# Patient Record
Sex: FEMALE | Race: WHITE | NOT HISPANIC OR LATINO | Employment: PART TIME | URBAN - METROPOLITAN AREA
[De-identification: names, ages, dates, MRNs, and addresses within clinical notes are randomized per-mention and may not be internally consistent; named-entity substitution may affect disease eponyms.]

---

## 2018-02-15 ENCOUNTER — OFFICE VISIT (OUTPATIENT)
Dept: URGENT CARE | Facility: CLINIC | Age: 43
End: 2018-02-15

## 2018-02-15 VITALS
HEART RATE: 72 BPM | BODY MASS INDEX: 30.86 KG/M2 | DIASTOLIC BLOOD PRESSURE: 85 MMHG | RESPIRATION RATE: 18 BRPM | SYSTOLIC BLOOD PRESSURE: 121 MMHG | HEIGHT: 66 IN | TEMPERATURE: 98 F | OXYGEN SATURATION: 97 % | WEIGHT: 192 LBS

## 2018-02-15 DIAGNOSIS — J01.00 ACUTE MAXILLARY SINUSITIS, RECURRENCE NOT SPECIFIED: Primary | ICD-10-CM

## 2018-02-15 LAB
CTP QC/QA: YES
FLUAV AG NPH QL: NEGATIVE
FLUBV AG NPH QL: NEGATIVE

## 2018-02-15 PROCEDURE — 3008F BODY MASS INDEX DOCD: CPT | Mod: S$GLB,,, | Performed by: FAMILY MEDICINE

## 2018-02-15 PROCEDURE — 96372 THER/PROPH/DIAG INJ SC/IM: CPT | Mod: S$GLB,,, | Performed by: FAMILY MEDICINE

## 2018-02-15 PROCEDURE — 87804 INFLUENZA ASSAY W/OPTIC: CPT | Mod: 59,QW,S$GLB, | Performed by: FAMILY MEDICINE

## 2018-02-15 PROCEDURE — 99202 OFFICE O/P NEW SF 15 MIN: CPT | Mod: 25,S$GLB,, | Performed by: FAMILY MEDICINE

## 2018-02-15 RX ORDER — AMOXICILLIN AND CLAVULANATE POTASSIUM 875; 125 MG/1; MG/1
1 TABLET, FILM COATED ORAL 2 TIMES DAILY
Qty: 20 TABLET | Refills: 0 | Status: SHIPPED | OUTPATIENT
Start: 2018-02-15 | End: 2018-02-25

## 2018-02-15 RX ORDER — BETAMETHASONE SODIUM PHOSPHATE AND BETAMETHASONE ACETATE 3; 3 MG/ML; MG/ML
6 INJECTION, SUSPENSION INTRA-ARTICULAR; INTRALESIONAL; INTRAMUSCULAR; SOFT TISSUE
Status: COMPLETED | OUTPATIENT
Start: 2018-02-15 | End: 2018-02-15

## 2018-02-15 RX ADMIN — BETAMETHASONE SODIUM PHOSPHATE AND BETAMETHASONE ACETATE 6 MG: 3; 3 INJECTION, SUSPENSION INTRA-ARTICULAR; INTRALESIONAL; INTRAMUSCULAR; SOFT TISSUE at 11:02

## 2018-02-15 NOTE — PROGRESS NOTES
"Subjective:       Patient ID: Chinyere Crockett is a 42 y.o. female.    Vitals:  height is 5' 6" (1.676 m) and weight is 87.1 kg (192 lb). Her oral temperature is 97.5 °F (36.4 °C). Her blood pressure is 121/85 and her pulse is 72. Her respiration is 18 and oxygen saturation is 97%.     Chief Complaint: Cough    Patient states she been having flu like symptoms for 1 week.      Cough   This is a new problem. The current episode started in the past 7 days. The problem has been gradually worsening. The problem occurs constantly. The cough is non-productive. Associated symptoms include chills, myalgias, nasal congestion and postnasal drip. Pertinent negatives include no chest pain, ear pain, eye redness, fever, headaches, sore throat, shortness of breath or wheezing. The symptoms are aggravated by lying down. Treatments tried: mucinex. The treatment provided mild relief. Her past medical history is significant for bronchitis and pneumonia. There is no history of asthma, COPD or emphysema.     Review of Systems   Constitution: Positive for chills, decreased appetite, weakness and malaise/fatigue. Negative for fever.   HENT: Positive for congestion and postnasal drip. Negative for ear pain, hoarse voice and sore throat.    Eyes: Negative for discharge and redness.   Cardiovascular: Negative for chest pain, dyspnea on exertion and leg swelling.   Respiratory: Positive for cough. Negative for shortness of breath, sputum production and wheezing.    Musculoskeletal: Positive for myalgias.   Gastrointestinal: Negative for abdominal pain and nausea.   Neurological: Negative for headaches.       Objective:      Physical Exam   Constitutional: She appears well-developed and well-nourished.   HENT:   Head: Normocephalic and atraumatic.   Right Ear: Tympanic membrane and ear canal normal.   Left Ear: Tympanic membrane and ear canal normal.   Nose: Mucosal edema and rhinorrhea present. Left sinus exhibits maxillary sinus tenderness and " frontal sinus tenderness.   Mouth/Throat: Posterior oropharyngeal erythema present.   Cardiovascular: Normal rate, regular rhythm and normal heart sounds.    Pulmonary/Chest: Effort normal and breath sounds normal.   Lymphadenopathy:     She has cervical adenopathy.   Nursing note and vitals reviewed.      Assessment:       1. Acute maxillary sinusitis, recurrence not specified        Plan:         Acute maxillary sinusitis, recurrence not specified  -     POCT Influenza A/B  -     amoxicillin-clavulanate 875-125mg (AUGMENTIN) 875-125 mg per tablet; Take 1 tablet by mouth 2 (two) times daily.  Dispense: 20 tablet; Refill: 0  -     betamethasone acetate-betamethasone sodium phosphate injection 6 mg; Inject 1 mL (6 mg total) into the muscle one time.      Recommend continue with muccinex OTC

## 2018-02-15 NOTE — PATIENT INSTRUCTIONS
Acute Sinusitis    Acute sinusitis is irritation and swelling of the sinuses. It is usually caused by a viral infection after a common cold. Your doctor can help you find relief.  What is acute sinusitis?  Sinuses are air-filled spaces in the skull behind the face. They are kept moist and clean by a lining of mucosa. Things such as pollen, smoke, and chemical fumes can irritate the mucosa. It can then swell up. As a response to irritation, the mucosa makes more mucus and other fluids. Tiny hairlike cilia cover the mucosa. Cilia help carry mucus toward the opening of the sinus. Too much mucus may cause the cilia to stop working. This blocks the sinus opening. A buildup of fluid in the sinuses then causes pain and pressure. It can also encourage bacteria to grow in the sinuses.  Common symptoms of acute sinusitis  You may have:  · Facial soreness pain  · Headache  · Fever  · Fluid draining in the back of the throat (postnasal drip)  · Congestion  · Drainage that is thick and colored, instead of clear  · Cough  Diagnosing acute sinusitis  Your doctor will ask about your symptoms and health history. He or she will look at your ear, nose, and throat. You usually won't need to have X-rays taken.    The doctor may take a sample of mucus to check for bacteria. If you have sinusitis that keeps coming back, you may need imaging tests such as X-rays or CAT scans. This will help your doctor check for a structural problem that may be causing the infection.  Treating acute sinusitis  Treatment is aimed at unblocking the sinus opening and helping the cilia work again. You may need to take antihistamine and decongestant medicine. These can reduce inflammation and decrease the amount of fluid your sinuses make. If you have a bacterial infection, you will need to take antibiotic medicine for 10 to 14 days. Take this medicine until it is gone, even if you feel better.  Date Last Reviewed: 10/1/2016  © 2527-4652 [employer] [address] [place] [id number]. All rights reserved. This information is not intended as a substitute for professional medical care. Always follow your healthcare professional's instructions.

## 2018-03-19 ENCOUNTER — HOSPITAL ENCOUNTER (EMERGENCY)
Facility: OTHER | Age: 43
Discharge: HOME | End: 2018-03-19
Attending: EMERGENCY MEDICINE
Payer: OTHER MISCELLANEOUS

## 2018-03-19 VITALS
RESPIRATION RATE: 16 BRPM | DIASTOLIC BLOOD PRESSURE: 63 MMHG | HEIGHT: 67 IN | HEART RATE: 77 BPM | OXYGEN SATURATION: 97 % | SYSTOLIC BLOOD PRESSURE: 128 MMHG | WEIGHT: 190.25 LBS | TEMPERATURE: 98 F | BODY MASS INDEX: 29.86 KG/M2

## 2018-03-19 DIAGNOSIS — T14.90XA INJURY: ICD-10-CM

## 2018-03-19 DIAGNOSIS — M54.6 ACUTE BILATERAL THORACIC BACK PAIN: ICD-10-CM
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
## 2018-03-19 DIAGNOSIS — M25.572 ACUTE LEFT ANKLE PAIN: Primary | ICD-10-CM

## 2018-03-19 LAB
B-HCG UR QL: NEGATIVE
CTP QC/QA: YES

## 2018-03-19 PROCEDURE — 81025 URINE PREGNANCY TEST: CPT | Performed by: EMERGENCY MEDICINE

## 2018-03-19 PROCEDURE — 63600175 PHARM REV CODE 636 W HCPCS: Performed by: PHYSICIAN ASSISTANT

## 2018-03-19 PROCEDURE — 96372 THER/PROPH/DIAG INJ SC/IM: CPT

## 2018-03-19 PROCEDURE — 99284 EMERGENCY DEPT VISIT MOD MDM: CPT | Mod: 25

## 2018-03-19 RX ORDER — KETOROLAC TROMETHAMINE 30 MG/ML
30 INJECTION, SOLUTION INTRAMUSCULAR; INTRAVENOUS
Status: COMPLETED | OUTPATIENT
Start: 2018-03-19 | End: 2018-03-19

## 2018-03-19 RX ORDER — METHOCARBAMOL 500 MG/1
1000 TABLET, FILM COATED ORAL 3 TIMES DAILY
Qty: 18 TABLET | Refills: 0 | Status: SHIPPED | OUTPATIENT
Start: 2018-03-19 | End: 2018-03-22

## 2018-03-19 RX ORDER — IBUPROFEN 600 MG/1
600 TABLET ORAL EVERY 6 HOURS PRN
Qty: 20 TABLET | Refills: 0 | Status: SHIPPED | OUTPATIENT
Start: 2018-03-19 | End: 2020-09-23

## 2018-03-19 RX ADMIN — KETOROLAC TROMETHAMINE 30 MG: 30 INJECTION, SOLUTION INTRAMUSCULAR at 06:03

## 2018-03-19 NOTE — ED NOTES
NEURO: Pt AAO x 4. Behavior and speech appropriate to situation.   CARDIAC: Regular rhythm auscultated  RESPIRATORY: Respirations even and unlabored. Breath sounds clear to all lung fields.   MUSCULOSKELETAL: Active ROM noted to extremities. Tenderness to lower thoracic back.

## 2018-03-19 NOTE — ED TRIAGE NOTES
"Pt presents with fall, onset 1150 today. Pt slipped on floor and fell backward and landed on her back. Pt hit left ankle on toilet. Pt also reporting pain to left hip. Pt denies any head trauma, denies LOC. Abrasion noted to left lateral ankle. Pain 7/10, described as "burning, dull ache". Pt denies any paresthesias. Pt took ibuprofen X 2 PTA. Pt ambulated to exam room with limp. Pt in NAD.   "

## 2018-03-19 NOTE — ED PROVIDER NOTES
Encounter Date: 3/19/2018       History     Chief Complaint   Patient presents with    Fall     + slip & fall at work, + bilateral lower back pains, left side ankle pains. Denies hitting head or LOC at time of fall. No neck pains reported     42-year-old female with no significant past medical history presents to the emergency department with complaints of mid to low back pain and left ankle pain status post slip and fall in the bathroom at work.  She states that she attempted to step back and fell hitting the concrete floor.  She states that she is unsure of what she her left ankle on.  She denies injury or LOC.  She denies numbness or weakness.  She denies loss of bowel bladder function.  She admits to taking 400 mg of ibuprofen without relief of her symptoms.  She denies confusion, lightheadedness, nausea, vomiting.  She complains of pain that is a 7 out of 10.      The history is provided by the patient and the spouse.     Review of patient's allergies indicates:   Allergen Reactions    Hydrocodone      Dizziness and nausea      History reviewed. No pertinent past medical history.  Past Surgical History:   Procedure Laterality Date    TONSILLECTOMY       History reviewed. No pertinent family history.  Social History   Substance Use Topics    Smoking status: Never Smoker    Smokeless tobacco: Never Used    Alcohol use Yes      Comment: occasionally     Review of Systems   Constitutional: Negative for chills and fever.   HENT: Negative for sore throat.    Respiratory: Negative for shortness of breath.    Cardiovascular: Negative for chest pain.   Gastrointestinal: Negative for nausea and vomiting.   Genitourinary: Negative for difficulty urinating and dysuria.   Musculoskeletal: Positive for arthralgias, back pain, gait problem, joint swelling and myalgias. Negative for neck pain and neck stiffness.   Skin: Negative for rash.   Neurological: Negative for syncope, weakness, light-headedness, numbness and  headaches.   Hematological: Does not bruise/bleed easily.   Psychiatric/Behavioral: Negative for confusion.       Physical Exam     Initial Vitals [03/19/18 1358]   BP Pulse Resp Temp SpO2   134/61 87 16 98.2 °F (36.8 °C) 98 %      MAP       85.33         Physical Exam    Nursing note and vitals reviewed.  Constitutional: Vital signs are normal. She appears well-developed and well-nourished. She is not diaphoretic.  Non-toxic appearance. No distress.   HENT:   Head: Normocephalic and atraumatic.   Right Ear: External ear normal.   Left Ear: External ear normal.   Nose: Nose normal.   Eyes: Conjunctivae, EOM and lids are normal. Pupils are equal, round, and reactive to light. No scleral icterus.   Neck: Normal range of motion and phonation normal. Neck supple. No spinous process tenderness and no muscular tenderness present. Normal range of motion present. No neck rigidity.   Cardiovascular: Normal rate, regular rhythm, normal heart sounds, intact distal pulses and normal pulses. Exam reveals no gallop, no friction rub and no decreased pulses.    No murmur heard.  Pulses:       Dorsalis pedis pulses are 2+ on the right side, and 2+ on the left side.   Pulmonary/Chest: Effort normal and breath sounds normal. No respiratory distress. She has no decreased breath sounds. She has no wheezes. She has no rhonchi. She has no rales.   Abdominal: Normal appearance.   Musculoskeletal: Normal range of motion.        Left ankle: She exhibits normal range of motion, no swelling, no ecchymosis, no deformity, no laceration and normal pulse. Tenderness. Lateral malleolus tenderness found. No medial malleolus, no AITFL, no head of 5th metatarsal and no proximal fibula tenderness found. Achilles tendon normal. Achilles tendon exhibits no pain and no defect.        Thoracic back: She exhibits tenderness, bony tenderness and pain. She exhibits no swelling, no edema, no deformity and no laceration.        Arms:  No obvious deformities,  moving all extremities, normal gait  No midline tenderness palpation to the cervical or lumbar spine.  Diffuse tenderness palpation to the thoracic spine.  No bruising or abrasions.  No paraspinal muscle tenderness palpation.  Left ankle with pain and tenderness to lateral malleolus with superficial overlying abrasion to the lateral malleolus.  Intact distal pulses and no sensory deficits.  Capillary refill less than 3 seconds.   Neurological: She is alert and oriented to person, place, and time. She has normal strength. She displays no atrophy. No sensory deficit. She exhibits normal muscle tone. Coordination and gait normal. GCS eye subscore is 4. GCS verbal subscore is 5. GCS motor subscore is 6.   Skin: Skin is warm and dry. Capillary refill takes less than 2 seconds. Abrasion noted. No bruising, no ecchymosis, no laceration, no lesion and no rash noted. No erythema.   Psychiatric: She has a normal mood and affect. Her speech is normal and behavior is normal. Judgment normal. Cognition and memory are normal.         ED Course   Procedures  Labs Reviewed   POCT URINE PREGNANCY         Imaging Results          X-Ray Thoracic Spine AP Lateral (Final result)  Result time 03/19/18 18:05:46    Final result by oB Brar MD (03/19/18 18:05:46)                 Impression:      No displaced fracture, dislocation or significant listhesis.      Electronically signed by: Bo Brar MD  Date:    03/19/2018  Time:    18:05             Narrative:    EXAMINATION:  XR THORACIC SPINE AP LATERAL    CLINICAL HISTORY:  Injury, unspecified, initial encounter    TECHNIQUE:  AP and lateral views of the thoracic spine    COMPARISON:  None    FINDINGS:  Bones are well mineralized.  There is S-type curvature of the thoracolumbar spine.  Normal thoracic kyphosis is observed.  Vertebral body and intervertebral disc space heights appear relatively maintained.  No acute displaced fracture, dislocation or significant listhesis.   Included surrounding soft tissues are within normal limits.                               X-Ray Ankle Complete Left (Final result)  Result time 03/19/18 18:04:56    Final result by Minerva Rucker MD (03/19/18 18:04:56)                 Impression:      There is soft tissue swelling about the ankle.      Electronically signed by: Minerva Rucker MD  Date:    03/19/2018  Time:    18:04             Narrative:    EXAMINATION:  XR ANKLE COMPLETE 3 VIEW LEFT    CLINICAL HISTORY:  Injury, unspecified, initial encounter    TECHNIQUE:  AP, lateral and oblique views of the left ankle were performed.    COMPARISON:  None    FINDINGS:  There is no evidence fracture or malalignment.  There is soft tissue swelling of the ankle.                                     Medical Decision Making:   History:   Old Medical Records: I decided to obtain old medical records.  Initial Assessment:   42-year-old female with complaints consistent with left ankle pain and swelling as well as thoracic back pain status post fall.  Vital signs stable, afebrile, neurovascular intact.  She is alert, healthy and nontoxic appearing.  She is in no apparent distress.  Exam documented above.  She is answering the emergency department.  Doubt fracture or dislocation. No clinical of evidence of spinal cord compression or cauda equina syndrome.  Independently Interpreted Test(s):   I have ordered and independently interpreted X-rays - see prior notes.  Clinical Tests:   Lab Tests: Ordered and Reviewed  Radiological Study: Ordered and Reviewed  ED Management:  X-rays obtained of the thoracic spine and left ankle and independently interpreted by myself with no evidence of fracture, dislocation, subluxation.  She was interested Toradol in the emergency department.  She is stable will be discharged home with prescriptions for ibuprofen and Robaxin as well as care instructions.  She is to follow-up with her doctor the next 48 hours or return for any worsening  signs or symptoms.  She states understanding.  This patient was discussed with the attending physician who agrees with treatment plan.  Other:   I have discussed this case with another health care provider.       <> Summary of the Discussion: Clyde  This note was created using Dragon Medical dictation.  There may be typographical errors secondary to dictation.                        Clinical Impression:     1. Acute left ankle pain    2. Injury    3. Acute bilateral thoracic back pain    4. Fall, initial encounter        Disposition:   Disposition: Discharged  Condition: Stable                        Catrina Bonilla PA-C  03/19/18 0880

## 2020-09-23 ENCOUNTER — OFFICE VISIT (OUTPATIENT)
Dept: INTERNAL MEDICINE | Facility: CLINIC | Age: 45
End: 2020-09-23
Payer: OTHER GOVERNMENT

## 2020-09-23 ENCOUNTER — IMMUNIZATION (OUTPATIENT)
Dept: INTERNAL MEDICINE | Facility: CLINIC | Age: 45
End: 2020-09-23
Payer: OTHER GOVERNMENT

## 2020-09-23 ENCOUNTER — HOSPITAL ENCOUNTER (OUTPATIENT)
Dept: RADIOLOGY | Facility: HOSPITAL | Age: 45
Discharge: HOME | End: 2020-09-23
Attending: FAMILY MEDICINE
Payer: OTHER GOVERNMENT

## 2020-09-23 VITALS
HEIGHT: 66 IN | SYSTOLIC BLOOD PRESSURE: 122 MMHG | TEMPERATURE: 98 F | DIASTOLIC BLOOD PRESSURE: 84 MMHG | BODY MASS INDEX: 40.57 KG/M2 | HEART RATE: 93 BPM | OXYGEN SATURATION: 98 % | WEIGHT: 252.44 LBS

## 2020-09-23 DIAGNOSIS — Z23 NEED FOR INFLUENZA VACCINATION: ICD-10-CM

## 2020-09-23 DIAGNOSIS — Z12.39 SCREENING FOR BREAST CANCER: ICD-10-CM

## 2020-09-23 DIAGNOSIS — M54.9 DORSALGIA, UNSPECIFIED: ICD-10-CM

## 2020-09-23 DIAGNOSIS — M25.551 HIP PAIN, BILATERAL: ICD-10-CM

## 2020-09-23 DIAGNOSIS — M25.552 HIP PAIN, BILATERAL: ICD-10-CM

## 2020-09-23 DIAGNOSIS — F41.9 ANXIETY: ICD-10-CM

## 2020-09-23 DIAGNOSIS — Z01.419 WELL WOMAN EXAM WITH ROUTINE GYNECOLOGICAL EXAM: Primary | ICD-10-CM

## 2020-09-23 DIAGNOSIS — R20.0 NUMBNESS OF LEFT FOOT: ICD-10-CM

## 2020-09-23 PROCEDURE — 72070 XR THORACIC SPINE AP LATERAL: ICD-10-PCS | Mod: 26,,, | Performed by: RADIOLOGY

## 2020-09-23 PROCEDURE — 72100 X-RAY EXAM L-S SPINE 2/3 VWS: CPT | Mod: 26,,, | Performed by: RADIOLOGY

## 2020-09-23 PROCEDURE — 72070 X-RAY EXAM THORAC SPINE 2VWS: CPT | Mod: 26,,, | Performed by: RADIOLOGY

## 2020-09-23 PROCEDURE — 90686 IIV4 VACC NO PRSV 0.5 ML IM: CPT | Mod: ,,, | Performed by: FAMILY MEDICINE

## 2020-09-23 PROCEDURE — 90471 FLU VACCINE (QUAD) GREATER THAN OR EQUAL TO 3YO PRESERVATIVE FREE IM: ICD-10-PCS | Mod: ,,, | Performed by: FAMILY MEDICINE

## 2020-09-23 PROCEDURE — 99215 OFFICE O/P EST HI 40 MIN: CPT | Mod: PBBFAC,25 | Performed by: FAMILY MEDICINE

## 2020-09-23 PROCEDURE — 99999 PR PBB SHADOW E&M-EST. PATIENT-LVL V: ICD-10-PCS | Mod: PBBFAC,,, | Performed by: FAMILY MEDICINE

## 2020-09-23 PROCEDURE — 72100 XR LUMBAR SPINE 2 OR 3 VIEWS: ICD-10-PCS | Mod: 26,,, | Performed by: RADIOLOGY

## 2020-09-23 PROCEDURE — 99999 PR PBB SHADOW E&M-EST. PATIENT-LVL V: CPT | Mod: PBBFAC,,, | Performed by: FAMILY MEDICINE

## 2020-09-23 PROCEDURE — 99203 OFFICE O/P NEW LOW 30 MIN: CPT | Mod: S$PBB,25,, | Performed by: FAMILY MEDICINE

## 2020-09-23 PROCEDURE — 72070 X-RAY EXAM THORAC SPINE 2VWS: CPT | Mod: TC

## 2020-09-23 PROCEDURE — 90686 IIV4 VACC NO PRSV 0.5 ML IM: CPT | Mod: PBBFAC

## 2020-09-23 PROCEDURE — 73521 X-RAY EXAM HIPS BI 2 VIEWS: CPT | Mod: TC

## 2020-09-23 PROCEDURE — 90471 IMMUNIZATION ADMIN: CPT | Mod: ,,, | Performed by: FAMILY MEDICINE

## 2020-09-23 PROCEDURE — 73521 X-RAY EXAM HIPS BI 2 VIEWS: CPT | Mod: 26,,, | Performed by: RADIOLOGY

## 2020-09-23 PROCEDURE — 72100 X-RAY EXAM L-S SPINE 2/3 VWS: CPT | Mod: TC

## 2020-09-23 PROCEDURE — 90686 FLU VACCINE (QUAD) GREATER THAN OR EQUAL TO 3YO PRESERVATIVE FREE IM: ICD-10-PCS | Mod: ,,, | Performed by: FAMILY MEDICINE

## 2020-09-23 PROCEDURE — 99203 PR OFFICE/OUTPT VISIT, NEW, LEVL III, 30-44 MIN: ICD-10-PCS | Mod: S$PBB,25,, | Performed by: FAMILY MEDICINE

## 2020-09-23 PROCEDURE — 73521 XR HIPS BILATERAL 2 VIEW INCL AP PELVIS: ICD-10-PCS | Mod: 26,,, | Performed by: RADIOLOGY

## 2020-09-23 PROCEDURE — G0008 ADMIN INFLUENZA VIRUS VAC: HCPCS | Mod: PBBFAC

## 2020-09-23 RX ORDER — MELOXICAM 15 MG/1
15 TABLET ORAL DAILY
Qty: 30 TABLET | Refills: 1 | Status: SHIPPED | OUTPATIENT
Start: 2020-09-23 | End: 2020-11-06

## 2020-09-23 RX ORDER — TIZANIDINE HYDROCHLORIDE 4 MG/1
1 CAPSULE, GELATIN COATED ORAL EVERY 6 HOURS PRN
Qty: 30 CAPSULE | Refills: 1 | Status: SHIPPED | OUTPATIENT
Start: 2020-09-23 | End: 2020-11-13 | Stop reason: SDUPTHER

## 2020-09-23 RX ORDER — DULOXETIN HYDROCHLORIDE 30 MG/1
30 CAPSULE, DELAYED RELEASE ORAL DAILY
Qty: 30 CAPSULE | Refills: 0 | Status: SHIPPED | OUTPATIENT
Start: 2020-09-23 | End: 2020-11-06 | Stop reason: SDUPTHER

## 2020-09-23 NOTE — PATIENT INSTRUCTIONS
"  Paraesthesias  Paraesthesia is a burning or prickling sensation that is sometimes felt in the hands, arms, legs or feet. It can also occur in other parts of the body. It can also feel like tingling or numbness, skin crawling, or itching. The feeling is not comfortable, but it is not painful. (The "pins and needles" feeling that happens when a foot or hand "falls asleep" is a temporary paraesthesia.)  Paraesthesias that last or come and go may be caused by medical issues that need to be treated. These include stroke, a bulging disk pressing on a nerve, a trapped nerve, vitamin deficiencies, or even certain medicines.  Tests are often done. These tests may include blood tests, X-ray, CT (computerized tomography) scan, or a muscle test (electromyography). Depending on the cause, treatment may include physical therapy.  Home care  · Tell the healthcare provider about all medicines you take. This includes prescription and over-the-counter medicines, vitamins, and herbs. Ask if any of the medicines may be causing your problems. Do not make any changes to prescription medicines without talking to your healthcare provider first.  · You may be prescribed medicines to help relieve the tingling feeling or for pain. Take all medicines as directed.  · A numb hand or foot may be more prone to injury. To help protect it:  ¨ Always use oven mitts.  ¨ Test water with an unaffected hand or foot.  ¨ Use caution when trimming nails. File sharp areas.  ¨ Wear shoes that fit well to avoid pressure points, blisters, and ulcers.  ¨ Inspect your hands and feet carefully (including the soles of your feet and between your toes) at least once a week. If you see red areas, sores, or other problems, tell your healthcare provider.  Follow-up care  Follow up with your doctor or as advised by our staff. You may need further testing or evaluation.  When to seek medical advice  Call your healthcare provider right away if any of the following " occur:  · Numbness or weakness of the face, one arm, or one leg  · Slurred speech, confusion, trouble speaking, walking, or seeing  · Severe headache, fainting spell, dizziness, or seizure  · Chest, arm, neck, or upper back pain  · Loss of bladder or bowel control  · Open wound with redness, swelling, or pus  Date Last Reviewed: 9/25/2015  © 9802-2953 [employer]. [address] [place] [address], [place] [address]. All rights reserved. This information is not intended as a substitute for professional medical care. Always follow your healthcare professional's instructions.

## 2020-09-23 NOTE — PROGRESS NOTES
Subjective:       Patient ID: Chinyere Crockett is a 45 y.o. female.    Chief Complaint:   Establish Care, Back Pain, Anxiety, Foot Pain (lt foot pain), and Hip Pain (soni hip pain)    Low-back Pain  This is a chronic problem. Episode onset: 2017. The problem occurs constantly. The problem has been gradually worsening. Pertinent negatives include no change in bowel habit, chest pain or chills. The symptoms are aggravated by stress and twisting. She has tried NSAIDs for the symptoms. The treatment provided mild relief. Started when she slipped on a wet floor and fell on her back and side.  Had PT.  Was better.  Starting to get worse again.  No new trauma.  Hip Pain   There was no injury mechanism. Pain location: both hips. The pain is moderate. The pain has been fluctuating since onset. She has tried NSAIDs for the symptoms. The treatment provided mild relief.   Foot Numbness  Entire sole of right foot for 2 years.      Anxiety  Presents for initial visit. Onset was more than 5 years ago. The problem has been gradually worsening (her  will be deployed in 3 weeks.  Pt doesn't drive much, especially in city traffic). Patient reports no chest pain or shortness of breath. Symptoms occur most days. The severity of symptoms is moderate.       Has a hard time sleeping.  Can't turn her brain off.  Wakes up tight and tense in her back.  She'd like to try medication.    Review of Systems   Constitutional: Negative for activity change, appetite change and fever.   Respiratory: Negative for cough and shortness of breath.    Cardiovascular: Negative for chest pain.   Genitourinary: Negative for difficulty urinating.     Current Outpatient Medications   Medication Sig    acetaminophen (TYLENOL ORAL) Take by mouth as needed.     No current facility-administered medications for this visit.      History reviewed. No pertinent past medical history.  History reviewed. No pertinent family history.  Social History     Tobacco Use     "Smoking status: Never Smoker    Smokeless tobacco: Never Used   Substance Use Topics    Alcohol use: Yes     Comment: occasionally    Drug use: No       Objective:      Vitals:    09/23/20 0816   BP: 122/84   BP Location: Right arm   Pulse: 93   Temp: 98.3 °F (36.8 °C)   SpO2: 98%   Weight: 114.5 kg (252 lb 6.8 oz)   Height: 5' 6" (1.676 m)     Physical Exam  Vitals signs and nursing note reviewed.   Constitutional:       General: She is not in acute distress.     Appearance: She is well-developed. She is not diaphoretic.   HENT:      Head: Normocephalic and atraumatic.   Eyes:      Conjunctiva/sclera: Conjunctivae normal.   Neck:      Musculoskeletal: Normal range of motion and neck supple.   Cardiovascular:      Rate and Rhythm: Normal rate and regular rhythm.      Heart sounds: Normal heart sounds. No murmur. No friction rub. No gallop.    Pulmonary:      Effort: Pulmonary effort is normal.      Breath sounds: Normal breath sounds. No wheezing or rales.   Musculoskeletal:         General: No deformity.      Comments: Back:  TTP L lower thoracic, upper lumbar area.  Spasm.  Neg SLR.  Patellar DTR's 2+,=.    Sole of L foot:  Decreased sensation to LT.  No redness, swelling, or warmth.   Skin:     General: Skin is warm and dry.   Neurological:      Mental Status: She is alert and oriented to person, place, and time.   Psychiatric:         Behavior: Behavior normal.              Assessment and Plan:     Well woman exam with routine gynecological exam  -     Ambulatory referral/consult to Obstetrics / Gynecology; Future; Expected date: 09/30/2020    Screening for breast cancer  -     Mammo Digital Screening Bilat; Future; Expected date: 09/23/2020    Need for influenza vaccination    Dorsalgia, unspecified  -     X-Ray Thoracic Spine AP Lateral; Future; Expected date: 09/23/2020  -     X-Ray Lumbar Spine 2 Or 3 Views; Future; Expected date: 09/23/2020  -     tiZANidine 4 mg Cap; Take 1 capsule by mouth every 6 " (six) hours as needed.  Dispense: 30 capsule; Refill: 1  -     meloxicam (MOBIC) 15 MG tablet; Take 1 tablet (15 mg total) by mouth once daily.  Dispense: 30 tablet; Refill: 1    Hip pain, bilateral  -     Cancel: X-Ray Hip 2 or 3 views Left; Future; Expected date: 09/23/2020  -     Cancel: X-Ray Hip 2 or 3 views Right; Future; Expected date: 09/23/2020  -     X-Ray Hips Bilateral 2 View Incl AP Pelvis; Future; Expected date: 09/23/2020    Numbness of left foot  -     EMG W/ ULTRASOUND AND NERVE CONDUCTION TEST 2 Extremities; Future    Anxiety  -     DULoxetine (CYMBALTA) 30 MG capsule; Take 1 capsule (30 mg total) by mouth once daily.  Dispense: 30 capsule; Refill: 0          Follow up in about 1 month (around 10/23/2020) for annual exam and follow up.      Heri Murphy MD

## 2020-09-25 ENCOUNTER — HOSPITAL ENCOUNTER (OUTPATIENT)
Dept: RADIOLOGY | Facility: OTHER | Age: 45
Discharge: HOME | End: 2020-09-25
Attending: FAMILY MEDICINE
Payer: OTHER GOVERNMENT

## 2020-09-25 ENCOUNTER — OFFICE VISIT (OUTPATIENT)
Dept: OBSTETRICS AND GYNECOLOGY | Facility: CLINIC | Age: 45
End: 2020-09-25
Payer: OTHER GOVERNMENT

## 2020-09-25 VITALS
DIASTOLIC BLOOD PRESSURE: 80 MMHG | WEIGHT: 252.19 LBS | SYSTOLIC BLOOD PRESSURE: 110 MMHG | HEIGHT: 66 IN | BODY MASS INDEX: 40.53 KG/M2

## 2020-09-25 DIAGNOSIS — Z01.419 WELL WOMAN EXAM WITH ROUTINE GYNECOLOGICAL EXAM: ICD-10-CM

## 2020-09-25 DIAGNOSIS — Z12.39 SCREENING FOR BREAST CANCER: ICD-10-CM

## 2020-09-25 PROCEDURE — 88175 CYTOPATH C/V AUTO FLUID REDO: CPT

## 2020-09-25 PROCEDURE — 99386 PR PREVENTIVE VISIT,NEW,40-64: ICD-10-PCS | Mod: S$PBB,,, | Performed by: STUDENT IN AN ORGANIZED HEALTH CARE EDUCATION/TRAINING PROGRAM

## 2020-09-25 PROCEDURE — 77063 BREAST TOMOSYNTHESIS BI: CPT | Mod: 26,,, | Performed by: RADIOLOGY

## 2020-09-25 PROCEDURE — 87624 HPV HI-RISK TYP POOLED RSLT: CPT

## 2020-09-25 PROCEDURE — 77067 SCR MAMMO BI INCL CAD: CPT | Mod: 26,,, | Performed by: RADIOLOGY

## 2020-09-25 PROCEDURE — 99213 OFFICE O/P EST LOW 20 MIN: CPT | Mod: PBBFAC | Performed by: STUDENT IN AN ORGANIZED HEALTH CARE EDUCATION/TRAINING PROGRAM

## 2020-09-25 PROCEDURE — 77067 MAMMO DIGITAL SCREENING BILAT WITH TOMO: ICD-10-PCS | Mod: 26,,, | Performed by: RADIOLOGY

## 2020-09-25 PROCEDURE — 77067 SCR MAMMO BI INCL CAD: CPT | Mod: TC

## 2020-09-25 PROCEDURE — 99999 PR PBB SHADOW E&M-EST. PATIENT-LVL III: ICD-10-PCS | Mod: PBBFAC,,, | Performed by: STUDENT IN AN ORGANIZED HEALTH CARE EDUCATION/TRAINING PROGRAM

## 2020-09-25 PROCEDURE — 99386 PREV VISIT NEW AGE 40-64: CPT | Mod: S$PBB,,, | Performed by: STUDENT IN AN ORGANIZED HEALTH CARE EDUCATION/TRAINING PROGRAM

## 2020-09-25 PROCEDURE — 77063 MAMMO DIGITAL SCREENING BILAT WITH TOMO: ICD-10-PCS | Mod: 26,,, | Performed by: RADIOLOGY

## 2020-09-25 PROCEDURE — 99999 PR PBB SHADOW E&M-EST. PATIENT-LVL III: CPT | Mod: PBBFAC,,, | Performed by: STUDENT IN AN ORGANIZED HEALTH CARE EDUCATION/TRAINING PROGRAM

## 2020-09-25 NOTE — PROGRESS NOTES
Subjective:       Patient ID: Chinyere Crockett is a 45 y.o. female.    Chief Complaint:  Well Woman      History of Present Illness  HPI  45 y.o. female  here for annual. Otherwise no complaints.  Menopause occurred 1-2 years ago (approximate). Denies postmenopausal bleeding. Hot flashes were bad initially but now they are rare.  She is sexually active,  denies issues.    Last Pap: unknown  History of abnormal pap: No  Last MMG: unknown  Colonoscopy: n/a    Denies FHx breast, ovarian, uterine, colon cancer      GYN & OB History  No LMP recorded. Patient is perimenopausal.   Date of Last Pap: No result found    OB History    Para Term  AB Living   0 0 0 0 0 0   SAB TAB Ectopic Multiple Live Births   0 0 0 0 0       Review of Systems  Review of Systems   Constitutional: Negative for chills and fever.   HENT: Negative for nasal congestion.    Respiratory: Negative for shortness of breath.    Cardiovascular: Negative for chest pain and leg swelling.   Gastrointestinal: Negative for abdominal pain, constipation, diarrhea, nausea and vomiting.   Endocrine: Negative for hot flashes.   Genitourinary: Negative for dysuria, menstrual problem, pelvic pain, vaginal discharge and vaginal dryness.   Musculoskeletal: Negative for myalgias.   Integumentary:  Negative for rash, breast mass, nipple discharge, breast skin changes and breast tenderness.   Neurological: Negative for headaches.   Psychiatric/Behavioral: Negative for depression. The patient is not nervous/anxious.    Breast: Negative for lump, mass, mastodynia, nipple discharge, skin changes and tenderness          Objective:    Physical Exam:   Constitutional: She is oriented to person, place, and time. She appears well-developed and well-nourished. No distress.    HENT:   Head: Normocephalic and atraumatic.    Eyes: Conjunctivae are normal.    Neck: Normal range of motion. No thyromegaly present.    Cardiovascular: Exam reveals no edema.      Pulmonary/Chest: Effort normal. No respiratory distress. Right breast exhibits no inverted nipple, no mass, no nipple discharge, no skin change, no tenderness and no swelling. Left breast exhibits no inverted nipple, no mass, no nipple discharge, no skin change, no tenderness and no swelling.        Abdominal: Soft. She exhibits no distension and no mass. There is no abdominal tenderness. There is no rebound and no guarding. No hernia.     Genitourinary:    Uterus, right adnexa and left adnexa normal.   There is no rash, tenderness, lesion or injury on the right labia. There is no rash, tenderness, lesion or injury on the left labia. Uterus is not enlarged, not fixed and not tender. Cervix is normal. There is a normal right adnexa and a normal left adnexa. Right adnexum displays no mass, no tenderness and no fullness. Left adnexum displays no mass, no tenderness and no fullness. No bleeding in the vagina. Cervix exhibits no motion tenderness and no friability.    Genitourinary Comments: Uterine/adnexal exam unremarkable but rather limited by habitus   negative for vaginal discharge          Musculoskeletal: Normal range of motion and moves all extremeties. No tenderness or edema.       Neurological: She is alert and oriented to person, place, and time.    Skin: Skin is warm and dry.    Psychiatric: She has a normal mood and affect.          Assessment:        1. Well woman exam with routine gynecological exam               Plan:       Chinyere was seen today for well woman.    Diagnoses and all orders for this visit:    Well woman exam with routine gynecological exam  -     Ambulatory referral/consult to Obstetrics / Gynecology  -     Liquid-Based Pap Smear, Screening  -     HPV High Risk Genotypes, PCR      CERVIX: cotesting today  BREAST: CBE wnl today. Breast self awareness discussed. MMG scheduled  COLON: age 48  BONE HEALTH: Recommend Calcium 1,200 mg and Vitamin D 600 IU daily   STI: declines screening     AHA  recommendation for 150 minutes of moderate-intensity aerobic exercise/week    Orders Placed This Encounter   Procedures    HPV High Risk Genotypes, PCR       Follow up in about 1 year (around 9/25/2021).

## 2020-09-25 NOTE — LETTER
September 25, 2020      Heri Murphy MD  [address]  [facility] [address]           [facility] [address]  [address] [facility] [address]  [facility] [address]  Phone: [phone]  Fax: [phone]          Patient: Chinyere Crockett   MR Number: [id number]   YOB: 1975   Date of Visit: 9/25/2020       Dear Dr. Heri Murphy:    Thank you for referring Chinyere Crockett to me for evaluation. Attached you will find relevant portions of my assessment and plan of care.    If you have questions, please do not hesitate to call me. I look forward to following Chinyere Crockett along with you.    Sincerely,    Pema Carlos MD    Enclosure  CC:  No Recipients    If you would like to receive this communication electronically, please contact [email or web address] or [phone] to request more information on [employer] Link access.    For providers and/or their staff who would like to refer a patient to [facility], please contact us through our one-stop-shop provider referral line, [facility], at [phone].    If you feel you have received this communication in error or would no longer like to receive these types of communications, please e-mail [email or web address]

## 2020-10-01 LAB
HPV HR 12 DNA SPEC QL NAA+PROBE: NEGATIVE
HPV16 AG SPEC QL: NEGATIVE
HPV18 DNA SPEC QL NAA+PROBE: NEGATIVE

## 2020-10-16 LAB
FINAL PATHOLOGIC DIAGNOSIS: NORMAL
Lab: NORMAL

## 2020-10-30 ENCOUNTER — NURSE TRIAGE (OUTPATIENT)
Dept: ADMINISTRATIVE | Facility: CLINIC | Age: 45
End: 2020-10-30

## 2020-10-30 NOTE — TELEPHONE ENCOUNTER
Calling regarding medication side effects. Has a follow up on Monday. Taking Meloxicam-rashes, tarry bloody stools. Stopped taking medication yesterday and has gotten better. Declined triage. Would like to send message to MD to see his advice. RN will send message to Dr. Murphy. Advised pt to call back with questions or concerns. Transferred pt to MD office. Advised pt to call back if unable to get in contact with Dr Murphy.    Reason for Disposition   Caller has NON-URGENT medication question about med that PCP or specialist prescribed and triager unable to answer question    Protocols used: MEDICATION QUESTION CALL-A-OH

## 2020-11-02 ENCOUNTER — TELEPHONE (OUTPATIENT)
Dept: INTERNAL MEDICINE | Facility: CLINIC | Age: 45
End: 2020-11-02

## 2020-11-02 NOTE — TELEPHONE ENCOUNTER
----- Message from KaiShaqabimael Krishnamurthy sent at 11/2/2020  8:14 AM CST -----  Regarding: Call  Contact: Patient  Type: Patient Call Back    Who called: Patient    What is the request in detail:Patient is requesting a call back. She states appt was r/s but she needs an injection. Please advise.    Can the clinic reply by [facility]? No    Would the patient rather a call back or a response via [facility]? Call    Best call back number: [phone] (home)     Additional Information:    Thanks

## 2020-11-02 NOTE — TELEPHONE ENCOUNTER
Spoke to the Pt's spouse he will be deployed in the next 2 days for an assignment  Rescheduled for this Friday at 11:30 am to f/u with Dr. Murphy    Full

## 2020-11-06 ENCOUNTER — OFFICE VISIT (OUTPATIENT)
Dept: INTERNAL MEDICINE | Facility: CLINIC | Age: 45
End: 2020-11-06
Payer: OTHER GOVERNMENT

## 2020-11-06 VITALS
OXYGEN SATURATION: 98 % | DIASTOLIC BLOOD PRESSURE: 74 MMHG | WEIGHT: 254.44 LBS | BODY MASS INDEX: 40.89 KG/M2 | HEIGHT: 66 IN | HEART RATE: 74 BPM | SYSTOLIC BLOOD PRESSURE: 98 MMHG

## 2020-11-06 DIAGNOSIS — M54.9 DORSALGIA, UNSPECIFIED: ICD-10-CM

## 2020-11-06 DIAGNOSIS — M25.551 HIP PAIN, BILATERAL: ICD-10-CM

## 2020-11-06 DIAGNOSIS — F41.9 ANXIETY: ICD-10-CM

## 2020-11-06 DIAGNOSIS — M25.552 HIP PAIN, BILATERAL: ICD-10-CM

## 2020-11-06 DIAGNOSIS — Z00.00 ANNUAL PHYSICAL EXAM: Primary | ICD-10-CM

## 2020-11-06 PROCEDURE — 99214 OFFICE O/P EST MOD 30 MIN: CPT | Mod: PBBFAC | Performed by: FAMILY MEDICINE

## 2020-11-06 PROCEDURE — 99396 PR PREVENTIVE VISIT,EST,40-64: ICD-10-PCS | Mod: S$PBB,,, | Performed by: FAMILY MEDICINE

## 2020-11-06 PROCEDURE — 99999 PR PBB SHADOW E&M-EST. PATIENT-LVL IV: ICD-10-PCS | Mod: PBBFAC,,, | Performed by: FAMILY MEDICINE

## 2020-11-06 PROCEDURE — 99999 PR PBB SHADOW E&M-EST. PATIENT-LVL IV: CPT | Mod: PBBFAC,,, | Performed by: FAMILY MEDICINE

## 2020-11-06 PROCEDURE — 99396 PREV VISIT EST AGE 40-64: CPT | Mod: S$PBB,,, | Performed by: FAMILY MEDICINE

## 2020-11-06 RX ORDER — DULOXETIN HYDROCHLORIDE 30 MG/1
30 CAPSULE, DELAYED RELEASE ORAL DAILY
Qty: 90 CAPSULE | Refills: 0 | Status: SHIPPED | OUTPATIENT
Start: 2020-11-06 | End: 2021-08-18

## 2020-11-06 NOTE — PROGRESS NOTES
Subjective:       Patient ID: Chinyere Crockett is a 45 y.o. female.    Chief Complaint:   Follow-up    Annual Exam  Last annual exam > 1 year ago.    Low-back Pain  This is a chronic problem. Episode onset: 2017. The problem occurs constantly. The problem has been gradually worsening. Pertinent negatives include no change in bowel habit, chest pain or chills. The symptoms are aggravated by stress and twisting. She has tried NSAIDs for the symptoms. The treatment provided mild relief. Started when she slipped on a wet floor and fell on her back and side.  Had PT.  Was better.  Starting to get worse again.  No new trauma.  Recent x-rays showed scoliosis and arthritis in her lumbar spine.  Thoracic spine normal.  She stopped taking meloxicam b/c she developed a rash.  Her back pain is tolerable.  Hip Pain   There was no injury mechanism. Pain location: both hips. The pain is moderate. The pain has been fluctuating since onset. She has tried NSAIDs for the symptoms. The treatment provided mild relief.  Recent hip x-rays were normal.  She'd like a referral to ortho.  Anxiety  Presents for follow up visit. Onset was more than 5 years ago. The problem has been gradually improving.  Her  was deployed recently.  Pt doesn't drive much, especially in city traffic. Patient reports no chest pain or shortness of breath. Symptoms occur most days. The severity of symptoms is moderate.   Better than last visit.  Was taking duloxetine.  Stopped taking it when she developed the rash, which she feels was from the meloxicam.      Review of Systems   Constitutional: Negative for fever.   Respiratory: Negative for cough and shortness of breath.    Cardiovascular: Negative for chest pain and palpitations.     Current Outpatient Medications   Medication Sig    acetaminophen (TYLENOL ORAL) Take by mouth as needed.    DULoxetine (CYMBALTA) 30 MG capsule Take 1 capsule (30 mg total) by mouth once daily.    tiZANidine 4 mg Cap Take 1  "capsule by mouth every 6 (six) hours as needed. (Patient not taking: Reported on 2020)     No current facility-administered medications for this visit.      No past medical history on file.  Family History   Problem Relation Age of Onset    Cancer Neg Hx     Colon cancer Neg Hx     Diabetes Neg Hx     Eclampsia Neg Hx     Hypertension Neg Hx     Miscarriages / Stillbirths Neg Hx     Ovarian cancer Neg Hx      labor Neg Hx     Stroke Neg Hx      Social History     Tobacco Use    Smoking status: Never Smoker    Smokeless tobacco: Never Used   Substance Use Topics    Alcohol use: Yes     Comment: occasionally    Drug use: No       Objective:      Vitals:    20 1131   BP: 98/74   BP Location: Left arm   Patient Position: Sitting   BP Method: X-Large (Manual)   Pulse: 74   SpO2: 98%   Weight: 115.4 kg (254 lb 6.6 oz)   Height: 5' 6" (1.676 m)     Physical Exam  Vitals signs and nursing note reviewed.   Constitutional:       General: She is not in acute distress.     Appearance: She is well-developed. She is not diaphoretic.   HENT:      Head: Normocephalic and atraumatic.   Eyes:      Conjunctiva/sclera: Conjunctivae normal.   Neck:      Musculoskeletal: Normal range of motion and neck supple.   Cardiovascular:      Rate and Rhythm: Normal rate and regular rhythm.      Heart sounds: Normal heart sounds. No murmur. No friction rub. No gallop.    Pulmonary:      Effort: Pulmonary effort is normal.      Breath sounds: Normal breath sounds. No wheezing or rales.   Musculoskeletal:         General: No deformity.   Skin:     General: Skin is warm and dry.   Neurological:      Mental Status: She is alert and oriented to person, place, and time.   Psychiatric:         Behavior: Behavior normal.              Assessment and Plan:     Annual physical exam  -     CBC Auto Differential; Future  -     Comprehensive Metabolic Panel; Future  -     Lipid Panel; Future  -     TSH; Future  -     Vitamin D; " Future  -     Urinalysis; Future  -     Hepatitis C Antibody; Future  -     HIV 1/2 Ag/Ab (4th Gen); Future; Expected date: 11/06/2020    Hip pain, bilateral  -     Ambulatory referral/consult to Orthopedics; Future; Expected date: 11/13/2020    Dorsalgia, unspecified  -     Ambulatory referral/consult to Orthopedics; Future; Expected date: 11/13/2020    Anxiety  -     DULoxetine (CYMBALTA) 30 MG capsule; Take 1 capsule (30 mg total) by mouth once daily.  Dispense: 90 capsule; Refill: 0          Follow up in about 3 months (around 2/6/2021).      Heri Murphy MD

## 2020-11-06 NOTE — PATIENT INSTRUCTIONS
Understanding Anxiety Disorders  Almost everyone gets nervous now and then. Its normal to have knots in your stomach before a test, or for your heart to race on a first date. But an anxiety disorder is much more than a case of nerves. In fact, its symptoms may be overwhelming. But treatment can relieve many of these symptoms. Talking to your healthcare provider is the first step.    What are anxiety disorders?  An anxiety disorder causes intense feelings of panic and fear. These feelings may arise for no apparent reason. And they tend to recur again and again. They may prevent you from coping with life and cause you great distress. As a result, you may avoid anything that triggers your fear. In extreme cases, you may never leave the house. Anxiety disorders may cause other symptoms, such as:  · Obsessive thoughts you cant control  · Constant nightmares or painful thoughts of the past  · Nausea, sweating, and muscle tension  · Trouble sleeping or concentrating  What causes anxiety disorders?  Anxiety disorders tend to run in families. For some people, childhood abuse or neglect may play a role. For others, stressful life events or trauma may trigger anxiety disorders. Anxiety can trigger low self-esteem and poor coping skills.  Common anxiety disorders  · Panic disorder. This causes an intense fear of being in danger.  · Phobias. These are extreme fears of certain objects, places, or events.  · Obsessive-compulsive disorder. This causes you to have unwanted thoughts and urges. You also may perform certain actions over and over.  · Posttraumatic stress disorder. This occurs in people who have survived a terrible ordeal. It can cause nightmares and flashbacks about the event.  · Generalized anxiety disorder. This causes constant worry that can greatly disrupt your life.   Getting better  You may believe that nothing can help you. Or, you might fear what others may think. But most anxiety symptoms can be eased.  Having an anxiety disorder is nothing to be ashamed of. Most people do best with treatment that combines medicine and therapy. These arent cures. But they can help you live a healthier life.  Date Last Reviewed: 2/1/2017  © 0597-5872 [employer]. [address] [place] [address], [place] [address]. All rights reserved. This information is not intended as a substitute for professional medical care. Always follow your healthcare professional's instructions.

## 2020-11-09 ENCOUNTER — LAB VISIT (OUTPATIENT)
Dept: LAB | Facility: OTHER | Age: 45
End: 2020-11-09
Attending: FAMILY MEDICINE
Payer: OTHER GOVERNMENT

## 2020-11-09 DIAGNOSIS — Z00.00 ANNUAL PHYSICAL EXAM: ICD-10-CM

## 2020-11-09 LAB
25(OH)D3+25(OH)D2 SERPL-MCNC: 14 NG/ML (ref 30–96)
ALBUMIN SERPL BCP-MCNC: 4.2 G/DL (ref 3.5–5.2)
ALP SERPL-CCNC: 141 U/L (ref 55–135)
ALT SERPL W/O P-5'-P-CCNC: 21 U/L (ref 10–44)
ANION GAP SERPL CALC-SCNC: 13 MMOL/L (ref 8–16)
AST SERPL-CCNC: 22 U/L (ref 10–40)
BASOPHILS # BLD AUTO: 0.04 K/UL (ref 0–0.2)
BASOPHILS NFR BLD: 0.5 % (ref 0–1.9)
BILIRUB SERPL-MCNC: 0.6 MG/DL (ref 0.1–1)
BUN SERPL-MCNC: 14 MG/DL (ref 6–20)
CALCIUM SERPL-MCNC: 9.1 MG/DL (ref 8.7–10.5)
CHLORIDE SERPL-SCNC: 104 MMOL/L (ref 95–110)
CHOLEST SERPL-MCNC: 185 MG/DL (ref 120–199)
CHOLEST/HDLC SERPL: 2.6 {RATIO} (ref 2–5)
CO2 SERPL-SCNC: 23 MMOL/L (ref 23–29)
CREAT SERPL-MCNC: 0.8 MG/DL (ref 0.5–1.4)
DIFFERENTIAL METHOD: ABNORMAL
EOSINOPHIL # BLD AUTO: 0.1 K/UL (ref 0–0.5)
EOSINOPHIL NFR BLD: 1 % (ref 0–8)
ERYTHROCYTE [DISTWIDTH] IN BLOOD BY AUTOMATED COUNT: 12.5 % (ref 11.5–14.5)
EST. GFR  (AFRICAN AMERICAN): >60 ML/MIN/1.73 M^2
EST. GFR  (NON AFRICAN AMERICAN): >60 ML/MIN/1.73 M^2
GLUCOSE SERPL-MCNC: 103 MG/DL (ref 70–110)
HCT VFR BLD AUTO: 43.2 % (ref 37–48.5)
HDLC SERPL-MCNC: 71 MG/DL (ref 40–75)
HDLC SERPL: 38.4 % (ref 20–50)
HGB BLD-MCNC: 13.9 G/DL (ref 12–16)
IMM GRANULOCYTES # BLD AUTO: 0.06 K/UL (ref 0–0.04)
IMM GRANULOCYTES NFR BLD AUTO: 0.7 % (ref 0–0.5)
LDLC SERPL CALC-MCNC: 95 MG/DL (ref 63–159)
LYMPHOCYTES # BLD AUTO: 2.6 K/UL (ref 1–4.8)
LYMPHOCYTES NFR BLD: 29.7 % (ref 18–48)
MCH RBC QN AUTO: 29.6 PG (ref 27–31)
MCHC RBC AUTO-ENTMCNC: 32.2 G/DL (ref 32–36)
MCV RBC AUTO: 92 FL (ref 82–98)
MONOCYTES # BLD AUTO: 0.8 K/UL (ref 0.3–1)
MONOCYTES NFR BLD: 8.8 % (ref 4–15)
NEUTROPHILS # BLD AUTO: 5.2 K/UL (ref 1.8–7.7)
NEUTROPHILS NFR BLD: 59.3 % (ref 38–73)
NONHDLC SERPL-MCNC: 114 MG/DL
NRBC BLD-RTO: 0 /100 WBC
PLATELET # BLD AUTO: 302 K/UL (ref 150–350)
PMV BLD AUTO: 9.6 FL (ref 9.2–12.9)
POTASSIUM SERPL-SCNC: 4.2 MMOL/L (ref 3.5–5.1)
PROT SERPL-MCNC: 7.9 G/DL (ref 6–8.4)
RBC # BLD AUTO: 4.69 M/UL (ref 4–5.4)
SODIUM SERPL-SCNC: 140 MMOL/L (ref 136–145)
TRIGL SERPL-MCNC: 95 MG/DL (ref 30–150)
TSH SERPL DL<=0.005 MIU/L-ACNC: 1 UIU/ML (ref 0.4–4)
WBC # BLD AUTO: 8.83 K/UL (ref 3.9–12.7)

## 2020-11-09 PROCEDURE — 80053 COMPREHEN METABOLIC PANEL: CPT

## 2020-11-09 PROCEDURE — 36415 COLL VENOUS BLD VENIPUNCTURE: CPT

## 2020-11-09 PROCEDURE — 86803 HEPATITIS C AB TEST: CPT

## 2020-11-09 PROCEDURE — 84443 ASSAY THYROID STIM HORMONE: CPT

## 2020-11-09 PROCEDURE — 86703 HIV-1/HIV-2 1 RESULT ANTBDY: CPT

## 2020-11-09 PROCEDURE — 82306 VITAMIN D 25 HYDROXY: CPT

## 2020-11-09 PROCEDURE — 80061 LIPID PANEL: CPT

## 2020-11-09 PROCEDURE — 85025 COMPLETE CBC W/AUTO DIFF WBC: CPT

## 2020-11-10 LAB
HCV AB SERPL QL IA: NEGATIVE
HIV 1+2 AB+HIV1 P24 AG SERPL QL IA: NEGATIVE

## 2020-11-12 ENCOUNTER — TELEPHONE (OUTPATIENT)
Dept: INTERNAL MEDICINE | Facility: CLINIC | Age: 45
End: 2020-11-12

## 2020-11-12 NOTE — TELEPHONE ENCOUNTER
----- Message from Heri Murphy MD sent at 11/11/2020  5:00 PM CST -----  Vitamin D level is low.  Needs to start taking OTC vitamin D3.  2,000 IU/day.  Will recheck her vitamin D level at her f/u in 3 months.  Other labs are normal.

## 2020-11-13 ENCOUNTER — TELEPHONE (OUTPATIENT)
Dept: INTERNAL MEDICINE | Facility: CLINIC | Age: 45
End: 2020-11-13

## 2020-11-13 DIAGNOSIS — M54.9 DORSALGIA, UNSPECIFIED: ICD-10-CM

## 2020-11-13 RX ORDER — TIZANIDINE HYDROCHLORIDE 4 MG/1
1 CAPSULE, GELATIN COATED ORAL EVERY 6 HOURS PRN
Qty: 30 CAPSULE | Refills: 1 | Status: SHIPPED | OUTPATIENT
Start: 2020-11-13 | End: 2021-08-18

## 2020-11-13 NOTE — TELEPHONE ENCOUNTER
----- Message from Sofía Cheek sent at 11/13/2020 10:55 AM CST -----   Name of Who is Calling:     What is the request in detail: patient request call back in reference to sleep medication  questions/concerns   Please contact to further discuss and advise      Can the clinic reply by [facility]: no     What Number to [facility] Back [facility]:  [phone]

## 2020-11-18 ENCOUNTER — TELEPHONE (OUTPATIENT)
Dept: INTERNAL MEDICINE | Facility: CLINIC | Age: 45
End: 2020-11-18

## 2020-11-19 ENCOUNTER — PATIENT OUTREACH (OUTPATIENT)
Dept: ADMINISTRATIVE | Facility: OTHER | Age: 45
End: 2020-11-19

## 2020-11-19 ENCOUNTER — OFFICE VISIT (OUTPATIENT)
Dept: ORTHOPEDICS | Facility: CLINIC | Age: 45
End: 2020-11-19
Payer: OTHER GOVERNMENT

## 2020-11-19 VITALS
WEIGHT: 254 LBS | DIASTOLIC BLOOD PRESSURE: 90 MMHG | BODY MASS INDEX: 40.82 KG/M2 | HEIGHT: 66 IN | SYSTOLIC BLOOD PRESSURE: 122 MMHG

## 2020-11-19 DIAGNOSIS — M25.551 HIP PAIN, BILATERAL: Primary | ICD-10-CM

## 2020-11-19 DIAGNOSIS — M25.552 HIP PAIN, BILATERAL: Primary | ICD-10-CM

## 2020-11-19 DIAGNOSIS — M70.62 GREATER TROCHANTERIC BURSITIS OF LEFT HIP: ICD-10-CM

## 2020-11-19 DIAGNOSIS — M70.61 TROCHANTERIC BURSITIS OF RIGHT HIP: ICD-10-CM

## 2020-11-19 PROCEDURE — 99204 PR OFFICE/OUTPT VISIT, NEW, LEVL IV, 45-59 MIN: ICD-10-PCS | Mod: 25,S$PBB,, | Performed by: ORTHOPAEDIC SURGERY

## 2020-11-19 PROCEDURE — 99204 OFFICE O/P NEW MOD 45 MIN: CPT | Mod: 25,S$PBB,, | Performed by: ORTHOPAEDIC SURGERY

## 2020-11-19 PROCEDURE — 99999 PR PBB SHADOW E&M-EST. PATIENT-LVL III: CPT | Mod: PBBFAC,,, | Performed by: ORTHOPAEDIC SURGERY

## 2020-11-19 PROCEDURE — 20611 PR DRAIN/ASP/INJECT MAJOR JOINT/BURSA W/US GUIDANCE: ICD-10-PCS | Mod: 50,S$PBB,, | Performed by: ORTHOPAEDIC SURGERY

## 2020-11-19 PROCEDURE — 20611 DRAIN/INJ JOINT/BURSA W/US: CPT | Mod: 50,PBBFAC,PN | Performed by: ORTHOPAEDIC SURGERY

## 2020-11-19 PROCEDURE — 99213 OFFICE O/P EST LOW 20 MIN: CPT | Mod: PBBFAC,PN,25 | Performed by: ORTHOPAEDIC SURGERY

## 2020-11-19 PROCEDURE — 99999 PR PBB SHADOW E&M-EST. PATIENT-LVL III: ICD-10-PCS | Mod: PBBFAC,,, | Performed by: ORTHOPAEDIC SURGERY

## 2020-11-19 PROCEDURE — 20611 DRAIN/INJ JOINT/BURSA W/US: CPT | Mod: 50,S$PBB,, | Performed by: ORTHOPAEDIC SURGERY

## 2020-11-19 RX ORDER — TRIAMCINOLONE ACETONIDE 40 MG/ML
40 INJECTION, SUSPENSION INTRA-ARTICULAR; INTRAMUSCULAR
Status: DISCONTINUED | OUTPATIENT
Start: 2020-11-19 | End: 2021-03-18

## 2020-11-19 NOTE — LETTER
November 20, 2020      Heri Murphy MD  [address]  [facility] [address]           [facility]  [address] [address]  Phone: [phone]  Fax: [phone]          Patient: Chinyere Crockett   MR Number: [id number]   YOB: 1975   Date of Visit: 11/19/2020       Dear Dr. Heri Murphy:    Thank you for referring Chinyere Crockett to me for evaluation. Attached you will find relevant portions of my assessment and plan of care.    If you have questions, please do not hesitate to call me. I look forward to following Chinyere Crockett along with you.    Sincerely,    Daryl Zazueta,     Enclosure  CC:  No Recipients    If you would like to receive this communication electronically, please contact [email or web address] or [phone] to request more information on [employer] Link access.    For providers and/or their staff who would like to refer a patient to [facility], please contact us through our one-stop-shop provider referral line, [facility], at [phone].    If you feel you have received this communication in error or would no longer like to receive these types of communications, please e-mail [email or web address]

## 2020-11-19 NOTE — PROGRESS NOTES
Health Maintenance Due   Topic Date Due    TETANUS VACCINE  08/23/1991     Updates were requested from care everywhere.  Chart was reviewed for overdue Proactive [facility] Encounters (PENNY) topics (CRS, Breast Cancer Screening, Eye exam)  Health Maintenance has been updated.  LINKS immunization registry triggered.  Immunizations were reconciled.

## 2020-11-19 NOTE — PROGRESS NOTES
CC: bilateral hip pain    45 y.o. Female presents today for evaluation of her bilateral hip pain. Patient admits to bilateral hip pain intermittently for the past ten years without known mechanism of injury. She feels as though her left hip pain is more severe than her right. When asked where she hurts she gestures with two fingers to the lateral aspect of both hips. She describes the quality of her pain as sharp and aching.  How long: Patient admits to bilateral hip pain for the past ten years.   What makes it better: Tylenol and ibuprofen help to somewhat improve her pain.   What makes it worse: Patient admits to increased pain with stairs, walking and sitting on the floor.   Does it radiate: Denies radiating pain.   Attempted treatments: Patient has been taking tylenol and ibuprofen as needed. She was prescribed meloxicam but experienced nausea and vomiting with this medication. She denies physical therapy for this problem. Denies history of hip injection or surgery.   Pain score: 2/10  Any mechanical symptoms: Denies mechanical symptoms.  Feelings of instability: Denies feelings of instability.   Affecting ADLs: Admits to this problem affecting her ability to perform ADLs.     REVIEW OF SYSTEMS:   Constitution: Patient denies fever, chills, night sweats, and weight changes.  Eyes: Patient denies eye pain or vision changes.  HENT: Patient denies headache, ear pain, sore throat, or nasal discharge.  CVS: Patient denies chest pain.  Lungs: Patient denies shortness of breath or cough.  Abd: Patient denies stomach pain, nausea, or vomiting.  Skin: Patient denies skin rash or itching.    Hematologic/Lymphatic: Patient denies easy bruising.   Musculoskeletal: Patient denies recent falls. See HPI.  Psych: Patient denies any current anxiety or nervousness.    PAST MEDICAL HISTORY:   History reviewed. No pertinent past medical history.    PAST SURGICAL HISTORY:   Past Surgical History:   Procedure Laterality Date     TONSILLECTOMY         FAMILY HISTORY:   Family History   Problem Relation Age of Onset    Cancer Neg Hx     Colon cancer Neg Hx     Diabetes Neg Hx     Eclampsia Neg Hx     Hypertension Neg Hx     Miscarriages / Stillbirths Neg Hx     Ovarian cancer Neg Hx      labor Neg Hx     Stroke Neg Hx        SOCIAL HISTORY:   Social History     Socioeconomic History    Marital status:      Spouse name: Not on file    Number of children: Not on file    Years of education: Not on file    Highest education level: Not on file   Occupational History    Not on file   Social Needs    Financial resource strain: Not on file    Food insecurity     Worry: Not on file     Inability: Not on file    Transportation needs     Medical: Not on file     Non-medical: Not on file   Tobacco Use    Smoking status: Never Smoker    Smokeless tobacco: Never Used   Substance and Sexual Activity    Alcohol use: Yes     Comment: occasionally    Drug use: No    Sexual activity: Yes     Partners: Male     Birth control/protection: None   Lifestyle    Physical activity     Days per week: Not on file     Minutes per session: Not on file    Stress: Not on file   Relationships    Social connections     Talks on phone: Not on file     Gets together: Not on file     Attends Baptism service: Not on file     Active member of club or organization: Not on file     Attends meetings of clubs or organizations: Not on file     Relationship status: Not on file   Other Topics Concern    Not on file   Social History Narrative    Not on file       MEDICATIONS:     Current Outpatient Medications:     acetaminophen (TYLENOL ORAL), Take by mouth as needed., Disp: , Rfl:     DULoxetine (CYMBALTA) 30 MG capsule, Take 1 capsule (30 mg total) by mouth once daily., Disp: 90 capsule, Rfl: 0    tiZANidine 4 mg Cap, Take 1 capsule by mouth every 6 (six) hours as needed., Disp: 30 capsule, Rfl: 1    Current Facility-Administered  "Medications:     triamcinolone acetonide injection 40 mg, 40 mg, INTRABURSAL, 1 time in Clinic/HOD, Daryl Zazueta DO    triamcinolone acetonide injection 40 mg, 40 mg, INTRABURSAL, 1 time in Clinic/HOD, Daryl Zazueta DO    ALLERGIES:   Review of patient's allergies indicates:   Allergen Reactions    Hydrocodone      Dizziness and nausea         PHYSICAL EXAMINATION:  BP (!) 122/90   Ht 5' 6" (1.676 m)   Wt 115.2 kg (254 lb)   BMI 41.00 kg/m²   Vitals signs and nursing note have been reviewed.  General: In no acute distress, well developed, well nourished, no diaphoresis  Eyes: EOM full and smooth, no eye redness or discharge  HENT: normocephalic and atraumatic, neck supple, trachea midline, no nasal discharge, no external ear redness or discharge  Cardiovascular: no LE edema  Lungs: respirations non-labored, no conversational dyspnea   Abd: non-distended, no rigidity  MSK: no amputation or deformity, no swelling of extremities  Neuro: AAOx3, CN2-12 grossly intact  Skin: No rashes, warm and dry  Psychiatric: cooperative, pleasant, mood and affect appropriate for age    HIP: BILATERAL  The affected hip is compared to the contralateral hip.    Observation:    There is no edema, erythema, or ecchymosis in the lumbosacral region.   There is no Trendelenburg sign on either side  No obvious pelvic obliquity while standing.    No thoracolumbar scoliosis observed.    No midline skin abnormalities.  No atrophy noted in the lower limbs.    ROM:   Passive hip flexion to 120° on left and 120° on right.    Passive hip internal rotation to 45° on left and 45° on right.    Passive hip external rotation to 45° on left and 45° on right.    Passive hip abduction to 45° on left and 45° on right.    All motions above with pain at lateral hips.    Tenderness To Palpation:  No tenderness at the ASIS, AIIS, PSIS, PIIS, iliac crest, pubic bones, ischial tuberosity.  No tenderness throughout the lumbar spine, iliolumbar region, " or posterior pelvis.  No tenderness throughout the sacrum or piriformis  + tenderness over the greater trochanteric bursa   + tenderness at the glut attachments on the greater trochanter  No tenderness over proximal IT band or hip flexor musculature.    Strength Testing:  Hip flexion - 5/5 on left and 5/5 on right  Hip extension - 5/5 on left and 5/5 on right  Hip adduction - 5/5 on left and 5/5 on right  Hip abduction - 5/5 on left and 5/5 on right  Knee flexion - 5/5 on left and 5/5 on right  Knee extension - 5/5 on left and 5/5 on right    Special Tests:  Standing Trendelenburg test - negative    Seated straight leg raise - negative  Supine straight leg raise - negative  Hamstring flexibility symmetric    Log roll - negative  BARRETT - negative  FADIR - negative  Scour test - negative  No pain with posterior hip capsule compression    ASIS compression test - negative  SI drawer test - negative   Thigh thrust test - negative     Neurovascular Exam:  Left antalgic gait  2+ femoral, DP, and PT pulses BL.  No skin changes, no abnormal hair distribution.  Sensation intact to light touch throughout the obturator and medial/lateral/posterior femoral cutaneous nerves.  Capillary refill intact to <2 seconds in all lower limb digits.      IMAGIN. X-ray obtained 2020 due to bilateral hip pain   2. X-ray images were reviewed personally by me and then directly with patient.  3. FINDINGS: X-ray images obtained demonstrate no acute fracture of the visualized lower lumbar spine, bony pelvis, or proximal femurs.  Preserved right and left femoral head contours.  Intact right and left SI joints and hip joint spaces.  Pelvic densities felt related to phleboliths unless concern for otherwise.  Some asymmetric radiodensity projects to the left side of the L4-L5 disc space that could be further evaluated with lumbar spine radiographs.  4. IMPRESSION: No acute fracture.      PROCEDURE:  Large Joint Aspiration/Injection  Greater  trochanteric bursa, bilateral  Performed by: ITZEL MISHRA  Authorized by: ITZEL MISHRA   Consent Done?: Yes (Verbal and written)  Indications: Pain  Site marked: The procedure site was marked   Timeout: Prior to procedure the correct patient, procedure, and site was verified     Location: Greater trochanteric bursa, bilateral  Prep: Patient was prepped and draped in usual sterile fashion   Ultrasonic Guidance for needle placement: yes  Procedure: After skin anesthetic was applied, the 22G, 2.5 needle was used to enter the greater trochanteric bursa under US guidance. A 3 cc mixture of 1 cc of 40 mg/ml triamcinolone acetonide and 2 cc of 0.2% ropivacaine was injected into the bursa. The procedure was performed on both lateral hips.  Needle size: 22G, 2.5  Approach: Lateral  Medications: 40 mg triamcinolone acetonide 40 mg/mL  Patient tolerance: Patient tolerated the procedure well with no immediate complications    Ultrasound guidance was used for needle localization with SonVeriShowte Edge 2, 9-L MHz linear probe(s). Images were saved and stored for documentation. The lateral hip structures were well visualized. Dynamic visualization of the 22G x 2.5 needles was continuous throughout the procedure and maintained good position and correct needle placement.      Triamcinolone (X2):  NDC: [address]-0  LOT: [id number]  EXP: 06/2022      ASSESSMENT:      ICD-10-CM ICD-9-CM   1. Hip pain, bilateral  M25.551 719.45    M25.552    2. Greater trochanteric bursitis of left hip  M70.62 726.5   3. Trochanteric bursitis of right hip  M70.61 726.5         PLAN:  1-3. Hip pain/GTB -     - Chiynere admits to bilateral lateral hip pain for the past 10 years without known mechanism of injury. She feels as though her left hip is more painful than her right and she is struggling with ambulation due to her pain at this time.     - XRs obtained 09/23/2020 in the office today and images were personally reviewed with the patient. See  above for further detail.    - After discussing options, she elects to proceed with bilateral ultrasound-guided injections. See above for procedure detail.     - Physical therapy referral has been placed.      Future planning includes - possibly OMT if indicated and if tolerable (was not today due to high level of pain with minimal palpation and motion).    All questions were answered to the best of my ability and all concerns were addressed at this time.    Follow up after PT.      This note is dictated using the M*Modal Fluency Direct word recognition program. There are word recognition mistakes that are occasionally missed on review.

## 2020-12-01 ENCOUNTER — CLINICAL SUPPORT (OUTPATIENT)
Dept: REHABILITATION | Facility: OTHER | Age: 45
End: 2020-12-01
Payer: OTHER GOVERNMENT

## 2020-12-01 DIAGNOSIS — M25.652 DECREASED RANGE OF MOTION OF BOTH HIPS: ICD-10-CM

## 2020-12-01 DIAGNOSIS — M25.552 HIP PAIN, BILATERAL: ICD-10-CM

## 2020-12-01 DIAGNOSIS — M25.651 DECREASED RANGE OF MOTION OF BOTH HIPS: ICD-10-CM

## 2020-12-01 DIAGNOSIS — M70.62 GREATER TROCHANTERIC BURSITIS OF LEFT HIP: ICD-10-CM

## 2020-12-01 DIAGNOSIS — M70.61 TROCHANTERIC BURSITIS OF RIGHT HIP: ICD-10-CM

## 2020-12-01 DIAGNOSIS — M25.551 HIP PAIN, BILATERAL: ICD-10-CM

## 2020-12-01 DIAGNOSIS — R29.898 DECREASED STRENGTH OF LOWER EXTREMITY: ICD-10-CM

## 2020-12-01 PROCEDURE — 97161 PT EVAL LOW COMPLEX 20 MIN: CPT | Mod: PN

## 2020-12-01 PROCEDURE — 97110 THERAPEUTIC EXERCISES: CPT | Mod: PN

## 2020-12-10 ENCOUNTER — DOCUMENTATION ONLY (OUTPATIENT)
Dept: REHABILITATION | Facility: OTHER | Age: 45
End: 2020-12-10

## 2020-12-14 ENCOUNTER — DOCUMENTATION ONLY (OUTPATIENT)
Dept: REHABILITATION | Facility: OTHER | Age: 45
End: 2020-12-14

## 2020-12-22 NOTE — PROGRESS NOTES
Physical Therapy Treatment Note     Name: Chinyere Bay  Clinic Number: [id number]    Therapy Diagnosis:   Encounter Diagnoses   Name Primary?    Decreased range of motion of both hips     Decreased strength of lower extremity      Physician: Daryl Zazueta DO    Visit Date: 12/23/2020    Physician Orders: PT Eval and Treat   Medical Diagnosis from Referral: M25.551,M25.552 (ICD-10-CM) - Hip pain, bilateral; M70.62 (ICD-10-CM) - Greater trochanteric bursitis of left hip; M70.61 (ICD-10-CM) - Trochanteric bursitis of right hip  Evaluation Date: 12/1/2020  Authorization Period Expiration: 3/18/2021  Plan of Care Certification Period: 03/01/2021  Visit #/Visits authorized: 2/ 16     Time In: 7:47 am  Time Out: 8:30 am  Total Billable Time: 43 minutes    Precautions: Standard    Subjective     Pt reports: she has not been able to keep up with everything due to personal reasons.  She was not compliant with home exercise program.  Response to previous treatment: some soreness but no adverse effects  Functional change: none    Pain: 1/10  Location: bilateral hips with the L worse than the R      Objective     Chinyere received therapeutic exercises to develop strength, endurance, ROM, flexibility and posture for 43 minutes including:  Piriformis stretch 3x 30 seconds each  HS stretch 3x 30 seconds each  ITB stretch 3x 30 seconds each  Hip flexor stretch 3x 30 seconds each  + Clamshells 10x 3 second hold  + PPT x10  + TrA activation in hooklying x10       Home Exercises Provided and Patient Education Provided     Education provided:   - new HEP; core engagement and/or opposite hip/knee flexion to decrease low back discomfort with HS stretch; exercise performance and purpose; performing HEP     Written Home Exercises Provided: yes.  Exercises were reviewed and Chinyere was able to demonstrate them prior to the end of the session.  Chinyere demonstrated good  understanding of the education provided.     See EMR under Patient  Instructions for exercises provided today and prior visit.    Assessment     Patient had good tolerance to therapy today.  Increased tightness noted with HS, Piriformis and ITB stretch with patient self reporting increased mobility in hip after exercises.  Added hip and abdominal strengthening exercises to address complaints of low back discomfort with HS stretch. Patient responded well with self tactile cues for PPT and TrA activation as well as PT provided manual cueing for hip positioning with clamshells. Continue with glute and hip strengthening and mobility exercises.     Chinyere is progressing well towards her goals.   Pt prognosis is Excellent.     Pt will continue to benefit from skilled outpatient physical therapy to address the deficits listed in the problem list box on initial evaluation, provide pt/family education and to maximize pt's level of independence in the home and community environment.     Pt's spiritual, cultural and educational needs considered and pt agreeable to plan of care and goals.     Anticipated barriers to physical therapy: none    Goals:   Short Term GOALS: 4 weeks. Pt agrees with goals set.  1. Patient demonstrates independence with HEP.   2. Patient demonstrates independence with Postural Awareness.   3. Patient demonstrates independence with body mechanics.   4. Patient will report pain of 3/10 at worst, on 0-10 pain scale, with all activity  5. Patient demonstrates increased hip range of motion to WFL to improve tolerance to functional activities  6. Patient demonstrates increased strength BLE's to 4/5 or greater to improve tolerance to functional activities pain free     Long Term GOALS: 8 weeks. Pt agrees with goals set.  1. Patient demonstrates increased SLS balance to improve tolerance to functional activities pain free.   2. Patient demonstrates increased strength BLE's to 5/5 or greater to improve tolerance to functional activities pain free.   3. Patient demonstrates  improved overall function per FOTO Hip Survey to 45% Limitation or less.   4. Patient will report pain of 2/10 at worst, on 0-10 pain scale, with all activity  5. Patient will ambulate >10 min pain free, proper gait pattern    Plan     Continue with established PT POC and progress per pt tolerance.  Trial ITB/glute tissue mobilization for relief and improved mobility as well.     Stephanie Hays, PT

## 2020-12-23 ENCOUNTER — CLINICAL SUPPORT (OUTPATIENT)
Dept: REHABILITATION | Facility: OTHER | Age: 45
End: 2020-12-23
Payer: OTHER GOVERNMENT

## 2020-12-23 DIAGNOSIS — R29.898 DECREASED STRENGTH OF LOWER EXTREMITY: ICD-10-CM

## 2020-12-23 DIAGNOSIS — M25.651 DECREASED RANGE OF MOTION OF BOTH HIPS: ICD-10-CM

## 2020-12-23 DIAGNOSIS — M25.652 DECREASED RANGE OF MOTION OF BOTH HIPS: ICD-10-CM

## 2020-12-23 PROCEDURE — 97110 THERAPEUTIC EXERCISES: CPT | Mod: PN

## 2020-12-23 NOTE — PATIENT INSTRUCTIONS
Access Code: [email or web address]   URL: [email or web address]   Date: 12/23/2020   Prepared by: Stephanie Hays     Exercises  Supine Posterior Pelvic Tilt - 20 reps - 5 hold - 1x daily  Supine Transversus Abdominis Bracing - Hands on Stomach - 1 sets - 20 reps - 5 second hold  Clamshell - 10 reps - 2 sets - 5 hold - 1x daily

## 2020-12-28 NOTE — PROGRESS NOTES
Physical Therapy Treatment Note     Name: Chinyere Crockett  [facility] Number: [id number]    Therapy Diagnosis:   Encounter Diagnoses   Name Primary?    Decreased range of motion of both hips     Decreased strength of lower extremity      Physician: Daryl Zazueta DO    Visit Date: 12/31/2020    Physician Orders: PT Eval and Treat   Medical Diagnosis from Referral: M25.551,M25.552 (ICD-10-CM) - Hip pain, bilateral; M70.62 (ICD-10-CM) - Greater trochanteric bursitis of left hip; M70.61 (ICD-10-CM) - Trochanteric bursitis of right hip  Evaluation Date: 12/1/2020  Authorization Period Expiration: 3/18/2021  Plan of Care Certification Period: 03/01/2021  Visit #/Visits authorized: 3/ 16     Time In: 7:49 am  Time Out: 8:34 am  Total Billable Time: 45 minutes    Precautions: Standard    Subjective     Pt reports: she didn't sleep well last night. She has been keeping up with her exercises since last visit. Patient reports performing Hamstring stretch and clamshells prior to therapy appointment today.     She was compliant with home exercise program.  Response to previous treatment: some soreness but no adverse effects  Functional change: none    Pain: 1/10  Location: bilateral hips with the L worse than the R      Objective     Chinyere received therapeutic exercises to develop strength, endurance, ROM, flexibility and posture for 45 minutes including:  PPT 20x 5 second hold  TrA activation in hooklying 20x 5 second hold  Piriformis stretch 3x 30 seconds each  + SL abduction 2x 10  + SL adduction 2x 10  ITB stretch 3x 30 seconds each  Hip flexor stretch 3x 30 seconds each  + self mobilization with lacrosse ball to lateral and posterior hip x2 minutes    Not performed today:  Clamshells 10x 3 second hold   HS stretch 3x 30 seconds each    Home Exercises Provided and Patient Education Provided     Education provided:   - exercise form and purpose    Written Home Exercises Provided: Patient instructed to cont prior  HEP.  Exercises were reviewed and Chinyere was able to demonstrate them prior to the end of the session.  Chinyere demonstrated good  understanding of the education provided.     See EMR under Patient Instructions for exercises provided prior visit.    Assessment     Patient was challenged with addition of SL abduction exercise today with moderate cues during initial exercise performance and intermittently throughout to decrease posterior rolling of hips and proper positioning of leg.  Pt had good relief of symptoms with self tissue mobilization using lacrosse ball.  Continue with hip strengthening as tolerated by patient.     Chinyere is progressing well towards her goals.   Pt prognosis is Excellent.     Pt will continue to benefit from skilled outpatient physical therapy to address the deficits listed in the problem list box on initial evaluation, provide pt/family education and to maximize pt's level of independence in the home and community environment.     Pt's spiritual, cultural and educational needs considered and pt agreeable to plan of care and goals.     Anticipated barriers to physical therapy: none    Goals:   Short Term GOALS: 4 weeks. Pt agrees with goals set.  1. Patient demonstrates independence with HEP.   2. Patient demonstrates independence with Postural Awareness.   3. Patient demonstrates independence with body mechanics.   4. Patient will report pain of 3/10 at worst, on 0-10 pain scale, with all activity  5. Patient demonstrates increased hip range of motion to WFL to improve tolerance to functional activities  6. Patient demonstrates increased strength BLE's to 4/5 or greater to improve tolerance to functional activities pain free     Long Term GOALS: 8 weeks. Pt agrees with goals set.  1. Patient demonstrates increased SLS balance to improve tolerance to functional activities pain free.   2. Patient demonstrates increased strength BLE's to 5/5 or greater to improve tolerance to functional  activities pain free.   3. Patient demonstrates improved overall function per FOTO Hip Survey to 45% Limitation or less.   4. Patient will report pain of 2/10 at worst, on 0-10 pain scale, with all activity  5. Patient will ambulate >10 min pain free, proper gait pattern    Plan     Continue with established PT POC and progress per pt tolerance.  Trial ITB/glute tissue mobilization for relief and improved mobility as well.     Stephanie Hays, PT

## 2020-12-31 ENCOUNTER — CLINICAL SUPPORT (OUTPATIENT)
Dept: REHABILITATION | Facility: OTHER | Age: 45
End: 2020-12-31
Payer: OTHER GOVERNMENT

## 2020-12-31 DIAGNOSIS — M25.651 DECREASED RANGE OF MOTION OF BOTH HIPS: ICD-10-CM

## 2020-12-31 DIAGNOSIS — M25.652 DECREASED RANGE OF MOTION OF BOTH HIPS: ICD-10-CM

## 2020-12-31 DIAGNOSIS — R29.898 DECREASED STRENGTH OF LOWER EXTREMITY: ICD-10-CM

## 2020-12-31 PROCEDURE — 97110 THERAPEUTIC EXERCISES: CPT | Mod: PN

## 2021-01-05 ENCOUNTER — DOCUMENTATION ONLY (OUTPATIENT)
Dept: REHABILITATION | Facility: OTHER | Age: 46
End: 2021-01-05

## 2021-01-07 ENCOUNTER — CLINICAL SUPPORT (OUTPATIENT)
Dept: REHABILITATION | Facility: OTHER | Age: 46
End: 2021-01-07
Payer: OTHER GOVERNMENT

## 2021-01-07 DIAGNOSIS — R29.898 DECREASED STRENGTH OF LOWER EXTREMITY: ICD-10-CM

## 2021-01-07 DIAGNOSIS — M25.652 DECREASED RANGE OF MOTION OF BOTH HIPS: ICD-10-CM

## 2021-01-07 DIAGNOSIS — M25.651 DECREASED RANGE OF MOTION OF BOTH HIPS: ICD-10-CM

## 2021-01-07 PROCEDURE — 97110 THERAPEUTIC EXERCISES: CPT | Mod: PN,CQ

## 2021-01-12 ENCOUNTER — CLINICAL SUPPORT (OUTPATIENT)
Dept: REHABILITATION | Facility: OTHER | Age: 46
End: 2021-01-12
Payer: OTHER GOVERNMENT

## 2021-01-12 DIAGNOSIS — M25.651 DECREASED RANGE OF MOTION OF BOTH HIPS: Primary | ICD-10-CM

## 2021-01-12 DIAGNOSIS — M25.652 DECREASED RANGE OF MOTION OF BOTH HIPS: Primary | ICD-10-CM

## 2021-01-12 DIAGNOSIS — R29.898 DECREASED STRENGTH OF LOWER EXTREMITY: ICD-10-CM

## 2021-01-12 PROCEDURE — 97140 MANUAL THERAPY 1/> REGIONS: CPT | Mod: PN

## 2021-01-14 ENCOUNTER — CLINICAL SUPPORT (OUTPATIENT)
Dept: REHABILITATION | Facility: OTHER | Age: 46
End: 2021-01-14
Payer: OTHER GOVERNMENT

## 2021-01-14 DIAGNOSIS — R29.898 DECREASED STRENGTH OF LOWER EXTREMITY: ICD-10-CM

## 2021-01-14 DIAGNOSIS — M25.652 DECREASED RANGE OF MOTION OF BOTH HIPS: Primary | ICD-10-CM

## 2021-01-14 DIAGNOSIS — M25.651 DECREASED RANGE OF MOTION OF BOTH HIPS: Primary | ICD-10-CM

## 2021-01-14 PROCEDURE — 97110 THERAPEUTIC EXERCISES: CPT | Mod: PN

## 2021-01-14 PROCEDURE — 97140 MANUAL THERAPY 1/> REGIONS: CPT | Mod: PN

## 2021-01-19 ENCOUNTER — CLINICAL SUPPORT (OUTPATIENT)
Dept: REHABILITATION | Facility: OTHER | Age: 46
End: 2021-01-19
Payer: OTHER GOVERNMENT

## 2021-01-19 DIAGNOSIS — M25.651 DECREASED RANGE OF MOTION OF BOTH HIPS: Primary | ICD-10-CM

## 2021-01-19 DIAGNOSIS — R29.898 DECREASED STRENGTH OF LOWER EXTREMITY: ICD-10-CM

## 2021-01-19 DIAGNOSIS — M25.652 DECREASED RANGE OF MOTION OF BOTH HIPS: Primary | ICD-10-CM

## 2021-01-19 PROCEDURE — 97110 THERAPEUTIC EXERCISES: CPT | Mod: PN

## 2021-01-21 ENCOUNTER — CLINICAL SUPPORT (OUTPATIENT)
Dept: REHABILITATION | Facility: OTHER | Age: 46
End: 2021-01-21
Payer: OTHER GOVERNMENT

## 2021-01-21 DIAGNOSIS — M25.651 DECREASED RANGE OF MOTION OF BOTH HIPS: Primary | ICD-10-CM

## 2021-01-21 DIAGNOSIS — R29.898 DECREASED STRENGTH OF LOWER EXTREMITY: ICD-10-CM

## 2021-01-21 DIAGNOSIS — M25.652 DECREASED RANGE OF MOTION OF BOTH HIPS: Primary | ICD-10-CM

## 2021-01-21 PROCEDURE — 97110 THERAPEUTIC EXERCISES: CPT | Mod: PN

## 2021-01-26 ENCOUNTER — CLINICAL SUPPORT (OUTPATIENT)
Dept: REHABILITATION | Facility: OTHER | Age: 46
End: 2021-01-26
Payer: OTHER GOVERNMENT

## 2021-01-26 DIAGNOSIS — R29.898 DECREASED STRENGTH OF LOWER EXTREMITY: ICD-10-CM

## 2021-01-26 DIAGNOSIS — M25.651 DECREASED RANGE OF MOTION OF BOTH HIPS: Primary | ICD-10-CM

## 2021-01-26 DIAGNOSIS — M25.652 DECREASED RANGE OF MOTION OF BOTH HIPS: Primary | ICD-10-CM

## 2021-01-26 PROCEDURE — 97110 THERAPEUTIC EXERCISES: CPT | Mod: PN

## 2021-01-28 ENCOUNTER — CLINICAL SUPPORT (OUTPATIENT)
Dept: REHABILITATION | Facility: OTHER | Age: 46
End: 2021-01-28
Payer: OTHER GOVERNMENT

## 2021-01-28 DIAGNOSIS — R29.898 DECREASED STRENGTH OF LOWER EXTREMITY: ICD-10-CM

## 2021-01-28 DIAGNOSIS — M25.651 DECREASED RANGE OF MOTION OF BOTH HIPS: Primary | ICD-10-CM

## 2021-01-28 DIAGNOSIS — M25.652 DECREASED RANGE OF MOTION OF BOTH HIPS: Primary | ICD-10-CM

## 2021-01-28 PROCEDURE — 97110 THERAPEUTIC EXERCISES: CPT | Mod: PN

## 2021-02-18 PROBLEM — F41.9 ANXIETY: Status: ACTIVE | Noted: 2021-02-18

## 2021-03-15 ENCOUNTER — TELEPHONE (OUTPATIENT)
Dept: SPORTS MEDICINE | Facility: CLINIC | Age: 46
End: 2021-03-15

## 2021-03-18 ENCOUNTER — OFFICE VISIT (OUTPATIENT)
Dept: SPORTS MEDICINE | Facility: CLINIC | Age: 46
End: 2021-03-18
Payer: OTHER GOVERNMENT

## 2021-03-18 VITALS
BODY MASS INDEX: 40.82 KG/M2 | DIASTOLIC BLOOD PRESSURE: 82 MMHG | SYSTOLIC BLOOD PRESSURE: 130 MMHG | WEIGHT: 254 LBS | HEIGHT: 66 IN

## 2021-03-18 DIAGNOSIS — M25.651 DECREASED RANGE OF MOTION OF BOTH HIPS: ICD-10-CM

## 2021-03-18 DIAGNOSIS — M25.551 BILATERAL HIP PAIN: Primary | ICD-10-CM

## 2021-03-18 DIAGNOSIS — M25.652 DECREASED RANGE OF MOTION OF BOTH HIPS: ICD-10-CM

## 2021-03-18 DIAGNOSIS — M70.62 GREATER TROCHANTERIC BURSITIS OF BOTH HIPS: ICD-10-CM

## 2021-03-18 DIAGNOSIS — M70.61 GREATER TROCHANTERIC BURSITIS OF BOTH HIPS: ICD-10-CM

## 2021-03-18 DIAGNOSIS — M25.552 BILATERAL HIP PAIN: Primary | ICD-10-CM

## 2021-03-18 PROCEDURE — 20611 DRAIN/INJ JOINT/BURSA W/US: CPT | Mod: 50,PBBFAC,PN | Performed by: ORTHOPAEDIC SURGERY

## 2021-03-18 PROCEDURE — 99999 PR PBB SHADOW E&M-EST. PATIENT-LVL II: ICD-10-PCS | Mod: PBBFAC,,, | Performed by: ORTHOPAEDIC SURGERY

## 2021-03-18 PROCEDURE — 99214 OFFICE O/P EST MOD 30 MIN: CPT | Mod: 25,S$PBB,, | Performed by: ORTHOPAEDIC SURGERY

## 2021-03-18 PROCEDURE — 99214 PR OFFICE/OUTPT VISIT, EST, LEVL IV, 30-39 MIN: ICD-10-PCS | Mod: 25,S$PBB,, | Performed by: ORTHOPAEDIC SURGERY

## 2021-03-18 PROCEDURE — 20611 PR DRAIN/ASP/INJECT MAJOR JOINT/BURSA W/US GUIDANCE: ICD-10-PCS | Mod: 50,S$PBB,, | Performed by: ORTHOPAEDIC SURGERY

## 2021-03-18 PROCEDURE — 20611 DRAIN/INJ JOINT/BURSA W/US: CPT | Mod: 50,S$PBB,, | Performed by: ORTHOPAEDIC SURGERY

## 2021-03-18 PROCEDURE — 99999 PR PBB SHADOW E&M-EST. PATIENT-LVL II: CPT | Mod: PBBFAC,,, | Performed by: ORTHOPAEDIC SURGERY

## 2021-03-18 PROCEDURE — 99212 OFFICE O/P EST SF 10 MIN: CPT | Mod: PBBFAC,PN | Performed by: ORTHOPAEDIC SURGERY

## 2021-03-18 RX ORDER — TRIAMCINOLONE ACETONIDE 40 MG/ML
40 INJECTION, SUSPENSION INTRA-ARTICULAR; INTRAMUSCULAR
Status: DISCONTINUED | OUTPATIENT
Start: 2021-03-18 | End: 2021-06-10

## 2021-04-13 ENCOUNTER — OFFICE VISIT (OUTPATIENT)
Dept: URGENT CARE | Facility: CLINIC | Age: 46
End: 2021-04-13
Payer: OTHER GOVERNMENT

## 2021-04-13 ENCOUNTER — TELEPHONE (OUTPATIENT)
Dept: URGENT CARE | Facility: CLINIC | Age: 46
End: 2021-04-13

## 2021-04-13 VITALS
OXYGEN SATURATION: 98 % | HEIGHT: 66 IN | TEMPERATURE: 98 F | BODY MASS INDEX: 41.46 KG/M2 | WEIGHT: 258 LBS | SYSTOLIC BLOOD PRESSURE: 146 MMHG | HEART RATE: 97 BPM | RESPIRATION RATE: 18 BRPM | DIASTOLIC BLOOD PRESSURE: 98 MMHG

## 2021-04-13 DIAGNOSIS — J30.2 SEASONAL ALLERGIES: ICD-10-CM

## 2021-04-13 DIAGNOSIS — H92.02 LEFT EAR PAIN: Primary | ICD-10-CM

## 2021-04-13 PROCEDURE — 99213 OFFICE O/P EST LOW 20 MIN: CPT | Mod: S$GLB,,, | Performed by: SURGERY

## 2021-04-13 PROCEDURE — 99213 PR OFFICE/OUTPT VISIT, EST, LEVL III, 20-29 MIN: ICD-10-PCS | Mod: S$GLB,,, | Performed by: SURGERY

## 2021-04-13 RX ORDER — PREDNISONE 20 MG/1
40 TABLET ORAL DAILY
Qty: 10 TABLET | Refills: 0 | Status: SHIPPED | OUTPATIENT
Start: 2021-04-14 | End: 2021-04-19

## 2021-04-13 RX ORDER — LORATADINE 10 MG/1
10 TABLET ORAL DAILY
Qty: 30 TABLET | Refills: 0 | Status: SHIPPED | OUTPATIENT
Start: 2021-04-13 | End: 2022-04-13

## 2021-04-13 RX ORDER — FLUTICASONE PROPIONATE 50 MCG
2 SPRAY, SUSPENSION (ML) NASAL DAILY
Qty: 15.8 ML | Refills: 1 | Status: SHIPPED | OUTPATIENT
Start: 2021-04-13 | End: 2021-05-13

## 2021-04-27 ENCOUNTER — PATIENT OUTREACH (OUTPATIENT)
Dept: ADMINISTRATIVE | Facility: OTHER | Age: 46
End: 2021-04-27

## 2021-04-29 ENCOUNTER — CLINICAL SUPPORT (OUTPATIENT)
Dept: OTOLARYNGOLOGY | Facility: CLINIC | Age: 46
End: 2021-04-29
Payer: OTHER GOVERNMENT

## 2021-04-29 ENCOUNTER — OFFICE VISIT (OUTPATIENT)
Dept: OTOLARYNGOLOGY | Facility: CLINIC | Age: 46
End: 2021-04-29
Payer: OTHER GOVERNMENT

## 2021-04-29 VITALS
HEIGHT: 66 IN | TEMPERATURE: 98 F | SYSTOLIC BLOOD PRESSURE: 133 MMHG | HEART RATE: 102 BPM | BODY MASS INDEX: 40.71 KG/M2 | WEIGHT: 253.31 LBS | DIASTOLIC BLOOD PRESSURE: 91 MMHG

## 2021-04-29 DIAGNOSIS — H93.13 BILATERAL TINNITUS: ICD-10-CM

## 2021-04-29 DIAGNOSIS — Z82.2 FAMILY HISTORY OF HEARING LOSS: ICD-10-CM

## 2021-04-29 DIAGNOSIS — H93.8X2 SENSATION OF FULLNESS IN LEFT EAR: ICD-10-CM

## 2021-04-29 DIAGNOSIS — Z86.69 HISTORY OF MIGRAINE: ICD-10-CM

## 2021-04-29 DIAGNOSIS — Z01.10 HEARING EXAM WITHOUT ABNORMAL FINDINGS: ICD-10-CM

## 2021-04-29 DIAGNOSIS — Z01.10 ENCOUNTER FOR HEARING EXAMINATION WITHOUT ABNORMAL FINDINGS: Primary | ICD-10-CM

## 2021-04-29 DIAGNOSIS — H93.12 TINNITUS, LEFT: ICD-10-CM

## 2021-04-29 DIAGNOSIS — R03.0 ELEVATED BLOOD PRESSURE READING: ICD-10-CM

## 2021-04-29 PROCEDURE — 92557 COMPREHENSIVE HEARING TEST: CPT | Mod: S$GLB,,, | Performed by: AUDIOLOGIST-HEARING AID FITTER

## 2021-04-29 PROCEDURE — 92550 TYMPANOMETRY & REFLEX THRESH: CPT | Mod: S$GLB,,, | Performed by: AUDIOLOGIST-HEARING AID FITTER

## 2021-04-29 PROCEDURE — 92550 PR TYMPANOMETRY AND REFLEX THRESHOLD MEASUREMENTS: ICD-10-PCS | Mod: S$GLB,,, | Performed by: AUDIOLOGIST-HEARING AID FITTER

## 2021-04-29 PROCEDURE — 99204 OFFICE O/P NEW MOD 45 MIN: CPT | Mod: S$GLB,,, | Performed by: OTOLARYNGOLOGY

## 2021-04-29 PROCEDURE — 92557 PR COMPREHENSIVE HEARING TEST: ICD-10-PCS | Mod: S$GLB,,, | Performed by: AUDIOLOGIST-HEARING AID FITTER

## 2021-04-29 PROCEDURE — 99204 PR OFFICE/OUTPT VISIT, NEW, LEVL IV, 45-59 MIN: ICD-10-PCS | Mod: S$GLB,,, | Performed by: OTOLARYNGOLOGY

## 2021-06-10 ENCOUNTER — OFFICE VISIT (OUTPATIENT)
Dept: SPORTS MEDICINE | Facility: CLINIC | Age: 46
End: 2021-06-10
Payer: OTHER GOVERNMENT

## 2021-06-10 VITALS
WEIGHT: 253 LBS | SYSTOLIC BLOOD PRESSURE: 130 MMHG | BODY MASS INDEX: 40.66 KG/M2 | DIASTOLIC BLOOD PRESSURE: 86 MMHG | HEIGHT: 66 IN

## 2021-06-10 DIAGNOSIS — M54.6 ACUTE RIGHT-SIDED THORACIC BACK PAIN: Primary | ICD-10-CM

## 2021-06-10 DIAGNOSIS — R20.8 HYPERALGESIA: ICD-10-CM

## 2021-06-10 PROCEDURE — 99214 OFFICE O/P EST MOD 30 MIN: CPT | Mod: S$PBB,,, | Performed by: ORTHOPAEDIC SURGERY

## 2021-06-10 PROCEDURE — 99212 OFFICE O/P EST SF 10 MIN: CPT | Mod: PBBFAC,PN | Performed by: ORTHOPAEDIC SURGERY

## 2021-06-10 PROCEDURE — 99999 PR PBB SHADOW E&M-EST. PATIENT-LVL II: CPT | Mod: PBBFAC,,, | Performed by: ORTHOPAEDIC SURGERY

## 2021-06-10 PROCEDURE — 99999 PR PBB SHADOW E&M-EST. PATIENT-LVL II: ICD-10-PCS | Mod: PBBFAC,,, | Performed by: ORTHOPAEDIC SURGERY

## 2021-06-10 PROCEDURE — 99214 PR OFFICE/OUTPT VISIT, EST, LEVL IV, 30-39 MIN: ICD-10-PCS | Mod: S$PBB,,, | Performed by: ORTHOPAEDIC SURGERY

## 2021-06-10 RX ORDER — PREDNISONE 10 MG/1
30 TABLET ORAL DAILY
Qty: 15 TABLET | Refills: 0 | Status: SHIPPED | OUTPATIENT
Start: 2021-06-10 | End: 2021-06-10

## 2021-06-10 RX ORDER — PREDNISONE 10 MG/1
30 TABLET ORAL DAILY
Qty: 15 TABLET | Refills: 0 | Status: SHIPPED | OUTPATIENT
Start: 2021-06-10 | End: 2021-07-14 | Stop reason: ALTCHOICE

## 2021-06-15 ENCOUNTER — HOSPITAL ENCOUNTER (OUTPATIENT)
Dept: RADIOLOGY | Facility: HOSPITAL | Age: 46
Discharge: HOME | End: 2021-06-15
Attending: ORTHOPAEDIC SURGERY
Payer: OTHER GOVERNMENT

## 2021-06-15 DIAGNOSIS — M54.6 ACUTE RIGHT-SIDED THORACIC BACK PAIN: ICD-10-CM

## 2021-06-15 PROCEDURE — 72146 MRI CHEST SPINE W/O DYE: CPT | Mod: 26,,, | Performed by: RADIOLOGY

## 2021-06-15 PROCEDURE — 72146 MRI CHEST SPINE W/O DYE: CPT | Mod: TC

## 2021-06-15 PROCEDURE — 72146 MRI THORACIC SPINE WITHOUT CONTRAST: ICD-10-PCS | Mod: 26,,, | Performed by: RADIOLOGY

## 2021-06-16 ENCOUNTER — TELEPHONE (OUTPATIENT)
Dept: SPORTS MEDICINE | Facility: CLINIC | Age: 46
End: 2021-06-16

## 2021-06-24 ENCOUNTER — OFFICE VISIT (OUTPATIENT)
Dept: SPORTS MEDICINE | Facility: CLINIC | Age: 46
End: 2021-06-24
Payer: OTHER GOVERNMENT

## 2021-06-24 VITALS
DIASTOLIC BLOOD PRESSURE: 92 MMHG | WEIGHT: 253 LBS | HEIGHT: 66 IN | BODY MASS INDEX: 40.66 KG/M2 | SYSTOLIC BLOOD PRESSURE: 118 MMHG

## 2021-06-24 DIAGNOSIS — M54.6 ACUTE RIGHT-SIDED THORACIC BACK PAIN: Primary | ICD-10-CM

## 2021-06-24 DIAGNOSIS — R20.8 HYPERALGESIA: ICD-10-CM

## 2021-06-24 PROCEDURE — 99999 PR PBB SHADOW E&M-EST. PATIENT-LVL III: CPT | Mod: PBBFAC,,, | Performed by: ORTHOPAEDIC SURGERY

## 2021-06-24 PROCEDURE — 99213 OFFICE O/P EST LOW 20 MIN: CPT | Mod: PBBFAC,PN | Performed by: ORTHOPAEDIC SURGERY

## 2021-06-24 PROCEDURE — 99213 PR OFFICE/OUTPT VISIT, EST, LEVL III, 20-29 MIN: ICD-10-PCS | Mod: S$PBB,,, | Performed by: ORTHOPAEDIC SURGERY

## 2021-06-24 PROCEDURE — 99213 OFFICE O/P EST LOW 20 MIN: CPT | Mod: S$PBB,,, | Performed by: ORTHOPAEDIC SURGERY

## 2021-06-24 PROCEDURE — 99999 PR PBB SHADOW E&M-EST. PATIENT-LVL III: ICD-10-PCS | Mod: PBBFAC,,, | Performed by: ORTHOPAEDIC SURGERY

## 2021-07-07 ENCOUNTER — TELEPHONE (OUTPATIENT)
Dept: SPORTS MEDICINE | Facility: CLINIC | Age: 46
End: 2021-07-07

## 2021-07-14 ENCOUNTER — OFFICE VISIT (OUTPATIENT)
Dept: SPORTS MEDICINE | Facility: CLINIC | Age: 46
End: 2021-07-14
Payer: OTHER GOVERNMENT

## 2021-07-14 VITALS
WEIGHT: 253 LBS | SYSTOLIC BLOOD PRESSURE: 140 MMHG | HEIGHT: 66 IN | DIASTOLIC BLOOD PRESSURE: 108 MMHG | BODY MASS INDEX: 40.66 KG/M2

## 2021-07-14 DIAGNOSIS — M25.551 BILATERAL HIP PAIN: Primary | ICD-10-CM

## 2021-07-14 DIAGNOSIS — M25.552 BILATERAL HIP PAIN: Primary | ICD-10-CM

## 2021-07-14 DIAGNOSIS — M70.62 GREATER TROCHANTERIC BURSITIS OF BOTH HIPS: ICD-10-CM

## 2021-07-14 DIAGNOSIS — M70.61 GREATER TROCHANTERIC BURSITIS OF BOTH HIPS: ICD-10-CM

## 2021-07-14 PROCEDURE — 99999 PR PBB SHADOW E&M-EST. PATIENT-LVL III: CPT | Mod: PBBFAC,,, | Performed by: ORTHOPAEDIC SURGERY

## 2021-07-14 PROCEDURE — 20611 DRAIN/INJ JOINT/BURSA W/US: CPT | Mod: 50,S$PBB,, | Performed by: ORTHOPAEDIC SURGERY

## 2021-07-14 PROCEDURE — 99999 PR PBB SHADOW E&M-EST. PATIENT-LVL III: ICD-10-PCS | Mod: PBBFAC,,, | Performed by: ORTHOPAEDIC SURGERY

## 2021-07-14 PROCEDURE — 99213 OFFICE O/P EST LOW 20 MIN: CPT | Mod: PBBFAC,PN,25 | Performed by: ORTHOPAEDIC SURGERY

## 2021-07-14 PROCEDURE — 99214 PR OFFICE/OUTPT VISIT, EST, LEVL IV, 30-39 MIN: ICD-10-PCS | Mod: 25,S$PBB,, | Performed by: ORTHOPAEDIC SURGERY

## 2021-07-14 PROCEDURE — 20611 DRAIN/INJ JOINT/BURSA W/US: CPT | Mod: 50,PBBFAC,PN | Performed by: ORTHOPAEDIC SURGERY

## 2021-07-14 PROCEDURE — 99214 OFFICE O/P EST MOD 30 MIN: CPT | Mod: 25,S$PBB,, | Performed by: ORTHOPAEDIC SURGERY

## 2021-07-14 PROCEDURE — 20611 PR DRAIN/ASP/INJECT MAJOR JOINT/BURSA W/US GUIDANCE: ICD-10-PCS | Mod: 50,S$PBB,, | Performed by: ORTHOPAEDIC SURGERY

## 2021-07-14 RX ORDER — TRIAMCINOLONE ACETONIDE 40 MG/ML
40 INJECTION, SUSPENSION INTRA-ARTICULAR; INTRAMUSCULAR
Status: SHIPPED | OUTPATIENT
Start: 2021-07-14

## 2021-08-18 ENCOUNTER — OFFICE VISIT (OUTPATIENT)
Dept: INTERNAL MEDICINE | Facility: CLINIC | Age: 46
End: 2021-08-18
Payer: OTHER GOVERNMENT

## 2021-08-18 VITALS
BODY MASS INDEX: 40.36 KG/M2 | HEART RATE: 100 BPM | DIASTOLIC BLOOD PRESSURE: 84 MMHG | SYSTOLIC BLOOD PRESSURE: 126 MMHG | WEIGHT: 251.13 LBS | HEIGHT: 66 IN | OXYGEN SATURATION: 98 %

## 2021-08-18 DIAGNOSIS — R73.01 ELEVATED FASTING GLUCOSE: ICD-10-CM

## 2021-08-18 DIAGNOSIS — Z13.220 ENCOUNTER FOR LIPID SCREENING FOR CARDIOVASCULAR DISEASE: ICD-10-CM

## 2021-08-18 DIAGNOSIS — Z00.00 ANNUAL PHYSICAL EXAM: Primary | ICD-10-CM

## 2021-08-18 DIAGNOSIS — M25.551 HIP PAIN, BILATERAL: ICD-10-CM

## 2021-08-18 DIAGNOSIS — Z13.6 ENCOUNTER FOR LIPID SCREENING FOR CARDIOVASCULAR DISEASE: ICD-10-CM

## 2021-08-18 DIAGNOSIS — M54.9 BACK PAIN, UNSPECIFIED BACK LOCATION, UNSPECIFIED BACK PAIN LATERALITY, UNSPECIFIED CHRONICITY: ICD-10-CM

## 2021-08-18 DIAGNOSIS — G47.9 SLEEP DISTURBANCE: ICD-10-CM

## 2021-08-18 DIAGNOSIS — E55.9 VITAMIN D DEFICIENCY: ICD-10-CM

## 2021-08-18 DIAGNOSIS — F41.9 ANXIETY: ICD-10-CM

## 2021-08-18 DIAGNOSIS — M25.552 HIP PAIN, BILATERAL: ICD-10-CM

## 2021-08-18 DIAGNOSIS — G43.009 MIGRAINE WITHOUT AURA AND WITHOUT STATUS MIGRAINOSUS, NOT INTRACTABLE: ICD-10-CM

## 2021-08-18 DIAGNOSIS — Z12.31 ENCOUNTER FOR SCREENING MAMMOGRAM FOR MALIGNANT NEOPLASM OF BREAST: ICD-10-CM

## 2021-08-18 PROCEDURE — 99396 PR PREVENTIVE VISIT,EST,40-64: ICD-10-PCS | Mod: S$PBB,,, | Performed by: STUDENT IN AN ORGANIZED HEALTH CARE EDUCATION/TRAINING PROGRAM

## 2021-08-18 PROCEDURE — 99214 OFFICE O/P EST MOD 30 MIN: CPT | Mod: PBBFAC | Performed by: STUDENT IN AN ORGANIZED HEALTH CARE EDUCATION/TRAINING PROGRAM

## 2021-08-18 PROCEDURE — 99999 PR PBB SHADOW E&M-EST. PATIENT-LVL IV: ICD-10-PCS | Mod: PBBFAC,,, | Performed by: STUDENT IN AN ORGANIZED HEALTH CARE EDUCATION/TRAINING PROGRAM

## 2021-08-18 PROCEDURE — 99999 PR PBB SHADOW E&M-EST. PATIENT-LVL IV: CPT | Mod: PBBFAC,,, | Performed by: STUDENT IN AN ORGANIZED HEALTH CARE EDUCATION/TRAINING PROGRAM

## 2021-08-18 PROCEDURE — 99396 PREV VISIT EST AGE 40-64: CPT | Mod: S$PBB,,, | Performed by: STUDENT IN AN ORGANIZED HEALTH CARE EDUCATION/TRAINING PROGRAM

## 2021-08-18 RX ORDER — CYCLOBENZAPRINE HCL 10 MG
10 TABLET ORAL 3 TIMES DAILY PRN
Qty: 20 TABLET | Refills: 1 | Status: SHIPPED | OUTPATIENT
Start: 2021-08-18 | End: 2021-09-07

## 2021-08-18 RX ORDER — ERGOCALCIFEROL 1.25 MG/1
50000 CAPSULE ORAL
COMMUNITY
End: 2022-04-13

## 2021-08-19 ENCOUNTER — LAB VISIT (OUTPATIENT)
Dept: LAB | Facility: OTHER | Age: 46
End: 2021-08-19
Attending: STUDENT IN AN ORGANIZED HEALTH CARE EDUCATION/TRAINING PROGRAM
Payer: OTHER GOVERNMENT

## 2021-08-19 DIAGNOSIS — Z00.00 ANNUAL PHYSICAL EXAM: ICD-10-CM

## 2021-08-19 DIAGNOSIS — Z13.220 ENCOUNTER FOR LIPID SCREENING FOR CARDIOVASCULAR DISEASE: ICD-10-CM

## 2021-08-19 DIAGNOSIS — E55.9 VITAMIN D DEFICIENCY: ICD-10-CM

## 2021-08-19 DIAGNOSIS — Z13.6 ENCOUNTER FOR LIPID SCREENING FOR CARDIOVASCULAR DISEASE: ICD-10-CM

## 2021-08-19 DIAGNOSIS — R73.01 ELEVATED FASTING GLUCOSE: ICD-10-CM

## 2021-08-19 LAB
25(OH)D3+25(OH)D2 SERPL-MCNC: 19 NG/ML (ref 30–96)
ALBUMIN SERPL BCP-MCNC: 3.6 G/DL (ref 3.5–5.2)
ALP SERPL-CCNC: 108 U/L (ref 55–135)
ALT SERPL W/O P-5'-P-CCNC: 30 U/L (ref 10–44)
ANION GAP SERPL CALC-SCNC: 8 MMOL/L (ref 8–16)
AST SERPL-CCNC: 25 U/L (ref 10–40)
BASOPHILS # BLD AUTO: 0.05 K/UL (ref 0–0.2)
BASOPHILS NFR BLD: 0.5 % (ref 0–1.9)
BILIRUB SERPL-MCNC: 0.4 MG/DL (ref 0.1–1)
BUN SERPL-MCNC: 11 MG/DL (ref 6–20)
CALCIUM SERPL-MCNC: 9.2 MG/DL (ref 8.7–10.5)
CHLORIDE SERPL-SCNC: 105 MMOL/L (ref 95–110)
CHOLEST SERPL-MCNC: 156 MG/DL (ref 120–199)
CHOLEST/HDLC SERPL: 2.6 {RATIO} (ref 2–5)
CO2 SERPL-SCNC: 28 MMOL/L (ref 23–29)
CREAT SERPL-MCNC: 0.7 MG/DL (ref 0.5–1.4)
DIFFERENTIAL METHOD: ABNORMAL
EOSINOPHIL # BLD AUTO: 0.1 K/UL (ref 0–0.5)
EOSINOPHIL NFR BLD: 0.6 % (ref 0–8)
ERYTHROCYTE [DISTWIDTH] IN BLOOD BY AUTOMATED COUNT: 12.4 % (ref 11.5–14.5)
EST. GFR  (AFRICAN AMERICAN): >60 ML/MIN/1.73 M^2
EST. GFR  (NON AFRICAN AMERICAN): >60 ML/MIN/1.73 M^2
ESTIMATED AVG GLUCOSE: 97 MG/DL (ref 68–131)
GLUCOSE SERPL-MCNC: 85 MG/DL (ref 70–110)
HBA1C MFR BLD: 5 % (ref 4–5.6)
HCT VFR BLD AUTO: 40.7 % (ref 37–48.5)
HDLC SERPL-MCNC: 61 MG/DL (ref 40–75)
HDLC SERPL: 39.1 % (ref 20–50)
HGB BLD-MCNC: 13.3 G/DL (ref 12–16)
IMM GRANULOCYTES # BLD AUTO: 0.08 K/UL (ref 0–0.04)
IMM GRANULOCYTES NFR BLD AUTO: 0.9 % (ref 0–0.5)
LDLC SERPL CALC-MCNC: 79.2 MG/DL (ref 63–159)
LYMPHOCYTES # BLD AUTO: 2.6 K/UL (ref 1–4.8)
LYMPHOCYTES NFR BLD: 27.7 % (ref 18–48)
MCH RBC QN AUTO: 30.4 PG (ref 27–31)
MCHC RBC AUTO-ENTMCNC: 32.7 G/DL (ref 32–36)
MCV RBC AUTO: 93 FL (ref 82–98)
MONOCYTES # BLD AUTO: 0.8 K/UL (ref 0.3–1)
MONOCYTES NFR BLD: 8.5 % (ref 4–15)
NEUTROPHILS # BLD AUTO: 5.8 K/UL (ref 1.8–7.7)
NEUTROPHILS NFR BLD: 61.8 % (ref 38–73)
NONHDLC SERPL-MCNC: 95 MG/DL
NRBC BLD-RTO: 0 /100 WBC
PLATELET # BLD AUTO: 313 K/UL (ref 150–450)
PMV BLD AUTO: 9.5 FL (ref 9.2–12.9)
POTASSIUM SERPL-SCNC: 3.6 MMOL/L (ref 3.5–5.1)
PROT SERPL-MCNC: 7.4 G/DL (ref 6–8.4)
RBC # BLD AUTO: 4.37 M/UL (ref 4–5.4)
SODIUM SERPL-SCNC: 141 MMOL/L (ref 136–145)
TRIGL SERPL-MCNC: 79 MG/DL (ref 30–150)
WBC # BLD AUTO: 9.31 K/UL (ref 3.9–12.7)

## 2021-08-19 PROCEDURE — 82306 VITAMIN D 25 HYDROXY: CPT | Performed by: STUDENT IN AN ORGANIZED HEALTH CARE EDUCATION/TRAINING PROGRAM

## 2021-08-19 PROCEDURE — 80053 COMPREHEN METABOLIC PANEL: CPT | Performed by: STUDENT IN AN ORGANIZED HEALTH CARE EDUCATION/TRAINING PROGRAM

## 2021-08-19 PROCEDURE — 36415 COLL VENOUS BLD VENIPUNCTURE: CPT | Performed by: STUDENT IN AN ORGANIZED HEALTH CARE EDUCATION/TRAINING PROGRAM

## 2021-08-19 PROCEDURE — 83036 HEMOGLOBIN GLYCOSYLATED A1C: CPT | Performed by: STUDENT IN AN ORGANIZED HEALTH CARE EDUCATION/TRAINING PROGRAM

## 2021-08-19 PROCEDURE — 85025 COMPLETE CBC W/AUTO DIFF WBC: CPT | Performed by: STUDENT IN AN ORGANIZED HEALTH CARE EDUCATION/TRAINING PROGRAM

## 2021-08-19 PROCEDURE — 80061 LIPID PANEL: CPT | Performed by: STUDENT IN AN ORGANIZED HEALTH CARE EDUCATION/TRAINING PROGRAM

## 2021-08-23 ENCOUNTER — TELEPHONE (OUTPATIENT)
Dept: INTERNAL MEDICINE | Facility: CLINIC | Age: 46
End: 2021-08-23

## 2021-08-23 PROBLEM — M25.651 DECREASED RANGE OF MOTION OF BOTH HIPS: Status: RESOLVED | Noted: 2020-12-01 | Resolved: 2021-01-28

## 2021-08-23 PROBLEM — M25.652 DECREASED RANGE OF MOTION OF BOTH HIPS: Status: RESOLVED | Noted: 2020-12-01 | Resolved: 2021-01-28

## 2021-08-23 PROBLEM — R29.898 DECREASED STRENGTH OF LOWER EXTREMITY: Status: RESOLVED | Noted: 2020-12-01 | Resolved: 2021-01-28

## 2021-08-24 ENCOUNTER — TELEPHONE (OUTPATIENT)
Dept: PAIN MEDICINE | Facility: CLINIC | Age: 46
End: 2021-08-24

## 2021-08-25 ENCOUNTER — OFFICE VISIT (OUTPATIENT)
Dept: PAIN MEDICINE | Facility: CLINIC | Age: 46
End: 2021-08-25
Payer: OTHER GOVERNMENT

## 2021-08-25 VITALS
TEMPERATURE: 97 F | DIASTOLIC BLOOD PRESSURE: 94 MMHG | RESPIRATION RATE: 18 BRPM | SYSTOLIC BLOOD PRESSURE: 132 MMHG | BODY MASS INDEX: 40.39 KG/M2 | WEIGHT: 251.31 LBS | HEART RATE: 104 BPM | HEIGHT: 66 IN

## 2021-08-25 DIAGNOSIS — M54.9 DORSALGIA, UNSPECIFIED: ICD-10-CM

## 2021-08-25 DIAGNOSIS — M25.552 HIP PAIN, BILATERAL: ICD-10-CM

## 2021-08-25 DIAGNOSIS — M25.551 HIP PAIN, BILATERAL: ICD-10-CM

## 2021-08-25 DIAGNOSIS — M54.9 BACK PAIN, UNSPECIFIED BACK LOCATION, UNSPECIFIED BACK PAIN LATERALITY, UNSPECIFIED CHRONICITY: ICD-10-CM

## 2021-08-25 PROCEDURE — 99999 PR PBB SHADOW E&M-EST. PATIENT-LVL IV: ICD-10-PCS | Mod: PBBFAC,,, | Performed by: ANESTHESIOLOGY

## 2021-08-25 PROCEDURE — 99244 OFF/OP CNSLTJ NEW/EST MOD 40: CPT | Mod: S$PBB,,, | Performed by: ANESTHESIOLOGY

## 2021-08-25 PROCEDURE — 99999 PR PBB SHADOW E&M-EST. PATIENT-LVL IV: CPT | Mod: PBBFAC,,, | Performed by: ANESTHESIOLOGY

## 2021-08-25 PROCEDURE — 99244 PR OFFICE CONSULTATION,LEVEL IV: ICD-10-PCS | Mod: S$PBB,,, | Performed by: ANESTHESIOLOGY

## 2021-08-25 PROCEDURE — 99214 OFFICE O/P EST MOD 30 MIN: CPT | Mod: PBBFAC | Performed by: ANESTHESIOLOGY

## 2021-08-25 RX ORDER — MELOXICAM 7.5 MG/1
7.5 TABLET ORAL DAILY
Qty: 60 TABLET | Refills: 2 | Status: SHIPPED | OUTPATIENT
Start: 2021-08-25 | End: 2021-12-21

## 2021-09-27 ENCOUNTER — HOSPITAL ENCOUNTER (OUTPATIENT)
Dept: RADIOLOGY | Facility: OTHER | Age: 46
Discharge: HOME | End: 2021-09-27
Attending: STUDENT IN AN ORGANIZED HEALTH CARE EDUCATION/TRAINING PROGRAM
Payer: OTHER GOVERNMENT

## 2021-09-27 DIAGNOSIS — M54.9 DORSALGIA, UNSPECIFIED: ICD-10-CM

## 2021-09-27 DIAGNOSIS — Z12.31 ENCOUNTER FOR SCREENING MAMMOGRAM FOR MALIGNANT NEOPLASM OF BREAST: ICD-10-CM

## 2021-09-27 PROCEDURE — 72110 XR LUMBAR SPINE 5 VIEW WITH FLEX AND EXT: ICD-10-PCS | Mod: 26,,, | Performed by: RADIOLOGY

## 2021-09-27 PROCEDURE — 72110 X-RAY EXAM L-2 SPINE 4/>VWS: CPT | Mod: TC,FY

## 2021-09-27 PROCEDURE — 77063 BREAST TOMOSYNTHESIS BI: CPT | Mod: 26,,, | Performed by: RADIOLOGY

## 2021-09-27 PROCEDURE — 77063 MAMMO DIGITAL SCREENING BILAT WITH TOMO: ICD-10-PCS | Mod: 26,,, | Performed by: RADIOLOGY

## 2021-09-27 PROCEDURE — 77067 MAMMO DIGITAL SCREENING BILAT WITH TOMO: ICD-10-PCS | Mod: 26,,, | Performed by: RADIOLOGY

## 2021-09-27 PROCEDURE — 77067 SCR MAMMO BI INCL CAD: CPT | Mod: 26,,, | Performed by: RADIOLOGY

## 2021-09-27 PROCEDURE — 77067 SCR MAMMO BI INCL CAD: CPT | Mod: TC

## 2021-09-27 PROCEDURE — 72110 X-RAY EXAM L-2 SPINE 4/>VWS: CPT | Mod: 26,,, | Performed by: RADIOLOGY

## 2021-10-05 ENCOUNTER — CLINICAL SUPPORT (OUTPATIENT)
Dept: REHABILITATION | Facility: OTHER | Age: 46
End: 2021-10-05
Attending: STUDENT IN AN ORGANIZED HEALTH CARE EDUCATION/TRAINING PROGRAM
Payer: OTHER GOVERNMENT

## 2021-10-05 DIAGNOSIS — M25.69 DECREASED RANGE OF MOTION OF TRUNK AND BACK: ICD-10-CM

## 2021-10-05 DIAGNOSIS — R29.898 WEAKNESS OF BOTH LOWER EXTREMITIES: ICD-10-CM

## 2021-10-05 DIAGNOSIS — M54.9 BACK PAIN, UNSPECIFIED BACK LOCATION, UNSPECIFIED BACK PAIN LATERALITY, UNSPECIFIED CHRONICITY: ICD-10-CM

## 2021-10-05 DIAGNOSIS — R29.898 DECREASED STRENGTH OF TRUNK AND BACK: ICD-10-CM

## 2021-10-05 PROCEDURE — 97161 PT EVAL LOW COMPLEX 20 MIN: CPT

## 2021-10-06 ENCOUNTER — PATIENT OUTREACH (OUTPATIENT)
Dept: ADMINISTRATIVE | Facility: OTHER | Age: 46
End: 2021-10-06

## 2021-10-07 ENCOUNTER — OFFICE VISIT (OUTPATIENT)
Dept: NEUROLOGY | Facility: CLINIC | Age: 46
End: 2021-10-07
Payer: OTHER GOVERNMENT

## 2021-10-07 ENCOUNTER — TELEPHONE (OUTPATIENT)
Dept: NEUROLOGY | Facility: CLINIC | Age: 46
End: 2021-10-07

## 2021-10-07 VITALS
WEIGHT: 254.19 LBS | HEART RATE: 93 BPM | BODY MASS INDEX: 41.03 KG/M2 | DIASTOLIC BLOOD PRESSURE: 87 MMHG | SYSTOLIC BLOOD PRESSURE: 111 MMHG

## 2021-10-07 DIAGNOSIS — G43.009 MIGRAINE WITHOUT AURA AND WITHOUT STATUS MIGRAINOSUS, NOT INTRACTABLE: Primary | ICD-10-CM

## 2021-10-07 DIAGNOSIS — Z86.69 HISTORY OF MIGRAINE: ICD-10-CM

## 2021-10-07 PROCEDURE — 99205 OFFICE O/P NEW HI 60 MIN: CPT | Mod: S$PBB,,, | Performed by: NEUROMUSCULOSKELETAL MEDICINE & OMM

## 2021-10-07 PROCEDURE — 99213 OFFICE O/P EST LOW 20 MIN: CPT | Mod: PBBFAC,PO | Performed by: NEUROMUSCULOSKELETAL MEDICINE & OMM

## 2021-10-07 PROCEDURE — 99999 PR PBB SHADOW E&M-EST. PATIENT-LVL III: ICD-10-PCS | Mod: PBBFAC,,, | Performed by: NEUROMUSCULOSKELETAL MEDICINE & OMM

## 2021-10-07 PROCEDURE — 99205 PR OFFICE/OUTPT VISIT, NEW, LEVL V, 60-74 MIN: ICD-10-PCS | Mod: S$PBB,,, | Performed by: NEUROMUSCULOSKELETAL MEDICINE & OMM

## 2021-10-07 PROCEDURE — 99999 PR PBB SHADOW E&M-EST. PATIENT-LVL III: CPT | Mod: PBBFAC,,, | Performed by: NEUROMUSCULOSKELETAL MEDICINE & OMM

## 2021-10-07 RX ORDER — SUMATRIPTAN SUCCINATE 100 MG/1
100 TABLET ORAL
Qty: 10 TABLET | Refills: 3 | Status: SHIPPED | OUTPATIENT
Start: 2021-10-07 | End: 2022-04-08 | Stop reason: SDUPTHER

## 2021-10-18 ENCOUNTER — CLINICAL SUPPORT (OUTPATIENT)
Dept: REHABILITATION | Facility: OTHER | Age: 46
End: 2021-10-18
Attending: STUDENT IN AN ORGANIZED HEALTH CARE EDUCATION/TRAINING PROGRAM
Payer: OTHER GOVERNMENT

## 2021-10-18 DIAGNOSIS — R29.898 DECREASED STRENGTH OF TRUNK AND BACK: ICD-10-CM

## 2021-10-18 DIAGNOSIS — R29.898 WEAKNESS OF BOTH LOWER EXTREMITIES: ICD-10-CM

## 2021-10-18 DIAGNOSIS — M25.69 DECREASED RANGE OF MOTION OF TRUNK AND BACK: Primary | ICD-10-CM

## 2021-10-18 PROCEDURE — 97110 THERAPEUTIC EXERCISES: CPT | Mod: CQ

## 2021-10-21 ENCOUNTER — CLINICAL SUPPORT (OUTPATIENT)
Dept: REHABILITATION | Facility: OTHER | Age: 46
End: 2021-10-21
Attending: STUDENT IN AN ORGANIZED HEALTH CARE EDUCATION/TRAINING PROGRAM
Payer: OTHER GOVERNMENT

## 2021-10-21 DIAGNOSIS — M25.69 DECREASED RANGE OF MOTION OF TRUNK AND BACK: Primary | ICD-10-CM

## 2021-10-21 DIAGNOSIS — R29.898 WEAKNESS OF BOTH LOWER EXTREMITIES: ICD-10-CM

## 2021-10-21 DIAGNOSIS — R29.898 DECREASED STRENGTH OF TRUNK AND BACK: ICD-10-CM

## 2021-10-21 PROCEDURE — 97110 THERAPEUTIC EXERCISES: CPT

## 2021-10-26 ENCOUNTER — CLINICAL SUPPORT (OUTPATIENT)
Dept: REHABILITATION | Facility: OTHER | Age: 46
End: 2021-10-26
Attending: STUDENT IN AN ORGANIZED HEALTH CARE EDUCATION/TRAINING PROGRAM
Payer: OTHER GOVERNMENT

## 2021-10-26 DIAGNOSIS — R29.898 WEAKNESS OF BOTH LOWER EXTREMITIES: ICD-10-CM

## 2021-10-26 DIAGNOSIS — R29.898 DECREASED STRENGTH OF TRUNK AND BACK: ICD-10-CM

## 2021-10-26 DIAGNOSIS — M25.69 DECREASED RANGE OF MOTION OF TRUNK AND BACK: Primary | ICD-10-CM

## 2021-10-26 PROCEDURE — 97110 THERAPEUTIC EXERCISES: CPT | Mod: CQ

## 2021-10-28 ENCOUNTER — PATIENT MESSAGE (OUTPATIENT)
Dept: REHABILITATION | Facility: OTHER | Age: 46
End: 2021-10-28
Payer: OTHER GOVERNMENT

## 2021-11-07 ENCOUNTER — PATIENT MESSAGE (OUTPATIENT)
Dept: REHABILITATION | Facility: OTHER | Age: 46
End: 2021-11-07
Payer: OTHER GOVERNMENT

## 2021-11-29 ENCOUNTER — OFFICE VISIT (OUTPATIENT)
Dept: PAIN MEDICINE | Facility: CLINIC | Age: 46
End: 2021-11-29
Payer: OTHER GOVERNMENT

## 2021-11-29 VITALS
HEART RATE: 96 BPM | WEIGHT: 253.5 LBS | OXYGEN SATURATION: 100 % | RESPIRATION RATE: 18 BRPM | BODY MASS INDEX: 40.74 KG/M2 | TEMPERATURE: 97 F | SYSTOLIC BLOOD PRESSURE: 156 MMHG | HEIGHT: 66 IN | DIASTOLIC BLOOD PRESSURE: 74 MMHG

## 2021-11-29 DIAGNOSIS — G89.29 CHRONIC RIGHT SHOULDER PAIN: ICD-10-CM

## 2021-11-29 DIAGNOSIS — M25.511 CHRONIC RIGHT SHOULDER PAIN: ICD-10-CM

## 2021-11-29 PROCEDURE — 99214 PR OFFICE/OUTPT VISIT, EST, LEVL IV, 30-39 MIN: ICD-10-PCS | Mod: S$PBB,,, | Performed by: ANESTHESIOLOGY

## 2021-11-29 PROCEDURE — 99214 OFFICE O/P EST MOD 30 MIN: CPT | Mod: S$PBB,,, | Performed by: ANESTHESIOLOGY

## 2021-11-29 PROCEDURE — 99999 PR PBB SHADOW E&M-EST. PATIENT-LVL IV: CPT | Mod: PBBFAC,,, | Performed by: ANESTHESIOLOGY

## 2021-11-29 PROCEDURE — 99214 OFFICE O/P EST MOD 30 MIN: CPT | Mod: PBBFAC | Performed by: ANESTHESIOLOGY

## 2021-11-29 PROCEDURE — 99999 PR PBB SHADOW E&M-EST. PATIENT-LVL IV: ICD-10-PCS | Mod: PBBFAC,,, | Performed by: ANESTHESIOLOGY

## 2021-11-29 RX ORDER — CYCLOBENZAPRINE HCL 10 MG
10 TABLET ORAL 3 TIMES DAILY PRN
Qty: 30 TABLET | Refills: 0 | Status: SHIPPED | OUTPATIENT
Start: 2021-11-29 | End: 2021-12-09

## 2021-11-29 RX ORDER — DICLOFENAC SODIUM 75 MG/1
75 TABLET, DELAYED RELEASE ORAL 2 TIMES DAILY
Qty: 60 TABLET | Refills: 2 | Status: SHIPPED | OUTPATIENT
Start: 2021-11-29 | End: 2022-04-11 | Stop reason: SDUPTHER

## 2021-11-29 RX ORDER — ERGOCALCIFEROL 1.25 MG/1
50000 CAPSULE ORAL
Qty: 8 CAPSULE | Refills: 0 | Status: SHIPPED | OUTPATIENT
Start: 2021-11-29 | End: 2023-04-13

## 2021-12-08 ENCOUNTER — HOSPITAL ENCOUNTER (OUTPATIENT)
Dept: RADIOLOGY | Facility: OTHER | Age: 46
Discharge: HOME | End: 2021-12-08
Attending: ANESTHESIOLOGY
Payer: OTHER GOVERNMENT

## 2021-12-08 DIAGNOSIS — G89.29 CHRONIC RIGHT SHOULDER PAIN: ICD-10-CM

## 2021-12-08 DIAGNOSIS — M25.511 CHRONIC RIGHT SHOULDER PAIN: ICD-10-CM

## 2021-12-08 PROCEDURE — 73221 MRI JOINT UPR EXTREM W/O DYE: CPT | Mod: TC,RT

## 2021-12-08 PROCEDURE — 73221 MRI JOINT UPR EXTREM W/O DYE: CPT | Mod: 26,RT,, | Performed by: RADIOLOGY

## 2021-12-08 PROCEDURE — 73221 MRI SHOULDER WITHOUT CONTRAST RIGHT: ICD-10-PCS | Mod: 26,RT,, | Performed by: RADIOLOGY

## 2021-12-20 ENCOUNTER — TELEPHONE (OUTPATIENT)
Dept: PAIN MEDICINE | Facility: CLINIC | Age: 46
End: 2021-12-20
Payer: OTHER GOVERNMENT

## 2021-12-20 ENCOUNTER — PATIENT OUTREACH (OUTPATIENT)
Dept: ADMINISTRATIVE | Facility: OTHER | Age: 46
End: 2021-12-20
Payer: OTHER GOVERNMENT

## 2021-12-21 ENCOUNTER — OFFICE VISIT (OUTPATIENT)
Dept: PAIN MEDICINE | Facility: CLINIC | Age: 46
End: 2021-12-21
Payer: OTHER GOVERNMENT

## 2021-12-21 VITALS — HEIGHT: 66 IN | BODY MASS INDEX: 40.04 KG/M2 | WEIGHT: 249.13 LBS | TEMPERATURE: 98 F

## 2021-12-21 DIAGNOSIS — M50.30 DDD (DEGENERATIVE DISC DISEASE), CERVICAL: Primary | ICD-10-CM

## 2021-12-21 DIAGNOSIS — M51.36 DDD (DEGENERATIVE DISC DISEASE), LUMBAR: ICD-10-CM

## 2021-12-21 DIAGNOSIS — M47.812 CERVICAL SPONDYLOSIS: ICD-10-CM

## 2021-12-21 DIAGNOSIS — M47.816 LUMBAR SPONDYLOSIS: ICD-10-CM

## 2021-12-21 PROCEDURE — 99213 OFFICE O/P EST LOW 20 MIN: CPT | Mod: PBBFAC | Performed by: NURSE PRACTITIONER

## 2021-12-21 PROCEDURE — 99213 PR OFFICE/OUTPT VISIT, EST, LEVL III, 20-29 MIN: ICD-10-PCS | Mod: S$PBB,,, | Performed by: NURSE PRACTITIONER

## 2021-12-21 PROCEDURE — 99999 PR PBB SHADOW E&M-EST. PATIENT-LVL III: CPT | Mod: PBBFAC,,, | Performed by: NURSE PRACTITIONER

## 2021-12-21 PROCEDURE — 99213 OFFICE O/P EST LOW 20 MIN: CPT | Mod: S$PBB,,, | Performed by: NURSE PRACTITIONER

## 2021-12-21 PROCEDURE — 99999 PR PBB SHADOW E&M-EST. PATIENT-LVL III: ICD-10-PCS | Mod: PBBFAC,,, | Performed by: NURSE PRACTITIONER

## 2021-12-21 RX ORDER — DICLOFENAC SODIUM 10 MG/G
4 GEL TOPICAL 3 TIMES DAILY
Qty: 2 EACH | Refills: 2 | Status: SHIPPED | OUTPATIENT
Start: 2021-12-21 | End: 2022-01-20

## 2022-01-04 ENCOUNTER — CLINICAL SUPPORT (OUTPATIENT)
Dept: REHABILITATION | Facility: OTHER | Age: 47
End: 2022-01-04
Attending: ANESTHESIOLOGY
Payer: OTHER GOVERNMENT

## 2022-01-04 DIAGNOSIS — M25.511 CHRONIC RIGHT SHOULDER PAIN: ICD-10-CM

## 2022-01-04 DIAGNOSIS — R68.89 DECREASED FUNCTIONAL ACTIVITY TOLERANCE: ICD-10-CM

## 2022-01-04 DIAGNOSIS — G89.29 CHRONIC RIGHT SHOULDER PAIN: ICD-10-CM

## 2022-01-04 DIAGNOSIS — R29.898 SHOULDER WEAKNESS: ICD-10-CM

## 2022-01-04 PROCEDURE — 97110 THERAPEUTIC EXERCISES: CPT

## 2022-01-04 PROCEDURE — 97161 PT EVAL LOW COMPLEX 20 MIN: CPT

## 2022-01-04 PROCEDURE — 97530 THERAPEUTIC ACTIVITIES: CPT

## 2022-01-04 NOTE — PLAN OF CARE
[facility]  Physical Therapy Initial Evaluation    Date: 1/4/2022   Name: Chinyere Crockett   Clinic Number: [id number]    Therapy Diagnosis:   Encounter Diagnoses   Name Primary?    Chronic right shoulder pain     Shoulder weakness     Decreased functional activity tolerance      Physician: Mariana De León MD     Physician Orders: PT Eval and Treat   Medical Diagnosis from Referral: Chronic right shoulder pain [M25.511, G89.29]  Evaluation Date: 1/4/2022  Authorization Period Expiration: 01/31/2022  Plan of Care Expiration: 03/01/2022  Visit # / Visits authorized: 1/ 1    Progress Note Due: 02/04/2022  FOTO: 1/1    Precautions: Standard, anxiety     Time In: 9:00 am  Time Out: 10:00 am  Total Appointment Time (timed & untimed codes): 60 minutes    Subjective   History of current condition - Chinyere reports: Last spring she was brushing her hair and heard a snap in her shoulder. She states that days prior she was doing a lot of heavy gardening. She states has been doing fair and but continued scapular pain and shoulder pain with cooking and every day activities. Does not wish to pursue CSI or surgical intervention at this time.     STEVE:  Any fall: no  Any popping or clicking or locking: no   Any difficult to elevate shoulder flexion, abd, ER, or IR: yes   Does pain radiates: yes; down into elbow   Pain constant or intermittent: constant   Has done any injections: no    Pain:  Current 2/10, worst 8/10, best 0/10   Location: right shoulder   Description: Dull and Throbbing  Aggravating Factors: elbow flexion,chopping veggies, housework,   Easing Factors: rest, activity modification     Prior Therapy: yes but none for current compliant   Social History: lives with their spouse  Occupation: Unemployed   Prior Level of Function: independent with gardening, house work, lifting   Current Level of Function: independent with ADL's w/ increased pain, unable to garden or sleep on side  "    Pts goals: "manage pain so I can garden and do what I need to do"     Imaging:  Narrative & Impression  EXAMINATION:  MRI SHOULDER WITHOUT CONTRAST RIGHT     CLINICAL HISTORY:  Pain in right shoulderShoulder pain, rotator cuff disorder suspected, xray done;     TECHNIQUE:  MRI of right shoulder was performed on a 1.5T magnet utilizing the following sequences: Localizer; axial T2 FS; coronal T2 FS and PD FS; sagittal T1, T2 FS and PD FS.     COMPARISON:  None     FINDINGS:  Rotator cuff: There is some attenuation of caliber of the insertional fibers of the supraspinatus and infraspinatus tendons and some irregularity and abnormal signal along the bursal surface suggesting partial-thickness bursal sided tear involving approximately 50% of the tendon thickness.  Tear measures 2 cm in AP diameter.  Subscapularis tendon intact.Muscle bulk preserved.     Biceps: Long head biceps tendon is intact.     Labrum: Glenoid labrum is intact.     Glenohumeral Joint: There is no fracture or significant articular cartilage loss.  Bone marrow signal is normal.     Acromioclavicular joint: The AC joint is unremarkable.     Misc: There is no evidence of bursitis.     Impression:     Partial-thickness bursal sided tear of the supraspinatus and infraspinatus tendons measuring 2 cm in size.     Medical History:   No past medical history on file.    Surgical History:   Chinyere Crockett  has a past surgical history that includes Tonsillectomy.    Medications:   Chinyere has a current medication list which includes the following prescription(s): acetaminophen, diclofenac, diclofenac sodium, ergocalciferol, ergocalciferol, loratadine, and sumatriptan, and the following Facility-Administered Medications: triamcinolone acetonide and triamcinolone acetonide.    Allergies:   Review of patient's allergies indicates:   Allergen Reactions    Hydrocodone      Dizziness and nausea       Objective   Observation: Pt ambulates into clinic with " independence and no AD. Guarding of right shoulder noted.      Posture Alignment: slouched posture;forward head   Shoulder rotation at rest: IR    Sensation: Light touch: intact to light touch    DTR: Intact     GAIT DEVIATIONS: Karma displays functional gait pattern with guarding of right shoulder during ambulation     Cervical Range of Motion:    Degrees Pain   Flexion WFL -     Extension WFL -     Right Rotation WFL -     Left Rotation WFL -     Right Side Bending WFL -   Left Side Bending WFL -     Cervical Special Tests:  Compression: negative  Spurling's:  negative  ULTT:  negative      Active Range of Motion in (degrees):   Shoulder Right Left   Flexion 155* WFL   Abduction 160* WFL   ER 90* WFL   IR  78* WFL     Upper Extremity Strength  (R) UE  (L) UE    Elbow flexion: 4/5 Elbow flexion: 4/5   Elbow extension: 4/5 Elbow extension: 4/5   Shoulder elevation: 3/5 Shoulder elevation: 4-/5   Shoulder flexion: 3/5 Shoulder flexion: 4-/5   Shoulder Abduction: 3/5 Shoulder abduction: 3+/5   Shoulder ER 3+/5 Shoulder ER 4/5   Shoulder IR 3+/5 Shoulder IR 4/5   Lower Trap 3/5 Lower Trap 3/5   Middle Trap 3/5 Middle Trap 3/5   Rhomboids 3-/5 Rhomboids 3-/5       Special Tests:    Impingement   Right Left   Neer's  positive  negative   Rahman-Bob  positive  negative     Rotator Cuff:     Right Left   Empty Can Test  positive negative   Full Can Test positive negative     Instability:     Right Left   Sulcus Sign negative negative   Anterior Apprehension Test negative negative   Anterior Drawer Test negative negative   Posterior Apprehension Test negative negative   Scapular Retraction Test negative negative     Labrum:     Right Left   University's Test negative negative   Clunk Test negative negative       AC Joint/Biceps:     Right Left   AC Joint Compression Test negative negative   Speed's test negative negative       Joint Mobility: hypomobile glenohumeral joint       Palpation: Supraspinatus Region, along long  head of biceps tendon       Flexibility: decreased soft tissue flexibility       CMS Impairment/Limitation/Restriction for FOTO Survey    Therapist reviewed FOTO scores for Chinyere Crockett on 1/4/2022.   FOTO documents entered into [employer] - see Media section.    Limitation Score: 48%           TREATMENT   Treatment Time In: 9:40 am  Treatment Time Out: 10:00 am  Total Treatment time separate from Evaluation: 20 minutes    Chinyere received therapeutic exercises to develop strength, endurance, ROM and posture for 15 minutes including:  +No money RTB 2x10   +shoulder isometrics x10       Home Exercises and Patient Education Provided    Education provided:   -HEP, POC  -Patient was educated on initial evaluation findings and expectations as well as future PT services, procedures, and expectations for optimal compliance with therapy    Written Home Exercises Provided: yes.  Exercises were reviewed and Chinyere was able to demonstrate them prior to the end of the session.  Chinyere demonstrated good  understanding of the education provided.     See EMR under Patient Instructions for exercises provided 1/4/2022.    Assessment   Chinyere is a 46 y.o. female referred to outpatient Physical Therapy with a medical diagnosis of Chronic right shoulder pain [M25.511, G89.29]. Pt presents with signs and symptoms consistent with diagnosis including weakness of RUE, decreased joint mobility in RUE, decreased soft tissue flexibility, TTP to shoulder complex, poor posture and decreased functional mobility tolerance. These deficits are limiting patient in full participation in ADL's and leisure activities such as lifting, grocery shopping, and repetitive task at work. Pt will benefit from skilled outpatient Physical Therapy to address the deficits stated above and in the chart below, provide pt/family education, and to maximize pt's level of independence.    Pt prognosis is Fair.   Pt will benefit from skilled outpatient Physical Therapy to  address the deficits stated above and in the chart below, provide pt/family education, and to maximize pt's level of independence.     Plan of care discussed with patient: Yes  Pt's spiritual, cultural and educational needs considered and patient is agreeable to the plan of care and goals as stated below:     Anticipated Barriers for therapy: None    Medical Necessity is demonstrated by the following  History  Co-morbidities and personal factors that may impact the plan of care Co-morbidities:   anxiety    Personal Factors:   no deficits     low   Examination  Body Structures and Functions, activity limitations and participation restrictions that may impact the plan of care Body Regions:   upper extremities    Body Systems:    gross symmetry  ROM  strength  gross coordinated movement  motor control  motor learning    Participation Restrictions:   None    Activity limitations:   Learning and applying knowledge  no deficits    General Tasks and Commands  no deficits    Communication  no deficits    Mobility  lifting and carrying objects    Self care  dressing    Domestic Life  shopping  cooking  doing house work (cleaning house, washing dishes, laundry)  assisting others    Interactions/Relationships  no deficits    Life Areas  no deficits    Community and Social Life  no deficits         Complexity: low  See FOTO outcome assessment    Clinical Presentation stable and uncomplicated low   Decision Making/ Complexity Score: low     GOALS: Short Term Goals: 4 weeks  1.Report decreased in pain at worse less than  <   / =  8  /10  to increase tolerance for functional mobility. On going  2. Pt to improve range of motion by 25% to allow for improved functional mobility to allow for improvement in IADLs. On going  3. Increased right shoulder MMT 1/2 grade to increase tolerance for ADL and work activities. On going  4. Pt to report be conscious of impaired sitting and standing posture daily to decrease pain. On going  5. Pt  "to tolerate HEP to improve ROM and independence with ADL's. On going    Long Term Goals: 8 weeks  1.Report decreased in pain at worse less than  <   / =  2 /10  to increase tolerance for functional mobility. On going  2.  Patient will demonstrate improved overall function per FOTO Survey to 35% score or less.On going  3.Increased right shoulder MMT 1 grade to increase tolerance for ADL and work activities.On going  4. Pt to report and demonstrate proper posture in standing and sitting to decrease pain. On going  5. Pt to be Independent with HEP to improve ROM and independence with ADL's.On going  6. Pt to improve range of motion by 75% to allow for improved functional mobility to allow for improvement in IADLs. On going  7. Pt will be able to complete making of "minis" while maintain good postural awareness and little to no (no more than 2/10 on PRS) pain. (progressing, not met)    Plan   Plan of care Certification: 1/4/2022 to 03/01/2022     Outpatient Physical Therapy 2 times weekly for 8 weeks to include the following interventions: Cervical/Lumbar Traction, Electrical Stimulation PRN, Gait Training, Manual Therapy, Moist Heat/ Ice, Neuromuscular Re-ed, Patient Education, Self Care, Therapeutic Activities and Therapeutic Exercise. Dry needling Progress HEP towards D/C. Recommend F/U with MD if symptoms worsen or do not resolve. Patient may be seen by a PTA for treatment to carry out their plan of care.  Face-to-face conferences will be held.    Debby Dasilva, PT      I CERTIFY THE NEED FOR THESE SERVICES FURNISHED UNDER THIS PLAN OF TREATMENT AND WHILE UNDER MY CARE   Physician's comments:     Physician's Signature: ___________________________________________________     "

## 2022-01-06 ENCOUNTER — OFFICE VISIT (OUTPATIENT)
Dept: NEUROLOGY | Facility: CLINIC | Age: 47
End: 2022-01-06
Payer: OTHER GOVERNMENT

## 2022-01-06 VITALS — WEIGHT: 253.06 LBS | BODY MASS INDEX: 40.85 KG/M2

## 2022-01-06 DIAGNOSIS — G43.109 MIGRAINE WITH AURA AND WITHOUT STATUS MIGRAINOSUS, NOT INTRACTABLE: Primary | ICD-10-CM

## 2022-01-06 PROCEDURE — 99215 OFFICE O/P EST HI 40 MIN: CPT | Mod: S$PBB,,, | Performed by: NEUROMUSCULOSKELETAL MEDICINE & OMM

## 2022-01-06 PROCEDURE — 99999 PR PBB SHADOW E&M-EST. PATIENT-LVL II: CPT | Mod: PBBFAC,,, | Performed by: NEUROMUSCULOSKELETAL MEDICINE & OMM

## 2022-01-06 PROCEDURE — 99999 PR PBB SHADOW E&M-EST. PATIENT-LVL II: ICD-10-PCS | Mod: PBBFAC,,, | Performed by: NEUROMUSCULOSKELETAL MEDICINE & OMM

## 2022-01-06 PROCEDURE — 99212 OFFICE O/P EST SF 10 MIN: CPT | Mod: PBBFAC,PO | Performed by: NEUROMUSCULOSKELETAL MEDICINE & OMM

## 2022-01-06 PROCEDURE — 99215 PR OFFICE/OUTPT VISIT, EST, LEVL V, 40-54 MIN: ICD-10-PCS | Mod: S$PBB,,, | Performed by: NEUROMUSCULOSKELETAL MEDICINE & OMM

## 2022-01-06 RX ORDER — RIMEGEPANT SULFATE 75 MG/75MG
75 TABLET, ORALLY DISINTEGRATING ORAL ONCE AS NEEDED
Qty: 8 TABLET | Refills: 1 | Status: SHIPPED | OUTPATIENT
Start: 2022-01-06 | End: 2022-01-06

## 2022-01-06 NOTE — PROGRESS NOTES
Social History :  Patient is presently not working  Present history:   This is a 46-year-old white female who presents with a long history of migraine headaches.  She is referred by ENT with history of ear problems and sinus problems.  Patient relates that her headaches are primarily seasonal and have occurred most of her life.  She is in menopause and has not had any menstrual cycles. Most of the headaches will last all day   She occasionally has hot flashes she.  Since September 29th she started with his severe headaches which the 1st 2 days are associated with nausea, photophobia, and sonophobia.  Headache is described on the top of the head that is an aching throbbing pain right greater than left typically 3-4/10 intensity however the 1st day of this headache was 8/10 radiating yesterday into the left arm.  Patient typically will take Tylenol or over-the-counter medications for the headaches.  She has not been on any triptans.  She has been taking Mobic 7.5 mg 2 pills in the morning for bursitis in the hips.     Neurological Exam:  Mental status-alert and oriented to person, place, and time; attention span and concentration is good. Fund of knowledge-patient is aware of current events and able to give detailed history of the current problem.recent and remote memory seems intact. Language function is normal with no evidence of aphasia  Cranial nerves:Visual acuity to hand chart -normal; visual fields to confrontation normal;pupils were equal and reactive to light ;no evidence of ptosis ;  funduscopic examination was normal with sharp disc margins. external ocular movements were full with no nystagmus. Facial sensation to pinprick : normal ; corneal reflexes intact; Facial muscles were symmetrical. Hearing is unimpaired symmetrical finger rub; Tongue movements - normal ; palate movements - normal ;Swallowing unimpaired. Shoulder shrug was intact with good strength Speech was normal  Motor examination: Upper :  normal                                      Lower extremities - Normal;muscle tone was normal ;                  Right-handed  Sensory examination:   Upper; normal pinprick and soft touch ;   Lower extremities -hip pain  Vibration sense: 15-20 seconds @ toes  Deep tendon reflexes: upper extremities :1-2+ symmetrical ;     lower extremities KJ- 1-2 +; AJ - 1-2+ Both plantar responses were flexor  Cerebellar examination upper: Normal finger to nose and rapid alternating movements  Gait: Steady with no ataxia;      heel and toe walk normal  Romberg test: negative       Tandem gait: Normal    Involuntary movements: Negative  TMJ - no tenderness  Cervical examination: Full range of motion with no pain Cervical tenderness :negative  Lumbar examination: Low back tenderness-negative                  Sciatic notchtenderness-negative            Straight leg raising : negative     Impression:  Chronic headaches; probable migraine related to hormones ie. postmenopausal ;Bursitis of the hips     Recommendations/Plan :  Will start patient on Imitrex 100 mg half tablet at onset of headache to repeat in 2 hours if needed patient will follow-up in 3 months at which time we will decide on frequency and pattern of the headaches-whether not she needs to be on preventive medication.

## 2022-01-10 ENCOUNTER — CLINICAL SUPPORT (OUTPATIENT)
Dept: REHABILITATION | Facility: OTHER | Age: 47
End: 2022-01-10
Attending: ANESTHESIOLOGY
Payer: OTHER GOVERNMENT

## 2022-01-10 DIAGNOSIS — R29.898 SHOULDER WEAKNESS: Primary | ICD-10-CM

## 2022-01-10 DIAGNOSIS — R68.89 DECREASED FUNCTIONAL ACTIVITY TOLERANCE: ICD-10-CM

## 2022-01-10 DIAGNOSIS — G89.29 CHRONIC RIGHT SHOULDER PAIN: ICD-10-CM

## 2022-01-10 DIAGNOSIS — M25.511 CHRONIC RIGHT SHOULDER PAIN: ICD-10-CM

## 2022-01-10 PROCEDURE — 97110 THERAPEUTIC EXERCISES: CPT | Mod: CQ

## 2022-01-10 PROCEDURE — 97140 MANUAL THERAPY 1/> REGIONS: CPT | Mod: CQ

## 2022-01-10 NOTE — PROGRESS NOTES
"Physical Therapy Daily Treatment Note     Name: Chinyere Crockett  Clinic Number: [id number]    Therapy Diagnosis:   Encounter Diagnoses   Name Primary?    Shoulder weakness Yes    Chronic right shoulder pain     Decreased functional activity tolerance      Physician: Mariana De León MD    Visit Date: 1/10/2022    Physician Orders: PT Eval and Treat   Medical Diagnosis from Referral: Chronic right shoulder pain [M25.511, G89.29]  Evaluation Date: 1/4/2022  Authorization Period Expiration: 01/31/2022  Plan of Care Expiration: 03/01/2022  Visit # / Visits authorized: 2/    Progress Note Due: 02/04/2022  FOTO: 1/1     Precautions: Standard, anxiety      Time In: 3:00 PM   Time Out: 4:00 PM  Total Appointment Time (timed & untimed codes):53 minutes    Subjective     Pt reports: aggravating her shoulder over the weekend after lifting a heavy bag from "[employer]" and also doing household chores.   She was compliant with home exercise program.   Response to previous treatment: Minimal soreness  Functional change: None yet    Pain: 3/10  Location: right face  and shoulder      Objective     Chinyere received therapeutic exercises to develop strength, endurance, ROM, flexibility, posture and core stabilization for 45 minutes including:    AAROM on pulleys for Shoulder flex/abd x 2 minutes each    Supine AAROM Shoulder flex with dowel x 10  Supine AAROM shoulder abd with dowel x 10  Supine Shoulder ER with dowel x 10  Supine Scap protract 2 x 10  Sidelying shoulder flex to 90 degrees x 10  Sidelying ER x 10  prone scaption (Y) x 10  prone row x 10  prone H-abd (T) x 10  prone ext (I) x 10  No money RTB 2x10   Scap retract/rows RTB 2 x 10  shoulder isometrics x10 --NP    Chinyere received the following manual therapy techniques: Joint mobilizations were applied to the: (R) shoulder for 8 minutes, including: inferior/posterior GH glides grade ll, GH oscillations    Chinyere received cold pack for 5 minutes to (R) shoulder for " pain control..    Home Exercises Provided and Patient Education Provided     Education provided:   - cues with ex's  - Cues for postural awareness      Written Home Exercises Provided: Patient instructed to cont prior HEP.  Exercises were reviewed and Chinyere was able to demonstrate them prior to the end of the session.  Chinyere demonstrated good  understanding of the education provided.     See EMR under Patient Instructions for exercises provided prior visit.    Assessment   Patient tolerated treatment fairly well.  She did appear to have guarded posturing and active shoulder movement initially which improved with cuing and as treatment progressed.  Patient able to perform the above ex's/activities within tolerable muscular fatigue and discomfort.  Patient particularly noting that supine scap protract felt good.  She does require cues for postural awareness, scap engagement and avoidance of UT activation with ex's.  HEP2go website was down therefore, new ex's not added to HEP today--will look to add next visit.     Chinyere Is progressing well towards her goals.   Pt prognosis is Good.     Pt will continue to benefit from skilled outpatient physical therapy to address the deficits listed in the problem list box on initial evaluation, provide pt/family education and to maximize pt's level of independence in the home and community environment.     Pt's spiritual, cultural and educational needs considered and pt agreeable to plan of care and goals.     Anticipated barriers to physical therapy: None    GOALS: Short Term Goals: 4 weeks  1.Report decreased in pain at worse less than  <   / =  8  /10  to increase tolerance for functional mobility. On going  2. Pt to improve range of motion by 25% to allow for improved functional mobility to allow for improvement in IADLs. On going  3. Increased right shoulder MMT 1/2 grade to increase tolerance for ADL and work activities. On going  4. Pt to report be conscious of impaired  "sitting and standing posture daily to decrease pain. On going  5. Pt to tolerate HEP to improve ROM and independence with ADL's. On going     Long Term Goals: 8 weeks  1.Report decreased in pain at worse less than  <   / =  2 /10  to increase tolerance for functional mobility. On going  2.  Patient will demonstrate improved overall function per FOTO Survey to 35% score or less.On going  3.Increased right shoulder MMT 1 grade to increase tolerance for ADL and work activities.On going  4. Pt to report and demonstrate proper posture in standing and sitting to decrease pain. On going  5. Pt to be Independent with HEP to improve ROM and independence with ADL's.On going  6. Pt to improve range of motion by 75% to allow for improved functional mobility to allow for improvement in IADLs. On going  7. Pt will be able to complete making of "minis" while maintain good postural awareness and little to no (no more than 2/10 on PRS) pain. (progressing, not met)    Plan     Plan of care Certification: 1/4/2022 to 03/01/2022     Outpatient Physical Therapy 2 times weekly for 8 weeks to include the following interventions: Cervical/Lumbar Traction, Electrical Stimulation PRN, Gait Training, Manual Therapy, Moist Heat/ Ice, Neuromuscular Re-ed, Patient Education, Self Care, Therapeutic Activities and Therapeutic Exercise. Dry needling Progress HEP towards D/C. Recommend F/U with MD if symptoms worsen or do not resolve. Patient may be seen by a PTA for treatment to carry out their plan of care.  Face-to-face conferences will be held.    Bladimir Mccullough PTA     "

## 2022-01-11 NOTE — PROGRESS NOTES
Physical Therapy Daily Treatment Note     Name: Chinyere Crockett  Clinic Number: [id number]    Therapy Diagnosis:   Encounter Diagnoses   Name Primary?    Shoulder weakness Yes    Chronic right shoulder pain     Decreased functional activity tolerance      Physician: Mariana De León MD    Visit Date: 1/13/2022    Physician Orders: PT Eval and Treat   Medical Diagnosis from Referral: Chronic right shoulder pain [M25.511, G89.29]  Evaluation Date: 1/4/2022  Authorization Period Expiration: 01/31/2022  Plan of Care Expiration: 03/01/2022  Visit # / Visits authorized: 3/20    Progress Note Due: 02/04/2022  FOTO: 1/1     Precautions: Standard, anxiety      Time In: 2:00 PM   Time Out: 3:00 PM  Total Appointment Time (timed & untimed codes):60 minutes    Subjective   Pt reports: she is still feeling a little sore from over doing it several days ago. 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   Response to previous treatment: Minimal soreness  Functional change: None yet    Pain:4/10  Location: right face  and shoulder      Objective     Chinyere received therapeutic exercises to develop strength, endurance, ROM, flexibility, posture and core stabilization for 45 minutes including:    AAROM on pulleys for Shoulder flex/scaption x 2 minutes each    Supine AAROM Shoulder flection with dowel x 10  Supine Shoulder ER with dowel x 10  Supine Scap protract 2 x 10  Sidelying shoulder flex to 90 degrees x 10  Sidelying ER x 10  +Supine serratus punches 1# dowel x10  prone row x 10  prone ext (I) x 10  prone H-abd (T) x 10  prone scaption (Y) x 10  SOC RTB 2x10   Scap retract/rows RTB 2 x 10  +Shoulder extension 2x10 RTB    Not performed:   shoulder isometrics x10    Chinyere received the following manual therapy techniques: Joint mobilizations were applied to the: (R) shoulder for 15 minutes, including: inferior/posterior GH  "glides grade ll, GH oscillations  +STM to right  Deltoid--->RTC      Chinyere received cold pack for 5 minutes to (R) shoulder for pain control..    Home Exercises Provided and Patient Education Provided     Education provided:   - cues with ex's  - Cues for postural awareness      Written Home Exercises Provided: Patient instructed to cont prior HEP.  Exercises were reviewed and Chinyere was able to demonstrate them prior to the end of the session.  Chinyere demonstrated good  understanding of the education provided.     See EMR under Patient Instructions for exercises provided prior visit.    Assessment   Chinyere completed today's therapy session well and with no adverse effects. Pt reports significant relief in "tightness and pain" post manual therapy. Continuation of shoulder and scapular stabilization exercises provided today. Pt required frequent verbal and tactile cues for proper muscle activation and avoidence of compensations. Will continue to progress exercises and initiate closed chain shoulder exercises next visit.     Chinyere Is progressing well towards her goals.   Pt prognosis is Good.     Pt will continue to benefit from skilled outpatient physical therapy to address the deficits listed in the problem list box on initial evaluation, provide pt/family education and to maximize pt's level of independence in the home and community environment.     Pt's spiritual, cultural and educational needs considered and pt agreeable to plan of care and goals.     Anticipated barriers to physical therapy: None    GOALS: Short Term Goals: 4 weeks  1.Report decreased in pain at worse less than  <   / =  8  /10  to increase tolerance for functional mobility. On going  2. Pt to improve range of motion by 25% to allow for improved functional mobility to allow for improvement in IADLs. On going  3. Increased right shoulder MMT 1/2 grade to increase tolerance for ADL and work activities. On going  4. Pt to report be conscious of " "impaired sitting and standing posture daily to decrease pain. On going  5. Pt to tolerate HEP to improve ROM and independence with ADL's. On going     Long Term Goals: 8 weeks  1.Report decreased in pain at worse less than  <   / =  2 /10  to increase tolerance for functional mobility. On going  2.  Patient will demonstrate improved overall function per FOTO Survey to 35% score or less.On going  3.Increased right shoulder MMT 1 grade to increase tolerance for ADL and work activities.On going  4. Pt to report and demonstrate proper posture in standing and sitting to decrease pain. On going  5. Pt to be Independent with HEP to improve ROM and independence with ADL's.On going  6. Pt to improve range of motion by 75% to allow for improved functional mobility to allow for improvement in IADLs. On going  7. Pt will be able to complete making of "minis" while maintain good postural awareness and little to no (no more than 2/10 on PRS) pain. (progressing, not met)    Plan     Plan of care Certification: 1/4/2022 to 03/01/2022     Outpatient Physical Therapy 2 times weekly for 8 weeks to include the following interventions: Cervical/Lumbar Traction, Electrical Stimulation PRN, Gait Training, Manual Therapy, Moist Heat/ Ice, Neuromuscular Re-ed, Patient Education, Self Care, Therapeutic Activities and Therapeutic Exercise. Dry needling Progress HEP towards D/C. Recommend F/U with MD if symptoms worsen or do not resolve. Patient may be seen by a PTA for treatment to carry out their plan of care.  Face-to-face conferences will be held.    Debby Dasilva, PT     "

## 2022-01-13 ENCOUNTER — CLINICAL SUPPORT (OUTPATIENT)
Dept: REHABILITATION | Facility: OTHER | Age: 47
End: 2022-01-13
Attending: ANESTHESIOLOGY
Payer: OTHER GOVERNMENT

## 2022-01-13 DIAGNOSIS — M25.511 CHRONIC RIGHT SHOULDER PAIN: ICD-10-CM

## 2022-01-13 DIAGNOSIS — R68.89 DECREASED FUNCTIONAL ACTIVITY TOLERANCE: ICD-10-CM

## 2022-01-13 DIAGNOSIS — R29.898 SHOULDER WEAKNESS: Primary | ICD-10-CM

## 2022-01-13 DIAGNOSIS — G89.29 CHRONIC RIGHT SHOULDER PAIN: ICD-10-CM

## 2022-01-13 PROCEDURE — 97110 THERAPEUTIC EXERCISES: CPT

## 2022-01-13 PROCEDURE — 97140 MANUAL THERAPY 1/> REGIONS: CPT

## 2022-01-19 ENCOUNTER — CLINICAL SUPPORT (OUTPATIENT)
Dept: REHABILITATION | Facility: OTHER | Age: 47
End: 2022-01-19
Attending: ANESTHESIOLOGY
Payer: OTHER GOVERNMENT

## 2022-01-19 DIAGNOSIS — R68.89 DECREASED FUNCTIONAL ACTIVITY TOLERANCE: ICD-10-CM

## 2022-01-19 DIAGNOSIS — G89.29 CHRONIC RIGHT SHOULDER PAIN: ICD-10-CM

## 2022-01-19 DIAGNOSIS — R29.898 SHOULDER WEAKNESS: Primary | ICD-10-CM

## 2022-01-19 DIAGNOSIS — M25.511 CHRONIC RIGHT SHOULDER PAIN: ICD-10-CM

## 2022-01-19 PROCEDURE — 97140 MANUAL THERAPY 1/> REGIONS: CPT | Mod: CQ

## 2022-01-19 PROCEDURE — 97110 THERAPEUTIC EXERCISES: CPT | Mod: CQ

## 2022-01-19 NOTE — PROGRESS NOTES
"Physical Therapy Daily Treatment Note     Name: Chinyere Crockett  Clinic Number: [id number]    Therapy Diagnosis:   Encounter Diagnoses   Name Primary?    Shoulder weakness Yes    Chronic right shoulder pain     Decreased functional activity tolerance      Physician: Mariana De León MD    Visit Date: 1/19/2022    Physician Orders: PT Eval and Treat   Medical Diagnosis from Referral: Chronic right shoulder pain [M25.511, G89.29]  Evaluation Date: 1/4/2022  Authorization Period Expiration: 01/31/2022  Plan of Care Expiration: 03/01/2022  Visit # / Visits authorized: 4/20    Progress Note Due: 02/04/2022  FOTO: 1/1     Precautions: Standard, anxiety      Time In: 2:05 PM   Time Out: 3:05 PM  Total Appointment Time (timed & untimed codes):55 minutes    Subjective   Pt reports: that her shoulder is feeling "grumpy".  She reports aggravation of symptoms while performing sewing activities and reorganizing her crafting supplies at home..    She was compliant with home exercise program.   Response to previous treatment: Minimal soreness  Functional change: None yet    Pain:4/10  Location: right shoulder/ (R) middle trap/rhomboid     Objective     Chinyere received therapeutic exercises to develop strength, endurance, ROM, flexibility, posture and core stabilization for 40 minutes including:    Ex's in bold performed today    AAROM on pulleys for Shoulder flex/scaption x 2 minutes each  Supine AAROM Shoulder flexion with dowel x 10  Sidelying ER with 1# 2 x 10  Supine serratus punches 1# dowel 1 x 10 and 1 x 5 ( stopped due to onset of (R) rhomboid/middle trap cramping)  +Posterior capsule stretch 3 x 20 sec  SOC RTB 2x10    Scap retract/rows RTB 2 x 10  Shoulder extension 2x10 RTB  +serratus wall slides (Pillow case) 2 x 10  +Shoulder ER/IR walkouts with YTB  +Myofascial release (Rhomboid/posterior capsule)with lacrosse ball on wall x 1 minute    Not performed:   shoulder isometrics x10  Supine Scap protract 2 x " 10  Supine Shoulder ER with dowel x 10  Sidelying shoulder flex to 90 degrees x 10  prone row x 10  prone ext (I) x 10  prone H-abd (T) x 10  prone scaption (Y) x 10    Karma received the following manual therapy techniques: Joint mobilizations were applied to the: (R) shoulder for 15 minutes, including: inferior/posterior GH glides grade ll, GH oscillations  +STM to right rhomboid/middle trap     Chinyere received cold pack for 5 minutes to (R) shoulder and Rhomboid/middle trap for pain control..    Home Exercises Provided and Patient Education Provided     Education provided:   - cues with ex's  - Cues for postural awareness    Written Home Exercises Provided: Patient instructed to cont prior HEP. Added serratus wall slides and posterior capsule stretch.  Exercises were reviewed and Chinyere was able to demonstrate them prior to the end of the session.  Chinyere demonstrated good  understanding of the education provided.     See EMR under Patient Instructions for exercises provided prior visit and today's visit.    Assessment   Patient presents with some continued posterior capsule discomfort to her (R) shoulder. Some aggravation of symptoms while performing craft work/sewing at home and she also experienced onset of Rhomboid/Middle trap discomfort with performance of supine scap protract with a dowel which was relieved with manual techniques, instruction in self Lacrosse ball myofascial release technique and posterior capsule stretch. She was able to tolerate progression to closed chain serratus wall slides without c/o and this was added to her HEP. Discomfort level = 3/10 post session. Will monitor and attempt to progress as tolerated.     Chinyere Is progressing well towards her goals.   Pt prognosis is Good.     Pt will continue to benefit from skilled outpatient physical therapy to address the deficits listed in the problem list box on initial evaluation, provide pt/family education and to maximize pt's level of  "independence in the home and community environment.     Pt's spiritual, cultural and educational needs considered and pt agreeable to plan of care and goals.     Anticipated barriers to physical therapy: None    GOALS: Short Term Goals: 4 weeks  1.Report decreased in pain at worse less than  <   / =  8  /10  to increase tolerance for functional mobility. On going  2. Pt to improve range of motion by 25% to allow for improved functional mobility to allow for improvement in IADLs. On going  3. Increased right shoulder MMT 1/2 grade to increase tolerance for ADL and work activities. On going  4. Pt to report be conscious of impaired sitting and standing posture daily to decrease pain. On going  5. Pt to tolerate HEP to improve ROM and independence with ADL's. On going     Long Term Goals: 8 weeks  1.Report decreased in pain at worse less than  <   / =  2 /10  to increase tolerance for functional mobility. On going  2.  Patient will demonstrate improved overall function per FOTO Survey to 35% score or less.On going  3.Increased right shoulder MMT 1 grade to increase tolerance for ADL and work activities.On going  4. Pt to report and demonstrate proper posture in standing and sitting to decrease pain. On going  5. Pt to be Independent with HEP to improve ROM and independence with ADL's.On going  6. Pt to improve range of motion by 75% to allow for improved functional mobility to allow for improvement in IADLs. On going  7. Pt will be able to complete making of "minis" while maintain good postural awareness and little to no (no more than 2/10 on PRS) pain. (progressing, not met)    Plan     Plan of care Certification: 1/4/2022 to 03/01/2022     Outpatient Physical Therapy 2 times weekly for 8 weeks to include the following interventions: Cervical/Lumbar Traction, Electrical Stimulation PRN, Gait Training, Manual Therapy, Moist Heat/ Ice, Neuromuscular Re-ed, Patient Education, Self Care, Therapeutic Activities and " Therapeutic Exercise. Dry needling Progress HEP towards D/C. Recommend F/U with MD if symptoms worsen or do not resolve. Patient may be seen by a PTA for treatment to carry out their plan of care.  Face-to-face conferences will be held.    Bladimir Mccullough PTA

## 2022-01-21 ENCOUNTER — CLINICAL SUPPORT (OUTPATIENT)
Dept: REHABILITATION | Facility: OTHER | Age: 47
End: 2022-01-21
Attending: ANESTHESIOLOGY
Payer: OTHER GOVERNMENT

## 2022-01-21 DIAGNOSIS — R29.898 SHOULDER WEAKNESS: Primary | ICD-10-CM

## 2022-01-21 DIAGNOSIS — M25.511 CHRONIC RIGHT SHOULDER PAIN: ICD-10-CM

## 2022-01-21 DIAGNOSIS — R68.89 DECREASED FUNCTIONAL ACTIVITY TOLERANCE: ICD-10-CM

## 2022-01-21 DIAGNOSIS — G89.29 CHRONIC RIGHT SHOULDER PAIN: ICD-10-CM

## 2022-01-21 PROCEDURE — 97110 THERAPEUTIC EXERCISES: CPT | Mod: CQ

## 2022-01-21 PROCEDURE — 97140 MANUAL THERAPY 1/> REGIONS: CPT | Mod: CQ

## 2022-01-21 NOTE — PROGRESS NOTES
"Physical Therapy Daily Treatment Note     Name: Chinyere Crockett  Clinic Number: [id number]    Therapy Diagnosis:   Encounter Diagnoses   Name Primary?    Shoulder weakness Yes    Chronic right shoulder pain     Decreased functional activity tolerance      Physician: Mariana De León MD    Visit Date: 1/21/2022    Physician Orders: PT Eval and Treat   Medical Diagnosis from Referral: Chronic right shoulder pain [M25.511, G89.29]  Evaluation Date: 1/4/2022  Authorization Period Expiration: 01/31/2022  Plan of Care Expiration: 03/01/2022  Visit # / Visits authorized: 5/20    Progress Note Due: 02/04/2022  FOTO: 1/1     Precautions: Standard, anxiety      Time In: 8:00 PM   Time Out: 9:00 PM  Total Appointment Time (timed & untimed codes):55 minutes    Subjective   Pt reports: some continued (R) shoulder/upper back discomfort.  She reports aggravation of symptoms with moving of plants inside in preparation of cold weather.  She also states increased discomfort when trying to open jars.     She was compliant with home exercise program.   Response to previous treatment: Minimal soreness  Functional change: None yet    Pain:3-4/10  Location: right posterolateral shoulder/ (R) middle trap/rhomboid     Objective     Chinyree received therapeutic exercises to develop strength, endurance, ROM, flexibility, posture and core stabilization for 40 minutes including:    Ex's in bold performed today    AAROM on pulleys for Shoulder flex/scaption and abduction to tolerance x 2 minutes each  Posterior capsule stretch 3 x 20 sec  Supine AAROM Shoulder flexion with dowel x 10    Scap retract/rows RTB 2 x 10  Shoulder extension 2x10 RTB  serratus wall slides (Pillow case) 2 x 10  Closed chain scap protract on wall 2 x 10  Shoulder ER/IR walkouts with YTB x 10   Prone over T-ball against plinth   Prone row x 10   scaption (Y") x 10   H-ABd ("T") x 10   Row x 10    Not performed:   shoulder isometrics x10  Supine Scap protract 2 x " 10  Supine Shoulder ER with dowel x 10  Sidelying shoulder flex to 90 degrees x 10  Supine serratus punches 1# dowel 1 x 10 and 1 x 5 ( stopped due to onset of (R) rhomboid/middle trap cramping)  Myofascial release (Rhomboid/posterior capsule)with lacrosse ball on wall x 1 minute  Sidelying ER with 1# 2 x 10  SOC RTB 2x10    Chinyere received the following manual therapy techniques: Joint mobilizations were applied to the: (R) shoulder for 20 minutes, including: inferior/posterior GH glides grade ll, GH oscillations, STM to right rhomboid/middle trap/posterolateral shoulder with and without roller massage ball.     Chinyere received cold pack for 5 minutes to (R) shoulder and Rhomboid/middle trap for pain control.. (Patient declined application of cryotherapy today due to time constraints but states she will apply at home.    Home Exercises Provided and Patient Education Provided     Education provided:   - cues with ex's  - Cues for postural awareness  - Patient educated to attempt to refrain from sleeping on her (R) side however, she indicates that she has tried other positions but cannot get comfortable enough to sleep.    Written Home Exercises Provided: Patient instructed to cont prior HEP. Added ER/IR walkouts 1/21/22  Exercises were reviewed and Chinyere was able to demonstrate them prior to the end of the session.  Chinyere demonstrated good  understanding of the education provided.     See EMR under Patient Instructions for exercises provided prior visit and today's visit.    Assessment   Patient presents with some continued (R) shoulder/posterior capsule discomfort. She reports aggravation of symptoms with household activities and crafting work. Patient appearing somewhat guarded with active (R) UE movement but did respond well to manual techniques with decreased guarded noted and was able to perform ex's without exacerbation of pain symptoms. She does require some cues for postural awareness and scap engagement with  "ex's. Will monitor and attempt to progress as tolerated.     Chinyere Is progressing well towards her goals.   Pt prognosis is Good.     Pt will continue to benefit from skilled outpatient physical therapy to address the deficits listed in the problem list box on initial evaluation, provide pt/family education and to maximize pt's level of independence in the home and community environment.     Pt's spiritual, cultural and educational needs considered and pt agreeable to plan of care and goals.     Anticipated barriers to physical therapy: None    GOALS: Short Term Goals: 4 weeks  1.Report decreased in pain at worse less than  <   / =  8  /10  to increase tolerance for functional mobility. On going  2. Pt to improve range of motion by 25% to allow for improved functional mobility to allow for improvement in IADLs. On going  3. Increased right shoulder MMT 1/2 grade to increase tolerance for ADL and work activities. On going  4. Pt to report be conscious of impaired sitting and standing posture daily to decrease pain. On going  5. Pt to tolerate HEP to improve ROM and independence with ADL's. On going     Long Term Goals: 8 weeks  1.Report decreased in pain at worse less than  <   / =  2 /10  to increase tolerance for functional mobility. On going  2.  Patient will demonstrate improved overall function per FOTO Survey to 35% score or less.On going  3.Increased right shoulder MMT 1 grade to increase tolerance for ADL and work activities.On going  4. Pt to report and demonstrate proper posture in standing and sitting to decrease pain. On going  5. Pt to be Independent with HEP to improve ROM and independence with ADL's.On going  6. Pt to improve range of motion by 75% to allow for improved functional mobility to allow for improvement in IADLs. On going  7. Pt will be able to complete making of "minis" while maintain good postural awareness and little to no (no more than 2/10 on PRS) pain. (progressing, not met)    Plan "     Plan of care Certification: 1/4/2022 to 03/01/2022     Outpatient Physical Therapy 2 times weekly for 8 weeks to include the following interventions: Cervical/Lumbar Traction, Electrical Stimulation PRN, Gait Training, Manual Therapy, Moist Heat/ Ice, Neuromuscular Re-ed, Patient Education, Self Care, Therapeutic Activities and Therapeutic Exercise. Dry needling Progress HEP towards D/C. Recommend F/U with MD if symptoms worsen or do not resolve. Patient may be seen by a PTA for treatment to carry out their plan of care.  Face-to-face conferences will be held.    Bladimir Mccullough PTA

## 2022-01-27 ENCOUNTER — CLINICAL SUPPORT (OUTPATIENT)
Dept: REHABILITATION | Facility: OTHER | Age: 47
End: 2022-01-27
Attending: ANESTHESIOLOGY
Payer: OTHER GOVERNMENT

## 2022-01-27 DIAGNOSIS — G89.29 CHRONIC RIGHT SHOULDER PAIN: ICD-10-CM

## 2022-01-27 DIAGNOSIS — M25.511 CHRONIC RIGHT SHOULDER PAIN: ICD-10-CM

## 2022-01-27 DIAGNOSIS — R29.898 SHOULDER WEAKNESS: Primary | ICD-10-CM

## 2022-01-27 DIAGNOSIS — R68.89 DECREASED FUNCTIONAL ACTIVITY TOLERANCE: ICD-10-CM

## 2022-01-27 PROCEDURE — 97110 THERAPEUTIC EXERCISES: CPT

## 2022-01-27 PROCEDURE — 97140 MANUAL THERAPY 1/> REGIONS: CPT

## 2022-01-31 ENCOUNTER — CLINICAL SUPPORT (OUTPATIENT)
Dept: REHABILITATION | Facility: OTHER | Age: 47
End: 2022-01-31
Attending: ANESTHESIOLOGY
Payer: OTHER GOVERNMENT

## 2022-01-31 ENCOUNTER — TELEPHONE (OUTPATIENT)
Dept: PAIN MEDICINE | Facility: CLINIC | Age: 47
End: 2022-01-31
Payer: OTHER GOVERNMENT

## 2022-01-31 DIAGNOSIS — R29.898 SHOULDER WEAKNESS: Primary | ICD-10-CM

## 2022-01-31 DIAGNOSIS — G89.29 CHRONIC RIGHT SHOULDER PAIN: ICD-10-CM

## 2022-01-31 DIAGNOSIS — R68.89 DECREASED FUNCTIONAL ACTIVITY TOLERANCE: ICD-10-CM

## 2022-01-31 DIAGNOSIS — M25.511 CHRONIC RIGHT SHOULDER PAIN: ICD-10-CM

## 2022-01-31 PROCEDURE — 97110 THERAPEUTIC EXERCISES: CPT | Mod: CQ

## 2022-01-31 PROCEDURE — 97140 MANUAL THERAPY 1/> REGIONS: CPT | Mod: CQ

## 2022-01-31 NOTE — TELEPHONE ENCOUNTER
"This message is for patient in regards to his/her appointment 02/01/21 at 8:00a       [facility] Policy: For the safety of all patients and staff members.     Patient Visitor policy: Due to social distancing and limited seating staff are requesting patient to arrive to their schedule appointments on time.     Upon arriving to facility; patients are required to wear a face mask, if patient do not have a face mask one will be provided. Upon arriving patient we ask patients to contact clinic at this number [phone] to notify staff that they have arrived or they may do so by utilizing the Mobile [employer] in [place](if patient have patient portal; clinical staff will send a message through there letting them know it's okay to proceed to their visit). Staff will give the patient the "okay" to come up or wait inside their vehicle until clinic is ready for patient to be seen by SHIRA Rubin. If you have any questions or concerns please contact [phone]      Staff left voicemail for patient   "

## 2022-01-31 NOTE — PROGRESS NOTES
Physical Therapy Daily Treatment Note     Name: Chinyere Crockett  Clinic Number: [id number]    Therapy Diagnosis:   No diagnosis found.  Physician: Mariana De León MD    Visit Date: 1/31/2022    Physician Orders: PT Eval and Treat   Medical Diagnosis from Referral: Chronic right shoulder pain [M25.511, G89.29]  Evaluation Date: 1/4/2022  Authorization Period Expiration: 12/31/2022  Plan of Care Expiration: 03/01/2022  Visit # / Visits authorized: 7/20    Progress Note Due: 02/27/2022  FOTO: 2/2     Precautions: Standard, anxiety      Time In: 8:00 am   Time Out: 9:10 am  Total Appointment Time (timed & untimed codes):60 minutes    Subjective   Pt reports: increased soreness after last session. States that she had to take some muscle relaxers and feels the progression to RTB last visit for some ex's was challenging. She reports pain is still located to (R) shoulder/(R) Mid thoracic area and also reports intermittent pain to (R) elbow.  Despite continued pain, patient does report slight improvement with functional use of her (R) arm at home. Patient reports that she has a visit with pain medicine MD tomorrow and will discuss her continued (R) shoulder pain along with her other issues related to chronic migraines, RA symptoms.   She was compliant with home exercise program.   Response to previous treatment: Increased soreness  Functional change: Slight improvement with ability to load/unload groceries at home on occasion    Pain:3-4/10  Location: right posterolateral shoulder/ (R) middle trap/rhomboid.      Objective          Active Range of Motion in (degrees): - reassessed 1/27/2022  Shoulder Right Left   Flexion 160 WFL   Abduction 165 WFL   ER 90* WFL   IR  80 WFL      Upper Extremity Strength - reassessed 1/27/2022  (R) UE   (L) UE     Elbow flexion: 4/5 Elbow flexion: 4/5   Elbow extension: 4/5 Elbow extension: 4/5   Shoulder elevation: 4/5 Shoulder elevation: 4/5   Shoulder flexion: 4-/5 Shoulder  "flexion: 4/5   Shoulder Abduction: 4-/5 Shoulder abduction: 4/5   Shoulder ER 4/5 Shoulder ER 4/5   Shoulder IR 4/5 Shoulder IR 4/5   Lower Trap 3/5 Lower Trap 3/5   Middle Trap 3/5 Middle Trap 3/5   Rhomboids 3-/5 Rhomboids 3-/5        Chinyere received therapeutic exercises to develop strength, endurance, ROM, flexibility, posture and core stabilization for 45 minutes including:    Ex's in bold performed today    AAROM on pulleys for Shoulder flex/scaption and abduction to tolerance x 2 minutes each  Levator scap stretch 3 x 20 sec (R).   Lat stretch holding onto chest press 5 x 10"  Posterior capsule stretch 3 x 20 sec  Open books x10  Supine AAROM Shoulder flexion with 1# dowel (decreased from 3# last visit) x 10    Scap protract in supine with 1# dowel x 10  Supine horizontal abduction YTB 10x3"  Sidelying ER with 1# 2 x 10  Prone over T-ball against plinth   Prone row x 10   scaption (Y") x 10   H-ABd ("T") x 10   Row x 10   Ext with ER x 10  Scap retract/rows 2 x 10 YTB  Shoulder extension 2x10 YTB  serratus wall slides (Pillow case) 2 x 10   Closed chain scap protract on wall 2 x 10  No money YTB 10x3"    Not performed:   Shoulder ER/IR walkouts with YTB x 10   shoulder isometrics x10  Sidelying shoulder flex to 90 degrees x 10  Myofascial release (Rhomboid/posterior capsule)with lacrosse ball on wall x 1 minute  SOC RTB 2x10    Chinyere received the following manual therapy techniques: Joint mobilizations were applied to the: (R) shoulder for 15 minutes, including: inferior/posterior GH glides grade ll, GH oscillations, scap mobs, STM to right rhomboid/middle trap/posterolateral shoulder with and without roller massage ball.     Chinyere received cold pack for 10 minutes to (R) shoulder and Rhomboid/middle trap for pain control..    Home Exercises Provided and Patient Education Provided     Education provided:   - cues with ex's  - Cues for postural awareness  - Patient educated to attempt to refrain from sleeping " on her (R) side however, she indicates that she has tried other positions but cannot get comfortable enough to sleep.    Written Home Exercises Provided: Patient instructed to cont prior HEP.   Exercises were reviewed and Chinyere was able to demonstrate them prior to the end of the session.  Chinyere demonstrated good  understanding of the education provided.     See EMR under Patient Instructions for exercises provided prior visit    Assessment   Patient returns reporting continued (R) shoulder/periscapular pain. She reports increased discomfort after last session therefore, decreased resistance was applied for some ex's today.  Patient noted to be moving a little more cautiously today. Increased tenderness reported to Mid trap area with scap mobs. Despite continued c/o pain, patient reports slight improvements in her functional use at home. Will monitor and attempt to progress as tolerated.    Chinyere Is progressing Fair/Good towards her goals.   Pt prognosis is Good.     Pt will continue to benefit from skilled outpatient physical therapy to address the deficits listed in the problem list box on initial evaluation, provide pt/family education and to maximize pt's level of independence in the home and community environment.     Pt's spiritual, cultural and educational needs considered and pt agreeable to plan of care and goals.     Anticipated barriers to physical therapy: None    GOALS: Short Term Goals: 4 weeks  1.Report decreased in pain at worse less than  <   / =  8  /10  to increase tolerance for functional mobility. MET 1/27/2022  2. Pt to improve range of motion by 25% to allow for improved functional mobility to allow for improvement in IADLs. MET 1/27/2022  3. Increased right shoulder MMT 1/2 grade to increase tolerance for ADL and work activities. MET 1/27/2022  4. Pt to report be conscious of impaired sitting and standing posture daily to decrease pain. MET 1/27/2022  5. Pt to tolerate HEP to improve ROM and  "independence with ADL's. MET 1/27/2022     Long Term Goals: 8 weeks  1.Report decreased in pain at worse less than  <   / =  2 /10  to increase tolerance for functional mobility. (not met, progressing)  2.  Patient will demonstrate improved overall function per FOTO Survey to 35% score or less. (not met, progressing)  3.Increased right shoulder MMT 1 grade to increase tolerance for ADL and work activities. (not met, progressing)  4. Pt to report and demonstrate proper posture in standing and sitting to decrease pain. (not met, progressing)  5. Pt to be Independent with HEP to improve ROM and independence with ADL's. (not met, progressing)  6. Pt to improve range of motion by 75% to allow for improved functional mobility to allow for improvement in IADLs. (not met, progressing)  7. Pt will be able to complete making of "minis" while maintain good postural awareness and little to no (no more than 2/10 on PRS) pain.(not met, progressing)    Plan     Plan of care Certification: 1/4/2022 to 03/01/2022     Outpatient Physical Therapy 2 times weekly for 8 weeks to include the following interventions: Cervical/Lumbar Traction, Electrical Stimulation PRN, Gait Training, Manual Therapy, Moist Heat/ Ice, Neuromuscular Re-ed, Patient Education, Self Care, Therapeutic Activities and Therapeutic Exercise. Dry needling Progress HEP towards D/C. Recommend F/U with MD if symptoms worsen or do not resolve. Patient may be seen by a PTA for treatment to carry out their plan of care.  Face-to-face conferences will be held.    Bladimir Mccullough PTA     "

## 2022-02-01 ENCOUNTER — OFFICE VISIT (OUTPATIENT)
Dept: PAIN MEDICINE | Facility: CLINIC | Age: 47
End: 2022-02-01
Payer: OTHER GOVERNMENT

## 2022-02-01 VITALS
SYSTOLIC BLOOD PRESSURE: 139 MMHG | HEART RATE: 83 BPM | TEMPERATURE: 97 F | RESPIRATION RATE: 18 BRPM | DIASTOLIC BLOOD PRESSURE: 93 MMHG | BODY MASS INDEX: 40.39 KG/M2 | HEIGHT: 66 IN | WEIGHT: 251.31 LBS

## 2022-02-01 DIAGNOSIS — G89.29 OTHER CHRONIC PAIN: Primary | ICD-10-CM

## 2022-02-01 DIAGNOSIS — M54.9 BACK PAIN, UNSPECIFIED BACK LOCATION, UNSPECIFIED BACK PAIN LATERALITY, UNSPECIFIED CHRONICITY: ICD-10-CM

## 2022-02-01 DIAGNOSIS — M25.511 CHRONIC RIGHT SHOULDER PAIN: ICD-10-CM

## 2022-02-01 DIAGNOSIS — M25.552 HIP PAIN, BILATERAL: ICD-10-CM

## 2022-02-01 DIAGNOSIS — M51.36 DDD (DEGENERATIVE DISC DISEASE), LUMBAR: ICD-10-CM

## 2022-02-01 DIAGNOSIS — M25.511 ACUTE PAIN OF RIGHT SHOULDER: ICD-10-CM

## 2022-02-01 DIAGNOSIS — M50.30 DDD (DEGENERATIVE DISC DISEASE), CERVICAL: ICD-10-CM

## 2022-02-01 DIAGNOSIS — G89.29 CHRONIC RIGHT SHOULDER PAIN: ICD-10-CM

## 2022-02-01 DIAGNOSIS — M25.551 HIP PAIN, BILATERAL: ICD-10-CM

## 2022-02-01 PROCEDURE — 99214 OFFICE O/P EST MOD 30 MIN: CPT | Mod: S$PBB,,, | Performed by: NURSE PRACTITIONER

## 2022-02-01 PROCEDURE — 99215 OFFICE O/P EST HI 40 MIN: CPT | Mod: PBBFAC | Performed by: NURSE PRACTITIONER

## 2022-02-01 PROCEDURE — 99999 PR PBB SHADOW E&M-EST. PATIENT-LVL V: ICD-10-PCS | Mod: PBBFAC,,, | Performed by: NURSE PRACTITIONER

## 2022-02-01 PROCEDURE — 99214 PR OFFICE/OUTPT VISIT, EST, LEVL IV, 30-39 MIN: ICD-10-PCS | Mod: S$PBB,,, | Performed by: NURSE PRACTITIONER

## 2022-02-01 PROCEDURE — 99999 PR PBB SHADOW E&M-EST. PATIENT-LVL V: CPT | Mod: PBBFAC,,, | Performed by: NURSE PRACTITIONER

## 2022-02-01 RX ORDER — CYCLOBENZAPRINE HCL 10 MG
10 TABLET ORAL 3 TIMES DAILY PRN
Qty: 90 TABLET | Refills: 1 | Status: SHIPPED | OUTPATIENT
Start: 2022-02-01 | End: 2022-03-03

## 2022-02-01 RX ORDER — DULOXETIN HYDROCHLORIDE 30 MG/1
30 CAPSULE, DELAYED RELEASE ORAL DAILY
Qty: 30 CAPSULE | Refills: 3 | Status: SHIPPED | OUTPATIENT
Start: 2022-02-01 | End: 2022-04-11

## 2022-02-01 NOTE — PROGRESS NOTES
Chronic Pain - Established patient - Follow Up     Referring Physician: No ref. provider found    Chief Complaint:   Chief Complaint   Patient presents with    Shoulder Pain       SUBJECTIVE: Disclaimer: This note has been generated using voice-recognition software. There may be typographical errors that have been missed during proof-reading    Last 3 PDI Scores 2/1/2022 12/21/2021 11/29/2021   Pain Disability Index (PDI) 21 15 13       Interval History 2/1/2022:  Mrs Crockett presents for follow up. She has been attending Therapy for Right shoulder and while she endorses benefit from PT at times PT sets her back and exacerbates pain. She states this Thursday will be her last day for PT. And again while she has found progress she is not where she would like to be with functioning and pain. She does not wish to have surgical intervention or CSI at this time. She is frustrated with diffuse pain complaints and decreased daily functioning. She is taking Flexeril more frequently at this time but feels brain fog at times during the day. Diclofenac has been beneficial for her. She denies new areas of pain, denies neurological changes.     Interval History 12/21/2021:  The Pt presents for follow up of Right shoulder pain. She states doing fair and but continued scapular pain and shoulder pain with even cooking activities. She will be starting Shoulder PT in January. Does not wish to pursue CSI or surgical intervention at this time without giving PT a try 1st. She is currently taking diclofenac with benefit and denies SE of medications.     Interval History 11/29/2021:  Pt presents to clinic for follow up of low back pain and new right sided scapula pain. Pt reports that her low back pain had improved with a combination of physical therapy and meloxicam. Unfortunately pt reports she had some GI upset and tried oral diclofenac with some relief.   Additionally pt reports right shoulder pain associated with right upper  back spasms. Her spasms are associated with any movement of the shoulder. She denies any neck pain, arm pain or any feelings of numbness. She reports that her symptoms are relieved by ice.     Initial encounter 08/25/2021:  Chinyere Crockett presents to the clinic for the evaluation of low back pain. The pain started 3 years ago following a fall at work and symptoms have been worsening. She slipped on a wet floor and fell in a twisting motion onto her back while striking a mop bucket. Since that time she has dealt with hip and back pain. The hip pain is treated with GTB injections by Dr. Zazueta with sports medicine. She receives the injections every 3-4 months. When she feels the injection's effect wear off, she begins to notice her back pain worsening.     Brief history:    Pain Description:    The pain is located in the Left lower back area and does not radiate.      At BEST  4/10     At WORST  8/10 on the WORST day.      On average pain is rated as 6/10.     Today the pain is rated as 6/10    The pain is described as aching and burning      Symptoms interfere with daily activity, sleeping and work.     Exacerbating factors: Extension and rotation.      Mitigating factors heat, ice, medications and dry needling.     Patient denies night fever/night sweats, urinary incontinence, bowel incontinence, significant weight loss, significant motor weakness and loss of sensations.  Patient denies any suicidal or homicidal ideations    Pain Medications:  Current:  Flexeril 10mg qHS PRN  Tylenol   OTC NSAIDs      Tried in Past:  NSAIDs - Mobic  TCA -Never  SNRI -Never  Anti-convulsants -Never  Muscle Relaxants -Never  Opioids-Never  Benzodiazepines -Never    Physical Therapy/Home Exercise:   No, most recently in Jan 2021  Not consistent with HEP       report:  Reviewed and consistent with medication use as prescribed.    Pain Procedures:   GTB injections with sports medicine    Chiropractor -yes  Acupuncture -  never  TENS unit -never  Spinal decompression -never  Joint replacement -never    Imaging:    MRI SHOULDER WITHOUT CONTRAST RIGHT 12/8/2021     CLINICAL HISTORY:  Pain in right shoulderShoulder pain, rotator cuff disorder suspected, xray done;     TECHNIQUE:  MRI of right shoulder was performed on a 1.5T magnet utilizing the following sequences: Localizer; axial T2 FS; coronal T2 FS and PD FS; sagittal T1, T2 FS and PD FS.     COMPARISON:  None     FINDINGS:  Rotator cuff: There is some attenuation of caliber of the insertional fibers of the supraspinatus and infraspinatus tendons and some irregularity and abnormal signal along the bursal surface suggesting partial-thickness bursal sided tear involving approximately 50% of the tendon thickness.  Tear measures 2 cm in AP diameter.  Subscapularis tendon intact.Muscle bulk preserved.     Biceps: Long head biceps tendon is intact.     Labrum: Glenoid labrum is intact.     Glenohumeral Joint: There is no fracture or significant articular cartilage loss.  Bone marrow signal is normal.     Acromioclavicular joint: The AC joint is unremarkable.     Misc: There is no evidence of bursitis.     Impression:     Partial-thickness bursal sided tear of the supraspinatus and infraspinatus tendons measuring 2 cm in size.    XR L-Spine 09/27/2021  FINDINGS:  There is rightward curvature of the lumbar spine with discogenic change and lower facet hypertrophy.  Vertebral body heights appear maintained.  No significant translation.  No evidence for lytic or blastic lesion.     Impression:     As above.     XR L-Spine 9/23/2020  FINDINGS:  Scoliosis convex to the right in the lumbar region is appreciated, alignment otherwise appearing unremarkable.  Vertebral body heights are normally maintained, without significant compression deformity at any level.  No significant disc narrowing.  The AP projection strongly suggests some prominent left-sided L4-5 facet arthropathy.  Minimal marginal  vertebral endplate spurring is seen at a few thoracolumbar levels.  Vertebral endplates are well defined.  No osseous destructive process.  SI joints appear unremarkable.     Impression:     Lumbar dextroscoliosis and prominent left-sided facet arthropathy at L4-5.  No evidence of compression fracture.    XR hip/pelvis 2021   FINDINGS:  No acute fracture of the visualized lower lumbar spine, bony pelvis, or proximal femurs.  Preserved right and left femoral head contours.  Intact right and left SI joints and hip joint spaces.  Pelvic densities felt related to phleboliths unless concern for otherwise.  Some asymmetric radiodensity projects to the left side of the L4-L5 disc space that could be further evaluated with lumbar spine radiographs.     Impression:     No acute fracture.    History reviewed. No pertinent past medical history.  Past Surgical History:   Procedure Laterality Date    TONSILLECTOMY       Social History     Socioeconomic History    Marital status:    Tobacco Use    Smoking status: Never Smoker    Smokeless tobacco: Never Used   Substance and Sexual Activity    Alcohol use: Yes     Comment: occasionally    Drug use: Yes     Types: Marijuana    Sexual activity: Yes     Partners: Male     Birth control/protection: None     Family History   Problem Relation Age of Onset    COPD Mother     Breast cancer Mother 72    Heart attack Father     Heart disease Paternal Grandfather         ?quad bypass    Heart disease Paternal Uncle         quad bypass    Cancer Neg Hx     Colon cancer Neg Hx     Diabetes Neg Hx     Eclampsia Neg Hx     Hypertension Neg Hx     Miscarriages / Stillbirths Neg Hx     Ovarian cancer Neg Hx      labor Neg Hx     Stroke Neg Hx        Review of patient's allergies indicates:   Allergen Reactions    Hydrocodone      Dizziness and nausea        Current Outpatient Medications   Medication Sig    acetaminophen (TYLENOL ORAL) Take by mouth as  "needed.    cyclobenzaprine (FLEXERIL) 10 MG tablet Take 1 tablet (10 mg total) by mouth 3 (three) times daily as needed for Muscle spasms.    diclofenac (VOLTAREN) 75 MG EC tablet Take 1 tablet (75 mg total) by mouth 2 (two) times daily.    DULoxetine (CYMBALTA) 30 MG capsule Take 1 capsule (30 mg total) by mouth once daily.    ergocalciferol (ERGOCALCIFEROL) 50,000 unit Cap Take 50,000 Units by mouth every 7 days.    ergocalciferol (ERGOCALCIFEROL) 50,000 unit Cap Take 1 capsule (50,000 Units total) by mouth every 7 days.    loratadine (CLARITIN) 10 mg tablet Take 1 tablet (10 mg total) by mouth once daily.    sumatriptan (IMITREX) 100 MG tablet Take 1 tablet (100 mg total) by mouth every 2 (two) hours as needed for Migraine.     Current Facility-Administered Medications   Medication    triamcinolone acetonide injection 40 mg    triamcinolone acetonide injection 40 mg       REVIEW OF SYSTEMS:    GENERAL:  No weight loss, malaise or fevers.  HEENT:   No recent changes in vision or hearing  NECK:  Negative for lumps, no difficulty with swallowing.  RESPIRATORY:  Negative for cough, wheezing or shortness of breath, patient denies any recent URI.  CARDIOVASCULAR:  Negative for chest pain, leg swelling or palpitations.  GI:  Negative for abdominal discomfort, blood in stools or black stools or change in bowel habits.  MUSCULOSKELETAL:  See HPI.  SKIN:  Negative for lesions, rash, and itching.  PSYCH:  No mood disorder or recent psychosocial stressors.  Patients sleep is not disturbed secondary to pain.  HEMATOLOGY/LYMPHOLOGY:  Negative for prolonged bleeding, bruising easily or swollen nodes.  Patient is not currently taking any anti-coagulants  ENDO: No history of diabetes or thyroid dysfunction  NEURO:   No history of headaches, syncope, paralysis, seizures or tremors.  All other reviewed and negative other than HPI.    OBJECTIVE:    BP (!) 139/93   Pulse 83   Temp 97 °F (36.1 °C)   Resp 18   Ht 5' 6" " (1.676 m)   Wt 114 kg (251 lb 5.2 oz)   BMI 40.56 kg/m²       PHYSICAL EXAMINATION:  Voice normal.  Normal affect without evidence of distress.  Psych: tearful at times and voices frustration with continued decreased functioning and pain.   Gait: sitting in chair without difficulty     Prior PHYSICAL EXAMINATION:    GENERAL: Well appearing, in no acute distress, alert and oriented x3.  PSYCH:  Mood and affect appropriate.  SKIN: Skin color, texture, turgor normal, no rashes or lesions.  HEAD/FACE:  Normocephalic, atraumatic. Cranial nerves grossly intact.  CV: RRR with palpation of the radial artery.  PULM: No evidence of respiratory difficulty, symmetric chest rise.  GI:  Soft and non-tender.  BACK: Straight leg raising in the supine position is negative to radicular pain. TTP over the Left facet joints of the lumbar spine. No tenderness over spinous processes. Normal range of motion. Pain reproduction with rotation and extension.  EXTREMITIES: Knee ROM is full and pain free without obvious instability or laxity. Pain in buttock and lateral aspect of hip with external rotation of Left hip. ROM of the right shoulder full with significant pain after maneuvers were performed.   MUSCULOSKELETAL: Pain with internal rotation of the right shoulder, with tenderness to palpation over the shoulder anterior and posteriorly.  There was discreet trigger point palpated over the rhomboid muscles on the right. TTP over sacroiliac joints bilaterally (L>R).  FABERs test is positive on Left.  FADIRs test is negative.   Bilateral lower extremity strength is normal and symmetric.  No atrophy or tone abnormalities are noted.   NEURO: Bilateral upper and lower extremity coordination and muscle stretch reflexes are physiologic and symmetric.  Plantar response are downgoing. No clonus.  No loss of sensation is noted.  GAIT: normal.    Lab Results   Component Value Date    WBC 9.31 08/19/2021    HGB 13.3 08/19/2021    HCT 40.7 08/19/2021     MCV 93 08/19/2021     08/19/2021       BMP  Lab Results   Component Value Date     08/19/2021    K 3.6 08/19/2021     08/19/2021    CO2 28 08/19/2021    BUN 11 08/19/2021    CREATININE 0.7 08/19/2021    CALCIUM 9.2 08/19/2021    ANIONGAP 8 08/19/2021    ESTGFRAFRICA >60 08/19/2021    EGFRNONAA >60 08/19/2021     Lab Results   Component Value Date    HGBA1C 5.0 08/19/2021       ASSESSMENT: 46 y.o. year old female with pain, consistent with     Encounter Diagnoses   Name Primary?    Other chronic pain Yes    Acute pain of right shoulder     DDD (degenerative disc disease), cervical     DDD (degenerative disc disease), lumbar     Chronic right shoulder pain     Hip pain, bilateral     Back pain, unspecified back location, unspecified back pain laterality, unspecified chronicity        PLAN:   - Prior records reviewed    - She has found benefit from PT but not functioning where she would like to be    - She is not interested in CSI to R shoulder or surgical intervention at this time    - Continue diclofenac 75mg bid prn    - Refill Flexeril 10mg qhs     - Start Cymbalta 30mg qhs    - Referral placed to [facility]    -f/u 8 weeks for re-evaluation.     The above plan and management options were discussed at length with patient. Patient is in agreement with the above and verbalized understanding. It will be communicated with the referring physician via electronic record, fax, or mail.       Yonis Goldberg  02/01/2022

## 2022-02-07 ENCOUNTER — OFFICE VISIT (OUTPATIENT)
Dept: PAIN MEDICINE | Facility: OTHER | Age: 47
End: 2022-02-07
Payer: OTHER GOVERNMENT

## 2022-02-07 DIAGNOSIS — Z78.9 DECREASED ACTIVITIES OF DAILY LIVING (ADL): Primary | ICD-10-CM

## 2022-02-07 DIAGNOSIS — G89.29 OTHER CHRONIC PAIN: ICD-10-CM

## 2022-02-07 DIAGNOSIS — Z74.09 IMPAIRED MOBILITY AND ENDURANCE: ICD-10-CM

## 2022-02-07 DIAGNOSIS — M25.511 ACUTE PAIN OF RIGHT SHOULDER: ICD-10-CM

## 2022-02-07 DIAGNOSIS — G89.4 CHRONIC PAIN DISORDER: ICD-10-CM

## 2022-02-07 PROCEDURE — 99215 PR OFFICE/OUTPT VISIT, EST, LEVL V, 40-54 MIN: ICD-10-PCS | Mod: ,,, | Performed by: NURSE PRACTITIONER

## 2022-02-07 PROCEDURE — 99215 OFFICE O/P EST HI 40 MIN: CPT | Mod: ,,, | Performed by: NURSE PRACTITIONER

## 2022-02-07 NOTE — PROGRESS NOTES
[facility]    Initial Medical Screening Visit Note    Subjective:       Chief Complaint Requiring Rehabilitation: Chronic Pain    Consulted by: Yonis Goldberg NP (Pain Mgmt)    HPI:     Chinyere Crockett is a 46 y.o. female who presents today for the [facility] Medical Screening Visit. Has a variety of pain issues. Has a torn rotator cuff on right side and associated shoulder pain, bursitis in hips worse on left, and lower back left sided pain. She also has seasonal migraines that may last 6-8 weeks at a time. Has had hip bursitis for many years- 12-13 years- gradual onset and progression. Worked at a  at that time and feels this may have impacted her pain. 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 Back pain- has mild scoliosis. Had a bad fall at work a few years ago and has had pain and issues since then. She has pain everyday. Her pain is worse with weather changes (feels it in her bones), too much activity (like sweeping or something with that motion; has to slow that down). She uses ice, heat and a TENS unit to help with pain. She feels weakness in her left ankle; says I think I broke it years ago but did not have insurance so did what I had to do. No paresthesias. No bowel/bladder dysfunction. 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       1. Ambulatory status:  Walks independently. About 2 years ago she was under a lot of stress (very much work related) and her pain was worse and she would have to rely on her  to get around sometimes.     2. Balance problems?  Says balance is pretty good. No syncope. No dizziness     3. Exercise routine:  She walks. Has some home equipment. Tries to do PT exercise with band at home for shoulder.     4. Physical Therapy?  Yes. [facility]; recently finished; they worked on her right  shoulder as this came up during the course of her therapy.       5. Current pain medications:  cymbalta   Flexeril prn   Tylenol prn       6. Pain management injections:  Yes- for bursitis     7. Relevant surgeries:  None     Denies any hx of prolonged hospitalizations or illness     8. Pain affecting function at work, home, and/or recreationally?  Yes    9. Work Hx:  Unemployed.     10. Sleep Hx:  Says I used to have terrible sleep- very broken, appx 4 hours a night. Takes unisom nearly every night. Will wake up after about 3 hours but can fall back asleep. Feels like sleep is going fairly well right now.     11. 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 Lots of work stress over the years.  in  so is gone a lot- HAs seem to coincide with when he is gone. Has lost a lot of loved ones in the last 2 years so also dealing with grief.  Mother and GM have also had medical problems.     To help with stress she crafts. 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     No mental health hospitalizations. Mother has a hx of mental illness and hospitalizations.     12. Social information:  and lives with her . 4 cats.     Smoker? No       Alcohol use? Yes. Has cut back on alcohol recently as intake increased during pandemic      Drug use? None      History of substance abuse? None       History reviewed. No pertinent past medical history.    Past Surgical History:   Procedure Laterality Date    TONSILLECTOMY         Review of patient's allergies indicates:   Allergen Reactions    Hydrocodone      Dizziness and nausea        Current Outpatient Medications   Medication Sig Dispense Refill    acetaminophen (TYLENOL ORAL) Take by mouth as needed.      cyclobenzaprine (FLEXERIL) 10 MG tablet Take 1 tablet (10 mg total) by mouth 3 (three) times daily as needed for Muscle spasms. 90 tablet 1    diclofenac (VOLTAREN) 75 MG EC tablet Take 1  tablet (75 mg total) by mouth 2 (two) times daily. 60 tablet 2    DULoxetine (CYMBALTA) 30 MG capsule Take 1 capsule (30 mg total) by mouth once daily. 30 capsule 3    ergocalciferol (ERGOCALCIFEROL) 50,000 unit Cap Take 50,000 Units by mouth every 7 days.      ergocalciferol (ERGOCALCIFEROL) 50,000 unit Cap Take 1 capsule (50,000 Units total) by mouth every 7 days. 8 capsule 0    loratadine (CLARITIN) 10 mg tablet Take 1 tablet (10 mg total) by mouth once daily. 30 tablet 0    sumatriptan (IMITREX) 100 MG tablet Take 1 tablet (100 mg total) by mouth every 2 (two) hours as needed for Migraine. 10 tablet 3     Current Facility-Administered Medications   Medication Dose Route Frequency Provider Last Rate Last Admin    triamcinolone acetonide injection 40 mg  40 mg INTRABURSAL 1 time in Clinic/HOD Daryl Zazueta DO        triamcinolone acetonide injection 40 mg  40 mg INTRABURSAL 1 time in Clinic/HOD Daryl Zazueta,            Family History   Problem Relation Age of Onset    COPD Mother     Breast cancer Mother 72    Heart attack Father     Heart disease Paternal Grandfather         ?quad bypass    Heart disease Paternal Uncle         quad bypass    Cancer Neg Hx     Colon cancer Neg Hx     Diabetes Neg Hx     Eclampsia Neg Hx     Hypertension Neg Hx     Miscarriages / Stillbirths Neg Hx     Ovarian cancer Neg Hx      labor Neg Hx     Stroke Neg Hx        Social History     Socioeconomic History    Marital status:    Tobacco Use    Smoking status: Never Smoker    Smokeless tobacco: Never Used   Substance and Sexual Activity    Alcohol use: Yes     Comment: occasionally    Drug use: Yes     Types: Marijuana    Sexual activity: Yes     Partners: Male     Birth control/protection: None              Objective:        GEN: Well developed, well nourished. No acute distress. Fully alert, oriented, and appropriate. Speech normal nicole.   PSYCH: Normal affect. Thought content  appropriate.  CHEST: Breathing symmetric. No audible wheezing.  SKIN: Warm, dry. No rash or discoloration on exposed areas.   NEURO/MUSCULOSKELETAL:  Cervical: ROM full and painless; TTP neck and shoulder musculature ; 5/5 UE strength; gross sensation and reflexes intact bilaterally; Negative Patricia's bilaterally.  Lumbar: ROM limited and painful. TTP lumbar musculature; 5/5 LE strength bilaterally; gross sensation and reflexes intact bilaterally; negative clonus bilaterally  SLR negative bilaterally (sitting)      Imaging:      EXAMINATION:  MRI SHOULDER WITHOUT CONTRAST RIGHT     CLINICAL HISTORY:  Pain in right shoulderShoulder pain, rotator cuff disorder suspected, xray done;     TECHNIQUE:  MRI of right shoulder was performed on a 1.5T magnet utilizing the following sequences: Localizer; axial T2 FS; coronal T2 FS and PD FS; sagittal T1, T2 FS and PD FS.     COMPARISON:  None     FINDINGS:  Rotator cuff: There is some attenuation of caliber of the insertional fibers of the supraspinatus and infraspinatus tendons and some irregularity and abnormal signal along the bursal surface suggesting partial-thickness bursal sided tear involving approximately 50% of the tendon thickness.  Tear measures 2 cm in AP diameter.  Subscapularis tendon intact.Muscle bulk preserved.     Biceps: Long head biceps tendon is intact.     Labrum: Glenoid labrum is intact.     Glenohumeral Joint: There is no fracture or significant articular cartilage loss.  Bone marrow signal is normal.     Acromioclavicular joint: The AC joint is unremarkable.     Misc: There is no evidence of bursitis.     Impression:     Partial-thickness bursal sided tear of the supraspinatus and infraspinatus tendons measuring 2 cm in size.        Electronically signed by: Daryl Mcdowell Jr  Date:                                            12/09/2021  Time:                                           08:38    EXAMINATION:  XR LUMBAR SPINE 5 VIEW WITH FLEX AND  EXT     CLINICAL HISTORY:  Back pain or radiculopathy, > 6 wks;  Dorsalgia, unspecified     TECHNIQUE:  Five views of the lumbar spine plus flexion extension views were performed.     COMPARISON:  09/23/2020     FINDINGS:  There is rightward curvature of the lumbar spine with discogenic change and lower facet hypertrophy.  Vertebral body heights appear maintained.  No significant translation.  No evidence for lytic or blastic lesion.     Impression:     As above.        Electronically signed by: Pelon Burt  Date:                                            09/27/2021  Time:                                           08:00             Exam Ended: 09/27/21 07:54 Last Resulted: 09/27/21 08:00       Order Details     View Encounter     Lab and Collection Details     Routing     Result History             Result Care Coordination      Result          EXAMINATION:  MRI THORACIC SPINE WITHOUT CONTRAST     CLINICAL HISTORY:  acute thoracic back pain radiating across back and around to front of chest, concerning for nerve/disc pathology;  Pain in thoracic spine     TECHNIQUE:  Multiplanar, multisequence images were performed through the thoracic spine.  Contrast was not administered.     COMPARISON:  Multiplanar, multisequence images of the thoracic spine without IV contrast.     FINDINGS:  Alignment: Normal.     Vertebrae: Vertebral heights are well maintained with no evidence of infiltrative process or acute fractures.     Discs: No significant abnormality.     Cord: No signal abnormality is identified.     Degenerative findings: No significant spinal canal stenosis or neural foraminal narrowing throughout the thoracic spine.     Soft tissue and paraspinous muscles: Unremarkable.     Impression:     No significant abnormality throughout the thoracic spine.     Electronically signed by resident: Matilde Frazier  Date:                                            06/15/2021  Time:                                            14:36  EXAMINATION:  XR HIPS BILATERAL 2 VIEW INCL AP PELVIS     CLINICAL HISTORY:  Pain in right hip     TECHNIQUE:  AP view of the pelvis and frogleg lateral views of both hips were performed.     COMPARISON:  None.     FINDINGS:  No acute fracture of the visualized lower lumbar spine, bony pelvis, or proximal femurs.  Preserved right and left femoral head contours.  Intact right and left SI joints and hip joint spaces.  Pelvic densities felt related to phleboliths unless concern for otherwise.  Some asymmetric radiodensity projects to the left side of the L4-L5 disc space that could be further evaluated with lumbar spine radiographs.     Impression:     No acute fracture.        Electronically signed by: Ankit Estrada  Date:                                            09/23/2020    Assessment:     Encounter Diagnoses   Name Primary?    Other chronic pain     Acute pain of right shoulder     Decreased activities of daily living (ADL) Yes    Impaired mobility and endurance     Chronic pain disorder        Plan:     Diagnoses and all orders for this visit:    Decreased activities of daily living (ADL)  -     Ambulatory referral/consult to Physical/Occupational Therapy; Future  -     Ambulatory referral/consult to Physical/Occupational Therapy; Future    Other chronic pain  -     Ambulatory referral[facility]ult to [facility]    Acute pain of right shoulder  -     Ambulatory referral/consult to [facility]    Impaired mobility and endurance  -     Ambulatory referral/consult to Physical/Occupational Therapy; Future  -     Ambulatory referral/consult to Physical/Occupational Therapy; Future    Chronic pain disorder  -     Ambulatory referral/consult to Physical/Occupational Therapy; Future  -     Ambulatory referral/consult to Physical/Occupational Therapy; Future         Chinyere Telma Crockett is a 46 y.o. female with chronic pain in many locations throughout her body. Associated  Medical dx include left torn rotator cuff, GT Bursitis, migraines, lumbar and cervical DDD/spondylosis. To help with pain she has tried PT, medications, injections. Surgery has not been recommended for any pain-related conditions. Despite tx, pain impacting function and QOL.     Education about pain and the chronic pain cycle was provided today. Discussed the importance of multimodal and multidisciplinary management of chronic pain with a focus on both pain relief and function. Discussed how our team provides education and training aimed at improving physical function, emotional health, stress and pain coping skills.Treatment is designed to build confidence in physical activity and ADLs and in your ability to control and manage your pain.     Recommend proceeding with PT/OT FRP screening visit for further evaluation of personal goals, functional status and limitations prior to enrollment in the program.     I spent a total of 60 minutes with the patient, and greater than 50% of the time was spent in counseling and education.     The above plan and management options were discussed at length with patient. Patient is in agreement with the above and verbalized understanding. It will be communicated with the referring physician via electronic record, fax, or mail.

## 2022-02-24 NOTE — PROGRESS NOTES
"NOTE: This is a duplicate copy utilized for reassessment purposes & continuity of care.  See pt's plan of care for co-signed copy.    [facility]  Functional Restoration Program  Physical Therapy Initial Evaluation    Date: 2/25/2022   Name: Chinyere Crockett  Clinic Number: [id number]    Therapy Diagnosis:   Encounter Diagnoses   Name Primary?    Decreased activities of daily living (ADL)     Impaired mobility and endurance     Chronic pain disorder      Physician: Aurora Xie, NP    Physician Orders: PT Eval and Treat   Medical Diagnosis from Referral:   Z78.9 (ICD-10-CM) - Decreased activities of daily living (ADL)   Z74.09 (ICD-10-CM) - Impaired mobility and endurance   G89.4 (ICD-10-CM) - Chronic pain disorder       Evaluation Date: 2/25/2022  Authorization Period Expiration: 3/15/2022  Plan of Care Expiration: 5/6/2022  Visit # / Visits authorized: 1/ 1  FOTO: 1/ 3     Precautions: Standard and Fall    Time In: 1:00p  Time Out: 2:00p  Total Appointment Time (timed & untimed codes): 60 minutes      Subjective     Date of onset: 12-13 years ago    Patient reported history of current condition - Chinyere reports chronic hip pain for about 12-13 years of insidious onset being in & out of PT in the last 2-3 year as it started to increase in intensity. She has also had other areas of the body that are "breaking down" really taking a toll on her function. She has had some depression & anxiety issues in the pandemic due to lots of things, & she knows that it has taken a toll on her pain & function. She was just in PT in [place] for her low back & her R shld which she thinks helped her low back. She tries to do her HEP to help, but it is tough on many days & she is trying to avoid using her R shld due to pain. While her MRI did show a R rotator cuff tear, but they did not indicate surgery at this time.     Imaging, MRI studies: FINDINGS:  Rotator cuff: There is some " attenuation of caliber of the insertional fibers of the supraspinatus and infraspinatus tendons and some irregularity and abnormal signal along the bursal surface suggesting partial-thickness bursal sided tear involving approximately 50% of the tendon thickness.  Tear measures 2 cm in AP diameter.  Subscapularis tendon intact.Muscle bulk preserved.     Biceps: Long head biceps tendon is intact.     Labrum: Glenoid labrum is intact.     Glenohumeral Joint: There is no fracture or significant articular cartilage loss.  Bone marrow signal is normal.     Acromioclavicular joint: The AC joint is unremarkable.     Misc: There is no evidence of bursitis.     Impression:     Partial-thickness bursal sided tear of the supraspinatus and infraspinatus tendons measuring 2 cm in size.        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  Current Level of Function: hard to walk long distances, difficulty lifting anything light or moderate    Pain:      Current 3/10, worst 6/10, best 2/10   Location: R shld, B hips (L worse), low back, L knee, L ankle  Description: stabbing, burning, throbbing, sharp  Aggravating Factors: vacuuming, walking, lifting, twisting w/ the hand (opening jars), cleaning  Easing Factors: pain medication, ice, heating pad, TENS unit and video games & crafting for distraction    Patient's FRP goals: be able to function better around the house, maybe get back to working    Medical History:   No past medical history on file.    Surgical History:   Karma  Telma Crockett  has a past surgical history that includes Tonsillectomy.    Medications:   Chinyere has a current medication list which includes the following prescription(s): acetaminophen, cyclobenzaprine, diclofenac, duloxetine, ergocalciferol, ergocalciferol, loratadine, and sumatriptan, and the following Facility-Administered Medications: triamcinolone acetonide and triamcinolone acetonide.    Allergies:   Review of patient's allergies indicates:   Allergen Reactions    Hydrocodone      Dizziness and nausea         Objective   Resting Vitals:  98% O2 & 99bpm  152/99 mmHg    Postural examination:  Seated: slight slouch, head forward  Standing: head forward, slight increased lumbar lordosis    Range of Motion - Cervical   AROM AROM AROM    Evaluation Reassessment  Reassessment   Flexion 60 ° ° °   Extension 23 ° ° °   Side bending Right 26 ° ° °   Side bending Left 30 ° ° °   Rotation Right 50 ° ° °   Rotation Left 30 ° ° °     Range of Motion - Lumbar         ROM Loss ROM Loss ROM Loss   Flexion moderate loss     Extension minimal loss     Side bending Right moderate loss     Side bending Left major loss     Rotation Right minimal loss     Rotation Left major loss       Strength Testing   Evaluation Reassessment Reassessment   Motion Tested         Lower Extremity R L R L R L   Hip Flexion 4/5 4-/5       Hip IR 4+/5 4/5       Hip ER 4-/5 4-/5       Knee Extension 4+/5 4+/5       Knee Flexion 4/5 4/5         Functional Testing     Evaluation Reassessment  Reassessment   Timed Up and Go 11.9 sec sec sec   5 Time Sit to Stand w/out UE 23.3 sec sec sec   Single Limb Stance R LE 3.3 sec sec sec   Single Limb Stance L LE 20.6 sec sec sec   2 Minute Walk Test 484 feet feet feet   6 Minute Walk Test 1425 feet feet feet   Self Selected Walking Speed 1.21 m/sec m/sec m/sec     GAIT:  Assistive Device used: none  Level of Assistance: independent  Patient displays the following gait deviations:  unsteady gait, decreased  base of support and antalgic gait.     Minimum standards/norms:    TUG:  < 13.5 seconds/ Males 7.3 sec, Females 8.1 sec  SLS:  26-29 sec  Ages 20-69  Self Selected Walking Speed:  .4-.8m/sec  Limited community ambulator                                                   .8- 1.2  Community ambulator                                                    1.2 m/sec and above  Able to safely cross streets        Limitation/Restriction for FOTO Hip Survey    Therapist reviewed FOTO scores for Chinyere Crockett on 2/25/2022.   FOTO documents entered into [employer] - see Media section.    Limitation Score: 54%  Predicted score: 43%         TREATMENT     Total Treatment time (time-based codes) separate from Evaluation: 15 minutes     Chinyere received the treatments listed below:      therapeutic activities to improve functional performance for 15 minutes, including:   PT education:  o Physical & psychological viscous pain cycles (see patient instructions 2/25/2022)  o Role of consistent activity (graded exposure) to break the physical cycle  o Importance of education & behavioral changes that promote intrinsic patient motivation to help break the psychological cycle  o Impact on pt's functional goals when breaking each one of these cycles.    o Impact central sensitization has on the sensory system in the chronic pain population      PATIENT EDUCATION AND HOME EXERCISES     Education provided:   - pain cycle    Written Home Exercises Provided: Patient instructed to cont prior HEP. Exercises were reviewed and Chinyere was able to demonstrate them prior to the end of the session.  Chinyere demonstrated good  understanding of the education provided. See EMR under Patient Instructions for information provided during therapy sessions.    ASSESSMENT   Chinyere is a 46 y.o. female referred to outpatient Physical Therapy with a medical diagnosis of chronic pain disorder & impaired functional mobility. Pt presents with pain, weakness, impaired  mobility, decreased AROM, impaired balance, fall risk, gait deviations, decreased endurance, fear of pain w/ central sensitization, increased psychosocial concerns, & inability to perform ADL's as before due to current functional status. Pt's increased psychosocial concerns w/ central sensitization present an unstable clinical presentation which may limit progress, but the intrinsic motivation to improve will assist.      Based on the above history and physical examination an active physical therapy program within a multi-disciplinary setting is recommended.  Pt will benefit from skilled outpatient physical therapy to address the deficits listed below in the chart, provide pt/family education and to maximize pt's level of independence in the home and community environment.     Plan of care discussed with patient: Yes  Pt's spiritual, cultural and educational needs considered and patient is agreeable to the plan of care and goals as stated below:     Pt prognosis is Good.   Anticipated Barriers for therapy: chronic nature of issues, psychosocial concerns, rotator cuff tear    Medical Necessity is demonstrated by the following  History  Co-morbidities and personal factors that may impact the plan of care Co-morbidities:   anxiety, coping style/mechanism, depression, difficulty sleeping and high BMI    Personal Factors:   coping style  social background  lifestyle  character     high   Examination  Body Structures and Functions, activity limitations and participation restrictions that may impact the plan of care Body Regions:   head  neck  back  lower extremities  upper extremities  trunk    Body Systems:    ROM  strength  balance  gait  transfers  transitions  motor control    Participation Restrictions:       Activity limitations:   Learning and applying knowledge  no deficits    General Tasks and Commands  no deficits    Communication  no deficits    Mobility  lifting and carrying objects  fine hand use  (grasping/picking up)  walking  moving around using equipment (WC)  using transportation (bus, train, plane, car)  driving (bike, car, motorcycle)    Self care  washing oneself (bathing, drying, washing hands)  caring for body parts (brushing teeth, shaving, grooming)  dressing    Domestic Life  shopping  cooking  doing house work (cleaning house, washing dishes, laundry)    Interactions/Relationships  no deficits    Life Areas  employment    Community and Social Life  community life  recreation and leisure         high   Clinical Presentation unstable clinical presentation with unpredictable characteristics high   Decision Making/ Complexity Score: high       GOALS: Pt is in agreement with the following goals:      Goal Progress Towards Goal   Short Term: In 3 weeks, pt will:    Verbalize & demonstrate good understanding of resisted training with 3-1-3 second rep for [id number]-[id number] contraction to encourage full muscle recruitment for strength & endurance Progress towards goal: initiated 2/25/2022   Verbalize & demonstrate good understanding of home stretch routine to improve AROM, help decrease pain & improve mobility Progress towards goal: initiated 2/25/2022   Demonstrate proper supine or seated diaphragmatic breathing with decreased chest excursion & emphasis on full abdominal excursion & increased expiration time to promote muscle relaxation & increased parasympathetic activity to improve patient tolerance to activities Progress towards goal: initiated 2/25/2022   Demonstrate proper static standing posture in frontal & sagittal plane per PT visual assessment to decrease postural strain in ADLs Progress towards goal: initiated 2/25/2022   Demonstrate good recall of names of resisted exercises w/ minimal to moderate verbal cues to promote independence w/ exercise at the end of the program Progress towards goal: initiated 2/25/2022   Verbalize plan for continuing resisted exercises w/ specific  location (i.e. commercial gym, home, community fitness center) indicating preference for machine-based or free-weight exercises to incorporate all major muscle groups to maintain & progress therapeutic functional improvements Progress towards goal: initiated 2/25/2022       Long Term: In 8 weeks, pt will:    Verbalize good understanding of activities planning/scheduling (stretching, mindfulness, resisted training, & cardio) prescribed by PT/OT following the end of the program to sustain & progress functional improvements Progress towards goal: initiated 2/25/2022   Be independent w/ selected resisted training w/ minimal to no cuing while performing to continue w/ resisted strengthening independently at the end of the program Progress towards goal: initiated 2/25/2022   Improve 2 Minute Walk Test (MWT) to 550 feet and 6 MWT to 1650 feet or greater to improve gait speed and mobility Progress towards goal: initiated 2/25/2022   Perform single leg stance 20 seconds or greater bilaterally to improve safety and balance in ADLs Progress towards goal: initiated 2/25/2022   Demonstrate Timed Up & Go (with appropriate assistive device, if necessary) of 9 seconds or less to decrease fall risk and improve functional mobility Progress towards goal: initiated 2/25/2022   Demonstrate 5 time sit to stand test without upper extremity assist to 12 sec or less to improve general mobility and decrease fall risk Progress towards goal: initiated 2/25/2022   Score 43 % or less on Hip FOTO to indicate significant functional improvements in impaired functions secondary to low back pain Progress towards goal: initiated 2/25/2022         PLAN   Plan of care Certification: 3/14/2022 to 5/9/2022    Outpatient Physical Therapy 3 times weekly for 8 weeks to include the following interventions: Aquatic Therapy, Electrical Stimulation per PT, Gait Training, Manual Therapy, Moist Heat/ Ice, Neuromuscular Re-ed, Patient Education, Therapeutic  Activities and Therapeutic Exercise.     Carmelo Gonzalez, PT, DPT

## 2022-02-25 ENCOUNTER — CLINICAL SUPPORT (OUTPATIENT)
Dept: REHABILITATION | Facility: OTHER | Age: 47
End: 2022-02-25
Payer: OTHER GOVERNMENT

## 2022-02-25 DIAGNOSIS — Z78.9 ALTERATION IN INSTRUMENTAL ACTIVITIES OF DAILY LIVING (IADL): ICD-10-CM

## 2022-02-25 DIAGNOSIS — G89.4 CHRONIC PAIN DISORDER: ICD-10-CM

## 2022-02-25 DIAGNOSIS — Z74.09 IMPAIRED FUNCTIONAL MOBILITY AND ACTIVITY TOLERANCE: ICD-10-CM

## 2022-02-25 DIAGNOSIS — Z74.09 IMPAIRED MOBILITY AND ENDURANCE: ICD-10-CM

## 2022-02-25 DIAGNOSIS — Z78.9 DECREASED ACTIVITIES OF DAILY LIVING (ADL): ICD-10-CM

## 2022-02-25 DIAGNOSIS — Z78.9 DIFFICULTY WITH ACTIVITIES OF DAILY LIVING: ICD-10-CM

## 2022-02-25 PROCEDURE — 97530 THERAPEUTIC ACTIVITIES: CPT

## 2022-02-25 PROCEDURE — 97167 OT EVAL HIGH COMPLEX 60 MIN: CPT

## 2022-02-25 PROCEDURE — 97163 PT EVAL HIGH COMPLEX 45 MIN: CPT

## 2022-02-25 NOTE — PLAN OF CARE
"[facility]   Occupational Therapy Initial Evaluation &  Goals and Physical Capacity for Functional Restoration Program    Date: 2/25/2022  Name: Chinyere Crockett  Clinic Number: [id number]    Therapy Diagnosis:   Encounter Diagnoses   Name Primary?    Decreased activities of daily living (ADL)     Impaired mobility and endurance     Chronic pain disorder     Impaired functional mobility and activity tolerance     Difficulty with activities of daily living     Alteration in instrumental activities of daily living (IADL)      Physician: Aurora Xie NP    Physician Orders: OT Eval and Treat  Medical Diagnosis: Chronic pain disorder  Evaluation Date: 2/25/2022  Insurance Authorization Period Expiration: 03/15/2022  Plan of Care Certification Period: 4/22/2022  Visit # / Visits authorized: 1/ 1  FOTO: evaluation: 52% limitation    Precautions:  Standard and Fall    Time In: 2:00 PM  Time Out: 3:00 PM    Total Appointment Time (timed & untimed codes): 60  minutes    Subjective   Date of onset: 12-13 years ago  Patient reported history of current condition - Chinyere reports: chronic hip pain for about 12-13 years of insidious onset being in & out of PT in the last 2-3 year as it started to increase in intensity. She has also had other areas of the body that are "breaking down" really taking a toll on her function. Has a torn rotator cuff on right side and associated shoulder pain, MRI confirms but surgery not recommended. She also has seasonal migraines that may last 6-8 weeks at a time. She has had some depression & anxiety issues in the pandemic due to lots of things, & she knows that it has taken a toll on her pain & function. She was just in PT in [facility] for her low back & her R shld which she thinks helped her low back. She tries to do her HEP to help, but it is tough on many days & she is trying to avoid using her R shld due to pain.   Statement: "I dont have much " "of a life right now with not working"   Prior Level of Function: activities patient can no longer perform/has great difficulty with vacuuming, using the swiffer, ascending stairs, gardening     Medical History:   No past medical history on file.     Surgical History:   Chinyere  has a past surgical history that includes Tonsillectomy.    Medications:   Chinyere has a current medication list which includes the following prescription(s): acetaminophen, cyclobenzaprine, diclofenac, duloxetine, ergocalciferol, ergocalciferol, loratadine, and sumatriptan, and the following Facility-Administered Medications: triamcinolone acetonide and triamcinolone acetonide.    Allergies:   Review of patient's allergies indicates:   Allergen Reactions    Hydrocodone      Dizziness and nausea        Prior Therapy: yes, [facility]; recently finished; they worked on her right shoulder as this came up during the course of her therapy.     Exercise routine: She walks. Has some home equipment. Tries to do PT exercise with band at home for shoulder.     Disturbed sleep: yes, Says I used to have terrible sleep- very broken, appx 4 hours a night. Takes deluxitine nearly every night and feels like sleep has gotten worse. Will wake up after about 3 hours but can fall back asleep.     Mental health: "its som gras and its macario but I have had 7 friends and family deaths in the last 2 years not even covid related. Feels like every time we get over one someone else dies. My  has pau deployed twice and with everything going on right now we dont know and my mom was dx with breast cancer during the hurricane and its just been a stellar 2 years".      Suffers with chronic seasonal migraines 6-8 weeks. Spring-fall.     Lots of work stress over the years.  in  so is gone a lot- HAs seem to coincide with when he is gone. Has lost a lot of loved ones in the last 2 years so also dealing with grief. Mother and GM have also had " "medical problems.     To help with stress she crafts. 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      No mental health hospitalizations. Mother has a hx of mental illness and hospitalizations.     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) ;  did most of the stock unloading        Pain:  Pain Related Behaviors Observed: yes   Functional Pain Scale Rating 0-10: within the last 30 days  3/10 current  6/10 at worst  2/10 at best  Location: R shld, B hips (L worse), low back, L knee, L ankle  Description: stabbing, burning, throbbing, sharp  Functional Exacerbations: vacuuming, walking, lifting, twisting w/ the hand (opening jars), cleaning  Easing Factors: pain medication, ice, heating pad, TENS unit and video games & crafting for distraction    Personal Functional Three Month Goals:   Vocational: "re-enter the workfield"    Recreational : "joing a walking [employer]"   Daily Living Activities: "continue with [employer] business"     Social and ADL History:  Activities of Daily Living Checklist:   Key to Score:   0: Unable to do the activity  1: Can do the activity with great difficulty  2: Can do the activity with little difficulty   3: No problem with activity  N/A: This is not an activity I do  H/T: Have not tried yet    Personal Care:  Homemaking:     Dressing Upper Body:  2 Meal preparation: 3   Dressing Lower Body:  3 Kitchen Cleanup: 3   Bathing:  3 Childcare:  na   Hair Care:  1 Grocery shoppin   Sleep:  2 Vacuuming/mopping:  3     Emptying trash/ garbage bag: 3   General Mobility:   Bed making changing:  3   Sitting:  3 Laundry  2   Bendin     Getting in/out of bed:  3 " Home Maintenance:    Standin Mowing, raking:  na   Walkin Gardenin   Twistin Home Repairs:  2   Liftin Painting:  na         Getting around:       Getting in and out of car:  3     Buckling up you seat belt:  2     Opening heavy doors:  2     Climbing/descending stairs:  2             Objective     COGNITIVE Exam  Oriented: Person, Place, Time and Situation  Behaviors: Follows multistep  commands  Follows Commands/attention: Follows multistep  commands  Communication: clear/fluent  Memory: No Deficits noted  Safety awareness/insight to disability: limited insight; guarded movement; fear of pain  Coping skills/emotional control: Appropriate to situation    PHYSICAL Exam  Resting Blood Pressure: 152/99  Heart rate: 99bpm  SpO2: 98%    Active ROM:     UE RUE LUE Comments   Hands WFL WFL    Wrist WFL WFL    Elbows WFL WFL    Shoulders WFL WFL Slight pain with R int rotation       Dominant hand: right    Functional Strengthen Assessment: WFL    Strength Testing   Evaluation [place] FRP  2 Month Follow Up   Motion Tested          R L R L R L   Upper Extremity         Shoulder Flexion 5/5 5/5       Shoulder Extension 5/5 5/5       Shoulder Abduction 5/5 5/5       Shoulder IR 5/5 5/5       Shoulder ER 5/5 5/5       Elbow Flexion 5/5 5/5       Elbow Extension 5/5 5/5            Evaluation   5 times sit-stand  (adults 18-65 y/o) 23.3 seconds  >12 sec= fall risk for general elderly  >16 sec= fall risk for Parkinson's disease  >10 sec= balance/vestibular dysfunction (<59 y/o)  >14.2 sec= balance/vestibular dysfunction (>59 y/o)  >12 sec= fall risk for CVA     Endurance Testing: Modified Ramp Treadmill Test   GAP Re-assessment Follow-up   Minutes Completed  3 mins 00 secs     % Grades  10 %     Speed (mph)  1.7 mph     METS 4      bpm     Doretha Rating Perceived Exertion Scale   5     Reason for stop point: Pt reported posture changes, fatigue, noted decline in maintaining pace safely.     Functional  Strength Test:  Pile Testing: Lifting  (Pounds/Heart rate)   GAP/Eval (#/HR/ROSY)  Reassessment  Day 9   Follow-up   Appointment    Repetitive Floor to Waist    12#/120/4          Repetitive Waist to Shoulder    10#/104/5              1- Time Maximum     20#/108/5           Carry-2 Handed (40ft)     12#/109/3           Work demand Level     Light           Reason for Stop point: Increased time required for completing repetitions. During physical testing, participation level consistent.    No environmental, cultural, spiritual, developmental or education needs expressed or noted.    Limitation/Restriction for FOTO NOC Survey    Therapist reviewed FOTO scores for Chinyere Crockett on 2/25/2022.   FOTO documents entered into [employer] - see Media section.    Limitation Score: 52%         Treatment   Home Exercises and Patient Education Provided    Education provided:   -Ms. Crockett received education regarding proper posture and body mechanics. She received education regarding anticipated muscular soreness following lift testing and strategies for management were reviewed with patient including stretching, using ice, scheduled rest.  -Additional Education provided: rosy scale, pain cycle, z-lie, functional lifting mechanics, pursed lip and diaphragmatic breathing techniques    Written Home Exercises Provided: yes.  Exercises were reviewed and Chinyere was able to demonstrate them prior to the end of the session.    Chinyere demonstrated good  understanding of the education provided.     Chinyere given handout re: rosy scale, breathing techniques, z-lie position, and received education on the Functional Restoration Program. Details of the program were discussed.     Assessment     Chinyere appears to be a good candidate for the Functional Restoration Program. She currently presents with overall deconditioning, generalized pain, Limited functional ROM with L shoulder, symptoms of depression, anxiety and with a lot of grief 2/2  recent family losses, and exhibits pain avoidance behavior. Due to the chronic nature of her issues and various associated medical dx which include left torn rotator cuff, GT Bursitis, migraines, lumbar and cervical DDD/spondylosis, she presents with an unstable clinical presentation which may limit her progress, but with good motivation to make behavioral changes. Chinyere with mild affect. She was able to participate in all aspects of assessment. Discussed with Chinyere how goal of functional restoration program is to provide tools, education and training aimed at improving physical functioning, emotional health, stress and pain coping skills.The program is designed to build confidence in physical activity and improve independence with ADLs and IADLs and in your ability to control and manage your pain. She voiced understanding.    Chinyere is unable to fully participate in work, recreational, and home-based activities because of decreased functional strength, decreased  AROM, decreased endurance, limited positional tolerance, fear of re-injury, and fear of increased pain. She will benefit from participating in a conditioning  program designed  to increase functional strength, flexibility, and endurance in order to meet functional goals.     Chinyere's prognosis is Good due to good personal goals, good motivation and good response to all feedback and education. She will benefit from skilled outpatient Occupational Therapy to address the limitations and goals stated above and in the chart below, provide patient/family education, and to maximize patient's level of functional independence.    Plan of care discussed with patient: Yes  Pt's spiritual, cultural and educational needs considered and patient is agreeable to the plan of care and goals as stated below:     Medical necessity is demonstrated by the following problem list.   Profile and History Assessment of Occupational Performance Level of Clinical Decision Making  Complexity Score   Occupational Profile:   Chinyere Crockett is a 46 y.o. female who lives with their spouse and is currently unemployed. Chinyere Crockett has difficulty with  bathing, grooming and dressing  driving/transportation management, shopping, phone/computer use and housework/household chores  affecting her daily functional abilities. Her main goal for therapy is Cooking, cleaning, gardening, crafting.     Comorbidities:   anxiety, coping style/mechanism, depression, difficulty sleeping, high BMI, level of undertstanding of current condition and sleep disturbance    Medical and Therapy History Review:   Expanded               Performance Deficits    Physical:  Joint Mobility  Joint Stability  Muscle Endurance  Proprioception Functions  Pain    Cognitive:  Safety Awareness/Insight to Disability    Psychosocial:   Pain avoidance, social isolation   Social Interaction  Habits  Routines  Group Participation     Clinical Decision Making:  moderate    Assessment Process:  Detailed Assessments    Modification/Need for Assistance:  Minimal-Moderate Modifications/Assistance    Intervention Selection:  Several Treatment Options       high  Based on PMHX, co morbidities , data from assessments and functional level of assistance required with task and clinical presentation directly impacting function.           FRP Goals: While working towards the long-term (2 month) functional goals listed above, Ms. Crockett will accomplish the following short term goals during the 5-week intensive rehabilitation program. Ms. Crockett will:     1. Develop a viable return to work plan.   2. Demonstrate physical capacities consistent with a medium work demand level.   3. Demonstrate increased functional strength by lifting 20 lbs floor to waist and 20 lbs waist to shoulder in order to meet demand of work/home routine.   4. Increase her positional tolerance to the level needed for work and home activities.   5. Implement  self-care strategies during the program and at home to control pain.   6.   Patient will demonstrate use of mindfulness, stress management, and coping  strategies for improved participation in daily routine.     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 More comprehensive physical  testing integrated with clinical findings would be required to derived an accurate work capacity.      Plan   Plan of care certification: 3/14/2022 to 4/22/2022.    Outpatient occupational therapy, 3 times a week for 5 weeks:     1. Functional conditioning daily to increase physical capacity and allow Ms. Crockett to meet demands of vocation and home.   2. Individual counseling to develop return to work/daily routine plan and better understand the return to work process and/or daily routine restructure.   3. Daily Stretching, aerobic conditioning and walking to increase functional tolerance and allow Ms. Crockett to meet demands of work and home.   4. Functional activities to increase ability to move fluidly and quickly and tolerate unguarded movements.   5. Re-education regarding proper postural, body mechanics, and coping strategies for healthy roles and routine for managing daily stressors.    6. Any other treatment deemed necessary for patient to achieve personally driven goals to promote positive health and wellness and daily roles and  routines    Leola Villasenor, CRISTINA, LOTR, 2/25/2022  Occupational Therapy

## 2022-02-25 NOTE — PLAN OF CARE
"NOTE: This is a duplicate copy utilized for reassessment purposes & continuity of care.  See pt's plan of care for co-signed copy.    [facility]  Functional Restoration Program  Physical Therapy Initial Evaluation    Date: 2/25/2022   Name: Chinyere Crockett  Clinic Number: [id number]    Therapy Diagnosis:   Encounter Diagnoses   Name Primary?    Decreased activities of daily living (ADL)     Impaired mobility and endurance     Chronic pain disorder      Physician: Aurora Xie, NP    Physician Orders: PT Eval and Treat   Medical Diagnosis from Referral:   Z78.9 (ICD-10-CM) - Decreased activities of daily living (ADL)   Z74.09 (ICD-10-CM) - Impaired mobility and endurance   G89.4 (ICD-10-CM) - Chronic pain disorder       Evaluation Date: 2/25/2022  Authorization Period Expiration: 3/15/2022  Plan of Care Expiration: 5/6/2022  Visit # / Visits authorized: 1/ 1  FOTO: 1/ 3     Precautions: Standard and Fall    Time In: 1:00p  Time Out: 2:00p  Total Appointment Time (timed & untimed codes): 60 minutes      Subjective     Date of onset: 12-13 years ago    Patient reported history of current condition - Chinyere reports chronic hip pain for about 12-13 years of insidious onset being in & out of PT in the last 2-3 year as it started to increase in intensity. She has also had other areas of the body that are "breaking down" really taking a toll on her function. She has had some depression & anxiety issues in the pandemic due to lots of things, & she knows that it has taken a toll on her pain & function. She was just in PT in [place] for her low back & her R shld which she thinks helped her low back. She tries to do her HEP to help, but it is tough on many days & she is trying to avoid using her R shld due to pain. While her MRI did show a R rotator cuff tear, but they did not indicate surgery at this time.     Imaging, MRI studies: FINDINGS:  Rotator cuff: There is some " attenuation of caliber of the insertional fibers of the supraspinatus and infraspinatus tendons and some irregularity and abnormal signal along the bursal surface suggesting partial-thickness bursal sided tear involving approximately 50% of the tendon thickness.  Tear measures 2 cm in AP diameter.  Subscapularis tendon intact.Muscle bulk preserved.     Biceps: Long head biceps tendon is intact.     Labrum: Glenoid labrum is intact.     Glenohumeral Joint: There is no fracture or significant articular cartilage loss.  Bone marrow signal is normal.     Acromioclavicular joint: The AC joint is unremarkable.     Misc: There is no evidence of bursitis.     Impression:     Partial-thickness bursal sided tear of the supraspinatus and infraspinatus tendons measuring 2 cm in size.        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  Current Level of Function: hard to walk long distances, difficulty lifting anything light or moderate    Pain:      Current 3/10, worst 6/10, best 2/10   Location: R shld, B hips (L worse), low back, L knee, L ankle  Description: stabbing, burning, throbbing, sharp  Aggravating Factors: vacuuming, walking, lifting, twisting w/ the hand (opening jars), cleaning  Easing Factors: pain medication, ice, heating pad, TENS unit and video games & crafting for distraction    Patient's FRP goals: be able to function better around the house, maybe get back to working    Medical History:   No past medical history on file.    Surgical History:   Karma  Telma Crockett  has a past surgical history that includes Tonsillectomy.    Medications:   Chinyere has a current medication list which includes the following prescription(s): acetaminophen, cyclobenzaprine, diclofenac, duloxetine, ergocalciferol, ergocalciferol, loratadine, and sumatriptan, and the following Facility-Administered Medications: triamcinolone acetonide and triamcinolone acetonide.    Allergies:   Review of patient's allergies indicates:   Allergen Reactions    Hydrocodone      Dizziness and nausea         Objective   Resting Vitals:  98% O2 & 99bpm  152/99 mmHg    Postural examination:  Seated: slight slouch, head forward  Standing: head forward, slight increased lumbar lordosis    Range of Motion - Cervical   AROM AROM AROM    Evaluation Reassessment  Reassessment   Flexion 60 ° ° °   Extension 23 ° ° °   Side bending Right 26 ° ° °   Side bending Left 30 ° ° °   Rotation Right 50 ° ° °   Rotation Left 30 ° ° °     Range of Motion - Lumbar         ROM Loss ROM Loss ROM Loss   Flexion moderate loss     Extension minimal loss     Side bending Right moderate loss     Side bending Left major loss     Rotation Right minimal loss     Rotation Left major loss       Strength Testing   Evaluation Reassessment Reassessment   Motion Tested         Lower Extremity R L R L R L   Hip Flexion 4/5 4-/5       Hip IR 4+/5 4/5       Hip ER 4-/5 4-/5       Knee Extension 4+/5 4+/5       Knee Flexion 4/5 4/5         Functional Testing     Evaluation Reassessment  Reassessment   Timed Up and Go 11.9 sec sec sec   5 Time Sit to Stand w/out UE 23.3 sec sec sec   Single Limb Stance R LE 3.3 sec sec sec   Single Limb Stance L LE 20.6 sec sec sec   2 Minute Walk Test 484 feet feet feet   6 Minute Walk Test 1425 feet feet feet   Self Selected Walking Speed 1.21 m/sec m/sec m/sec     GAIT:  Assistive Device used: none  Level of Assistance: independent  Patient displays the following gait deviations:  unsteady gait, decreased  base of support and antalgic gait.     Minimum standards/norms:    TUG:  < 13.5 seconds/ Males 7.3 sec, Females 8.1 sec  SLS:  26-29 sec  Ages 20-69  Self Selected Walking Speed:  .4-.8m/sec  Limited community ambulator                                                   .8- 1.2  Community ambulator                                                    1.2 m/sec and above  Able to safely cross streets        Limitation/Restriction for FOTO Hip Survey    Therapist reviewed FOTO scores for Chinyere Crockett on 2/25/2022.   FOTO documents entered into [employer] - see Media section.    Limitation Score: 54%  Predicted score: 43%         TREATMENT     Total Treatment time (time-based codes) separate from Evaluation: 15 minutes     Chinyere received the treatments listed below:      therapeutic activities to improve functional performance for 15 minutes, including:   PT education:  o Physical & psychological viscous pain cycles (see patient instructions 2/25/2022)  o Role of consistent activity (graded exposure) to break the physical cycle  o Importance of education & behavioral changes that promote intrinsic patient motivation to help break the psychological cycle  o Impact on pt's functional goals when breaking each one of these cycles.    o Impact central sensitization has on the sensory system in the chronic pain population      PATIENT EDUCATION AND HOME EXERCISES     Education provided:   - pain cycle    Written Home Exercises Provided: Patient instructed to cont prior HEP. Exercises were reviewed and Chinyere was able to demonstrate them prior to the end of the session.  Chinyere demonstrated good  understanding of the education provided. See EMR under Patient Instructions for information provided during therapy sessions.    ASSESSMENT   Chinyere is a 46 y.o. female referred to outpatient Physical Therapy with a medical diagnosis of chronic pain disorder & impaired functional mobility. Pt presents with pain, weakness, impaired  mobility, decreased AROM, impaired balance, fall risk, gait deviations, decreased endurance, fear of pain w/ central sensitization, increased psychosocial concerns, & inability to perform ADL's as before due to current functional status. Pt's increased psychosocial concerns w/ central sensitization present an unstable clinical presentation which may limit progress, but the intrinsic motivation to improve will assist.      Based on the above history and physical examination an active physical therapy program within a multi-disciplinary setting is recommended.  Pt will benefit from skilled outpatient physical therapy to address the deficits listed below in the chart, provide pt/family education and to maximize pt's level of independence in the home and community environment.     Plan of care discussed with patient: Yes  Pt's spiritual, cultural and educational needs considered and patient is agreeable to the plan of care and goals as stated below:     Pt prognosis is Good.   Anticipated Barriers for therapy: chronic nature of issues, psychosocial concerns, rotator cuff tear    Medical Necessity is demonstrated by the following  History  Co-morbidities and personal factors that may impact the plan of care Co-morbidities:   anxiety, coping style/mechanism, depression, difficulty sleeping and high BMI    Personal Factors:   coping style  social background  lifestyle  character     high   Examination  Body Structures and Functions, activity limitations and participation restrictions that may impact the plan of care Body Regions:   head  neck  back  lower extremities  upper extremities  trunk    Body Systems:    ROM  strength  balance  gait  transfers  transitions  motor control    Participation Restrictions:       Activity limitations:   Learning and applying knowledge  no deficits    General Tasks and Commands  no deficits    Communication  no deficits    Mobility  lifting and carrying objects  fine hand use  (grasping/picking up)  walking  moving around using equipment (WC)  using transportation (bus, train, plane, car)  driving (bike, car, motorcycle)    Self care  washing oneself (bathing, drying, washing hands)  caring for body parts (brushing teeth, shaving, grooming)  dressing    Domestic Life  shopping  cooking  doing house work (cleaning house, washing dishes, laundry)    Interactions/Relationships  no deficits    Life Areas  employment    Community and Social Life  community life  recreation and leisure         high   Clinical Presentation unstable clinical presentation with unpredictable characteristics high   Decision Making/ Complexity Score: high       GOALS: Pt is in agreement with the following goals:      Goal Progress Towards Goal   Short Term: In 3 weeks, pt will:    Verbalize & demonstrate good understanding of resisted training with 3-1-3 second rep for lnjibqtqwg-[id number] contraction to encourage full muscle recruitment for strength & endurance Progress towards goal: initiated 2/25/2022   Verbalize & demonstrate good understanding of home stretch routine to improve AROM, help decrease pain & improve mobility Progress towards goal: initiated 2/25/2022   Demonstrate proper supine or seated diaphragmatic breathing with decreased chest excursion & emphasis on full abdominal excursion & increased expiration time to promote muscle relaxation & increased parasympathetic activity to improve patient tolerance to activities Progress towards goal: initiated 2/25/2022   Demonstrate proper static standing posture in frontal & sagittal plane per PT visual assessment to decrease postural strain in ADLs Progress towards goal: initiated 2/25/2022   Demonstrate good recall of names of resisted exercises w/ minimal to moderate verbal cues to promote independence w/ exercise at the end of the program Progress towards goal: initiated 2/25/2022   Verbalize plan for continuing resisted exercises w/ specific  location (i.e. commercial gym, home, community fitness center) indicating preference for machine-based or free-weight exercises to incorporate all major muscle groups to maintain & progress therapeutic functional improvements Progress towards goal: initiated 2/25/2022       Long Term: In 8 weeks, pt will:    Verbalize good understanding of activities planning/scheduling (stretching, mindfulness, resisted training, & cardio) prescribed by PT/OT following the end of the program to sustain & progress functional improvements Progress towards goal: initiated 2/25/2022   Be independent w/ selected resisted training w/ minimal to no cuing while performing to continue w/ resisted strengthening independently at the end of the program Progress towards goal: initiated 2/25/2022   Improve 2 Minute Walk Test (MWT) to 550 feet and 6 MWT to 1650 feet or greater to improve gait speed and mobility Progress towards goal: initiated 2/25/2022   Perform single leg stance 20 seconds or greater bilaterally to improve safety and balance in ADLs Progress towards goal: initiated 2/25/2022   Demonstrate Timed Up & Go (with appropriate assistive device, if necessary) of 9 seconds or less to decrease fall risk and improve functional mobility Progress towards goal: initiated 2/25/2022   Demonstrate 5 time sit to stand test without upper extremity assist to 12 sec or less to improve general mobility and decrease fall risk Progress towards goal: initiated 2/25/2022   Score 43 % or less on Hip FOTO to indicate significant functional improvements in impaired functions secondary to low back pain Progress towards goal: initiated 2/25/2022         PLAN   Plan of care Certification: 3/14/2022 to 5/9/2022    Outpatient Physical Therapy 3 times weekly for 8 weeks to include the following interventions: Aquatic Therapy, Electrical Stimulation per PT, Gait Training, Manual Therapy, Moist Heat/ Ice, Neuromuscular Re-ed, Patient Education, Therapeutic  Activities and Therapeutic Exercise.     Carmelo Gonzalez, PT, DPT

## 2022-02-25 NOTE — PROGRESS NOTES
"[facility]   Occupational Therapy Initial Evaluation &  Goals and Physical Capacity for Functional Restoration Program  NOTE: This is a duplicate copy utilized for reassessment purposes & continuity of care.  See pt's plan of care for co-signed copy.    Date: 2/25/2022  Name: Chinyere Crockett  Clinic Number: [id number]    Therapy Diagnosis:   Encounter Diagnoses   Name Primary?    Decreased activities of daily living (ADL)     Impaired mobility and endurance     Chronic pain disorder     Impaired functional mobility and activity tolerance     Difficulty with activities of daily living     Alteration in instrumental activities of daily living (IADL)      Physician: Aurora Xie NP    Physician Orders: OT Eval and Treat  Medical Diagnosis: Chronic pain disorder  Evaluation Date: 2/25/2022  Insurance Authorization Period Expiration: 03/15/2022  Plan of Care Certification Period: 4/22/2022  Visit # / Visits authorized: 1/ 1  FOTO: evaluation: 52% limitation    Precautions:  Standard and Fall    Time In: 2:00 PM  Time Out: 3:00 PM    Total Appointment Time (timed & untimed codes): 60  minutes    Subjective   Date of onset: 12-13 years ago  Patient reported history of current condition - Chinyere reports: chronic hip pain for about 12-13 years of insidious onset being in & out of PT in the last 2-3 year as it started to increase in intensity. She has also had other areas of the body that are "breaking down" really taking a toll on her function. Has a torn rotator cuff on right side and associated shoulder pain, MRI confirms but surgery not recommended. She also has seasonal migraines that may last 6-8 weeks at a time. She has had some depression & anxiety issues in the pandemic due to lots of things, & she knows that it has taken a toll on her pain & function. She was just in PT in [facility] for her low back & her R shld which she thinks helped her low back. She tries to do " "her HEP to help, but it is tough on many days & she is trying to avoid using her R shld due to pain.   Statement: "I dont have much of a life right now with not working"   Prior Level of Function: activities patient can no longer perform/has great difficulty with vacuuming, using the swiffer, ascending stairs, gardening     Medical History:   No past medical history on file.     Surgical History:   Chinyere  has a past surgical history that includes Tonsillectomy.    Medications:   Chinyere has a current medication list which includes the following prescription(s): acetaminophen, cyclobenzaprine, diclofenac, duloxetine, ergocalciferol, ergocalciferol, loratadine, and sumatriptan, and the following Facility-Administered Medications: triamcinolone acetonide and triamcinolone acetonide.    Allergies:   Review of patient's allergies indicates:   Allergen Reactions    Hydrocodone      Dizziness and nausea        Prior Therapy: yes, [facility]; recently finished; they worked on her right shoulder as this came up during the course of her therapy.     Exercise routine: She walks. Has some home equipment. Tries to do PT exercise with band at home for shoulder.     Disturbed sleep: yes, Says I used to have terrible sleep- very broken, appx 4 hours a night. Takes deluxitine nearly every night and feels like sleep has gotten worse. Will wake up after about 3 hours but can fall back asleep.     Mental health: "its som gras and its macario but I have had 7 friends and family deaths in the last 2 years not even covid related. Feels like every time we get over one someone else dies. My  has pau deployed twice and with everything going on right now we dont know and my mom was dx with breast cancer during the hurricane and its just been a stellar 2 years".      Suffers with chronic seasonal migraines 6-8 weeks. Spring-fall.     Lots of work stress over the years.  in  so is gone a lot- HAs seem to coincide " "with when he is gone. Has lost a lot of loved ones in the last 2 years so also dealing with grief. Mother and GM have also had medical problems.     To help with stress she crafts. 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      No mental health hospitalizations. Mother has a hx of mental illness and hospitalizations.     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) ;  did most of the stock unloading        Pain:  Pain Related Behaviors Observed: yes   Functional Pain Scale Rating 0-10: within the last 30 days  3/10 current  6/10 at worst  2/10 at best  Location: R shld, B hips (L worse), low back, L knee, L ankle  Description: stabbing, burning, throbbing, sharp  Functional Exacerbations: vacuuming, walking, lifting, twisting w/ the hand (opening jars), cleaning  Easing Factors: pain medication, ice, heating pad, TENS unit and video games & crafting for distraction    Personal Functional Three Month Goals:   Vocational: "re-enter the workfield"    Recreational : "joing a walking [employer]"   Daily Living Activities: "continue with [employer] business"     Social and ADL History:  Activities of Daily Living Checklist:   Key to Score:   0: Unable to do the activity  1: Can do the activity with great difficulty  2: Can do the activity with little difficulty   3: No problem with activity  N/A: This is not an activity I do  H/T: Have not tried yet    Personal Care:  Homemaking:     Dressing Upper Body:  2 Meal preparation: 3   Dressing Lower Body:  3 Kitchen Cleanup: 3   Bathing:  3 Childcare:  na   Hair Care:  1 Grocery shoppin   Sleep:  2 Vacuuming/mopping:  3     Emptying trash/ " garbage bag: 3   General Mobility:   Bed making changing:  3   Sitting:  3 Laundry  2   Bendin     Getting in/out of bed:  3 Home Maintenance:    Standin Mowing, raking:  na   Walkin Gardenin   Twistin Home Repairs:  2   Liftin Painting:  na         Getting around:       Getting in and out of car:  3     Buckling up you seat belt:  2     Opening heavy doors:  2     Climbing/descending stairs:  2             Objective     COGNITIVE Exam  Oriented: Person, Place, Time and Situation  Behaviors: Follows multistep  commands  Follows Commands/attention: Follows multistep  commands  Communication: clear/fluent  Memory: No Deficits noted  Safety awareness/insight to disability: limited insight; guarded movement; fear of pain  Coping skills/emotional control: Appropriate to situation    PHYSICAL Exam  Resting Blood Pressure: 152/99  Heart rate: 99bpm  SpO2: 98%    Active ROM:     UE RUE LUE Comments   Hands WFL WFL    Wrist WFL WFL    Elbows WFL WFL    Shoulders WFL WFL Slight pain with R int rotation       Dominant hand: right    Functional Strengthen Assessment: WFL    Strength Testing   Evaluation Roland FRP  2 Month Follow Up   Motion Tested          R L R L R L   Upper Extremity         Shoulder Flexion 5/5 5/5       Shoulder Extension 5/5 5/5       Shoulder Abduction 5/5 5/5       Shoulder IR 5/5 5/5       Shoulder ER 5/5 5/5       Elbow Flexion 5/5 5/5       Elbow Extension 5/5 5/5            Evaluation   5 times sit-stand  (adults 18-65 y/o) 23.3 seconds  >12 sec= fall risk for general elderly  >16 sec= fall risk for Parkinson's disease  >10 sec= balance/vestibular dysfunction (<61 y/o)  >14.2 sec= balance/vestibular dysfunction (>61 y/o)  >12 sec= fall risk for CVA     Endurance Testing: Modified Ramp Treadmill Test   GAP Re-assessment Follow-up   Minutes Completed  3 mins 00 secs     % Grades  10 %     Speed (mph)  1.7 mph     METS 4      bpm     Doretha Rating Perceived Exertion  Scale   5     Reason for stop point: Pt reported posture changes, fatigue, noted decline in maintaining pace safely.     Functional Strength Test:  Pile Testing: Lifting  (Pounds/Heart rate)   GAP/Eval (#/HR/ROSY)  Reassessment  Day 9   Follow-up   Appointment    Repetitive Floor to Waist    12#/120/4          Repetitive Waist to Shoulder    10#/104/5              1- Time Maximum     20#/108/5           Carry-2 Handed (40ft)     12#/109/3           Work demand Level     Light           Reason for Stop point: Increased time required for completing repetitions. During physical testing, participation level consistent.    No environmental, cultural, spiritual, developmental or education needs expressed or noted.    Limitation/Restriction for FOTO NOC Survey    Therapist reviewed FOTO scores for Chinyere Crockett on 2/25/2022.   FOTO documents entered into [employer] - see Media section.    Limitation Score: 52%         Treatment   Home Exercises and Patient Education Provided    Education provided:   -Ms. Crockett received education regarding proper posture and body mechanics. She received education regarding anticipated muscular soreness following lift testing and strategies for management were reviewed with patient including stretching, using ice, scheduled rest.  -Additional Education provided: rosy scale, pain cycle, z-lie, functional lifting mechanics, pursed lip and diaphragmatic breathing techniques    Written Home Exercises Provided: yes.  Exercises were reviewed and Chinyere was able to demonstrate them prior to the end of the session.    Chinyere demonstrated good  understanding of the education provided.     Chinyere given handout re: rosy scale, breathing techniques, z-lie position, and received education on the Functional Restoration Program. Details of the program were discussed.     Assessment     Chinyere appears to be a good candidate for the Functional Restoration Program. She currently presents with overall  deconditioning, generalized pain, Limited functional ROM with L shoulder, symptoms of depression, anxiety and with a lot of grief 2/2 recent family losses, and exhibits pain avoidance behavior. Due to the chronic nature of her issues and various associated medical dx which include left torn rotator cuff, GT Bursitis, migraines, lumbar and cervical DDD/spondylosis, she presents with an unstable clinical presentation which may limit her progress, but with good motivation to make behavioral changes. Chinyere with mild affect. She was able to participate in all aspects of assessment. Discussed with Chinyere how goal of functional restoration program is to provide tools, education and training aimed at improving physical functioning, emotional health, stress and pain coping skills.The program is designed to build confidence in physical activity and improve independence with ADLs and IADLs and in your ability to control and manage your pain. She voiced understanding.    Chinyere is unable to fully participate in work, recreational, and home-based activities because of decreased functional strength, decreased  AROM, decreased endurance, limited positional tolerance, fear of re-injury, and fear of increased pain. She will benefit from participating in a conditioning  program designed  to increase functional strength, flexibility, and endurance in order to meet functional goals.     Chinyere's prognosis is Good due to good personal goals, good motivation and good response to all feedback and education. She will benefit from skilled outpatient Occupational Therapy to address the limitations and goals stated above and in the chart below, provide patient/family education, and to maximize patient's level of functional independence.    Plan of care discussed with patient: Yes  Pt's spiritual, cultural and educational needs considered and patient is agreeable to the plan of care and goals as stated below:     Medical necessity is demonstrated  by the following problem list.   Profile and History Assessment of Occupational Performance Level of Clinical Decision Making Complexity Score   Occupational Profile:   Chinyere Crockett is a 46 y.o. female who lives with their spouse and is currently unemployed. Chinyere Crockett has difficulty with  bathing, grooming and dressing  driving/transportation management, shopping, phone/computer use and housework/household chores  affecting her daily functional abilities. Her main goal for therapy is Cooking, cleaning, gardening, crafting.     Comorbidities:   anxiety, coping style/mechanism, depression, difficulty sleeping, high BMI, level of undertstanding of current condition and sleep disturbance    Medical and Therapy History Review:   Expanded               Performance Deficits    Physical:  Joint Mobility  Joint Stability  Muscle Endurance  Proprioception Functions  Pain    Cognitive:  Safety Awareness/Insight to Disability    Psychosocial:   Pain avoidance, social isolation   Social Interaction  Habits  Routines  Group Participation     Clinical Decision Making:  moderate    Assessment Process:  Detailed Assessments    Modification/Need for Assistance:  Minimal-Moderate Modifications/Assistance    Intervention Selection:  Several Treatment Options       high  Based on PMHX, co morbidities , data from assessments and functional level of assistance required with task and clinical presentation directly impacting function.           FRP Goals: While working towards the long-term (2 month) functional goals listed above, Ms. Crockett will accomplish the following short term goals during the 5-week intensive rehabilitation program. Ms. Crockett will:     1. Develop a viable return to work plan.   2. Demonstrate physical capacities consistent with a medium work demand level.   3. Demonstrate increased functional strength by lifting 20 lbs floor to waist and 20 lbs waist to shoulder in order to meet demand of  work/home routine.   4. Increase her positional tolerance to the level needed for work and home activities.   5. Implement self-care strategies during the program and at home to control pain.   6.   Patient will demonstrate use of mindfulness, stress management, and coping  strategies for improved participation in daily routine.     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 More comprehensive physical  testing integrated with clinical findings would be required to derived an accurate work capacity.      Plan   Plan of care certification: 3/14/2022 to 4/22/2022.    Outpatient occupational therapy, 3 times a week for 5 weeks:     1. Functional conditioning daily to increase physical capacity and allow Ms. Crockett to meet demands of vocation and home.   2. Individual counseling to develop return to work/daily routine plan and better understand the return to work process and/or daily routine restructure.   3. Daily Stretching, aerobic conditioning and walking to increase functional tolerance and allow Ms. Crockett to meet demands of work and home.   4. Functional activities to increase ability to move fluidly and quickly and tolerate unguarded movements.   5. Re-education regarding proper postural, body mechanics, and coping strategies for healthy roles and routine for managing daily stressors.    6. Any other treatment deemed  necessary for patient to achieve personally driven goals to promote positive health and wellness and daily roles and routines    Leola Villasenor, CRISTINA, MELIZA, 2/25/2022  Occupational Therapy

## 2022-03-08 ENCOUNTER — TELEPHONE (OUTPATIENT)
Dept: PAIN MEDICINE | Facility: CLINIC | Age: 47
End: 2022-03-08
Payer: OTHER GOVERNMENT

## 2022-03-08 NOTE — TELEPHONE ENCOUNTER
Staff left voicemail for patient informing that Yonis Villalobos will not be in clinic on 03/29/22 and her appointment needs to be rescheduled.    Patient was also informed that she can contact the office back at [phone] to be schedule or through my chart drew

## 2022-03-11 ENCOUNTER — TELEPHONE (OUTPATIENT)
Dept: PAIN MEDICINE | Facility: CLINIC | Age: 47
End: 2022-03-11
Payer: OTHER GOVERNMENT

## 2022-03-11 NOTE — TELEPHONE ENCOUNTER
Staff called and spoke with pt. To r/s appt that was cancelled wit pain provider Yonis,pt verbalized understanding and confirmed Sg

## 2022-03-11 NOTE — TELEPHONE ENCOUNTER
----- Message from Alpa Tidwell sent at 3/11/2022 10:04 AM CST -----  Regarding: Pt missed a call from the nurse and needs a call back to reschedule her appt that was canceled by the doctor today due to her having a limited schedule  Patient Advice:    Pt missed a call from the nurse and needs a call back to reschedule her 3/29/22 appt that was canceled by the doctor today due to her having a limited schedule    Pt can be reached at [phone]

## 2022-03-14 ENCOUNTER — TELEPHONE (OUTPATIENT)
Dept: NEUROLOGY | Facility: CLINIC | Age: 47
End: 2022-03-14
Payer: OTHER GOVERNMENT

## 2022-03-14 ENCOUNTER — CLINICAL SUPPORT (OUTPATIENT)
Dept: REHABILITATION | Facility: OTHER | Age: 47
End: 2022-03-14
Payer: OTHER GOVERNMENT

## 2022-03-14 ENCOUNTER — CLINICAL SUPPORT (OUTPATIENT)
Dept: BEHAVIORAL HEALTH | Facility: CLINIC | Age: 47
End: 2022-03-14
Payer: OTHER GOVERNMENT

## 2022-03-14 DIAGNOSIS — Z74.09 IMPAIRED FUNCTIONAL MOBILITY AND ACTIVITY TOLERANCE: Primary | ICD-10-CM

## 2022-03-14 DIAGNOSIS — Z74.09 IMPAIRED FUNCTIONAL MOBILITY, BALANCE, GAIT, AND ENDURANCE: ICD-10-CM

## 2022-03-14 DIAGNOSIS — F41.9 ANXIETY: Primary | ICD-10-CM

## 2022-03-14 DIAGNOSIS — F40.298 FEAR OF PAIN: ICD-10-CM

## 2022-03-14 DIAGNOSIS — Z78.9 ALTERATION IN INSTRUMENTAL ACTIVITIES OF DAILY LIVING (IADL): ICD-10-CM

## 2022-03-14 DIAGNOSIS — Z78.9 DIFFICULTY WITH ACTIVITIES OF DAILY LIVING: ICD-10-CM

## 2022-03-14 PROCEDURE — 97530 THERAPEUTIC ACTIVITIES: CPT

## 2022-03-14 PROCEDURE — 90791 PR PSYCHIATRIC DIAGNOSTIC EVALUATION: ICD-10-PCS | Mod: S$GLB,,, | Performed by: SOCIAL WORKER

## 2022-03-14 PROCEDURE — 90791 PSYCH DIAGNOSTIC EVALUATION: CPT | Mod: S$GLB,,, | Performed by: SOCIAL WORKER

## 2022-03-14 PROCEDURE — 97110 THERAPEUTIC EXERCISES: CPT

## 2022-03-14 NOTE — PROGRESS NOTES
"[facility]    Functional Restoration Program  Physical Therapy Treatment Note  FRP Day 1    Name: Chinyere Crockett  MRN:[id number]      Therapy Diagnosis:   Encounter Diagnoses   Name Primary?    Fear of pain     Impaired functional mobility, balance, gait, and endurance      Physician: Aurora Xie NP    Date: 3/14/2022    Physician Orders: PT Eval and Treat   Medical Diagnosis from Referral:   Z78.9 (ICD-10-CM) - Decreased activities of daily living (ADL)   Z74.09 (ICD-10-CM) - Impaired mobility and endurance   G89.4 (ICD-10-CM) - Chronic pain disorder         Evaluation Date: 2/25/2022  Authorization Period Expiration: 12/31/22  Plan of Care Expiration: 5/6/2022  Visit # / Visits authorized: 2/ 20  FOTO: 1/ 3      Precautions: Standard and Fall     Time In:   3:00 pm  Time Out:   4:05 pm  Total Appointment Time (timed & untimed codes): 65 minutes        SUBJECTIVE     Chinyere reports no sx changes since eval.  Pt reports tolerating evaluation fair noting use of ice/heat helped some residual soreness.   But notes she has "seasonal migraines" (spring and fall) and expects them to start appearing.  Pt reports the migraines stop her from activities and verbalizes she likely would not come to tx (which is why she lobbied to come now vs next cohort - closer to spring).   Pt report owning a home fitness apparatus that includes some upper body and lower body exercises but note using in awhile.   Pt believes her L hip may be her biggest physical barrier to FRP tx having "good days and bad days".     Pt report most looking forward to the "synchronicity of physical and mental health" component FRP.   Pt lives approx 1 mile from clinic and plans to walk to and from tx on most days.      Patient's FRP goals: be able to function better around the house, maybe get back to working      Current 3/10  Location: R shld, B hips (L worse),     OBJECTIVE     Objective Measures updated at " progress report unless specified.         Limitation/Restriction for FOTO Hip Survey     Therapist reviewed FOTO scores for Chinyere Crockett on 2/25/2022.   FOTO documents entered into [employer] - see Media section.     Limitation Score: 54%  Predicted score: 43%           Treatment       Chinyere received the Individualized Treatments listed below:     Therapeutic exercises to develop strength, endurance, ROM, flexibility, posture and core stabilization for 60 minutes including:    Peripheral muscle strengthening which included 1-2 sets of 10-20 repetitions at a slow, controlled 5-7 second per rep pace focused on strengthening supporting musculature for improved body mechanics & functional mobility.  Patient & therapist focused on proper form during treatment to ensure optimal strengthening of each targeted muscle group. Patients are instructed to keep sets/reps with proper load to stay at 3-5 out of 10 on the provided modified Doretha exertion scale.       BATCA Machine Exercises Weight (lbs) Sets Repetitions Doretha Exertion Scale Rating   Leg Extension  10 1 20 3   Hamstring Curls 20 1 15 5   Chest Press 15 1 20 4   Pec Fly 15 1 20 3   Lat Pull Down 17.5 1 15 4   Mid Row 17.5 1 15 4   Bicep Bar Curls       Standing Tricep Extension       Single Leg Press  R/L  20 1 20/15 2/4       HEP demo include   Seated    No moneys x 10    Scap retractions x 10   Supine   LTR x 10       Patient Education and Home Exercises     Home Exercises Provided and Patient Education Provided     Education provided:   - resisted exercise basics    Written Home Exercises Provided: Patient instructed to cont prior HEP. Exercises were reviewed and Chinyere was able to demonstrate them prior to the end of the session.  Chinyere demonstrated good  understanding of the education provided. See EMR under Patient Instructions for information provided during therapy sessions    ASSESSMENT     Pt /c good participation today and verbalized good understanding of  education on resisted exercise basics (see pt instructions 03/14/22).  However, pt verbalize concern for R shoulder in advance of UE resistance exercises and noting impending migraine perhaps offering a glimpse of some fear avoidance.  Pt /c difficulty completing full range and reps /c R shoulder during UE resistance training and [employer] does note partial tear R supraspinatus. Therefore, recommend reduce weight to achieve full reps at safe RPE for inc self efficacy of RUE.  To bolster pt at home activity level, PT requested pt demo her believed most effective HEP components. Pt perform safely, therefore, pt encouraged to cont HEP performance.  Also, recommend POC include inc standing challenges to address pt belief of primary physical limitation.                Chinyere Is progressing well towards her goals.   Pt prognosis is Good.     Pt will continue to benefit from skilled outpatient physical therapy to address the deficits listed in the problem list box on initial evaluation, provide pt/family education and to maximize pt's level of independence in the home and community environment.     Pt's spiritual, cultural and educational needs considered and pt agreeable to plan of care and goals.     Anticipated barriers to physical therapy: chronic nature of issues, psychosocial concerns, rotator cuff tear    GOALS: Pt is in agreement with the following goals:        Goal Progress Towards Goal   Short Term: In 3 weeks, pt will:     Verbalize & demonstrate good understanding of resisted training with 3-1-3 second rep for sloodfwbgq-ldufzmhlb-gxzltxabg contraction to encourage full muscle recruitment for strength & endurance Progress towards goal: initiated 2/25/2022   Verbalize & demonstrate good understanding of home stretch routine to improve AROM, help decrease pain & improve mobility Progress towards goal: initiated 2/25/2022   Demonstrate proper supine or seated diaphragmatic breathing with decreased chest excursion &  emphasis on full abdominal excursion & increased expiration time to promote muscle relaxation & increased parasympathetic activity to improve patient tolerance to activities Progress towards goal: initiated 2/25/2022   Demonstrate proper static standing posture in frontal & sagittal plane per PT visual assessment to decrease postural strain in ADLs Progress towards goal: initiated 2/25/2022   Demonstrate good recall of names of resisted exercises w/ minimal to moderate verbal cues to promote independence w/ exercise at the end of the program Progress towards goal: initiated 2/25/2022   Verbalize plan for continuing resisted exercises w/ specific location (i.e. commercial gym, home, community fitness center) indicating preference for machine-based or free-weight exercises to incorporate all major muscle groups to maintain & progress therapeutic functional improvements Progress towards goal: initiated 2/25/2022         Long Term: In 8 weeks, pt will:     Verbalize good understanding of activities planning/scheduling (stretching, mindfulness, resisted training, & cardio) prescribed by PT/OT following the end of the program to sustain & progress functional improvements Progress towards goal: initiated 2/25/2022   Be independent w/ selected resisted training w/ minimal to no cuing while performing to continue w/ resisted strengthening independently at the end of the program Progress towards goal: initiated 2/25/2022   Improve 2 Minute Walk Test (MWT) to 550 feet and 6 MWT to 1650 feet or greater to improve gait speed and mobility Progress towards goal: initiated 2/25/2022   Perform single leg stance 20 seconds or greater bilaterally to improve safety and balance in ADLs Progress towards goal: initiated 2/25/2022   Demonstrate Timed Up & Go (with appropriate assistive device, if necessary) of 9 seconds or less to decrease fall risk and improve functional mobility Progress towards goal: initiated 2/25/2022   Demonstrate 5  time sit to stand test without upper extremity assist to 12 sec or less to improve general mobility and decrease fall risk Progress towards goal: initiated 2/25/2022   Score 43 % or less on Hip FOTO to indicate significant functional improvements in impaired functions secondary to low back pain Progress towards goal: initiated 2/25/2022          PLAN     Continue PT per POC     Zeus Chan, PT, DPT

## 2022-03-14 NOTE — PROGRESS NOTES
"  [facility]   Functional Restoration Program  Occupational Therapy Daily Note  FRP Day 1    Name: Chinyere Crockett  Medical Record Number: [id number]    Therapy Diagnosis:   Encounter Diagnoses   Name Primary?    Impaired functional mobility and activity tolerance Yes    Difficulty with activities of daily living     Alteration in instrumental activities of daily living (IADL)      Physician: Aurora Xie NP    Visit Date: 3/14/2022    Physician Orders: OT Eval and Treat  Medical Diagnosis: Chronic pain disorder  Evaluation Date: 2/25/2022  Insurance Authorization Period Expiration: 12/31/2022  Plan of Care Certification Period: 4/22/2022  Visit # / Visits authorized: 1/ 20  FOTO: evaluation: 52% limitation     Precautions:  Standard and Fall    Time In: 13:04  Time Out: 14:02  Total Billable Time: 58 minutes    Subjective     Chinyere reports "today is a good day; it helps that the weather is warming up." "I used to work at [employer]; 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         Pain: 3/10  Location: R shld, B hips (L worse), low back, L knee, L ankle    Personal Functional Three Month Goals: Performance and satisfaction noted below.    OBJECTIVE     Objective Measures updated at progress report unless specified.      Endurance Testing: Modified Ramp Treadmill Test    GAP Re-assessment Follow-up   Minutes Completed  3 mins 00 secs       % Grades  10 %       Speed (mph)  1.7 mph       METS 4        bpm       Doretha Rating Perceived Exertion Scale   5            Functional Strength Test:  Pile Testing: Lifting  (Pounds/Heart rate)   GAP/Eval (#/HR/DORETHA)  Reassessment  Day 9   Follow-up   Appointment    Repetitive Floor to Waist    12#/120/4          Repetitive Waist to Shoulder    10#/104/5              1- Time Maximum     20#/108/5         "   Carry-2 Handed (40ft)     12#/109/3           Work demand Level     Light               Treatment     Chinyere received the treatments listed below    therapeutic activities to improve functional performance for 58 minutes.    Discussed established goals. Reviewed and revised goals based on patient feedback. Pt oriented/educated on functional lifting/endurance exercise program with plan for progression toward goals. Pt educated on frequent functional lifts, verbalized and demonstrated understanding. Pt demonstrated understanding of functional tasks associated on program. Pt educated on the importance of paying attention to body with functional activities, considerations for use of log and use of rosy exertion scale.     .Additionally, participated in review of breathing exercises, with 4-7-8 technique performed.      Patient Education and Home Exercises     Education provided:   -Ms. Crockett received education regarding proper posture and body mechanics. She received education regarding anticipated muscular soreness following lift testing and strategies for management were reviewed with patient including stretching, using ice, scheduled rest.  -Additional Education provided: rosy scale, pain cycle, z-lie, functional lifting mechanics, pursed lip and diaphragmatic breathing techniques     Written Home Exercises Provided: yes, and binder for program received.   Exercises were reviewed and Chinyere was able to demonstrate them prior to the end of the session.    Chinyere demonstrated good understanding of the education provided.     ASSESSMENT     Chinyere presents motivated to participate in program and open to sharing, becoming teary eyed with discussion of goals. Good response to breathing exercises.      Chinyere is unable to fully participate in work, recreational, and home-based activities because of decreased functional strength, decreased  AROM, decreased endurance, limited positional tolerance, fear of re-injury, and fear  of increased pain. She will benefit from participating in a conditioning  program designed  to increase functional strength, flexibility, and endurance in order to meet functional goals.      Chinyere's prognosis is Good due to good personal goals, good motivation and good response to all feedback and education. Pt will continue to benefit from skilled outpatient occupational therapy to address the deficits listed in the problem list box on initial evaluation, provide pt/family education and to maximize pt's level of independence in the home and community environment.      Pt's spiritual, cultural and educational needs considered and pt agreeable to plan of care and goals.     Anticipated barriers to occupational therapy: none identified.    FRP Goals: While working towards the long-term (3 month) functional goals listed above, Vu accomplish the following short term goals during the 5-week intensive rehabilitation program. Chinyere will:      1. Develop a viable return to work plan.   2. Demonstrate physical capacities consistent with a medium work demand level.   3. Demonstrate increased functional strength by lifting 20 lbs floor to waist and 20 lbs waist to shoulder in order to meet demand of work/home routine.   4. Increase her positional tolerance to the level needed for work and home activities.   5. Implement self-care strategies during the program and at home to control pain.   6.   Patient will demonstrate use of mindfulness, stress management, and coping  strategies for improved participation in daily routine.   7.   Will increase water intake to 7 x 16oz water bottles daily.     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     Find part-time work (will do so only after completion of FRP)     Recreational:  Joining walking [employer]   - walking tolerance     Gardening    Crafting    Daily Living:  Cooking      Cleaning     Hair care      Upper body dressing (bras).          Medium Physical Demand Level      0/0         0/5               4/3   2/1     3/0       5/5     4/3     3/3     5/2  (Currently using sport bras)              PLAN     Continue per POC; Plan of care certification: 3/14/2022 to 4/22/2022.     Outpatient occupational therapy, 3 times a week for 5 weeks:      · Functional conditioning daily to increase physical capacity and allow Ms. Crockett to meet demands of vocation and home.   · Individual counseling to develop return to work/daily routine plan and better understand the return to work process and/or daily routine restructure.   · Daily Stretching, aerobic conditioning and walking to increase functional tolerance and allow Ms. Crockett to meet demands of work and home.   · Functional activities to increase ability to move fluidly and quickly and tolerate unguarded movements.   · Re-education regarding proper postural, body mechanics, and coping strategies for healthy roles and routine for managing daily stressors.    · Any other treatment deemed necessary for patient to achieve personally driven goals to promote positive health and wellness and daily roles and routines    CHRIS Richey, CAN/L, CEAS-I  3/14/2022
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
## 2022-03-14 NOTE — TELEPHONE ENCOUNTER
Called and spoke with Dagoberto. He stated the flyer was for [facility]. I explained Dr. Montoya will be in on Wednesday and have him place the order.

## 2022-03-14 NOTE — TELEPHONE ENCOUNTER
----- Message from Teodora Trujillo sent at 3/14/2022  9:27 AM CDT -----  Who Called: JW MENDEZ    What is the request in detail: Pt called in stating flyer that was given for sample of headache medicine, she was told she doesn't qualify for it. Please advise on other options.      Can the clinic reply by [facility]? Yes     Best Call Back Number: [phone]    Additional Information: Please leave a VM if pt doesn't answer, or please call  Dagoberto [phone].

## 2022-03-16 ENCOUNTER — OFFICE VISIT (OUTPATIENT)
Dept: BEHAVIORAL HEALTH | Facility: CLINIC | Age: 47
End: 2022-03-16
Payer: OTHER GOVERNMENT

## 2022-03-16 ENCOUNTER — CLINICAL SUPPORT (OUTPATIENT)
Dept: REHABILITATION | Facility: OTHER | Age: 47
End: 2022-03-16
Payer: OTHER GOVERNMENT

## 2022-03-16 DIAGNOSIS — Z74.09 IMPAIRED FUNCTIONAL MOBILITY, BALANCE, GAIT, AND ENDURANCE: ICD-10-CM

## 2022-03-16 DIAGNOSIS — Z74.09 IMPAIRED FUNCTIONAL MOBILITY AND ACTIVITY TOLERANCE: Primary | ICD-10-CM

## 2022-03-16 DIAGNOSIS — F40.298 FEAR OF PAIN: Primary | ICD-10-CM

## 2022-03-16 DIAGNOSIS — Z78.9 ALTERATION IN INSTRUMENTAL ACTIVITIES OF DAILY LIVING (IADL): ICD-10-CM

## 2022-03-16 DIAGNOSIS — Z78.9 DIFFICULTY WITH ACTIVITIES OF DAILY LIVING: ICD-10-CM

## 2022-03-16 DIAGNOSIS — F41.9 ANXIETY: Primary | ICD-10-CM

## 2022-03-16 PROCEDURE — 90853 PR GROUP PSYCHOTHERAPY: ICD-10-PCS | Mod: S$GLB,,, | Performed by: SOCIAL WORKER

## 2022-03-16 PROCEDURE — 97530 THERAPEUTIC ACTIVITIES: CPT

## 2022-03-16 PROCEDURE — 97110 THERAPEUTIC EXERCISES: CPT

## 2022-03-16 PROCEDURE — 90853 GROUP PSYCHOTHERAPY: CPT | Mod: S$GLB,,, | Performed by: SOCIAL WORKER

## 2022-03-16 NOTE — PROGRESS NOTES
"[facility]   Functional Restoration Program  Occupational Therapy Daily Note  P Day 2    Name: Chinyere Crockett  Medical Record Number: [id number]    Therapy Diagnosis:   Encounter Diagnoses   Name Primary?    Impaired functional mobility and activity tolerance Yes    Difficulty with activities of daily living     Alteration in instrumental activities of daily living (IADL)      Physician: Aurora Xie NP    Visit Date: 3/16/2022    Physician Orders: OT Eval and Treat  Medical Diagnosis: Chronic pain disorder  Evaluation Date: 2/25/2022  Insurance Authorization Period Expiration: 12/31/2022  Plan of Care Certification Period: 4/22/2022  Visit # / Visits authorized: 2 / 20  FOTO: evaluation: 52% limitation     Precautions:  Standard and Fall    Time In: 1:30 PM  Time Out: 2:45 PM  Total Billable Time: 75 minutes    Subjective     Chinyere reports "My shoulder is really very grumpy".         Pain: 4/10  Location: R shoulder and shoulder blade     Personal Functional Three Month Goals: Performance and satisfaction noted below.    OBJECTIVE   Refer to [facility] protocol for details and explanation of each activity.   Chinyere participated in the following dynamic functional therapeutic activities:    Individualized Treatment: Increased resistance levels of functional conditioning exercises according to personal recovery goals. Activities include: Dynamic reaching, [id number] lifting, sustained standing activity, maximal lifting, 2-handed carry, sustained seated tasks, push and pull, waist-to-shoulder lift, box/container carry, endurance training. Daily functional conditioning progress is documented on a flow sheet which is available upon request.     Chinyere participated in dynamic functional therapeutic activities to improve functional performance for 75 minutes, including:     Full body stretch w/ 3-10 sec hold technique &/or 3-5 repetition technique on all of the " following:   Cervical flx/ext   Cervical sidebending   Cervical rotation   Cervical protraction/retraction   Shld rolls   Shld depression/elevation   Scapular retraction/protraction/scaption   Posterior shld stretch (cross arm)   Shld ER stretch (chicken wing)   Elbow flx/ext   Forearm supination/pronation   Wrist flx/ext   Open/close hands/fingers   Seated trunk rotations   Seated trunk/thoracic ext/flx   Seated marches & knee to chest   Seated knee flx/ext   Seated hip ER/piriformis stretch   Seated hamstring stretch   Seated toe raise to heel raise    Seated ankle circles each way   Standing lumbar extensions   Standing lateral leaning stretch   Standing quad stretch (UE support for balance)    Pt completed functional lifting, floor to waist, 3x5 reps x 5 lbs with exertion rated moderate (3 /10), focused education on wide base of support, hinging at hips, keep weight in heels to activate glutes and coordinate movement with breath. Pt plans to use this lift related to laundry. Completed x5 reps of pursed lip breathing in between sets as rest breaks.     Pt completed seated mindfulness diaphragmatic breathing activity [id number] with focus on posture, mechanics of breathing, and benefits re: PNS activation. Pt voiced and demonstrated understanding. Completed in between functional activities throughout session as rest break to regain breath control and decrease HR.     Pt participated in functional lift waist to shoulder, x15 reps x 5 lbs with a rating of easy (2/10). Pt educated on standing posture, core awareness, keeping shoulders depressed and retracted, stepping to place > reaching to place, keeping weight close to center of gravity and pacing as needed. Educated on how to activate deep core muscles with slight pelvic tilt (cued: hips and ribs to kiss).      Pt completed maximal lift 2x5 reps with 12 lbs, rated as moderate (3/10), continued education focused on neutral spine positioning and  pushing through heels for safety and activation of correct muscles. Completed in front of mirror for visual cuse to aid in maladaptive compensatory patterns.      Patient Education and Home Exercises     Education provided:   -Ms. Crockett received education regarding proper posture and body mechanics. She received education regarding anticipated muscular soreness following lift testing and strategies for management were reviewed with patient including stretching, using ice, scheduled rest.  -Additional Education provided: rosy scale, pain cycle, z-lie, functional lifting mechanics, pursed lip and diaphragmatic breathing techniques     Written Home Exercises Provided: yes, and binder for program received.   Exercises were reviewed and Chinyere was able to demonstrate them prior to the end of the session.    Chinyere demonstrated good understanding of the education provided.     ASSESSMENT     Chinyere presents with self report of increased pain in shoulders post day 1 FRP. Despite this, she continues with good willingness to participate and good motivation. Good response to all feedback and education. Good tolerance to session this date despite low number of functional activities completed 2/2 learning new mechanics and pacing appropriately. Good continued use of and response to breathing exercises. Overall continues to progress towards personal goals.      Chinyere is unable to fully participate in work, recreational, and home-based activities because of decreased functional strength, decreased  AROM, decreased endurance, limited positional tolerance, fear of re-injury, and fear of increased pain. She will benefit from participating in a conditioning  program designed  to increase functional strength, flexibility, and endurance in order to meet functional goals.      Chinyere's prognosis is Good due to good personal goals, good motivation and good response to all feedback and education. Pt will continue to benefit from skilled  outpatient occupational therapy to address the deficits listed in the problem list box on initial evaluation, provide pt/family education and to maximize pt's level of independence in the home and community environment.      Pt's spiritual, cultural and educational needs considered and pt agreeable to plan of care and goals.     Anticipated barriers to occupational therapy: none identified.    FRP Goals: While working towards the long-term (3 month) functional goals listed above, Vu accomplish the following short term goals during the 5-week intensive rehabilitation program. Chinyere will:      1. Develop a viable return to work plan.   2. Demonstrate physical capacities consistent with a medium work demand level.   3. Demonstrate increased functional strength by lifting 20 lbs floor to waist and 20 lbs waist to shoulder in order to meet demand of work/home routine.   4. Increase her positional tolerance to the level needed for work and home activities.   5. Implement self-care strategies during the program and at home to control pain.   6.   Patient will demonstrate use of mindfulness, stress management, and coping  strategies for improved participation in daily routine.   7.   Will increase water intake to 7 x 16oz water bottles daily.     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     Find part-time work (will do so only after completion of FRP)     Recreational:  Joining walking [employer]   - walking tolerance     Gardening    Crafting    Daily Living:  Cooking      Cleaning     Hair care      Upper body dressing (bras).          Medium Physical Demand Level      0/0         0/5                4/3   2/1     3/0       5/5     4/3     3/3     5/2  (Currently using sport bras)              PLAN     Continue per POC; Plan of care certification: 3/14/2022 to 4/22/2022.     Outpatient occupational therapy, 3 times a week for 5 weeks:      · Functional conditioning daily to increase physical capacity and allow Ms. Crockett to meet demands of vocation and home.   · Individual counseling to develop return to work/daily routine plan and better understand the return to work process and/or daily routine restructure.   · Daily Stretching, aerobic conditioning and walking to increase functional tolerance and allow Ms. Crockett to meet demands of work and home.   · Functional activities to increase ability to move fluidly and quickly and tolerate unguarded movements.   · Re-education regarding proper postural, body mechanics, and coping strategies for healthy roles and routine for managing daily stressors.    · Any other treatment deemed necessary for patient to achieve personally driven goals to promote positive health and wellness and daily roles and routines    Leola Villasenor, OTR/L  3/16/2022

## 2022-03-16 NOTE — PROGRESS NOTES
"  [facility]    Functional Restoration Program  Physical Therapy Treatment Note  FRP Day 2    Name: Chinyere Crockett  MRN:[id number]      Therapy Diagnosis:   Encounter Diagnoses   Name Primary?    Fear of pain Yes    Impaired functional mobility, balance, gait, and endurance      Physician: Aurora Xie NP    Date: 3/16/2022    Physician Orders: PT Eval and Treat   Medical Diagnosis from Referral:   Z78.9 (ICD-10-CM) - Decreased activities of daily living (ADL)   Z74.09 (ICD-10-CM) - Impaired mobility and endurance   G89.4 (ICD-10-CM) - Chronic pain disorder         Evaluation Date: 2/25/2022  Authorization Period Expiration: 12/31/22  Plan of Care Expiration: 5/6/2022   Visit # / Visits authorized: 3/ 20  FOTO: 1/ 3      Precautions: Standard and Fall     Time In:   2:45 pm  Time Out:  4:00 pm  Total Appointment Time (timed & untimed codes): 75 minutes        SUBJECTIVE     Chinyere tolerate last session fair noting need to ice LB and R shoulder and finding relief.   Pt note waking the next day /c no excess discomfort.  Pt state ability to complete daily activities on Tuesday but attempting nothing more.  Specifically, pt note shoulder issues appear /c vacuum press/pull.    Pt report her attitude and personal attention can be affected by the music she listens to and plans to include regular listening into her at home programming.     Pt agree to tx today        Patient's FRP goals: be able to function better around the house, maybe get back to working, looking forward to the "synchronicity of physical and mental health"       Current 3/10  Location: R shld, B hips (L worse),     OBJECTIVE     Objective Measures updated at progress report unless specified.         Limitation/Restriction for FOTO Hip Survey     Therapist reviewed FOTO scores for Chinyere Crockett on 2/25/2022.   FOTO documents entered into [employer] - see Media section.     Limitation Score: 54%  Predicted " score: 43%           Treatment       Chinyere received the Individualized Treatments listed below:     Therapeutic exercises to develop strength, endurance, ROM, flexibility, posture and core stabilization for 75 minutes including:    Peripheral muscle strengthening which included 1-2 sets of 10-20 repetitions at a slow, controlled 5-7 second per rep pace focused on strengthening supporting musculature for improved body mechanics & functional mobility.  Patient & therapist focused on proper form during treatment to ensure optimal strengthening of each targeted muscle group. Patients are instructed to keep sets/reps with proper load to stay at 3-5 out of 10 on the provided modified Doretha exertion scale.       [employer] Machine Exercises Weight (lbs) Sets Repetitions Doretha Exertion Scale Rating   Leg Extension        Hamstring Curls       Chest Press 17.5  1 20 3   Pec Fly 17.5 1 20 3   Lat Pull Down       Mid Row       Bicep Bar Curls 5 1 20 4   Standing Tricep Extension 10 1 20 4   Single Leg Press  R/L  22.5 1 20 4       Standing    ER/IR x 20 each Y TB    R shoulder ext x 20 Y TB    Wall walks RUE     Flex x 10    AB x 10   Seated    No moneys x 10 Y TB     [employer]itter Board - low setting, 30 tilt bouts    Fwd/Bwd - 1 hand  fade to 2 hand finger tip, 1 hand finger tip dec tilt control, PT cue for dec torso WS    Lat - 1 hand  fade to finger tips, PT tactile cue for dec upper body WS    Seated - R calf and plantar fascitis stretch       Patient Education and Home Exercises     Home Exercises Provided and Patient Education Provided     Education provided:   - stretch program    Written Home Exercises Provided: Patient instructed to cont prior HEP. Exercises were reviewed and Chinyere was able to demonstrate them prior to the end of the session.  Chinyere demonstrated good  understanding of the education provided. See EMR under Patient Instructions for information provided during therapy sessions    ASSESSMENT     Pt /c good  participation in tx today /c dec limitation /c RUE exercises including direct challenges to rotator cuff.    Pt subjective report indicate good usage of ice for sx self management.  However, pt may will need further encouragement to advance at home activity tolerance.   Stretch program initiated today and handout issued to promote inc participation.    During UE resistance exercises pt note impending cramp in R flank and attend to it /c lat stretch indicating good body awareness and sx self management.  Plan to complete full complement of BATCA resistance exercises to meet FRP principle of full body exercise.  Also, to promote inc self efficacy, plan to return to [place] board as pt note it was challenging but felt accomplished afterward.                 Chinyere Is progressing well towards her goals.   Pt prognosis is Good.     Pt will continue to benefit from skilled outpatient physical therapy to address the deficits listed in the problem list box on initial evaluation, provide pt/family education and to maximize pt's level of independence in the home and community environment.     Pt's spiritual, cultural and educational needs considered and pt agreeable to plan of care and goals.     Anticipated barriers to physical therapy: chronic nature of issues, psychosocial concerns, rotator cuff tear    GOALS: Pt is in agreement with the following goals:        Goal Progress Towards Goal   Short Term: In 3 weeks, pt will:     Verbalize & demonstrate good understanding of resisted training with 3-1-3 second rep for cyqyzifrvx-rkpqxqlpt-onymryvvc contraction to encourage full muscle recruitment for strength & endurance Progress towards goal: initiated 2/25/2022   Verbalize & demonstrate good understanding of home stretch routine to improve AROM, help decrease pain & improve mobility Progress towards goal: initiated 2/25/2022   Demonstrate proper supine or seated diaphragmatic breathing with decreased chest excursion & emphasis  on full abdominal excursion & increased expiration time to promote muscle relaxation & increased parasympathetic activity to improve patient tolerance to activities Progress towards goal: initiated 2/25/2022   Demonstrate proper static standing posture in frontal & sagittal plane per PT visual assessment to decrease postural strain in ADLs Progress towards goal: initiated 2/25/2022   Demonstrate good recall of names of resisted exercises w/ minimal to moderate verbal cues to promote independence w/ exercise at the end of the program Progress towards goal: initiated 2/25/2022   Verbalize plan for continuing resisted exercises w/ specific location (i.e. commercial gym, home, community fitness center) indicating preference for machine-based or free-weight exercises to incorporate all major muscle groups to maintain & progress therapeutic functional improvements Progress towards goal: initiated 2/25/2022         Long Term: In 8 weeks, pt will:     Verbalize good understanding of activities planning/scheduling (stretching, mindfulness, resisted training, & cardio) prescribed by PT/OT following the end of the program to sustain & progress functional improvements Progress towards goal: initiated 2/25/2022   Be independent w/ selected resisted training w/ minimal to no cuing while performing to continue w/ resisted strengthening independently at the end of the program Progress towards goal: initiated 2/25/2022   Improve 2 Minute Walk Test (MWT) to 550 feet and 6 MWT to 1650 feet or greater to improve gait speed and mobility Progress towards goal: initiated 2/25/2022   Perform single leg stance 20 seconds or greater bilaterally to improve safety and balance in ADLs Progress towards goal: initiated 2/25/2022   Demonstrate Timed Up & Go (with appropriate assistive device, if necessary) of 9 seconds or less to decrease fall risk and improve functional mobility Progress towards goal: initiated 2/25/2022   Demonstrate 5 time sit  to stand test without upper extremity assist to 12 sec or less to improve general mobility and decrease fall risk Progress towards goal: initiated 2/25/2022   Score 43 % or less on Hip FOTO to indicate significant functional improvements in impaired functions secondary to low back pain Progress towards goal: initiated 2/25/2022          PLAN     Continue PT per POC     Zeus Chan, PT, DPT

## 2022-03-17 ENCOUNTER — CLINICAL SUPPORT (OUTPATIENT)
Dept: REHABILITATION | Facility: OTHER | Age: 47
End: 2022-03-17
Payer: OTHER GOVERNMENT

## 2022-03-17 ENCOUNTER — OFFICE VISIT (OUTPATIENT)
Dept: BEHAVIORAL HEALTH | Facility: CLINIC | Age: 47
End: 2022-03-17
Payer: OTHER GOVERNMENT

## 2022-03-17 DIAGNOSIS — F41.9 ANXIETY: Primary | ICD-10-CM

## 2022-03-17 DIAGNOSIS — Z74.09 IMPAIRED FUNCTIONAL MOBILITY, BALANCE, GAIT, AND ENDURANCE: ICD-10-CM

## 2022-03-17 DIAGNOSIS — Z78.9 DIFFICULTY WITH ACTIVITIES OF DAILY LIVING: ICD-10-CM

## 2022-03-17 DIAGNOSIS — Z74.09 IMPAIRED FUNCTIONAL MOBILITY AND ACTIVITY TOLERANCE: Primary | ICD-10-CM

## 2022-03-17 DIAGNOSIS — F40.298 FEAR OF PAIN: Primary | ICD-10-CM

## 2022-03-17 DIAGNOSIS — Z78.9 ALTERATION IN INSTRUMENTAL ACTIVITIES OF DAILY LIVING (IADL): ICD-10-CM

## 2022-03-17 PROCEDURE — 90853 GROUP PSYCHOTHERAPY: CPT | Mod: S$GLB,,, | Performed by: SOCIAL WORKER

## 2022-03-17 PROCEDURE — 90853 PR GROUP PSYCHOTHERAPY: ICD-10-PCS | Mod: S$GLB,,, | Performed by: SOCIAL WORKER

## 2022-03-17 PROCEDURE — 97530 THERAPEUTIC ACTIVITIES: CPT

## 2022-03-17 PROCEDURE — 97110 THERAPEUTIC EXERCISES: CPT

## 2022-03-17 NOTE — PROGRESS NOTES
"[facility]   Functional Restoration Program  Occupational Therapy Daily Note  [facility] Day 3    Name: Chinyere Crockett  Medical Record Number: [id number]    Therapy Diagnosis:   Encounter Diagnoses   Name Primary?    Impaired functional mobility and activity tolerance Yes    Difficulty with activities of daily living     Alteration in instrumental activities of daily living (IADL)      Physician: Aurora Xie NP    Visit Date: 3/17/2022    Physician Orders: OT Eval and Treat  Medical Diagnosis: Chronic pain disorder  Evaluation Date: 2/25/2022  Insurance Authorization Period Expiration: 12/31/2022  Plan of Care Certification Period: 4/22/2022  Visit # / Visits authorized: 3 / 20  FOTO: evaluation: 52% limitation     Precautions:  Standard and Fall    Time In: 2:45 PM  Time Out: 4:00 PM  Total Billable Time: 75 minutes    Subjective     Chinyere reports "I fell walking home yesterday. Im okay just a little bruise. Its the bad streets and I think my ankle rolled, but I was also tired leaving here".         Pain: 3 / 10  Location: R shoulder and shoulder blade     Personal Functional Three Month Goals: Performance and satisfaction noted below.    OBJECTIVE   Refer to [facility] protocol for details and explanation of each activity.   Chinyere participated in the following dynamic functional therapeutic activities:    Individualized Treatment: Increased resistance levels of functional conditioning exercises according to personal recovery goals. Activities include: Dynamic reaching, pjdsk-sh-vugku lifting, sustained standing activity, maximal lifting, 2-handed carry, sustained seated tasks, push and pull, waist-to-shoulder lift, box/container carry, endurance training. Daily functional conditioning progress is documented on a flow sheet which is available upon request.     Chinyere participated in dynamic functional therapeutic activities to improve functional performance for 75 minutes, " including:    Chinyere participated in endurance activity using [employer] for 10 minutes, starting at level 2 and progressing to Level 2.5 by end of activity, with goal to maintain 85 steps/min to improve functional endurance and progress towards increased MET level for improved community mobility and participation. Able to maintain  steps/min. She rated as sort of hard (4/10), Chinyere educated on how to add mindfulness component to activity and how to pace appropriately by completed self check ins and grading activity as needed, with goal to reach moderate to sort of hard by end of activity. Completed 930 steps. Pt with SOB post activity, encouraged to take 10 diaphragmatic breaths prior to getting off machine. Demonstrated independence. Discussed that she plans to walk for exercise on days she is not here.     Pt completed seated mindfulness diaphragmatic breathing activity [id number] with focus on posture, mechanics of breathing, and benefits re: PNS activation. Pt voiced and demonstrated understanding. Completed in between functional activities throughout session as rest break to regain breath control and decrease HR.     Pt completed dynamic reaching in various functional ranges, with education focused on standing posture, core awareness, hip rotation/weight shifting + pivoting on back foot, slow and controlled movements and coordinating movement with breath. She completed x 10 reps x 1 lbs, rated as hard (5/10) exertion.     Pt completed functional lifting, floor to waist, 3x5 reps x 8 lbs with exertion rated sort of hard (4 /10), focused education on wide base of support, hinging at hips, keep weight in heels to activate glutes and coordinate movement with breath. Pt plans to use this lift related to laundry. Completed x5 reps of pursed lip breathing in between sets as rest breaks. Continues with difficulty to coordinate movement with breath with tendency to hold breath with activity. Required increased time to  complete activity 2/2 fatigue, and difficulty with body mechanics. Discussed quality > quantity and pacing > completion. Voiced understanding.     Pt participated in functional lift waist to shoulder, x15 reps x 7 lbs with a rating of moderate (3/10). Pt educated on standing posture, core awareness, keeping shoulders depressed and retracted, stepping to place > reaching to place, keeping weight close to center of gravity and pacing as needed.    Pt completed maximal lift x5 reps with 14 lbs, rated as hard (5/10), continued education focused on neutral spine positioning and pushing through heels for safety and activation of correct muscles. Completed in front of mirror for visual cuse to aid in maladaptive compensatory patterns. Discussed body follows eyes and educated on using gaze to ensure neutral spine position. Voiced and demonstrated understanding.     Pt educated on and participated in seated yoga flow x10 min with focus on stability, strength, balance, sitting posture, alignment, and coordinating breath with movement. Pt educated on both physical and mental benefits of yoga.      Patient Education and Home Exercises     Education provided:   -Ms. Crockett received education regarding proper posture and body mechanics. She received education regarding anticipated muscular soreness following lift testing and strategies for management were reviewed with patient including stretching, using ice, scheduled rest.  -Additional Education provided: rosy scale, pain cycle, z-lie, functional lifting mechanics, pursed lip and diaphragmatic breathing techniques     Written Home Exercises Provided: yes, and binder for program received.   Exercises were reviewed and Chinyere was able to demonstrate them prior to the end of the session.    Chinyere demonstrated good understanding of the education provided.     ASSESSMENT   Chinyere presents with increased fatigue post PT session. 2/2 fatigue she required increased time to complete  functional activities with increased need for rest breaks. Despite this, overall she had good tolerance to session this date. She continues to perseverate on pain throughout session but able to redirect. Continued difficulty with lifting floor to waist but with improved body awareness and improved ability to self correct as needed. Overall continues to progress towards personal goals.      Chinyere is unable to fully participate in work, recreational, and home-based activities because of decreased functional strength, decreased  AROM, decreased endurance, limited positional tolerance, fear of re-injury, and fear of increased pain. She will benefit from participating in a conditioning  program designed  to increase functional strength, flexibility, and endurance in order to meet functional goals.      Chinyere's prognosis is Good due to good personal goals, good motivation and good response to all feedback and education. Pt will continue to benefit from skilled outpatient occupational therapy to address the deficits listed in the problem list box on initial evaluation, provide pt/family education and to maximize pt's level of independence in the home and community environment.      Pt's spiritual, cultural and educational needs considered and pt agreeable to plan of care and goals.     Anticipated barriers to occupational therapy: none identified.    FRP Goals: While working towards the long-term (3 month) functional goals listed above, Vu accomplish the following short term goals during the 5-week intensive rehabilitation program. Chinyere will:      1. Develop a viable return to work plan.   2. Demonstrate physical capacities consistent with a medium work demand level.   3. Demonstrate increased functional strength by lifting 20 lbs floor to waist and 20 lbs waist to shoulder in order to meet demand of work/home routine.   4. Increase her positional tolerance to the level needed for work and home activities.    5. Implement self-care strategies during the program and at home to control pain.   6.   Patient will demonstrate use of mindfulness, stress management, and coping  strategies for improved participation in daily routine.   7.   Will increase water intake to 7 x 16oz water bottles daily.     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     Find part-time work (will do so only after completion of FRP)     Recreational:  [employer]    Daily Living:  Cooking      Cleaning     Hair care      Upper body dressing (bras).          Medium Physical Demand Level      0/0         0/5               4/3   2/1     3/0       5/5     4/3     3/3     5/2  (Currently using sport bras)              PLAN     Continue per POC; Plan of care certification: 3/14/2022 to 4/22/2022.     Outpatient occupational therapy, 3 times a week for 5 weeks:      · Functional conditioning daily to increase physical capacity and allow Ms. Crockett to meet demands of vocation and home.   · Individual counseling to develop return to work/daily routine plan and better understand the return to work process and/or daily routine restructure.   · Daily Stretching, aerobic conditioning and walking to increase functional tolerance and allow Ms. Crockett to meet demands of work and home.   · Functional activities to increase ability to move fluidly and quickly and tolerate unguarded movements.   · Re-education regarding proper postural, body mechanics, and coping strategies for healthy roles and routine for managing daily stressors.    · Any other treatment deemed necessary for patient to  achieve personally driven goals to promote positive health and wellness and daily roles and routines    Leola Villasenor, OTR/[facility]EVERLEY  3/17/2022

## 2022-03-17 NOTE — PROGRESS NOTES
"  [facility]    Functional Restoration Program  Physical Therapy Treatment Note  FRP Day 3    Name: Chinyere Crockett  MRN:[id number]      Therapy Diagnosis:   Encounter Diagnoses   Name Primary?    Fear of pain Yes    Impaired functional mobility, balance, gait, and endurance      Physician: Aurora Xie NP    Date: 3/17/2022    Physician Orders: PT Eval and Treat   Medical Diagnosis from Referral:   Z78.9 (ICD-10-CM) - Decreased activities of daily living (ADL)   Z74.09 (ICD-10-CM) - Impaired mobility and endurance   G89.4 (ICD-10-CM) - Chronic pain disorder         Evaluation Date: 2/25/2022  Authorization Period Expiration: 12/31/22  Plan of Care Expiration: 5/6/2022   Visit # / Visits authorized: 4 / 20  FOTO: 1/ 3      Precautions: Standard and Fall     Time In:  1:30 pm  Time Out:  2:45 pm  Total Appointment Time (timed & untimed codes): 75 minutes      SUBJECTIVE     Chinyere report that when walking home she fell on broken sidewalk.  Pt report her R ankle twisted and she fell on L knee.  Pt note no adverse outcome other than bruise on L knee including returning home and cooking a double sized meal for dinner the next 2 nights /s inc discomfort.  Pt note making larger meal to prepare for "exertional days" (FRP days) and have less to do those nights.  Pt report plans to go on evening walks /c SO on non FRP days.      Pt agree to tx today        Patient's FRP goals: be able to function better around the house, maybe get back to working, looking forward to the "synchronicity of physical and mental health"       Current 3/10  Location: R shld, B hips (L worse),     OBJECTIVE     Objective Measures updated at progress report unless specified.         Limitation/Restriction for FOTO Hip Survey     Therapist reviewed FOTO scores for Chinyere Crockett on 2/25/2022.   FOTO documents entered into [employer] - see Media section.     Limitation Score: 54%  Predicted score: 43%    "        Treatment       Chinyere received the Individualized Treatments listed below:     Therapeutic exercises to develop strength, endurance, ROM, flexibility, posture and core stabilization for 75 minutes including:    Full body stretch w/ 3-10 sec hold technique &/or 3-5 repetition technique on all of the following:   Cervical flx/ext   Cervical sidebending   Cervical rotation   Cervical protraction/retraction   Shld rolls   Shld depression/elevation   Scapular retraction/protraction/scaption   Posterior shld stretch (cross arm)   Shld ER stretch (chicken wing)   Elbow flx/ext   Forearm supination/pronation   Wrist flx/ext   Open/close hands/fingers   Seated trunk rotations   Seated trunk/thoracic ext/flx   Seated marches & knee to chest   Seated knee flx/ext   Seated hip ER/piriformis stretch   Seated hamstring stretch   Seated toe raise to heel raise    Seated ankle circles each way   Standing lumbar extensions   Standing lateral leaning stretch   Standing quad stretch (UE support for balance)      Peripheral muscle strengthening which included 1-2 sets of 10-20 repetitions at a slow, controlled 5-7 second per rep pace focused on strengthening supporting musculature for improved body mechanics & functional mobility.  Patient & therapist focused on proper form during treatment to ensure optimal strengthening of each targeted muscle group. Patients are instructed to keep sets/reps with proper load to stay at 3-5 out of 10 on the provided modified Doretha exertion scale.       BATCA Machine Exercises Weight (lbs) Sets Repetitions Doretha Exertion Scale Rating   Leg Extension  15 1 20 3   Hamstring Curls 25 1 20 4   Chest Press       Pec Fly       Lat Pull Down 17.5 1 20 4   Mid Row 17.5 1 20 3   Bicep Bar Curls       Standing Tricep Extension       Single Leg Press  R/L  25 1 20 3       Standing    Ball on wall rolls CW, CCW x 20 each B shoulder      Fitter Board - WBOS, low setting, 30 tilt bouts     Fwd/Bwd -     Trial 1 - 1 hand finger tip    Trial 2 - EC, 2 hand piano fingers   Lat -     Trial 1 - 1 hand finger tips fade to high guard     Standing B calf and plantar fascitis stretch     Trial 2 - EC, 1 hand palmar support          Patient Education and Home Exercises     Home Exercises Provided and Patient Education Provided     Education provided:       Written Home Exercises Provided: Patient instructed to cont prior HEP. Exercises were reviewed and Chinyere was able to demonstrate them prior to the end of the session.  Chinyere demonstrated good  understanding of the education provided. See EMR under Patient Instructions for information provided during therapy sessions    ASSESSMENT     Considering pt fall yesterday, pt cont to demo improved exercise tolerance today.  Pt complete full complement of BATCA exercises including those /c inc BUE use which had sig bothered her earlier in the week.  Those exercises (lat pulldown, mid row) were performed at beginning of tx session, therefore, recommend purposely performing these near end of tx at future visits to reassess exercise tolerance.  Tx cont to challenge RUE strength meeting no disruption today indicating appropriateness of cont progression for inc UE functional strength and mobility.  Pt demo some hesitation /c fitter board progression to EC but demo safe performance, however, her B foot cramping indicate appropriate challenge level.         Chinyere Is progressing well towards her goals.   Pt prognosis is Good.     Pt will continue to benefit from skilled outpatient physical therapy to address the deficits listed in the problem list box on initial evaluation, provide pt/family education and to maximize pt's level of independence in the home and community environment.     Pt's spiritual, cultural and educational needs considered and pt agreeable to plan of care and goals.     Anticipated barriers to physical therapy: chronic nature of issues, psychosocial  concerns, rotator cuff tear    GOALS: Pt is in agreement with the following goals:        Goal Progress Towards Goal   Short Term: In 3 weeks, pt will:     Verbalize & demonstrate good understanding of resisted training with 3-1-3 second rep for iejfcmlsrl-xqovkpyml-pnztwgpuv contraction to encourage full muscle recruitment for strength & endurance Progress towards goal: initiated 2/25/2022   Verbalize & demonstrate good understanding of home stretch routine to improve AROM, help decrease pain & improve mobility Progress towards goal: initiated 2/25/2022   Demonstrate proper supine or seated diaphragmatic breathing with decreased chest excursion & emphasis on full abdominal excursion & increased expiration time to promote muscle relaxation & increased parasympathetic activity to improve patient tolerance to activities Progress towards goal: initiated 2/25/2022   Demonstrate proper static standing posture in frontal & sagittal plane per PT visual assessment to decrease postural strain in ADLs Progress towards goal: initiated 2/25/2022   Demonstrate good recall of names of resisted exercises w/ minimal to moderate verbal cues to promote independence w/ exercise at the end of the program Progress towards goal: initiated 2/25/2022   Verbalize plan for continuing resisted exercises w/ specific location (i.e. commercial gym, home, community fitness center) indicating preference for machine-based or free-weight exercises to incorporate all major muscle groups to maintain & progress therapeutic functional improvements Progress towards goal: initiated 2/25/2022         Long Term: In 8 weeks, pt will:     Verbalize good understanding of activities planning/scheduling (stretching, mindfulness, resisted training, & cardio) prescribed by PT/OT following the end of the program to sustain & progress functional improvements Progress towards goal: initiated 2/25/2022   Be independent w/ selected resisted training w/ minimal to no  cuing while performing to continue w/ resisted strengthening independently at the end of the program Progress towards goal: initiated 2/25/2022   Improve 2 Minute Walk Test (MWT) to 550 feet and 6 MWT to 1650 feet or greater to improve gait speed and mobility Progress towards goal: initiated 2/25/2022   Perform single leg stance 20 seconds or greater bilaterally to improve safety and balance in ADLs Progress towards goal: initiated 2/25/2022   Demonstrate Timed Up & Go (with appropriate assistive device, if necessary) of 9 seconds or less to decrease fall risk and improve functional mobility Progress towards goal: initiated 2/25/2022   Demonstrate 5 time sit to stand test without upper extremity assist to 12 sec or less to improve general mobility and decrease fall risk Progress towards goal: initiated 2/25/2022   Score 43 % or less on Hip FOTO to indicate significant functional improvements in impaired functions secondary to low back pain Progress towards goal: initiated 2/25/2022          PLAN     Continue PT per POC     Zeus Chan, PT, DPT

## 2022-03-17 NOTE — PROGRESS NOTES
Group Psychotherapy    Site: [facility]    Clinical status of patient: Outpatient    3/16/2022    Length of service:[id number]    Referred by: Functional [facility]oration Program     Number of patients in attendance: 5    Target symptoms: Chronic Pain Management     Patient's response to intervention:  The patient's response to intervention is active listening.    Progress toward goals and other mental status changes:  The patient's progress toward goals is Day 1 of group, no progress expected.    Plan: group psychotherapy    Topics Covered: Pt attended CBT for Chronic Pain as part of her participation in Functional Restoration. Topics discussed today included a discussion on the pain cycle and how it impacts and is impacted by anxiety, depression and other mood disorders. Pts also discussed the stages of change and how change and relapse happen. Will f/u in 1 day.

## 2022-03-17 NOTE — PROGRESS NOTES
Group Psychotherapy    Site: [facility]    Clinical status of patient: Outpatient    3/17/2022    Length of service:[id number]    Referred by: [facility]oration Program     Number of patients in attendance: 5    Target symptoms: Chronic Pain Management     Patient's response to intervention:  The patient's response to intervention is active listening.    Progress toward goals and other mental status changes:  The patient's progress toward goals is good.    Plan: group psychotherapy    Topics Covered: Pt attended CBT for Chronic Pain as part of her participation in Functional [facility]oration. Topics discussed today included a discussion on sleep hygiene and how sleep impacts anxiety, depression, other mood disorders and chronic pain. We also went over homework for the weekend and progress thus far. Will f/u on Monday.

## 2022-03-21 ENCOUNTER — CLINICAL SUPPORT (OUTPATIENT)
Dept: REHABILITATION | Facility: OTHER | Age: 47
End: 2022-03-21
Payer: OTHER GOVERNMENT

## 2022-03-21 ENCOUNTER — PATIENT MESSAGE (OUTPATIENT)
Dept: ADMINISTRATIVE | Facility: HOSPITAL | Age: 47
End: 2022-03-21
Payer: OTHER GOVERNMENT

## 2022-03-21 ENCOUNTER — OFFICE VISIT (OUTPATIENT)
Dept: BEHAVIORAL HEALTH | Facility: CLINIC | Age: 47
End: 2022-03-21
Payer: OTHER GOVERNMENT

## 2022-03-21 DIAGNOSIS — F40.298 FEAR OF PAIN: Primary | ICD-10-CM

## 2022-03-21 DIAGNOSIS — Z74.09 IMPAIRED FUNCTIONAL MOBILITY AND ACTIVITY TOLERANCE: Primary | ICD-10-CM

## 2022-03-21 DIAGNOSIS — Z74.09 IMPAIRED FUNCTIONAL MOBILITY, BALANCE, GAIT, AND ENDURANCE: ICD-10-CM

## 2022-03-21 DIAGNOSIS — Z78.9 ALTERATION IN INSTRUMENTAL ACTIVITIES OF DAILY LIVING (IADL): ICD-10-CM

## 2022-03-21 DIAGNOSIS — F41.9 ANXIETY: Primary | ICD-10-CM

## 2022-03-21 DIAGNOSIS — Z78.9 DIFFICULTY WITH ACTIVITIES OF DAILY LIVING: ICD-10-CM

## 2022-03-21 PROCEDURE — 97530 THERAPEUTIC ACTIVITIES: CPT

## 2022-03-21 PROCEDURE — 90853 PR GROUP PSYCHOTHERAPY: ICD-10-PCS | Mod: S$GLB,,, | Performed by: SOCIAL WORKER

## 2022-03-21 PROCEDURE — 90853 GROUP PSYCHOTHERAPY: CPT | Mod: S$GLB,,, | Performed by: SOCIAL WORKER

## 2022-03-21 PROCEDURE — 97110 THERAPEUTIC EXERCISES: CPT

## 2022-03-21 NOTE — PROGRESS NOTES
Group Psychotherapy    Site: [facility]    Clinical status of patient: Outpatient    3/21/2022    Length of service:[id number]    Referred by: Functional [facility]oration Program     Number of patients in attendance: 5    Target symptoms: Chronic Pain Management     Patient's response to intervention:  The patient's response to intervention is active listening.    Progress toward goals and other mental status changes:  The patient's progress toward goals is good.    Plan: group psychotherapy    Topics Covered: Pt attended CBT for Chronic Pain as part of her participation in Functional [facility]oration. Topics discussed today included a review of their weekends and what they did differently this weekend based upon what they have learned in the program so far. We also discussed the concept of self care and how self care can help with anxiety, depression, other mood disorders and chronic pain. Pts filled out a self care wheel, will f/u in 2 days.

## 2022-03-21 NOTE — PATIENT INSTRUCTIONS
[employer]            Please track at least 3 days. Please note per day which day of the week it was.

## 2022-03-21 NOTE — PROGRESS NOTES
"  [facility]    Functional Restoration Program  Physical Therapy Treatment Note  FRP Day 4    Name: Chinyere Crockett  MRN:[id number]      Therapy Diagnosis:   Encounter Diagnoses   Name Primary?    Fear of pain Yes    Impaired functional mobility, balance, gait, and endurance      Physician: Aurora Xie NP    Date: 3/21/2022    Physician Orders: PT Eval and Treat   Medical Diagnosis from Referral:   Z78.9 (ICD-10-CM) - Decreased activities of daily living (ADL)   Z74.09 (ICD-10-CM) - Impaired mobility and endurance   G89.4 (ICD-10-CM) - Chronic pain disorder         Evaluation Date: 2/25/2022  Authorization Period Expiration: 12/31/22  Plan of Care Expiration: 5/6/2022   Visit # / Visits authorized: 4 / 20  FOTO: 1/ 3      Precautions: Standard and Fall     Time In:  2:45p  Time Out:  4:00p  Total Appointment Time (timed & untimed codes): 75 minutes      SUBJECTIVE     hCinyere reports she had a good weekend & did part of her stretch routine w/out using the pictures, but overall did well after her visits last week. There was some R shoulder pain over the weekend & she will be mindful of the pain in her exercises today & make sure to report anything that bothers it.      Patient's FRP goals: be able to function better around the house, maybe get back to working, looking forward to the "synchronicity of physical and mental health"       Current 3/10  Location: R shld, B hips (L worse),     OBJECTIVE     Objective Measures updated at progress report unless specified.         Limitation/Restriction for FOTO Hip Survey     Therapist reviewed FOTO scores for Chinyere Crockett on 2/25/2022.   FOTO documents entered into [employer] - see Media section.     Limitation Score: 54%  Predicted score: 43%           Treatment       Chinyere received the Individualized Treatments listed below:     Therapeutic exercises to develop strength, endurance, ROM, flexibility, posture and core " stabilization for 65 minutes including:      Peripheral muscle strengthening which included 1-2 sets of 10-20 repetitions at a slow, controlled 5-7 second per rep pace focused on strengthening supporting musculature for improved body mechanics & functional mobility.  Patient & therapist focused on proper form during treatment to ensure optimal strengthening of each targeted muscle group. Patients are instructed to keep sets/reps with proper load to stay at 3-5 out of 10 on the provided modified Doretha exertion scale.       BATCA Machine Exercises Weight (lbs) Sets Repetitions Doretha Exertion Scale Rating   Leg Extension  15 1 20 3   Hamstring Curls 25 1 15 5   Chest Press 20 1 20 5   Pec Fly 20 1 20 3   Lat Pull Down       Mid Row       Bicep Bar Curls 5 1 20 5   Standing Tricep Extension 10 1 20 3   Single Leg Press  R/L          · Door pec stretch high & low 2x30 sec each way  · Supine on foam roller:  · Diaphragmatic breaths w/ cuff weight on abdomen for tactile cue 2x15  · B shld flx 2lb 2x10  · B snow angels 2x10  · B serratus punches 2lb 2x10    Karma participated in dynamic functional therapeutic activities to improve functional performance for 10  minutes, including:    Diaphragmatic breathing:   · Verbal cues for unlabored, long & relaxed breathing w/ increased time for exhalation  · Awareness of pulling breath down away from mouth to hips  · Cues for for proper abdominal excursion & decreased use of accessory muscles of breathing (hand on stomach & on chest; increased stomach motion, decreased chest motion)  · Education on inhalation activating sympathetic nervous system   · Education on exhalation activating parasympathetic nervous system  · Performed supine & seated        Patient Education and Home Exercises     Home Exercises Provided and Patient Education Provided     Education provided:   - breathing techniques    Written Home Exercises Provided: Patient instructed to cont prior HEP. Exercises were  reviewed and Chinyere was able to demonstrate them prior to the end of the session.  Chinyere demonstrated good  understanding of the education provided. See EMR under Patient Instructions for information provided during therapy sessions    ASSESSMENT     Chinyere w/ good participation in PT today & demonstrated good return demonstration of breathing technique in supine & seated positions. In scapular repositioning exercises on towel roll, she demonstrated a high fear of pain but reported she knew these small bouts of pain were not likely causing tissue damage. She would attempt to sit up immediately in a high stress manner & PT encouraged pt to attempt to reposition her painful shld instead. She needed moderate cuing to avoid holding her breath in times of pain & to perform all resisted training w/ proper pace; she will need continued reinforcement.       Chinyere Is progressing well towards her goals.   Pt prognosis is Good.     Pt will continue to benefit from skilled outpatient physical therapy to address the deficits listed in the problem list box on initial evaluation, provide pt/family education and to maximize pt's level of independence in the home and community environment.     Pt's spiritual, cultural and educational needs considered and pt agreeable to plan of care and goals.     Anticipated barriers to physical therapy: chronic nature of issues, psychosocial concerns, rotator cuff tear    GOALS: Pt is in agreement with the following goals:        Goal Progress Towards Goal   Short Term: In 3 weeks, pt will:     Verbalize & demonstrate good understanding of resisted training with 3-1-3 second rep for ohfibdqhya-ijnhkeent-pvejnthnc contraction to encourage full muscle recruitment for strength & endurance Progress towards goal: initiated 2/25/2022   Verbalize & demonstrate good understanding of home stretch routine to improve AROM, help decrease pain & improve mobility Progress towards goal: initiated 2/25/2022    Demonstrate proper supine or seated diaphragmatic breathing with decreased chest excursion & emphasis on full abdominal excursion & increased expiration time to promote muscle relaxation & increased parasympathetic activity to improve patient tolerance to activities Goal met 3/21/2022   Demonstrate proper static standing posture in frontal & sagittal plane per PT visual assessment to decrease postural strain in ADLs Progress towards goal: initiated 2/25/2022   Demonstrate good recall of names of resisted exercises w/ minimal to moderate verbal cues to promote independence w/ exercise at the end of the program Progress towards goal: initiated 2/25/2022   Verbalize plan for continuing resisted exercises w/ specific location (i.e. commercial gym, home, community fitness center) indicating preference for machine-based or free-weight exercises to incorporate all major muscle groups to maintain & progress therapeutic functional improvements Progress towards goal: initiated 2/25/2022         Long Term: In 8 weeks, pt will:     Verbalize good understanding of activities planning/scheduling (stretching, mindfulness, resisted training, & cardio) prescribed by PT/OT following the end of the program to sustain & progress functional improvements Progress towards goal: initiated 2/25/2022   Be independent w/ selected resisted training w/ minimal to no cuing while performing to continue w/ resisted strengthening independently at the end of the program Progress towards goal: initiated 2/25/2022   Improve 2 Minute Walk Test (MWT) to 550 feet and 6 MWT to 1650 feet or greater to improve gait speed and mobility Progress towards goal: initiated 2/25/2022   Perform single leg stance 20 seconds or greater bilaterally to improve safety and balance in ADLs Progress towards goal: initiated 2/25/2022   Demonstrate Timed Up & Go (with appropriate assistive device, if necessary) of 9 seconds or less to decrease fall risk and improve  functional mobility Progress towards goal: initiated 2/25/2022   Demonstrate 5 time sit to stand test without upper extremity assist to 12 sec or less to improve general mobility and decrease fall risk Progress towards goal: initiated 2/25/2022   Score 43 % or less on Hip FOTO to indicate significant functional improvements in impaired functions secondary to low back pain Progress towards goal: initiated 2/25/2022          PLAN     Continue PT per POC     Carmelo Gonzalez, PT, DPT

## 2022-03-21 NOTE — PROGRESS NOTES
"[facility]   Functional Restoration Program  Occupational Therapy Daily Note  [facility] Day 4       Name: Chinyere Crockett  Medical Record Number: [id number]    Therapy Diagnosis:   Encounter Diagnoses   Name Primary?    Impaired functional mobility and activity tolerance Yes    Difficulty with activities of daily living     Alteration in instrumental activities of daily living (IADL)      Physician: Aurora Xie NP    Visit Date: 3/21/2022    Physician Orders: OT Eval and Treat  Medical Diagnosis: Chronic pain disorder  Evaluation Date: 2/25/2022  Insurance Authorization Period Expiration: 12/31/2022  Plan of Care Certification Period: 4/22/2022  Visit # / Visits authorized: 4 / 20  FOTO: evaluation: 52% limitation     Precautions:  Standard and Fall    Time In: 13:30  Time Out: 14:45  Total Billable Time: 75 minutes    Subjective     Chinyere reports "it's a good day, no complaints but the allergies". "The pet supplies are always dropped off at the wrong entrance to the house, and in a box that is bigger than the item, so it's an awkward carry".         Pain: 2 / 10  Location: R shoulder and shoulder blade     Personal Functional Three Month Goals: Performance and satisfaction noted below.    OBJECTIVE      Chinyere participated in dynamic functional therapeutic activities to improve functional performance for 75 minutes, including:    Individualized Treatment: Increased resistance levels of functional conditioning exercises according to personal recovery goals. Activities include: Dynamic reaching, mnfda-qv-txwya lifting, sustained standing activity, maximal lifting, 2-handed carry, sustained seated tasks, push and pull, waist-to-shoulder lift, box/container carry, endurance training. Daily functional conditioning progress is documented on a flow sheet which is available upon request. Refer to [facility] protocol for details and explanation of each activity.    Full body stretch w/ 3-10 " "sec hold technique &/or 3-5 repetition technique on all of the following:   Cervical flx/ext   Cervical sidebending   Cervical rotation   Cervical protraction/retraction   Shld rolls   Shld depression/elevation   Scapular retraction/protraction/scaption   Posterior shld stretch (cross arm)   Shld ER stretch (chicken wing)   Elbow flx/ext   Forearm supination/pronation   Wrist flx/ext   Open/close hands/fingers   Seated trunk rotations   Seated trunk/thoracic ext/flx   Seated marches & knee to chest   Seated knee flx/ext   Seated hip ER/piriformis stretch   Seated hamstring stretch   Seated toe raise to heel raise    Seated ankle circles each way   Standing lumbar extensions   Standing lateral trunk stretch   Standing quad stretch    Pt completed functional lifting, floor to waist, 15 reps x 8# lbs with exertion rated hard (5 /10), focused education on wide base of support, hinging at hips, keep weight in heels to activate glutes and coordinate movement with breath. During task increased time used to review technique and stand to sit transfer to and from chair used to review flow of movements of squatting, as she has decreased flow with performance. Pt plans to use this lift related to picking up cat litter after home delivery. "I need to get over sticking my butt out, after years of deconditioning to make yourself smaller, as to not attract unwanted attention".     Pt participated in functional lift waist to shoulder, 15 reps x  8 #lbs with a rating of sort of hard (4/10). Pt educated on standing posture, core awareness, keeping shoulders depressed and retracted, stepping to place > reaching to place, keeping weight close to center of gravity and pacing as needed.     Pt participated in endurance activity on Nustep x 10 minutes at Level 2, which she increased to level 2.5, with goal to maintain ~90steps/min, to improve functional endurance and progress towards increased MET level for improved " "community mobility and participation, rated as moderate (3/10) exertion. Completed 993 steps. Pt educated on how to pace by alternating with increasing activation of either (B)UE or (B)LE as they fatigue. Pt voiced and demonstrated understanding.      Patient Education and Home Exercises     Education provided:   -Ms. Crockett received education regarding proper posture and body mechanics. She received education regarding anticipated muscular soreness following lift testing and strategies for management were reviewed with patient including stretching, using ice, scheduled rest.  -Additional Education provided: rosy scale, pain cycle, z-lie, functional lifting mechanics, pursed lip and diaphragmatic breathing techniques  - energy banking     Written Home Exercises Provided: yes, and binder for program received.   Exercises were reviewed and Chinyere was able to demonstrate them prior to the end of the session.    Chinyere demonstrated good understanding of the education provided.     ASSESSMENT   Chinyere presents with report of this being a good day. Participated in extended review of performance of lifting technique, altering task to increase awareness of performance, with Chinyere stating that it's difficult for her to perform proper squatting with "booty to the back", as she has received unwanted attention in the past. She was able to pace task with breathing, and identified that it's harder for her to place item on the ground than it might be to pick it up. Overall continues to progress towards personal goals.      Chinyere is unable to fully participate in work, recreational, and home-based activities because of decreased functional strength, decreased  AROM, decreased endurance, limited positional tolerance, fear of re-injury, and fear of increased pain. She will benefit from participating in a conditioning  program designed  to increase functional strength, flexibility, and endurance in order to meet functional goals.    "   Chinyere's prognosis is Good due to good personal goals, good motivation and good response to all feedback and education. Pt will continue to benefit from skilled outpatient occupational therapy to address the deficits listed in the problem list box on initial evaluation, provide pt/family education and to maximize pt's level of independence in the home and community environment.      Pt's spiritual, cultural and educational needs considered and pt agreeable to plan of care and goals.     Anticipated barriers to occupational therapy: none identified.    FRP Goals: While working towards the long-term (3 month) functional goals listed above, Chinyere will accomplish the following short term goals during the 5-week intensive rehabilitation program. Chinyere will:      1. Develop a viable return to work plan.   2. Demonstrate physical capacities consistent with a medium work demand level.   3. Demonstrate increased functional strength by lifting 20 lbs floor to waist and 20 lbs waist to shoulder in order to meet demand of work/home routine.   4. Increase her positional tolerance to the level needed for work and home activities.   5. Implement self-care strategies during the program and at home to control pain.   6.   Patient will demonstrate use of mindfulness, stress management, and coping  strategies for improved participation in daily routine.   7.   Will increase water intake to 7 x 16oz water bottles daily.     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     Find part-time work (will do so only after completion of FRP)     Recreational:  Joining walking [employer]   - walking tolerance      Gardening    Crafting    Daily Living:  Cooking      Cleaning     Hair care      Upper body dressing (bras).          Medium Physical Demand Level      0/0         0/5               4/3   2/1     3/0       5/5     4/3     3/3     5/2  (Currently using sport bras)              PLAN     Continue per POC; Plan of care certification: 3/14/2022 to 4/22/2022.     Outpatient occupational therapy, 3 times a week for 5 weeks:      · Functional conditioning daily to increase physical capacity and allow Ms. Crockett to meet demands of vocation and home.   · Individual counseling to develop return to work/daily routine plan and better understand the return to work process and/or daily routine restructure.   · Daily Stretching, aerobic conditioning and walking to increase functional tolerance and allow Ms. Crockett to meet demands of work and home.   · Functional activities to increase ability to move fluidly and quickly and tolerate unguarded movements.   · Re-education regarding proper postural, body mechanics, and coping strategies for healthy roles and routine for managing daily stressors.    · Any other treatment deemed necessary for patient to achieve personally driven goals to promote positive health and wellness and daily roles and routines    CHRIS Richey, CRISTINAR/L, CEAS-I  3/21/2022

## 2022-03-22 ENCOUNTER — TELEPHONE (OUTPATIENT)
Dept: NEUROLOGY | Facility: CLINIC | Age: 47
End: 2022-03-22
Payer: OTHER GOVERNMENT

## 2022-03-22 NOTE — TELEPHONE ENCOUNTER
----- Message from Keren Krishnan sent at 3/22/2022  2:34 PM CDT -----  Contact: Pt  Type:  Needs Medical Advice    Who Called: Pt  Symptoms (please be specific): Migraines  How long has patient had these symptoms:    Pharmacy name and phone #:  [facility] [address]  Would the patient rather a call back or a response via [facility]? Call back  Best Call Back Number: [phone]  Additional Information: [facility] pt asked that this medication be prescribed and asked for a call back once called in

## 2022-03-23 ENCOUNTER — OFFICE VISIT (OUTPATIENT)
Dept: PSYCHIATRY | Facility: OTHER | Age: 47
End: 2022-03-23
Payer: OTHER GOVERNMENT

## 2022-03-23 ENCOUNTER — CLINICAL SUPPORT (OUTPATIENT)
Dept: REHABILITATION | Facility: OTHER | Age: 47
End: 2022-03-23
Payer: OTHER GOVERNMENT

## 2022-03-23 DIAGNOSIS — Z78.9 DIFFICULTY WITH ACTIVITIES OF DAILY LIVING: ICD-10-CM

## 2022-03-23 DIAGNOSIS — F41.9 ANXIETY: Primary | ICD-10-CM

## 2022-03-23 DIAGNOSIS — Z74.09 IMPAIRED FUNCTIONAL MOBILITY AND ACTIVITY TOLERANCE: Primary | ICD-10-CM

## 2022-03-23 DIAGNOSIS — Z74.09 IMPAIRED FUNCTIONAL MOBILITY, BALANCE, GAIT, AND ENDURANCE: ICD-10-CM

## 2022-03-23 DIAGNOSIS — Z78.9 ALTERATION IN INSTRUMENTAL ACTIVITIES OF DAILY LIVING (IADL): ICD-10-CM

## 2022-03-23 DIAGNOSIS — F40.298 FEAR OF PAIN: Primary | ICD-10-CM

## 2022-03-23 PROCEDURE — 97530 THERAPEUTIC ACTIVITIES: CPT

## 2022-03-23 PROCEDURE — 90853 GROUP PSYCHOTHERAPY: CPT | Mod: ,,, | Performed by: SOCIAL WORKER

## 2022-03-23 PROCEDURE — 90853 PR GROUP PSYCHOTHERAPY: ICD-10-PCS | Mod: ,,, | Performed by: SOCIAL WORKER

## 2022-03-23 PROCEDURE — 97110 THERAPEUTIC EXERCISES: CPT

## 2022-03-23 NOTE — PROGRESS NOTES
"  [facility]    Functional Restoration Program  Physical Therapy Treatment Note  FRP Day 5    Name: Chinyere Crockett  MRN:[id number]      Therapy Diagnosis:   Encounter Diagnoses   Name Primary?    Fear of pain Yes    Impaired functional mobility, balance, gait, and endurance      Physician: Aurora Xie NP    Date: 3/23/2022    Physician Orders: PT Eval and Treat   Medical Diagnosis from Referral:   Z78.9 (ICD-10-CM) - Decreased activities of daily living (ADL)   Z74.09 (ICD-10-CM) - Impaired mobility and endurance   G89.4 (ICD-10-CM) - Chronic pain disorder         Evaluation Date: 2/25/2022  Authorization Period Expiration: 12/31/22  Plan of Care Expiration: 5/6/2022   Visit # / Visits authorized: 6 / 20  FOTO: 1/ 3      Precautions: Standard and Fall     Time In:  1:30 pm  Time Out: 2:45 pm  Total Appointment Time (timed & untimed codes): 75 minutes      SUBJECTIVE     Chinyere reports starting Tues evening inc R hip discomfort from "weather changes".  Pt stating she had poor sleep last night and was woken abruptly by her cats fighting.  Pt arrives in inc subjective discomfort especially /c her shoulder.    However, pt report tolerating last session well /c no excess discomfort.   Pt agree to tx today.           Patient's FRP goals: be able to function better around the house, maybe get back to working, looking forward to the "synchronicity of physical and mental health"       Current 5/10  Location: R shld, B hips (L worse),     OBJECTIVE     Objective Measures updated at progress report unless specified.         Limitation/Restriction for FOTO Hip Survey     Therapist reviewed FOTO scores for Chinyere Crockett on 2/25/2022.   FOTO documents entered into [employer] - see Media section.     Limitation Score: 54%  Predicted score: 43%           Treatment       Chinyere received the Individualized Treatments listed below:     Therapeutic exercises to develop strength, " endurance, ROM, flexibility, posture and core stabilization for 75 minutes including:    Full body stretch w/ 3-10 sec hold technique &/or 3-5 repetition technique on all of the following:   Cervical flx/ext   Cervical sidebending   Cervical rotation   Cervical protraction/retraction   Shld rolls   Shld depression/elevation   Scapular retraction/protraction/scaption   Posterior shld stretch (cross arm)   Shld ER stretch (chicken wing)   Elbow flx/ext   Forearm supination/pronation   Wrist flx/ext   Open/close hands/fingers   Seated trunk rotations   Seated trunk/thoracic ext/flx   Seated marches & knee to chest   Seated knee flx/ext   Seated hip ER/piriformis stretch   Seated hamstring stretch   Seated toe raise to heel raise    Seated ankle circles each way   Standing lumbar extensions   Standing lateral leaning stretch   Standing quad stretch (UE support for balance)      Peripheral muscle strengthening which included 1-2 sets of 10-20 repetitions at a slow, controlled 5-7 second per rep pace focused on strengthening supporting musculature for improved body mechanics & functional mobility.  Patient & therapist focused on proper form during treatment to ensure optimal strengthening of each targeted muscle group. Patients are instructed to keep sets/reps with proper load to stay at 3-5 out of 10 on the provided modified Doretha exertion scale.       BATCA Machine Exercises Weight (lbs) Sets Repetitions Doretha Exertion Scale Rating   Leg Extension  17.5 1 20 5   Hamstring Curls 25 1 15 5   Chest Press       Pec Fly       Lat Pull Down       Mid Row - Prone  5 1 20 5   Bicep Bar Curls       Standing Tricep Extension       Single Leg Press  R/L  30 1 20 4     Supine on double yoga mat rolled:  Diaphragmatic breaths x15  B shld flx 2x10  B snow angels 2x10  B serratus punches  2x10    Wall angels x 10       Fitter Board - WBOS, low setting, 30 tilt bouts               Fwd/Bwd -                          "  Trial 1 - 1 hand finger tip                          Trial 2 - EC, 2 hand piano fingers              Lat -                           Trial 1 - 1 hand finger tips fade to high guard                                      Standing B calf and plantar fascitis stretch                           Trial 2 - EC, 1 hand palmar support         Patient Education and Home Exercises     Home Exercises Provided and Patient Education Provided     Education provided:     Written Home Exercises Provided: Patient instructed to cont prior HEP. Exercises were reviewed and Chinyere was able to demonstrate them prior to the end of the session.  Chinyere demonstrated good  understanding of the education provided. See EMR under Patient Instructions for information provided during therapy sessions    ASSESSMENT     Bertinma arrive /c inc subjective pain needing inc time between exercises but /c no fear avoidance participation in all rx activities including tolerating tx main focus on shoulder stability and strengthening to further Monday's conversation of "hurt vs harm".  Along those lines, tx return to supine on inc bolster size.  Pt perform /s inc discomfort indicating cont improved scapular stability and shoulder strength.  Plan to complete full complement of BATCA resistance exercises tomorrow per FRP principle of addressing all body parts and recommend considering return to machine based mid row to assess exercise tolerance as exercise was modified today to meet pt inc pain today.       Chinyere Is progressing well towards her goals.   Pt prognosis is Good.     Pt will continue to benefit from skilled outpatient physical therapy to address the deficits listed in the problem list box on initial evaluation, provide pt/family education and to maximize pt's level of independence in the home and community environment.     Pt's spiritual, cultural and educational needs considered and pt agreeable to plan of care and goals.     Anticipated barriers to " physical therapy: chronic nature of issues, psychosocial concerns, rotator cuff tear    GOALS: Pt is in agreement with the following goals:        Goal Progress Towards Goal   Short Term: In 3 weeks, pt will:     Verbalize & demonstrate good understanding of resisted training with 3-1-3 second rep for niqwsemyhg-mrhynmkxd-mzevmpzvw contraction to encourage full muscle recruitment for strength & endurance Progress towards goal: initiated 2/25/2022   Verbalize & demonstrate good understanding of home stretch routine to improve AROM, help decrease pain & improve mobility Progress towards goal: initiated 2/25/2022   Demonstrate proper supine or seated diaphragmatic breathing with decreased chest excursion & emphasis on full abdominal excursion & increased expiration time to promote muscle relaxation & increased parasympathetic activity to improve patient tolerance to activities Goal met 3/21/2022   Demonstrate proper static standing posture in frontal & sagittal plane per PT visual assessment to decrease postural strain in ADLs Progress towards goal: initiated 2/25/2022   Demonstrate good recall of names of resisted exercises w/ minimal to moderate verbal cues to promote independence w/ exercise at the end of the program Progress towards goal: initiated 2/25/2022   Verbalize plan for continuing resisted exercises w/ specific location (i.e. commercial gym, home, community fitness center) indicating preference for machine-based or free-weight exercises to incorporate all major muscle groups to maintain & progress therapeutic functional improvements Progress towards goal: initiated 2/25/2022         Long Term: In 8 weeks, pt will:     Verbalize good understanding of activities planning/scheduling (stretching, mindfulness, resisted training, & cardio) prescribed by PT/OT following the end of the program to sustain & progress functional improvements Progress towards goal: initiated 2/25/2022   Be independent w/ selected  resisted training w/ minimal to no cuing while performing to continue w/ resisted strengthening independently at the end of the program Progress towards goal: initiated 2/25/2022   Improve 2 Minute Walk Test (MWT) to 550 feet and 6 MWT to 1650 feet or greater to improve gait speed and mobility Progress towards goal: initiated 2/25/2022   Perform single leg stance 20 seconds or greater bilaterally to improve safety and balance in ADLs Progress towards goal: initiated 2/25/2022   Demonstrate Timed Up & Go (with appropriate assistive device, if necessary) of 9 seconds or less to decrease fall risk and improve functional mobility Progress towards goal: initiated 2/25/2022   Demonstrate 5 time sit to stand test without upper extremity assist to 12 sec or less to improve general mobility and decrease fall risk Progress towards goal: initiated 2/25/2022   Score 43 % or less on Hip FOTO to indicate significant functional improvements in impaired functions secondary to low back pain Progress towards goal: initiated 2/25/2022          PLAN     Continue PT per POC     Zeus Chan, PT, DPT

## 2022-03-23 NOTE — PROGRESS NOTES
"[facility]   Functional Restoration Program  Occupational Therapy Daily Note  FRP Day 5       Name: Chinyere Crockett  Medical Record Number: [id number]    Therapy Diagnosis:   Encounter Diagnoses   Name Primary?    Impaired functional mobility and activity tolerance Yes    Difficulty with activities of daily living     Alteration in instrumental activities of daily living (IADL)      Physician: Aurora Xie NP    Visit Date: 3/23/2022    Physician Orders: OT Eval and Treat  Medical Diagnosis: Chronic pain disorder  Evaluation Date: 2/25/2022  Insurance Authorization Period Expiration: 12/31/2022  Plan of Care Certification Period: 4/22/2022  Visit # / Visits authorized: 5 / 20  FOTO: evaluation: 52% limitation     Precautions:  Standard and Fall    Time In: 2:50 PM  Time Out: 4:00 PM  Total Billable Time: 70 minutes    Subjective     Chinyere reports "that mignon chi just really helped to loosen my shoulder, so that was a pleasant surprise".         Pain: 5 / 10  Location: R shoulder and shoulder blade     Personal Functional Three Month Goals: Performance and satisfaction noted below.    OBJECTIVE      Chinyere participated in dynamic functional therapeutic activities to improve functional performance for 70 minutes, including:    Individualized Treatment: Increased resistance levels of functional conditioning exercises according to personal recovery goals. Activities include: Dynamic reaching, [id number] lifting, sustained standing activity, maximal lifting, 2-handed carry, sustained seated tasks, push and pull, waist-to-shoulder lift, box/container carry, endurance training. Daily functional conditioning progress is documented on a flow sheet which is available upon request.     Pt completed functional lifting, floor to waist, 15 reps x 8 lbs with exertion rated hard (5 /10), focused education on wide base of support, hinging at hips, keep weight in heels to activate glutes " and coordinate movement with breath. During task increased time used to review technique and stand to sit transfer to and from chair used to review flow of movements of squatting, as she has decreased flow with performance. Pt plans to use this lift related to laundry.    Pt completed dynamic reaching in various functional ranges, with continued focus on standing posture, core awareness, hip rotation/weight shifting + pivot on back foot, slow and controlled movements, and coordinating movement with breath. Completed x10 reps using body weight 2/2 increased shoulder pain, rated as hard (6/10) exertion. Continues to perseverate on pain but with good discussion re: safety and pain relationship and able to complete activity with standing rest breaks.     Pt completed maximal lift x10 reps with 14lbs, rated as sort of hard (4/10), continued education focused on neutral spine positioning and pushing through heels for safety and activation of correct muscles, coordinating movement with breath, and pacing as needed.    Pt completed seated mindfulness diaphragmatic breathing activity [id number] with focus on posture, mechanics of breathing, and benefits re: PNS activation.     Pt participated in endurance activity on Nustep x 10 minutes at Level 2.5, which she increased to level 3, with goal to maintain ~90steps/min, to improve functional endurance and progress towards increased MET level for improved community mobility and participation, rated as hard (5/10) exertion. Completed 990 steps. Pt re-educated on how to add mindfulness to activity with frequent self check ins and modifications as needed to reach 3-5 on rosy scale. Pt voiced and demonstrated understanding. Added focus on core awareness and pursed lip breathing.     Pt educated on benefits of a mindfulness practice and completed a standing mignon chi routine with focus on posture, coordinating movement with breath, improving flexibility, balance and agility, clearing the  mind, and decrease symptoms of stress and anxiety 10 min. Demonstrated x2 reps and pt able to complete independently x5 reps of each practice. Rated as sort of hard (4/10) exertion. Noted improvements in shoulder pain post activity.      Pt participated in functional lift waist to shoulder, 15 reps x  8 # with a rating of moderate (3/10). Pt educated on standing posture, core awareness, keeping shoulders depressed and retracted, stepping to place > reaching to place, keeping weight close to center of gravity and pacing as needed.      Patient Education and Home Exercises     Education provided:   -Ms. Crockett received education regarding proper posture and body mechanics. She received education regarding anticipated muscular soreness following lift testing and strategies for management were reviewed with patient including stretching, using ice, scheduled rest.  -Additional Education provided: rosy scale, pain cycle, z-lie, functional lifting mechanics, pursed lip and diaphragmatic breathing techniques  - energy banking     Written Home Exercises Provided: yes, and binder for program received.   Exercises were reviewed and Chinyere was able to demonstrate them prior to the end of the session.    Chinyere demonstrated good understanding of the education provided.     ASSESSMENT   Chinyere presents with report of increased R shoulder pain with perseveration of pain and symptoms throughout session. Despite this, she had good tolerance to session and continues with willingness to participate. She demonstrated improved independence with pacing and body mechanics requiring decreased verbal cues. Overall continues to progress towards personal goals.      Chinyere is unable to fully participate in work, recreational, and home-based activities because of decreased functional strength, decreased  AROM, decreased endurance, limited positional tolerance, fear of re-injury, and fear of increased pain. She will benefit from participating in  a conditioning  program designed  to increase functional strength, flexibility, and endurance in order to meet functional goals.      Chinyere's prognosis is Good due to good personal goals, good motivation and good response to all feedback and education. Pt will continue to benefit from skilled outpatient occupational therapy to address the deficits listed in the problem list box on initial evaluation, provide pt/family education and to maximize pt's level of independence in the home and community environment.    Pt's spiritual, cultural and educational needs considered and pt agreeable to plan of care and goals.     Anticipated barriers to occupational therapy: none identified.    FRP Goals: While working towards the long-term (3 month) functional goals listed above, Chinyere will accomplish the following short term goals during the 5-week intensive rehabilitation program. Chinyere will:      1. Develop a viable return to work plan.   2. Demonstrate physical capacities consistent with a medium work demand level.   3. Demonstrate increased functional strength by lifting 20 lbs floor to waist and 20 lbs waist to shoulder in order to meet demand of work/home routine.   4. Increase her positional tolerance to the level needed for work and home activities.   5. Implement self-care strategies during the program and at home to control pain.   6.   Patient will demonstrate use of mindfulness, stress management, and coping  strategies for improved participation in daily routine.   7.   Will increase water intake to 7 x 16oz water bottles daily.     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     Find part-time work  (will do so only after completion of FRP)     Recreational:  Joining walking [employer]   - walking tolerance     Gardening    Crafting    Daily Living:  Cooking      Cleaning     Hair care      Upper body dressing (bras).          Medium Physical Demand Level      0/0         0/5               4/3   2/1     3/0       5/5     4/3     3/3     5/2  (Currently using sport bras)              PLAN     Continue per POC; Plan of care certification: 3/14/2022 to 4/22/2022.     Outpatient occupational therapy, 3 times a week for 5 weeks:      · Functional conditioning daily to increase physical capacity and allow Ms. Crockett to meet demands of vocation and home.   · Individual counseling to develop return to work/daily routine plan and better understand the return to work process and/or daily routine restructure.   · Daily Stretching, aerobic conditioning and walking to increase functional tolerance and allow Ms. Crockett to meet demands of work and home.   · Functional activities to increase ability to move fluidly and quickly and tolerate unguarded movements.   · Re-education regarding proper postural, body mechanics, and coping strategies for healthy roles and routine for managing daily stressors.    · Any other treatment deemed necessary for patient to achieve personally driven goals to promote positive health and wellness and daily roles and routines    Leola Villasenor OTR/[facility]  3/23/2022

## 2022-03-24 ENCOUNTER — CLINICAL SUPPORT (OUTPATIENT)
Dept: REHABILITATION | Facility: OTHER | Age: 47
End: 2022-03-24
Payer: OTHER GOVERNMENT

## 2022-03-24 ENCOUNTER — OFFICE VISIT (OUTPATIENT)
Dept: PSYCHIATRY | Facility: OTHER | Age: 47
End: 2022-03-24
Payer: OTHER GOVERNMENT

## 2022-03-24 DIAGNOSIS — Z74.09 IMPAIRED FUNCTIONAL MOBILITY, BALANCE, GAIT, AND ENDURANCE: ICD-10-CM

## 2022-03-24 DIAGNOSIS — F41.9 ANXIETY: Primary | ICD-10-CM

## 2022-03-24 DIAGNOSIS — Z78.9 DIFFICULTY WITH ACTIVITIES OF DAILY LIVING: ICD-10-CM

## 2022-03-24 DIAGNOSIS — Z78.9 ALTERATION IN INSTRUMENTAL ACTIVITIES OF DAILY LIVING (IADL): ICD-10-CM

## 2022-03-24 DIAGNOSIS — F40.298 FEAR OF PAIN: Primary | ICD-10-CM

## 2022-03-24 DIAGNOSIS — Z74.09 IMPAIRED FUNCTIONAL MOBILITY AND ACTIVITY TOLERANCE: Primary | ICD-10-CM

## 2022-03-24 PROCEDURE — 90853 PR GROUP PSYCHOTHERAPY: ICD-10-PCS | Mod: ,,, | Performed by: SOCIAL WORKER

## 2022-03-24 PROCEDURE — 97110 THERAPEUTIC EXERCISES: CPT

## 2022-03-24 PROCEDURE — 90853 GROUP PSYCHOTHERAPY: CPT | Mod: ,,, | Performed by: SOCIAL WORKER

## 2022-03-24 PROCEDURE — 97530 THERAPEUTIC ACTIVITIES: CPT

## 2022-03-24 NOTE — PROGRESS NOTES
"[facility]   Functional Restoration Program  Occupational Therapy Daily Note  FRP Day 6       Name: Chinyere Crockett  Medical Record Number: [id number]    Therapy Diagnosis:   Encounter Diagnoses   Name Primary?    Impaired functional mobility and activity tolerance Yes    Difficulty with activities of daily living     Alteration in instrumental activities of daily living (IADL)      Physician: Aurora Xie NP    Visit Date: 3/24/2022    Physician Orders: OT Eval and Treat  Medical Diagnosis: Chronic pain disorder  Evaluation Date: 2/25/2022  Insurance Authorization Period Expiration: 12/31/2022  Plan of Care Certification Period: 4/22/2022  Visit # / Visits authorized: 6 / 20  FOTO: evaluation: 52% limitation     Precautions:  Standard and Fall    Time In: 1:30 PM  Time Out: 2:45 PM  Total Billable Time: 75 minutes    Subjective     Chinyere reports "im feeling better today".         Pain: 3 / 10  Location: R shoulder and shoulder blade     Personal Functional Three Month Goals: Performance and satisfaction noted below    OBJECTIVE      Chinyere participated in dynamic functional therapeutic activities to improve functional performance for 75 minutes, including:    Individualized Treatment: Increased resistance levels of functional conditioning exercises according to personal recovery goals. Activities include: Dynamic reaching, [id number] lifting, sustained standing activity, maximal lifting, 2-handed carry, sustained seated tasks, push and pull, waist-to-shoulder lift, box/container carry, endurance training. Daily functional conditioning progress is documented on a flow sheet which is available upon request.     Full body stretch w/ 3-10 sec hold technique &/or 3-5 repetition technique on all of the following:   Cervical flx/ext   Cervical sidebending   Cervical rotation   Cervical protraction/retraction   Shld rolls   Shld depression/elevation   Scapular " retraction/protraction/scaption   Posterior shld stretch (cross arm)   Shld ER stretch (chicken wing)   Elbow flx/ext   Forearm supination/pronation   Wrist flx/ext   Open/close hands/fingers   Seated trunk rotations   Seated trunk/thoracic ext/flx   Seated marches & knee to chest   Seated knee flx/ext   Seated hip ER/piriformis stretch   Seated hamstring stretch   Seated toe raise to heel raise    Seated ankle circles each way   Standing lumbar extensions   Standing lateral leaning stretch   Standing quad stretch (UE support for balance)    Pt completed dynamic reaching in various functional ranges, with continued focus on standing posture, core awareness, hip rotation/weight shifting + pivot on back foot, slow and controlled movements, and coordinating movement with breath. Completed x10 reps using body weight 2/2 continued increased shoulder pain, rated as sort of hard (4/10) exertion.     Pt completed functional lifting, floor to waist, 15 reps x 8 lbs with exertion rated moderate (3 /10), focused education on wide base of support, hinging at hips, keep weight in heels to activate glutes and coordinate movement with breath. During task increased time used to review technique and stand to sit transfer to and from chair used to review flow of movements of squatting, as she has decreased flow with performance. Pt plans to use this lift related to laundry. Good use of standing rest breaks as needed throughout activity. Able to carry conversation throughout activity.     Pt participated in functional lift waist to shoulder, x15 reps x  9 # with a rating of easy (2/10). Pt educated on standing posture, core awareness, keeping shoulders depressed and retracted, stepping to place > reaching to place, keeping weight close to center of gravity and pacing as needed.     Pt completed maximal lift x10 reps with 15lbs, rated as moderate (3/10), continued education focused on neutral spine positioning and  "pushing through heels for safety and activation of correct muscles, coordinating movement with breath, and pacing as needed. Tendency to shift weight to toes, able to adjust with verbal cues. Noted increased body awareness when slowed movement.     Pt completed standing task x10 minutes at upright peg board on wall with focused education on posture, weight shifting as needed and maintaining slight bend in knees. Completed placing pegs above shoulder height with R hand and removing with L hand. Goal to simulate personal goal to progress activity tolerance for overhead task of hair washing and sustained use of BUE use for chopping/cooking. She rated activity as easy (2/10) exertion. She noted "Effort wise its easy, but my arms definitely feel it"     Pt completed seated mindfulness diaphragmatic breathing activity with focus on posture, mechanics of breathing, and benefits re: PNS activation. Completed breath retention on inhale with 5 count inhale and exhale [id number].      Patient Education and Home Exercises     Education provided:   -Ms. Crockett received education regarding proper posture and body mechanics. She received education regarding anticipated muscular soreness following lift testing and strategies for management were reviewed with patient including stretching, using ice, scheduled rest.  -Additional Education provided: rosy scale, pain cycle, z-lie, functional lifting mechanics, pursed lip and diaphragmatic breathing techniques  - energy banking     Written Home Exercises Provided: yes, and binder for program received.   Exercises were reviewed and Chinyere was able to demonstrate them prior to the end of the session.    Chinyere demonstrated good understanding of the education provided.     ASSESSMENT   Chinyere presents with improved pain report upon arrival. She had good tolerance to session and continues with good motivation and willingness to participate. She demonstrated improved independence with pacing and " body mechanics. Continues to require intermittent verbal cues for proper lifting techniques. Overall continues to progress towards personal goals.      Chinyere is unable to fully participate in work, recreational, and home-based activities because of decreased functional strength, decreased  AROM, decreased endurance, limited positional tolerance, fear of re-injury, and fear of increased pain. She will benefit from participating in a conditioning  program designed  to increase functional strength, flexibility, and endurance in order to meet functional goals.      Chinyere's prognosis is Good due to good personal goals, good motivation and good response to all feedback and education. Pt will continue to benefit from skilled outpatient occupational therapy to address the deficits listed in the problem list box on initial evaluation, provide pt/family education and to maximize pt's level of independence in the home and community environment.    Pt's spiritual, cultural and educational needs considered and pt agreeable to plan of care and goals.     Anticipated barriers to occupational therapy: none identified.    FRP Goals: While working towards the long-term (3 month) functional goals listed above, Chinyere will accomplish the following short term goals during the 5-week intensive rehabilitation program. Chinyere will:      1. Develop a viable return to work plan.   2. Demonstrate physical capacities consistent with a medium work demand level.   3. Demonstrate increased functional strength by lifting 20 lbs floor to waist and 20 lbs waist to shoulder in order to meet demand of work/home routine.   4. Increase her positional tolerance to the level needed for work and home activities.   5. Implement self-care strategies during the program and at home to control pain.   6.   Patient will demonstrate use of mindfulness, stress management, and coping  strategies for improved participation in daily routine.   7.   Will increase water  intake to 7 x 16oz water bottles daily.     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     Find part-time work (will do so only after completion of FRP)     Recreational:  Joining walking [employer]   - walking tolerance     Gardening    Crafting    Daily Living:  Cooking      Cleaning     Hair care      Upper body dressing (bras).          Medium Physical Demand Level      0/0         0/5               4/3   2/1     3/0       5/5     4/3     3/3     5/2  (Currently using sport bras)              PLAN     Continue per POC; Plan of care certification: 3/14/2022 to 4/22/2022.     Outpatient occupational therapy, 3 times a week for 5 weeks:      · Functional conditioning daily to increase physical capacity and allow Ms. Crockett to meet demands of vocation and home.   · Individual counseling to develop return to work/daily routine plan and better understand the return to work process and/or daily routine restructure.   · Daily Stretching, aerobic conditioning and walking to increase functional tolerance and allow Ms. Crockett to meet demands of work and home.   · Functional activities to increase ability to move fluidly and quickly and tolerate unguarded movements.   · Re-education regarding proper postural, body mechanics, and coping strategies for healthy roles and routine for managing daily stressors.    · Any other treatment deemed necessary for patient to achieve personally driven goals to promote positive health and wellness and daily roles and routines    Leola Villasenor, OTR/L  3/24/2022

## 2022-03-24 NOTE — PROGRESS NOTES
Group Psychotherapy    Site: [facility]    Clinical status of patient: Outpatient    3/23/2022    Length of service:[id number]    Referred by: Functional Restoration Program     Number of patients in attendance: 5    Target symptoms: Chronic Pain Management     Patient's response to intervention:  The patient's response to intervention is active listening.    Progress toward goals and other mental status changes:  The patient's progress toward goals is good.    Plan: group psychotherapy    Topics Covered: Pt attended CBT for Chronic Pain as part of her participation in Functional Restoration. Topics discussed today included a discussion on the autonomic nervous system and how that impacts anxiety, depression, other mood disorders and chronic pain. Pts also attended a lecture on nutrition and inflammation and eating for chronic pain. Will f/u in 1 day.

## 2022-03-24 NOTE — PROGRESS NOTES
"  [facility]    Functional Restoration Program  Physical Therapy Treatment Note  FRP Day 6    Name: Chinyere Crockett  MRN:[id number]      Therapy Diagnosis:   Encounter Diagnoses   Name Primary?    Fear of pain Yes    Impaired functional mobility, balance, gait, and endurance      Physician: Aurora Xie NP    Date: 3/24/2022    Physician Orders: PT Eval and Treat   Medical Diagnosis from Referral:   Z78.9 (ICD-10-CM) - Decreased activities of daily living (ADL)   Z74.09 (ICD-10-CM) - Impaired mobility and endurance   G89.4 (ICD-10-CM) - Chronic pain disorder         Evaluation Date: 2/25/2022  Authorization Period Expiration: 12/31/22  Plan of Care Expiration: 5/6/2022   Visit # / Visits authorized: 7 / 20  FOTO: 1/ 3      Precautions: Standard and Fall     Time In:  2:45  pm  Time Out: 4:00 pm  Total Appointment Time (timed & untimed codes): 75 minutes      SUBJECTIVE     Chinyere reports she finds herself using the stairs to her bedroom more often and feels her movements in the house are "less choppy and smoother" like loading laundry or in the kitchen.  Pt plans inc cooking and house cleaning in prep for spouse birthday celebration.   Pt report performing stretching in the PM but notes she often forgets in AM.   Pt tolerate last session well /c no c/o of excess discomfort.           Patient's FRP goals: be able to function better around the house, maybe get back to working, looking forward to the "synchronicity of physical and mental health"       Current 5/10  Location: [facility], B hips (L worse),     OBJECTIVE     Objective Measures updated at progress report unless specified.         Limitation/Restriction for FOTO Hip Survey     Therapist reviewed FOTO scores for Chinyere Crockett on 2/25/2022.   FOTO documents entered into [employer] - see Media section.     Limitation Score: 54%  Predicted score: 43%           Treatment       Chinyere received the Individualized " Treatments listed below:     Therapeutic exercises to develop strength, endurance, ROM, flexibility, posture and core stabilization for 75 minutes including:      Peripheral muscle strengthening which included 1-2 sets of 10-20 repetitions at a slow, controlled 5-7 second per rep pace focused on strengthening supporting musculature for improved body mechanics & functional mobility.  Patient & therapist focused on proper form during treatment to ensure optimal strengthening of each targeted muscle group. Patients are instructed to keep sets/reps with proper load to stay at 3-5 out of 10 on the provided modified Doretha exertion scale.       [email or web address] Machine Exercises Weight (lbs) Sets Repetitions Doretha Exertion Scale Rating   Leg Extension        Hamstring Curls       Chest Press 20 1 20 4   Pec Fly 20 1 20 2   Lat Pull Down 20 1 20 2   Mid Row - Prone  20 1 20 2   Bicep Bar Curls 7.5 1 20 3   Standing Tricep Extension 12.5 1 20 3   Single Leg Press  R/L  32.5 1 20 3       Fitter Board - WBOS, hard setting, 30 tilt bouts               Fwd/Bwd -                           Trial 1 - 1 hand palmar                          Trial 2 - facing away from handle bar, looking at mirror, high guard, dec tilt control     Standing B calf and plantar fascitis stretch               Lat -                           Trial 1 - 1 hand finger tips fade                           Trial 2 - high guard       Wall angels x 10    Supine on double yoga mat rolled:  Diaphragmatic breaths x15  B shld flx 2x10  B snow angels 2x10  B serratus punches  2x10    (return to) Wall angels x 10 pt note inc mobility, cont strength challenge 5/10 RPE         Patient Education and Home Exercises     Home Exercises Provided and Patient Education Provided     Education provided:     Written Home Exercises Provided: Patient instructed to cont prior HEP. Exercises were reviewed and Chinyere was able to demonstrate them prior to the end of the session.  Chinyere demonstrated  "good  understanding of the education provided. See EMR under Patient Instructions for information provided during therapy sessions    ASSESSMENT     Chinyere subjective report (particularly her weekend plans) indicate inc self efficacy and activity tolerance especially around the house and approaching pt stated goal. Continuing this trend fitter board performed at higher setting and pt complete /s inc need for rest of sig dec tilt control even performing /s UE assist.   Completed full complement of BATCA resistance exercises to meet full body strengthening.  Pt demo improved controlled breathing and sig improved mid row/lat pull down tolerance even when performed at end of tx session to offer tolerance challenge.  Recommend f/u on weekend activity tolerance in effort to cont progression of realization of improved "function around the house" per pt stated goal.       Chinyere Is progressing well towards her goals.   Pt prognosis is Good.     Pt will continue to benefit from skilled outpatient physical therapy to address the deficits listed in the problem list box on initial evaluation, provide pt/family education and to maximize pt's level of independence in the home and community environment.     Pt's spiritual, cultural and educational needs considered and pt agreeable to plan of care and goals.     Anticipated barriers to physical therapy: chronic nature of issues, psychosocial concerns, rotator cuff tear    GOALS: Pt is in agreement with the following goals:        Goal Progress Towards Goal   Short Term: In 3 weeks, pt will:     Verbalize & demonstrate good understanding of resisted training with 3-1-3 second rep for qdcauxlvpr-lvgfhavcx-inpvlfhwm contraction to encourage full muscle recruitment for strength & endurance Progress towards goal: initiated 2/25/2022   Verbalize & demonstrate good understanding of home stretch routine to improve AROM, help decrease pain & improve mobility Progress towards goal: initiated " 2/25/2022   Demonstrate proper supine or seated diaphragmatic breathing with decreased chest excursion & emphasis on full abdominal excursion & increased expiration time to promote muscle relaxation & increased parasympathetic activity to improve patient tolerance to activities Goal met 3/21/2022   Demonstrate proper static standing posture in frontal & sagittal plane per PT visual assessment to decrease postural strain in ADLs Progress towards goal: initiated 2/25/2022   Demonstrate good recall of names of resisted exercises w/ minimal to moderate verbal cues to promote independence w/ exercise at the end of the program Progress towards goal: initiated 2/25/2022   Verbalize plan for continuing resisted exercises w/ specific location (i.e. commercial gym, home, community fitness center) indicating preference for machine-based or free-weight exercises to incorporate all major muscle groups to maintain & progress therapeutic functional improvements Progress towards goal: initiated 2/25/2022         Long Term: In 8 weeks, pt will:     Verbalize good understanding of activities planning/scheduling (stretching, mindfulness, resisted training, & cardio) prescribed by PT/OT following the end of the program to sustain & progress functional improvements Progress towards goal: initiated 2/25/2022   Be independent w/ selected resisted training w/ minimal to no cuing while performing to continue w/ resisted strengthening independently at the end of the program Progress towards goal: initiated 2/25/2022   Improve 2 Minute Walk Test (MWT) to 550 feet and 6 MWT to 1650 feet or greater to improve gait speed and mobility Progress towards goal: initiated 2/25/2022   Perform single leg stance 20 seconds or greater bilaterally to improve safety and balance in ADLs Progress towards goal: initiated 2/25/2022   Demonstrate Timed Up & Go (with appropriate assistive device, if necessary) of 9 seconds or less to decrease fall risk and  improve functional mobility Progress towards goal: initiated 2/25/2022   Demonstrate 5 time sit to stand test without upper extremity assist to 12 sec or less to improve general mobility and decrease fall risk Progress towards goal: initiated 2/25/2022   Score 43 % or less on Hip FOTO to indicate significant functional improvements in impaired functions secondary to low back pain Progress towards goal: initiated 2/25/2022          PLAN     Continue PT per POC     Zeus Chan, PT, DPT

## 2022-03-24 NOTE — PROGRESS NOTES
"Group Psychotherapy    Site: [facility]    Clinical status of patient: Outpatient    3/24/2022    Length of service:[id number]    Referred by: Functional Restoration Program     Number of patients in attendance: 5    Target symptoms: Chronic Pain Management     Patient's response to intervention:  The patient's response to intervention is active listening.    Progress toward goals and other mental status changes:  The patient's progress toward goals is good.    Plan: group psychotherapy    Topics Covered: Pt attended CBT for Chronic Pain as part of her participation in Functional Restoration. Topics discussed today included a continuing discussion on managing anxiety, depression and other mood disorders in addition to chronic pain. We also discussed the pain cycle and pts attended the lecture "chronic pain explained' for 45 mins. Will f/u on Monday.       "

## 2022-03-28 ENCOUNTER — CLINICAL SUPPORT (OUTPATIENT)
Dept: REHABILITATION | Facility: OTHER | Age: 47
End: 2022-03-28
Payer: OTHER GOVERNMENT

## 2022-03-28 ENCOUNTER — OFFICE VISIT (OUTPATIENT)
Dept: PSYCHIATRY | Facility: OTHER | Age: 47
End: 2022-03-28
Payer: OTHER GOVERNMENT

## 2022-03-28 ENCOUNTER — PATIENT MESSAGE (OUTPATIENT)
Dept: REHABILITATION | Facility: OTHER | Age: 47
End: 2022-03-28

## 2022-03-28 DIAGNOSIS — Z74.09 IMPAIRED FUNCTIONAL MOBILITY AND ACTIVITY TOLERANCE: Primary | ICD-10-CM

## 2022-03-28 DIAGNOSIS — Z78.9 ALTERATION IN INSTRUMENTAL ACTIVITIES OF DAILY LIVING (IADL): ICD-10-CM

## 2022-03-28 DIAGNOSIS — Z74.09 IMPAIRED FUNCTIONAL MOBILITY, BALANCE, GAIT, AND ENDURANCE: ICD-10-CM

## 2022-03-28 DIAGNOSIS — F40.298 FEAR OF PAIN: Primary | ICD-10-CM

## 2022-03-28 DIAGNOSIS — F41.9 ANXIETY: Primary | ICD-10-CM

## 2022-03-28 DIAGNOSIS — Z78.9 DIFFICULTY WITH ACTIVITIES OF DAILY LIVING: ICD-10-CM

## 2022-03-28 PROCEDURE — 97110 THERAPEUTIC EXERCISES: CPT

## 2022-03-28 PROCEDURE — 90853 PR GROUP PSYCHOTHERAPY: ICD-10-PCS | Mod: ,,, | Performed by: SOCIAL WORKER

## 2022-03-28 PROCEDURE — 90853 GROUP PSYCHOTHERAPY: CPT | Mod: ,,, | Performed by: SOCIAL WORKER

## 2022-03-28 PROCEDURE — 97530 THERAPEUTIC ACTIVITIES: CPT

## 2022-03-28 NOTE — PROGRESS NOTES
"[facility]   Functional Restoration Program  Occupational Therapy Daily Note  FRP Day 7     Name: Chinyere Crockett  Medical Record Number: [id number]    Therapy Diagnosis:   Encounter Diagnoses   Name Primary?    Impaired functional mobility and activity tolerance Yes    Difficulty with activities of daily living     Alteration in instrumental activities of daily living (IADL)      Physician: Aurora Xie NP    Visit Date: 3/28/2022    Physician Orders: OT Eval and Treat  Medical Diagnosis: Chronic pain disorder  Evaluation Date: 2/25/2022  Insurance Authorization Period Expiration: 12/31/2022  Plan of Care Certification Period: 4/22/2022  Visit # / Visits authorized: 8 / 20 (including eval)  FOTO: evaluation: 52% limitation     Precautions:  Standard and Fall    Time In: 14:50  Time Out: 16:00  Total Billable Time: 70 minutes    Subjective     Chinyere reports "it was not a good weekend. My  brought home the stomach flu".         Pain: 4 / 10  Location: R shoulder and shoulder blade, and feeling drained.    Personal Functional Three Month Goals: Performance and satisfaction noted below    OBJECTIVE      Chinyere participated in dynamic functional therapeutic activities to improve functional performance for 70 minutes, including:    Individualized Treatment: Increased resistance levels of functional conditioning exercises according to personal recovery goals. Activities include: Dynamic reaching, fbfmy-au-oluvk lifting, sustained standing activity, maximal lifting, 2-handed carry, sustained seated tasks, push and pull, waist-to-shoulder lift, box/container carry, endurance training. Daily functional conditioning progress is documented on a flow sheet which is available upon request.     Pt completed closed chain forward flexion with 1#weighted cuffs each wrist using physio ball x 15 reps, rated as sort of hard (4/10) exertion.     Pt completed functional lifting, floor to " "waist, 10, then 5 reps x 9# lbs with exertion rated moderate (3 /10), focused education on keeping weight into heels, and pacing to breath. Pt has been applying this technique when doing laundry last week. "It's been easier to carrying the laundry basket upstairs".    Chinyere participated in endurance activity using nustep for 10 minutes, starting at level 2.8 to 3.0 to improve functional endurance and progress towards increased MET level for improved community mobility and participation, rated as moderate (3/10), Chinyere re-educated on how to add mindfulness component to activity and how to pace appropriately by completed self check ins and grading activity as needed, with goal to reach moderate to sort of hard by end of activity. Completed 956 steps.    Pt participated in functional lift waist to shoulder, 15 reps x  10 #lbs with a rating of moderate (3/10). Pt educated on standing posture, core awareness, keeping shoulders depressed and retracted, stepping to place > reaching to place, keeping weight close to center of gravity and pacing as needed. Took standing rest break after 10 reps, before continuing.    Pt completed push and pull x 7 minutes with grocery basket in order to reach personal goal of improving grocery shopping ability, educated focused on upright standing posture and activating gluts with functional mobility, leading with hips, decreasing  on handles, using push/pull method with hands for improved control of cart, keeping cart close to center of gravity and weight shifting/stretching as needed.    Pt completed maximal lift 5 reps with 17 #lbs, rated as sort of hard (4/10), continued education focused on neutral spine positioning and pushing through heels for safety and activation of correct muscles. States heaviest thing to carry for her would be cat litter.     Patient Education and Home Exercises     Education provided:   -Ms. Crockett received education regarding proper posture and body " mechanics. She received education regarding anticipated muscular soreness following lift testing and strategies for management were reviewed with patient including stretching, using ice, scheduled rest.  -Additional Education provided: rosy scale, pain cycle, z-lie, functional lifting mechanics, pursed lip and diaphragmatic breathing techniques  - energy banking     Written Home Exercises Provided: yes, and binder for program received.   Exercises were reviewed and Chinyere was able to demonstrate them prior to the end of the session.    Chinyere demonstrated good understanding of the education provided.     ASSESSMENT   Chinyere presents with report of having had stomach flu over the weekend, affecting overall well being, but despite this she had good tolerance to session and continued with good motivation and willingness to participate. She demonstrated improved independence with pacing and body mechanics, with improved flow of movement noted, and ability to maintain weight into heels with lifting from ground. Overall, she continues to progress towards personal goals.      Chinyere is unable to fully participate in work, recreational, and home-based activities because of decreased functional strength, decreased  AROM, decreased endurance, limited positional tolerance, fear of re-injury, and fear of increased pain. She will benefit from participating in a conditioning  program designed  to increase functional strength, flexibility, and endurance in order to meet functional goals.      Chinyere's prognosis is Good due to good personal goals, good motivation and good response to all feedback and education. Pt will continue to benefit from skilled outpatient occupational therapy to address the deficits listed in the problem list box on initial evaluation, provide pt/family education and to maximize pt's level of independence in the home and community environment.    Pt's spiritual, cultural and educational needs considered and pt  agreeable to plan of care and goals.     Anticipated barriers to occupational therapy: none identified.    FRP Goals: While working towards the long-term (3 month) functional goals listed above, Chinyere will accomplish the following short term goals during the 5-week intensive rehabilitation program. Chinyere will:      1. Develop a viable return to work plan.   2. Demonstrate physical capacities consistent with a medium work demand level.   3. Demonstrate increased functional strength by lifting 20 lbs floor to waist and 20 lbs waist to shoulder in order to meet demand of work/home routine.   4. Increase her positional tolerance to the level needed for work and home activities.   5. Implement self-care strategies during the program and at home to control pain.   6.   Patient will demonstrate use of mindfulness, stress management, and coping  strategies for improved participation in daily routine.   7.   Will increase water intake to 7 x 16oz water bottles daily.     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     Find part-time work (will do so only after completion of FRP)     Recreational:  Joining [employer] walking [employer]    Daily Living:  Cooking      Cleaning     Hair care      Upper body dressing (bras).          Medium Physical Demand Level      0/0         0/5               4/3   2/1     3/0       5/5     4/3     3/3     5/2  (Currently using sport bras)              PLAN     Continue per POC; Plan of care certification: 3/14/2022 to 4/22/2022.     Outpatient occupational therapy, 3 times a week for 5 weeks:      · Functional conditioning daily to increase  physical capacity and allow Ms. Crockett to meet demands of vocation and home.   · Individual counseling to develop return to work/daily routine plan and better understand the return to work process and/or daily routine restructure.   · Daily Stretching, aerobic conditioning and walking to increase functional tolerance and allow Ms. Crockett to meet demands of work and home.   · Functional activities to increase ability to move fluidly and quickly and tolerate unguarded movements.   · Re-education regarding proper postural, body mechanics, and coping strategies for healthy roles and routine for managing daily stressors.    · Any other treatment deemed necessary for patient to achieve personally driven goals to promote positive health and wellness and daily roles and routines    CHRIS Richey, CRISTINAR/L, CEAS-I  3/28/2022

## 2022-03-28 NOTE — PROGRESS NOTES
"  [facility]    Functional Restoration Program  Physical Therapy Treatment Note  FRP Day 7    Name: Chinyere Crockett  MRN:[id number]      Therapy Diagnosis:   Encounter Diagnoses   Name Primary?    Fear of pain Yes    Impaired functional mobility, balance, gait, and endurance      Physician: Aurora Xie NP    Date: 3/28/2022    Physician Orders: PT Eval and Treat   Medical Diagnosis from Referral:   Z78.9 (ICD-10-CM) - Decreased activities of daily living (ADL)   Z74.09 (ICD-10-CM) - Impaired mobility and endurance   G89.4 (ICD-10-CM) - Chronic pain disorder         Evaluation Date: 2/25/2022  Authorization Period Expiration: 12/31/22  Plan of Care Expiration: 5/6/2022   Visit # / Visits authorized: 8 / 20  FOTO: 1/ 3      Precautions: Standard and Fall     Time In: 1:30  pm  Time Out: 2:45 pm  Total Appointment Time (timed & untimed codes): 75 minutes      SUBJECTIVE     Chinyere reports she had a stomach flu over the weekend, so her fatigue is a little high today. She was able to start eating again by the end of the day on Saturday. She is ok for therapy today, but she did not do too much in terms of exercise due to all of this. She also reports that at home, the task that hurts her shld the most is sweeping.      Patient's FRP goals: be able to function better around the house, maybe get back to working, looking forward to the "synchronicity of physical and mental health"       Current 4/10  Location: R shld, B hips (L worse),     OBJECTIVE     Objective Measures updated at progress report unless specified.         Limitation/Restriction for FOTO Hip Survey     Therapist reviewed FOTO scores for Chinyere Crockett on 2/25/2022.   FOTO documents entered into [employer] - see Media section.     Limitation Score: 54%  Predicted score: 43%           Treatment       Chinyere received the Individualized Treatments listed below:     Therapeutic exercises to develop strength, " endurance, ROM, flexibility, posture and core stabilization for 75 minutes including:    Full body stretch w/ 3-10 sec hold technique &/or 3-5 repetition technique on all of the following:   Cervical flx/ext   Cervical sidebending   Cervical rotation   Cervical protraction/retraction   Shld rolls   Shld depression/elevation   Scapular retraction/protraction/scaption   Posterior shld stretch (cross arm)   Shld ER stretch (chicken wing)   Elbow flx/ext   Forearm supination/pronation   Wrist flx/ext   Open/close hands/fingers   Seated trunk rotations   Seated trunk/thoracic ext/flx   Seated marches & knee to chest   Seated knee flx/ext   Seated hip ER/piriformis stretch   Seated hamstring stretch   Seated toe raise to heel raise    Seated ankle circles each way   Standing lumbar extensions   Standing lateral leaning stretch   Standing quad stretch (UE support for balance)        Peripheral muscle strengthening which included 1-2 sets of 10-20 repetitions at a slow, controlled 5-7 second per rep pace focused on strengthening supporting musculature for improved body mechanics & functional mobility.  Patient & therapist focused on proper form during treatment to ensure optimal strengthening of each targeted muscle group. Patients are instructed to keep sets/reps with proper load to stay at 3-5 out of 10 on the provided modified Doretha exertion scale.       BATCA Machine Exercises Weight (lbs) Sets Repetitions Doretha Exertion Scale Rating   Leg Extension  17.5 1 20 2   Hamstring Curls 25 1 20 5   Chest Press       Pec Fly       Lat Pull Down 22.5 1 20 3   Mid Row - Prone  22.5 1 20 2   Bicep Bar Curls       Standing Tricep Extension       Single Leg Press  R/L  32.5 1 20 2       · Fitter Board hard setting tilts each way 2x20 w/ minimal palm support  · Standing shld horizontal abduction & adduction alternating holding 2 lb vertical bar to mimic sweeping 2x10 each way (R over L & L over  R)          Patient Education and Home Exercises     Home Exercises Provided and Patient Education Provided     Education provided:     Written Home Exercises Provided: Patient instructed to cont prior HEP. Exercises were reviewed and Chinyere was able to demonstrate them prior to the end of the session.  Chinyere demonstrated good  understanding of the education provided. See EMR under Patient Instructions for information provided during therapy sessions    ASSESSMENT     Chinyere w/ good participation in PT despite a difficult weekend. In sweeping mimicing exercise, she needed to use mirror for feedback to avoid shld elevation & hiking due to poor motor program. She also reported she could feel the muscles deep in her scapula working a great deal indicating poor scapular control/stability.       Chinyere Is progressing well towards her goals.   Pt prognosis is Good.     Pt will continue to benefit from skilled outpatient physical therapy to address the deficits listed in the problem list box on initial evaluation, provide pt/family education and to maximize pt's level of independence in the home and community environment.     Pt's spiritual, cultural and educational needs considered and pt agreeable to plan of care and goals.     Anticipated barriers to physical therapy: chronic nature of issues, psychosocial concerns, rotator cuff tear    GOALS: Pt is in agreement with the following goals:        Goal Progress Towards Goal   Short Term: In 3 weeks, pt will:     Verbalize & demonstrate good understanding of resisted training with 3-1-3 second rep for ekirjvxkgq-tzlovfgok-bwglhbiev contraction to encourage full muscle recruitment for strength & endurance Progress towards goal: initiated 2/25/2022   Verbalize & demonstrate good understanding of home stretch routine to improve AROM, help decrease pain & improve mobility Progress towards goal: initiated 2/25/2022   Demonstrate proper supine or seated diaphragmatic breathing with  decreased chest excursion & emphasis on full abdominal excursion & increased expiration time to promote muscle relaxation & increased parasympathetic activity to improve patient tolerance to activities Goal met 3/21/2022   Demonstrate proper static standing posture in frontal & sagittal plane per PT visual assessment to decrease postural strain in ADLs Progress towards goal: initiated 2/25/2022   Demonstrate good recall of names of resisted exercises w/ minimal to moderate verbal cues to promote independence w/ exercise at the end of the program Progress towards goal: initiated 2/25/2022   Verbalize plan for continuing resisted exercises w/ specific location (i.e. commercial gym, home, community fitness center) indicating preference for machine-based or free-weight exercises to incorporate all major muscle groups to maintain & progress therapeutic functional improvements Progress towards goal: initiated 2/25/2022         Long Term: In 8 weeks, pt will:     Verbalize good understanding of activities planning/scheduling (stretching, mindfulness, resisted training, & cardio) prescribed by PT/OT following the end of the program to sustain & progress functional improvements Progress towards goal: initiated 2/25/2022   Be independent w/ selected resisted training w/ minimal to no cuing while performing to continue w/ resisted strengthening independently at the end of the program Progress towards goal: initiated 2/25/2022   Improve 2 Minute Walk Test (MWT) to 550 feet and 6 MWT to 1650 feet or greater to improve gait speed and mobility Progress towards goal: initiated 2/25/2022   Perform single leg stance 20 seconds or greater bilaterally to improve safety and balance in ADLs Progress towards goal: initiated 2/25/2022   Demonstrate Timed Up & Go (with appropriate assistive device, if necessary) of 9 seconds or less to decrease fall risk and improve functional mobility Progress towards goal: initiated 2/25/2022    Demonstrate 5 time sit to stand test without upper extremity assist to 12 sec or less to improve general mobility and decrease fall risk Progress towards goal: initiated 2/25/2022   Score 43 % or less on Hip FOTO to indicate significant functional improvements in impaired functions secondary to low back pain Progress towards goal: initiated 2/25/2022          PLAN     Continue PT per POC     Carmelo Gonzalez, PT, DPT

## 2022-03-29 NOTE — PROGRESS NOTES
"  [facility]    Functional Restoration Program  Physical Therapy Treatment Note  FRP Day 8    Name: Chinyere Crockett  MRN:[id number]      Therapy Diagnosis:   Encounter Diagnoses   Name Primary?    Fear of pain Yes    Impaired functional mobility, balance, gait, and endurance      Physician: Aurora Xie NP    Date: 3/30/2022    Physician Orders: PT Eval and Treat   Medical Diagnosis from Referral:   Z78.9 (ICD-10-CM) - Decreased activities of daily living (ADL)   Z74.09 (ICD-10-CM) - Impaired mobility and endurance   G89.4 (ICD-10-CM) - Chronic pain disorder         Evaluation Date: 2/25/2022  Authorization Period Expiration: 12/31/22  Plan of Care Expiration: 5/6/2022   Visit # / Visits authorized: 9 / 20  FOTO: 1/ 3      Precautions: Standard and Fall     Time In: 2:15  pm  Time Out: 3:30 pm  Total Appointment Time (timed & untimed codes): 75 minutes      SUBJECTIVE     Chinyere reports regaining her appetite and planning 's birthday meal tonight.  In preparation, pt note difficulty /c household chores yesterday especially reaching /c oven cleaning resulting in R shoulder/scapular pain lasting into today.     Regarding potential gym membership /p FRP, pt note concern for finances and the fact that she doesn't drive limiting ability to join.  However, pt report intent to join  on circuit training at [place] [employer] to add intensity to her walking challenge.    Pt report tolerate last session well /c no excess discomfort.        Patient's FRP goals: be able to function better around the house, maybe get back to working, looking forward to the "synchronicity of physical and mental health"       Current 4/10  Location: R shld, B hips (L worse),     OBJECTIVE     Objective Measures updated at progress report unless specified.         Limitation/Restriction for FOTO Hip Survey     Therapist reviewed FOTO scores for Chinyere Crockett on 2/25/2022.   FOTO documents " entered into [employer] - see Media section.     Limitation Score: 54%    Predicted score: 43%           Treatment       Chinyere received the Individualized Treatments listed below:     Therapeutic exercises to develop strength, endurance, ROM, flexibility, posture and core stabilization for 75 minutes including:      Peripheral muscle strengthening which included 1-2 sets of 10-20 repetitions at a slow, controlled 5-7 second per rep pace focused on strengthening supporting musculature for improved body mechanics & functional mobility.  Patient & therapist focused on proper form during treatment to ensure optimal strengthening of each targeted muscle group. Patients are instructed to keep sets/reps with proper load to stay at 3-5 out of 10 on the provided modified Doretha exertion scale.       BATCA Machine Exercises Weight (lbs) Sets Repetitions Doretha Exertion Scale Rating   Leg Extension  20 1 20 4   Hamstring Curls 25 1 20 4   Chest Press (Wall Press) n/a 1 15 5   Pec Fly (Supine) 2# 2 15 5   Lat Pull Down       Mid Row - Prone        Bicep Bar Curls       Standing Tricep Extension       Single Leg Press  R/L  35 1 20 3       Wall push ups x 15 5 /10 RPE inc R shoulder blade   Wall squats x 15 5/10 RPE     Supine on white bolster     Diaphragmatic breathing x 10    B shoulder flex 2# 2 x 15    B shoulder horizAB 2# 2 x 15 (see above)    Snow angels x 15, x 15 off bolster     Prone /c incline     I, Y, T  x 15     inc difficulty /c Y,     I = arms at side        Patient Education and Home Exercises     Home Exercises Provided and Patient Education Provided     Education provided:     Written Home Exercises Provided: Patient instructed to cont prior HEP. Exercises were reviewed and Chinyere was able to demonstrate them prior to the end of the session.  Chinyere demonstrated good  understanding of the education provided. See EMR under Patient Instructions for information provided during therapy sessions    ASSESSMENT     Chinyere  subjective report indicate potential limitations to resistance training carry over.  Pt encouraged to explore options that include weights - hand or machine - including looking into local gyms that may have membership discounts.  To support use of circuit training equipment, pt intro into body weight wall push ups and wall squats /c anticipated progression to incline push ups and free squats per tolerance.  Pt demo improved controlled breathing especially /c supine scapular mobility. Recommend cont prone strengthening exercises to isolate scapula movers.  Next visit, plan to perform functional activity tests to assess tx progress.      Chinyere Is progressing well towards her goals.   Pt prognosis is Good.     Pt will continue to benefit from skilled outpatient physical therapy to address the deficits listed in the problem list box on initial evaluation, provide pt/family education and to maximize pt's level of independence in the home and community environment.     Pt's spiritual, cultural and educational needs considered and pt agreeable to plan of care and goals.     Anticipated barriers to physical therapy: chronic nature of issues, psychosocial concerns, rotator cuff tear    GOALS: Pt is in agreement with the following goals:        Goal Progress Towards Goal   Short Term: In 3 weeks, pt will:     Verbalize & demonstrate good understanding of resisted training with 3-1-3 second rep for yzfvpfemkx-nnpcugvcs-rybivclcr contraction to encourage full muscle recruitment for strength & endurance Progress towards goal: initiated 2/25/2022   Verbalize & demonstrate good understanding of home stretch routine to improve AROM, help decrease pain & improve mobility Progress towards goal: initiated 2/25/2022   Demonstrate proper supine or seated diaphragmatic breathing with decreased chest excursion & emphasis on full abdominal excursion & increased expiration time to promote muscle relaxation & increased parasympathetic  activity to improve patient tolerance to activities Goal met 3/21/2022   Demonstrate proper static standing posture in frontal & sagittal plane per PT visual assessment to decrease postural strain in ADLs Progress towards goal: initiated 2/25/2022   Demonstrate good recall of names of resisted exercises w/ minimal to moderate verbal cues to promote independence w/ exercise at the end of the program Progress towards goal: initiated 2/25/2022   Verbalize plan for continuing resisted exercises w/ specific location (i.e. commercial gym, home, community fitness center) indicating preference for machine-based or free-weight exercises to incorporate all major muscle groups to maintain & progress therapeutic functional improvements Progress towards goal: initiated 2/25/2022         Long Term: In 8 weeks, pt will:     Verbalize good understanding of activities planning/scheduling (stretching, mindfulness, resisted training, & cardio) prescribed by PT/OT following the end of the program to sustain & progress functional improvements Progress towards goal: initiated 2/25/2022   Be independent w/ selected resisted training w/ minimal to no cuing while performing to continue w/ resisted strengthening independently at the end of the program Progress towards goal: initiated 2/25/2022   Improve 2 Minute Walk Test (MWT) to 550 feet and 6 MWT to 1650 feet or greater to improve gait speed and mobility Progress towards goal: initiated 2/25/2022   Perform single leg stance 20 seconds or greater bilaterally to improve safety and balance in ADLs Progress towards goal: initiated 2/25/2022   Demonstrate Timed Up & Go (with appropriate assistive device, if necessary) of 9 seconds or less to decrease fall risk and improve functional mobility Progress towards goal: initiated 2/25/2022   Demonstrate 5 time sit to stand test without upper extremity assist to 12 sec or less to improve general mobility and decrease fall risk Progress towards goal:  initiated 2/25/2022   Score 43 % or less on Hip FOTO to indicate significant functional improvements in impaired functions secondary to low back pain Progress towards goal: initiated 2/25/2022          PLAN     Continue PT per POC     Zeus Chan PT, DPT

## 2022-03-30 ENCOUNTER — OFFICE VISIT (OUTPATIENT)
Dept: PSYCHIATRY | Facility: OTHER | Age: 47
End: 2022-03-30
Payer: OTHER GOVERNMENT

## 2022-03-30 ENCOUNTER — CLINICAL SUPPORT (OUTPATIENT)
Dept: REHABILITATION | Facility: OTHER | Age: 47
End: 2022-03-30
Payer: OTHER GOVERNMENT

## 2022-03-30 DIAGNOSIS — Z74.09 IMPAIRED FUNCTIONAL MOBILITY, BALANCE, GAIT, AND ENDURANCE: ICD-10-CM

## 2022-03-30 DIAGNOSIS — F40.298 FEAR OF PAIN: Primary | ICD-10-CM

## 2022-03-30 DIAGNOSIS — Z78.9 ALTERATION IN INSTRUMENTAL ACTIVITIES OF DAILY LIVING (IADL): ICD-10-CM

## 2022-03-30 DIAGNOSIS — F41.9 ANXIETY: Primary | ICD-10-CM

## 2022-03-30 DIAGNOSIS — Z78.9 DIFFICULTY WITH ACTIVITIES OF DAILY LIVING: ICD-10-CM

## 2022-03-30 DIAGNOSIS — Z74.09 IMPAIRED FUNCTIONAL MOBILITY AND ACTIVITY TOLERANCE: Primary | ICD-10-CM

## 2022-03-30 PROCEDURE — 97110 THERAPEUTIC EXERCISES: CPT

## 2022-03-30 PROCEDURE — 90853 GROUP PSYCHOTHERAPY: CPT | Mod: ,,, | Performed by: SOCIAL WORKER

## 2022-03-30 PROCEDURE — 97530 THERAPEUTIC ACTIVITIES: CPT

## 2022-03-30 PROCEDURE — 90853 PR GROUP PSYCHOTHERAPY: ICD-10-PCS | Mod: ,,, | Performed by: SOCIAL WORKER

## 2022-03-30 NOTE — PROGRESS NOTES
"[facility]   Functional Restoration Program  Occupational Therapy Daily Note  FRP Day 8     Name: Chinyere Crockett  Medical Record Number: [id number]    Therapy Diagnosis:   Encounter Diagnoses   Name Primary?    Impaired functional mobility and activity tolerance Yes    Difficulty with activities of daily living     Alteration in instrumental activities of daily living (IADL)      Physician: Aurora Xie NP    Visit Date: 3/30/2022    Physician Orders: OT Eval and Treat  Medical Diagnosis: Chronic pain disorder  Evaluation Date: 2/25/2022  Insurance Authorization Period Expiration: 12/31/2022  Plan of Care Certification Period: 4/22/2022  Visit # / Visits authorized: 9 / 20 (including eval)  FOTO: evaluation: 52% limitation     Precautions:  Standard and Fall    Time In: 1:00   Time Out: 2:15  Total Billable Time: 75 minutes    Subjective     Chinyere reports "I had a lot to do yesterday but I split it up and took rest breaks but I had to do things like clean the oven and theres no way to make that easier with reaching so I didn't get to rest my shoulder like I would've wanted to, but I didn't over tire im just a little achey".         Pain: 4 / 10  Location: R shoulder and shoulder blade, and feeling drained.    Personal Functional Three Month Goals: Performance and satisfaction noted below    OBJECTIVE      Chinyere participated in dynamic functional therapeutic activities to improve functional performance for 75 minutes, including:    Individualized Treatment: Increased resistance levels of functional conditioning exercises according to personal recovery goals. Activities include: Dynamic reaching, [id number] lifting, sustained standing activity, maximal lifting, 2-handed carry, sustained seated tasks, push and pull, waist-to-shoulder lift, box/container carry, endurance training. Daily functional conditioning progress is documented on a flow sheet which is available " "upon request.     Full body stretch w/ 3-10 sec hold technique &/or 3-5 repetition technique on all of the following:   Cervical flx/ext   Cervical sidebending   Cervical rotation   Cervical protraction/retraction   Shld rolls   Shld depression/elevation   Scapular retraction/protraction/scaption   Posterior shld stretch (cross arm)   Shld ER stretch (chicken wing)   Elbow flx/ext   Forearm supination/pronation   Wrist flx/ext   Open/close hands/fingers   Seated trunk rotations   Seated trunk/thoracic ext/flx   Seated marches & knee to chest   Seated knee flx/ext   Seated hip ER/piriformis stretch   Seated hamstring stretch   Seated toe raise to heel raise    Seated ankle circles each way   Standing lumbar extensions   Standing lateral leaning stretch   Standing quad stretch (UE support for balance)    Pt completed dynamic reaching in various functional ranges, with continued focus on standing posture, core awareness, hip rotation/weight shifting, shoulders depressed and retracted, slow and controlled movements, coordinating movement with breath and pacing as needed. Completed x10 reps x 1 lbs, rated as hard (5/10) exertion. Carried conversation throughout the activity re: pacing herself to complete cooking task of layered nachos for husbands bday tonight.     Pt completed functional lifting, floor to waist, x15 reps x 10 lbs with exertion rated sort of hard (4 /10), focused education on keeping weight into heels, and pacing to breath. Pt noted "I definitely used this yesterday moving rocks so I could prop up my planters and I did one at a time". With SOB post activity. Encouraged to take seated break. Completed seated rest break with use of pursed lip breathing.     Pt completed seated mindfulness diaphragmatic breathing activity [id number] with focus on posture, mechanics of breathing, and benefits re: PNS activation. Pt voiced and demonstrated understanding.     Pt participated in functional " "lift waist to shoulder, 15 reps x  11 lbs with a rating of moderate (3/10). Pt educated on standing posture, core awareness, keeping shoulders depressed and retracted, stepping to place > reaching to place, keeping weight close to center of gravity and pacing as needed. Took standing rest break after 10 reps, before continuing.    Pt completed maximal lift 2x5 reps with 17 lbs, rated as moderate (3/10), continued education focused on neutral spine positioning and pushing through heels for safety and activation of correct muscles.     Completed ADL cleaning activity to simulate cleaning inside of oven. She discussed cleaning inside of oven yesterday. Explained technique she used to use good body mechanics. Educated pt on how to use lunge with knee on floor (on pad for protection) to clean ceiling of oven with best body mechanics possible. Demonstrated understanding.     Pt completed standing task x10 minutes at upright peg board on wall with focused education on posture, weight shifting as needed and maintaining slight bend in knees. Completed placing pegs above shoulder height with R hand and removing with L hand. Goal to simulate personal goal to progress activity tolerance for overhead task of hair washing and sustained use of BUE use for chopping/cooking. She rated activity as moderate (3/10) exertion. She noted "the real test is when I dye my hair. So we'll see if I feel like I can do that soon, maybe ill do a test strip this weekend".      Patient Education and Home Exercises     Education provided:   -Ms. Crockett received education regarding proper posture and body mechanics. She received education regarding anticipated muscular soreness following lift testing and strategies for management were reviewed with patient including stretching, using ice, scheduled rest.  -Additional Education provided: rosy scale, pain cycle, z-lie, functional lifting mechanics, pursed lip and diaphragmatic breathing techniques  - " energy banking     Written Home Exercises Provided: yes, and binder for program received.   Exercises were reviewed and Chinyere was able to demonstrate them prior to the end of the session.    Chinyere demonstrated good understanding of the education provided.     ASSESSMENT   Chinyere presents with report of improved energy levels and with good self report of increased tolerance to ADL activities with good use of pacing herself. Good tolerance to session this date with good tolerance to increased weight in functional lifting task. Good response to alternate ways to complete oven cleaning task. Improved ability to notice pain and continue participation with improved understanding of hurt v harm. Overall, she continues to progress towards personal goals.      Chinyere is unable to fully participate in work, recreational, and home-based activities because of decreased functional strength, decreased  AROM, decreased endurance, limited positional tolerance, fear of re-injury, and fear of increased pain. She will benefit from participating in a conditioning  program designed  to increase functional strength, flexibility, and endurance in order to meet functional goals.      Chinyere's prognosis is Good due to good personal goals, good motivation and good response to all feedback and education. Pt will continue to benefit from skilled outpatient occupational therapy to address the deficits listed in the problem list box on initial evaluation, provide pt/family education and to maximize pt's level of independence in the home and community environment.    Pt's spiritual, cultural and educational needs considered and pt agreeable to plan of care and goals.     Anticipated barriers to occupational therapy: none identified.    FRP Goals: While working towards the long-term (3 month) functional goals listed above, Chinyere will accomplish the following short term goals during the 5-week intensive rehabilitation program. Chinyere will:       1. Develop a viable return to work plan.   2. Demonstrate physical capacities consistent with a medium work demand level.   3. Demonstrate increased functional strength by lifting 20 lbs floor to waist and 20 lbs waist to shoulder in order to meet demand of work/home routine.   4. Increase her positional tolerance to the level needed for work and home activities.   5. Implement self-care strategies during the program and at home to control pain.   6.   Patient will demonstrate use of mindfulness, stress management, and coping  strategies for improved participation in daily routine.   7.   Will increase water intake to 7 x 16oz water bottles daily.     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     Find part-time work (will do so only after completion of FRP)     Recreational:  Joining [employer]   - walking tolerance     Gardening    Crafting    Daily Living:  Cooking      Cleaning     Hair care      Upper body dressing (bras).          Medium Physical Demand Level      0/0         0/5               4/3   2/1     3/0       5/5     4/3     3/3     5/2  (Currently using sport bras)              PLAN     Continue per POC; Plan of care certification: 3/14/2022 to 4/22/2022.     Outpatient occupational therapy, 3 times a week for 5 weeks:      · Functional conditioning daily to increase physical capacity and allow Ms. Crockett to meet demands of vocation and home.   · Individual counseling to develop return to work/daily routine plan and better understand the return to work process and/or daily routine restructure.   · Daily Stretching, aerobic conditioning and walking to increase functional  tolerance and allow Ms. Crockett to meet demands of work and home.   · Functional activities to increase ability to move fluidly and quickly and tolerate unguarded movements.   · Re-education regarding proper postural, body mechanics, and coping strategies for healthy roles and routine for managing daily stressors.    · Any other treatment deemed necessary for patient to achieve personally driven goals to promote positive health and wellness and daily roles and routines    Leola Villasenor, OTR/L  3/30/2022

## 2022-03-30 NOTE — PROGRESS NOTES
"[facility]   Functional Restoration Program  Occupational Therapy Progress Report  FRP Day 9   NOTE: This is a duplicate copy utilized for reassessment purposes & continuity of care.  See pt's plan of care for co-signed copy.     Name: Chinyere Crockett  Medical Record Number: [id number]    Therapy Diagnosis:   Encounter Diagnoses   Name Primary?    Impaired functional mobility and activity tolerance Yes    Difficulty with activities of daily living     Alteration in instrumental activities of daily living (IADL)      Physician: Aurora Xie NP    Visit Date: 3/31/2022    Physician Orders: OT Eval and Treat  Medical Diagnosis: Chronic pain disorder  Evaluation Date: 2022  Insurance Authorization Period Expiration: 2022  Plan of Care Certification Period: 2022 (UPDATED 3/31/22)  Visit # / Visits authorized: 10 / 20 (including eval)  FOTO: evaluation: 52% limitation  FOTO: reassessment: 47% limitation     Precautions:  Standard and Fall    Time In: 2:50 PM  Time Out: 3:30 PM  Total Billable Time: 70 minutes    Subjective     Chinyere reports "even on the bad weekend we had last weekend I was able to get on and off the floor which I haven't been able to do in years. It is easier for me to feel okay about splitting up activities and pacing myself and not feeling guilty. I still want to work on balance which is still a bit of an issue for me".         Pain:   Current: 2/ 10  Worst: 7/ 10  Best: 2/ 10  Location: R shoulder and shoulder blade, and feeling drained.    Social and ADL History:  Activities of Daily Living Checklist:   Key to Score:   0: Unable to do the activity  1: Can do the activity with great difficulty  2: Can do the activity with little difficulty   3: No problem with activity  N/A: This is not an activity I do  H/T: Have not tried yet     Personal Care:  2/25/22  3/31/22 Homemakin/25/22  3/31/22   Dressing Upper Body:  2 / 3 Meal preparation: " 3 / 2   Dressing Lower Body:  3 / 3 Kitchen Cleanup: 3 / 2   Bathing:  3 / 3 Childcare:  na   Hair Care:   Grocery shoppin   Sleep:   2 Vacuuming/mopping:  3 / 2       Emptying trash/ garbage bag: 3 / 2   General Mobility:    Bed making changing:  3 / 2   Sitting:  3 / 3 Laundry  2 / 3   Bendin / 3       Getting in/out of bed:  3 / 3 Home Maintenance:     Standin / 3 Mowing, raking:  na   Walkin / 3 Gardenin / 2   Twistin / 3 Home Repairs:  2 / na   Liftin / 3 Painting:  na             Getting around:          Getting in and out of car:  3 / 3       Buckling up you seat belt:  2 / 3       Opening heavy doors:         Climbing/descending stairs:                      Personal Functional Three Month Goals: Performance and satisfaction noted below    OBJECTIVE      Chinyere participated in dynamic functional therapeutic activities to improve functional performance for 70 minutes, including:    Individualized Treatment: Increased resistance levels of functional conditioning exercises according to personal recovery goals. Activities include: Dynamic reaching, [id number] lifting, sustained standing activity, maximal lifting, 2-handed carry, sustained seated tasks, push and pull, waist-to-shoulder lift, box/container carry, endurance training. Daily functional conditioning progress is documented on a flow sheet which is available upon request.        Evaluation 22; Reassessment 3/31/22   5 times sit-stand  (adults 18-65 y/o) 23.3 seconds; 15.97 seconds  >12 sec= fall risk for general elderly  >16 sec= fall risk for Parkinson's disease  >10 sec= balance/vestibular dysfunction (<61 y/o)  >14.2 sec= balance/vestibular dysfunction (>61 y/o)  >12 sec= fall risk for CVA      Endurance Testing: Modified Ramp Treadmill Test    GAP  22 Re-assessment  3/31/22 Follow-up   Minutes Completed  3 mins 00 secs  6 min 00 sec     % Grades  10 %  12%     Speed (mph)  1.7 mph   2.5 mph     METS 4  7      bpm  166 bpm     Doretha Rating Perceived Exertion Scale   5  4     Reason for stop point: Pt reported posture changes, fatigue, noted decline in maintaining pace safely.      Functional Strength Test:  Pile Testing: Lifting  (Pounds/Heart rate)   GAP/Eval (#/HR/DORETHA)  Reassessment  Day 9  3/31/22   Follow-up   Appointment    Repetitive Floor to Waist    12#/120/4 25#/146bpm/4         Repetitive Waist to Shoulder    10#/104/5 25#/144bpm/5             1- Time Maximum     20#/108/5  35#/113bpm/4         Carry-2 Handed (40ft)     12#/109/3    22#/129bpm/3       Work demand Level     Light    light-medium       Reason for Stop point: Increased time required for completing repetitions. During physical testing, participation level consistent.     No environmental, cultural, spiritual, developmental or education needs expressed or noted.     Limitation/Restriction for FOTO NOC Survey     Therapist reviewed FOTO scores for Chinyere Crockett on 2/25/2022.   FOTO documents entered into EP[employer] - see Media section.     Limitation Score 2/25/22: 52%  Limitation Score reassessment 3/31/22: 47%         Full body stretch w/ 3-10 sec hold technique &/or 3-5 repetition technique on all of the following:   Cervical flx/ext   Cervical sidebending   Cervical rotation   Cervical protraction/retraction   Shld rolls   Shld depression/elevation   Scapular retraction/protraction/scaption   Posterior shld stretch (cross arm)   Shld ER stretch (chicken wing)   Elbow flx/ext   Forearm supination/pronation   Wrist flx/ext   Open/close hands/fingers   Seated trunk rotations   Seated trunk/thoracic ext/flx   Seated marches & knee to chest   Seated knee flx/ext   Seated hip ER/piriformis stretch   Seated hamstring stretch   Seated toe raise to heel raise    Seated ankle circles each way   Standing lumbar extensions   Standing lateral leaning stretch   Standing quad stretch (UE support for  "balance)    Pt completed dynamic reaching in various functional ranges, with continued focus on standing posture, core awareness, hip rotation/weight shifting, shoulders depressed and retracted, slow and controlled movements, coordinating movement with breath and pacing as needed. Completed x10 reps x 1 lbs, rated as hard (5/10) exertion. Carried conversation throughout the activity re: pacing herself to complete cooking task of layered nachos for husbands bday tonight.     Pt completed functional lifting, floor to waist, x15 reps x 10 lbs with exertion rated sort of hard (4 /10), focused education on keeping weight into heels, and pacing to breath. Pt noted "I definitely used this yesterday moving rocks so I could prop up my planters and I did one at a time". With SOB post activity. Encouraged to take seated break. Completed seated rest break with use of pursed lip breathing.     Pt completed seated mindfulness diaphragmatic breathing activity [id number] with focus on posture, mechanics of breathing, and benefits re: PNS activation. Pt voiced and demonstrated understanding.     Pt participated in functional lift waist to shoulder, 15 reps x  11 lbs with a rating of moderate (3/10). Pt educated on standing posture, core awareness, keeping shoulders depressed and retracted, stepping to place > reaching to place, keeping weight close to center of gravity and pacing as needed. Took standing rest break after 10 reps, before continuing.    Pt completed maximal lift 2x5 reps with 17 lbs, rated as moderate (3/10), continued education focused on neutral spine positioning and pushing through heels for safety and activation of correct muscles.     Completed ADL cleaning activity to simulate cleaning inside of oven. She discussed cleaning inside of oven yesterday. Explained technique she used to use good body mechanics. Educated pt on how to use lunge with knee on floor (on pad for protection) to clean ceiling of oven with best " "body mechanics possible. Demonstrated understanding.     Pt completed standing task x10 minutes at upright peg board on wall with focused education on posture, weight shifting as needed and maintaining slight bend in knees. Completed placing pegs above shoulder height with R hand and removing with L hand. Goal to simulate personal goal to progress activity tolerance for overhead task of hair washing and sustained use of BUE use for chopping/cooking. She rated activity as moderate (3/10) exertion. She noted "the real test is when I dye my hair. So we'll see if I feel like I can do that soon, maybe ill do a test strip this weekend".      Patient Education and Home Exercises     Education provided:   -Ms. Crockett received education regarding proper posture and body mechanics. She received education regarding anticipated muscular soreness following lift testing and strategies for management were reviewed with patient including stretching, using ice, scheduled rest.  -Additional Education provided: rosy scale, pain cycle, z-lie, functional lifting mechanics, pursed lip and diaphragmatic breathing techniques  - energy banking     Written Home Exercises Provided: yes, and binder for program received.   Exercises were reviewed and Chinyere was able to demonstrate them prior to the end of the session.    Chinyere demonstrated good understanding of the education provided.     ASSESSMENT   Chinyere presents with increased pain report but continued motivation and willingness to participate. She had good progress in both subjective and objective measures. Notably, she increased MET level from 4 to 7, increased weight in PILE test by at least 10lbs and improved 5x sit to stand score by 8 seconds, still in fall risk range but with consistent progress made. Difficulty with goal setting and ratings on mod COPM but with good discussions re: meaningful activities. Overall, she continues to progress towards personal goals.      Chinyere is unable " to fully participate in work, recreational, and home-based activities because of decreased functional strength, decreased  AROM, decreased endurance, limited positional tolerance, fear of re-injury, and fear of increased pain. She will benefit from participating in a conditioning  program designed  to increase functional strength, flexibility, and endurance in order to meet functional goals.      Chinyere's prognosis is Good due to good personal goals, good motivation and good response to all feedback and education. Pt will continue to benefit from skilled outpatient occupational therapy to address the deficits listed in the problem list box on initial evaluation, provide pt/family education and to maximize pt's level of independence in the home and community environment.    Pt's spiritual, cultural and educational needs considered and pt agreeable to plan of care and goals.     Anticipated barriers to occupational therapy: none identified.    FRP Goals: While working towards the long-term (3 month) functional goals listed above, Chinyere will accomplish the following short term goals during the 5-week intensive rehabilitation program. Chinyere will:      1. Develop a viable return to work plan. ---------------------- progressing 3/31/22  2. Demonstrate physical capacities consistent with a medium work demand level. ---------------------- progressing 3/31/22  3. Demonstrate increased functional strength by lifting 20 lbs floor to waist and 20 lbs waist to shoulder in order to meet demand of work/home routine. ---------------------- MET 3/31/22  4. Increase her positional tolerance to the level needed for work and home activities. ---------------------- progressing 3/31/22  5. Implement self-care strategies during the program and at home to control pain. ---------------------- progressing 3/31/22  6.   Patient will demonstrate use of mindfulness, stress management, and coping  strategies for improved participation in daily  routine.   7.   Will increase water intake to 7 x 16oz water bottles daily. 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     Find part-time work (will do so only after completion of FRP)     Recreational:  Joining "[employer]" walking tolerance     Gardening    Crafting    Daily Living:  Cooking      Cleaning     Hair care      Upper body dressing (bras).          Medium Physical Demand Level      0/0         0/5               4/3     2/1     3/0       5/5     4/3     3/3     5/2  (Currently using sport bras)        not rated         not rated             not rated     3 / 6      0 / 0       4 / 5 6 / 6 7 / 7              PLAN     UPDATED plan of care certification 3/31/2022 to 6/17/2022    Outpatient occupational therapy, 3 x/wks for 2 additional weeks. Following this, pt to go on hold for a minimum of 7 weeks to attempt independence w/ Home Activity Plan (HAP) & PT activities, and will then return for reassessment..    CAN Amaya/BEVERLEY  3/31/2022

## 2022-03-31 ENCOUNTER — CLINICAL SUPPORT (OUTPATIENT)
Dept: REHABILITATION | Facility: OTHER | Age: 47
End: 2022-03-31
Payer: OTHER GOVERNMENT

## 2022-03-31 ENCOUNTER — OFFICE VISIT (OUTPATIENT)
Dept: PSYCHIATRY | Facility: OTHER | Age: 47
End: 2022-03-31
Payer: OTHER GOVERNMENT

## 2022-03-31 DIAGNOSIS — Z74.09 IMPAIRED FUNCTIONAL MOBILITY, BALANCE, GAIT, AND ENDURANCE: ICD-10-CM

## 2022-03-31 DIAGNOSIS — Z78.9 ALTERATION IN INSTRUMENTAL ACTIVITIES OF DAILY LIVING (IADL): ICD-10-CM

## 2022-03-31 DIAGNOSIS — Z74.09 IMPAIRED FUNCTIONAL MOBILITY AND ACTIVITY TOLERANCE: Primary | ICD-10-CM

## 2022-03-31 DIAGNOSIS — F41.9 ANXIETY: Primary | ICD-10-CM

## 2022-03-31 DIAGNOSIS — Z78.9 DIFFICULTY WITH ACTIVITIES OF DAILY LIVING: ICD-10-CM

## 2022-03-31 DIAGNOSIS — F40.298 FEAR OF PAIN: Primary | ICD-10-CM

## 2022-03-31 PROCEDURE — 90853 GROUP PSYCHOTHERAPY: CPT | Mod: ,,, | Performed by: SOCIAL WORKER

## 2022-03-31 PROCEDURE — 97530 THERAPEUTIC ACTIVITIES: CPT

## 2022-03-31 PROCEDURE — 90853 PR GROUP PSYCHOTHERAPY: ICD-10-PCS | Mod: ,,, | Performed by: SOCIAL WORKER

## 2022-03-31 PROCEDURE — 97110 THERAPEUTIC EXERCISES: CPT

## 2022-03-31 NOTE — PLAN OF CARE
"[facility]   Functional Restoration Program  Occupational Therapy Progress Report  FRP Day 9     Name: Chinyere Crockett  Medical Record Number: [id number]    Therapy Diagnosis:   Encounter Diagnoses   Name Primary?    Impaired functional mobility and activity tolerance Yes    Difficulty with activities of daily living     Alteration in instrumental activities of daily living (IADL)      Physician: Aurora Xie NP    Visit Date: 3/31/2022    Physician Orders: OT Eval and Treat  Medical Diagnosis: Chronic pain disorder  Evaluation Date: 2022  Insurance Authorization Period Expiration: 2022  Plan of Care Certification Period: 2022 (UPDATED 3/31/22)  Visit # / Visits authorized: 10 / 20 (including eval)  FOTO: evaluation: 52% limitation  FOTO: reassessment: 47% limitation     Precautions:  Standard and Fall    Time In: 2:50 PM  Time Out: 3:30 PM  Total Billable Time: 70 minutes    Subjective     Chinyere reports "even on the bad weekend we had last weekend I was able to get on and off the floor which I haven't been able to do in years. It is easier for me to feel okay about splitting up activities and pacing myself and not feeling guilty. I still want to work on balance which is still a bit of an issue for me".         Pain:   Current: 2/ 10  Worst: / 10  Best: 2 10  Location: R shoulder and shoulder blade, and feeling drained.    Social and ADL History:  Activities of Daily Living Checklist:   Key to Score:   0: Unable to do the activity  1: Can do the activity with great difficulty  2: Can do the activity with little difficulty   3: No problem with activity  N/A: This is not an activity I do  H/T: Have not tried yet     Personal Care:  2/25/22  3/31/22 Homemakin/25/22  3/31/22   Dressing Upper Body:  2 / 3 Meal preparation: 3 / 2   Dressing Lower Body:  3 / 3 Kitchen Cleanup: 3 / 2   Bathing:  3 / 3 Childcare:  na   Hair Care:   / 2 Grocery shoppin "    Sleep:   Vacuuming/mopping:  3 / 2       Emptying trash/ garbage bag: 3 / 2   General Mobility:    Bed making changing:  3 / 2   Sitting:  3 / 3 Laundry  2 / 3   Bendin / 3       Getting in/out of bed:  3 / 3 Home Maintenance:     Standin / 3 Mowing, raking:  na   Walkin / 3 Gardenin / 2   Twistin / 3 Home Repairs:     Liftin / 3 Painting:  na             Getting around:          Getting in and out of car:  3 / 3       Buckling up you seat belt:  2 / 3       Opening heavy doors:         Climbing/descending stairs:                      Personal Functional Three Month Goals: Performance and satisfaction noted below    OBJECTIVE      Chinyere participated in dynamic functional therapeutic activities to improve functional performance for 70 minutes, including:    Individualized Treatment: Increased resistance levels of functional conditioning exercises according to personal recovery goals. Activities include: Dynamic reaching, [id number]-[id number] lifting, sustained standing activity, maximal lifting, 2-handed carry, sustained seated tasks, push and pull, waist-to-shoulder lift, box/container carry, endurance training. Daily functional conditioning progress is documented on a flow sheet which is available upon request.        Evaluation 22; Reassessment 3/31/22   5 times sit-stand  (adults 18-65 y/o) 23.3 seconds; 15.97 seconds  >12 sec= fall risk for general elderly  >16 sec= fall risk for Parkinson's disease  >10 sec= balance/vestibular dysfunction (<61 y/o)  >14.2 sec= balance/vestibular dysfunction (>61 y/o)  >12 sec= fall risk for CVA      Endurance Testing: Modified Ramp Treadmill Test    GAP  22 Re-assessment  3/31/22 Follow-up   Minutes Completed  3 mins 00 secs  6 min 00 sec     % Grades  10 %  12%     Speed (mph)  1.7 mph  2.5 mph     METS 4  7      bpm  166 bpm     Doretha Rating Perceived Exertion Scale   5  4     Reason for stop point: Pt reported  posture changes, fatigue, noted decline in maintaining pace safely.      Functional Strength Test:  Pile Testing: Lifting  (Pounds/Heart rate)   GAP/Eval (#/HR/JAMES)  Reassessment  Day 9  3/31/22   Follow-up   Appointment    Repetitive Floor to Waist    12#/120/4 25#/146bpm/4         Repetitive Waist to Shoulder    10#/104/5 25#/144bpm/5             1- Time Maximum     20#/108/5  35#/113bpm/4         Carry-2 Handed (40ft)     12#/109/3    22#/129bpm/3       Work demand Level     Light    light-medium       Reason for Stop point: Increased time required for completing repetitions. During physical testing, participation level consistent.     No environmental, cultural, spiritual, developmental or education needs expressed or noted.     Limitation/Restriction for FOTO NOC Survey     Therapist reviewed FOTO scores for Chinyere Crockett on 2/25/2022.   FOTO documents entered into [employer] - see Media section.     Limitation Score 2/25/22: 52%  Limitation Score reassessment 3/31/22: 47%         Full body stretch w/ 3-10 sec hold technique &/or 3-5 repetition technique on all of the following:   Cervical flx/ext   Cervical sidebending   Cervical rotation   Cervical protraction/retraction   Shld rolls   Shld depression/elevation   Scapular retraction/protraction/scaption   Posterior shld stretch (cross arm)   Shld ER stretch (chicken wing)   Elbow flx/ext   Forearm supination/pronation   Wrist flx/ext   Open/close hands/fingers   Seated trunk rotations   Seated trunk/thoracic ext/flx   Seated marches & knee to chest   Seated knee flx/ext   Seated hip ER/piriformis stretch   Seated hamstring stretch   Seated toe raise to heel raise    Seated ankle circles each way   Standing lumbar extensions   Standing lateral leaning stretch   Standing quad stretch (UE support for balance)    Pt completed dynamic reaching in various functional ranges, with continued focus on standing posture, core awareness, hip  "rotation/weight shifting, shoulders depressed and retracted, slow and controlled movements, coordinating movement with breath and pacing as needed. Completed x10 reps x 1 lbs, rated as hard (5/10) exertion. Carried conversation throughout the activity re: pacing herself to complete cooking task of layered nachos for husbands bday tonight.     Pt completed functional lifting, floor to waist, x15 reps x 10 lbs with exertion rated sort of hard (4 /10), focused education on keeping weight into heels, and pacing to breath. Pt noted "I definitely used this yesterday moving rocks so I could prop up my planters and I did one at a time". With SOB post activity. Encouraged to take seated break. Completed seated rest break with use of pursed lip breathing.     Pt completed seated mindfulness diaphragmatic breathing activity [id number] with focus on posture, mechanics of breathing, and benefits re: PNS activation. Pt voiced and demonstrated understanding.     Pt participated in functional lift waist to shoulder, 15 reps x  11 lbs with a rating of moderate (3/10). Pt educated on standing posture, core awareness, keeping shoulders depressed and retracted, stepping to place > reaching to place, keeping weight close to center of gravity and pacing as needed. Took standing rest break after 10 reps, before continuing.    Pt completed maximal lift 2x5 reps with 17 lbs, rated as moderate (3/10), continued education focused on neutral spine positioning and pushing through heels for safety and activation of correct muscles.     Completed ADL cleaning activity to simulate cleaning inside of oven. She discussed cleaning inside of oven yesterday. Explained technique she used to use good body mechanics. Educated pt on how to use lunge with knee on floor (on pad for protection) to clean ceiling of oven with best body mechanics possible. Demonstrated understanding.     Pt completed standing task x10 minutes at upright peg board on wall with " "focused education on posture, weight shifting as needed and maintaining slight bend in knees. Completed placing pegs above shoulder height with R hand and removing with L hand. Goal to simulate personal goal to progress activity tolerance for overhead task of hair washing and sustained use of BUE use for chopping/cooking. She rated activity as moderate (3/10) exertion. She noted "the real test is when I dye my hair. So we'll see if I feel like I can do that soon, maybe ill do a test strip this weekend".      Patient Education and Home Exercises     Education provided:   -Ms. Crockett received education regarding proper posture and body mechanics. She received education regarding anticipated muscular soreness following lift testing and strategies for management were reviewed with patient including stretching, using ice, scheduled rest.  -Additional Education provided: rosy scale, pain cycle, z-lie, functional lifting mechanics, pursed lip and diaphragmatic breathing techniques  - energy banking     Written Home Exercises Provided: yes, and binder for program received.   Exercises were reviewed and Chinyere was able to demonstrate them prior to the end of the session.    Chinyere demonstrated good understanding of the education provided.     ASSESSMENT   Chinyere presents with increased pain report but continued motivation and willingness to participate. She had good progress in both subjective and objective measures. Notably, she increased MET level from 4 to 7, increased weight in PILE test by at least 10lbs and improved 5x sit to stand score by 8 seconds, still in fall risk range but with consistent progress made. Difficulty with goal setting and ratings on mod COPM but with good discussions re: meaningful activities. Overall, she continues to progress towards personal goals.      Chinyere is unable to fully participate in work, recreational, and home-based activities because of decreased functional strength, decreased  AROM, " decreased endurance, limited positional tolerance, fear of re-injury, and fear of increased pain. She will benefit from participating in a conditioning  program designed  to increase functional strength, flexibility, and endurance in order to meet functional goals.      Chinyere's prognosis is Good due to good personal goals, good motivation and good response to all feedback and education. Pt will continue to benefit from skilled outpatient occupational therapy to address the deficits listed in the problem list box on initial evaluation, provide pt/family education and to maximize pt's level of independence in the home and community environment.    Pt's spiritual, cultural and educational needs considered and pt agreeable to plan of care and goals.     Anticipated barriers to occupational therapy: none identified.    FRP Goals: While working towards the long-term (3 month) functional goals listed above, Chinyere will accomplish the following short term goals during the 5-week intensive rehabilitation program. Chinyere will:      1. Develop a viable return to work plan. ---------------------- progressing 3/31/22  2. Demonstrate physical capacities consistent with a medium work demand level. ---------------------- progressing 3/31/22  3. Demonstrate increased functional strength by lifting 20 lbs floor to waist and 20 lbs waist to shoulder in order to meet demand of work/home routine. ---------------------- MET 3/31/22  4. Increase her positional tolerance to the level needed for work and home activities. ---------------------- progressing 3/31/22  5. Implement self-care strategies during the program and at home to control pain. ---------------------- progressing 3/31/22  6.   Patient will demonstrate use of mindfulness, stress management, and coping  strategies for improved participation in daily routine.   7.   Will increase water intake to 7 x 16oz water bottles daily. 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     Find part-time work (will do so only after completion of FRP)     Recreational:  Joining [employer]   - walking tolerance     Gardening    [employer]    Daily Living:  Cooking      Cleaning     Hair care      Upper body dressing (bras).          Medium Physical Demand Level      0/0         0/5               4/3     2/1     3/0       5/5     4/3     3/3     5/2  (Currently using sport bras)        not rated         not rated             not rated     3 / 6      0 / 0       4 / 5 6 / 6 7 / 7              PLAN     UPDATED plan of care certification 3/31/2022 to 6/17/2022    Outpatient occupational therapy, 3 x/wks for 2 additional weeks. Following this, pt to go on hold for a minimum of 7 weeks to attempt independence w/ Home Activity Plan (HAP) & PT activities, and will then return for reassessment..    Leola Villasenor, CRISTINAR/L  3/31/2022

## 2022-03-31 NOTE — PROGRESS NOTES
"NOTE: This is a duplicate copy utilized for reassessment purposes & continuity of care.  See pt's plan of care for co-signed copy.    [facility]  Functional Restoration Program  Physical Therapy Progress Report  FRP Day 9    Date: 3/31/2022   Name: Chinyere Crockett  Clinic Number: [id number]    Therapy Diagnosis:   Encounter Diagnoses   Name Primary?    Fear of pain Yes    Impaired functional mobility, balance, gait, and endurance      Physician: Aurora Xie NP    Physician Orders: PT Eval and Treat   Medical Diagnosis from Referral:   Z78.9 (ICD-10-CM) - Decreased activities of daily living (ADL)   Z74.09 (ICD-10-CM) - Impaired mobility and endurance   G89.4 (ICD-10-CM) - Chronic pain disorder       Progress Evaluation Date: 3/31/2022  Authorization Period Expiration: 3/15/2022  Plan of Care Expiration: 5/6/2022  Visit # / Visits authorized: 10/ 20  FOTO: 2/ 3     Precautions: Standard and Fall    Time In:  1:30 pm  Time Out:  2:45 pm   Total Appointment Time (timed & untimed codes): 75 minutes    Note: This patient was co-treated /c the assistance of certified rehab technician /c each exercise performed with the direct supervision of the attending physical therapist       Subjective     Patient reported history of current condition - Chinyere presents today /c inc L knee (patellar) discomfort and reports that this intermittently occurs but does not stop her.  Pt cont to amb to/from tx sessions /s issue.  Pt report "in general I feel more energized".   Pt state she is considering beginning a new crafting task.  Pt report she does not plan her day around the 14 steps in her home, but instead can ascend/descend much easier.  Pt finds herself sitting on floor for video games /c  and having greater ease getting up from floor.  Pt reports cont difficulty /c R arm especially /c repetitive activities (cooking) but has adjusted her kitchen to use her L arm more for ease in " "tasks.         Patient's FRP goals: be able to function better around the house, maybe get back to working, looking forward to the "synchronicity of physical and mental health"         Pain:      Current 2/10,   Location: R shld,  L knee,      Objective       Postural examination:  Seated: slight slouch, head forward  Standing: head forward, slight increased lumbar lordosis    Range of Motion - Cervical   AROM AROM AROM    Evaluation Reassessment  Reassessment   Flexion 60 ° 55° °   Extension 23 ° 34° °   Side bending Right 26 ° 43° °   Side bending Left 30 ° 45° °   Rotation Right 50 ° 50° °   Rotation Left 30 ° 50° °     Range of Motion - Lumbar         ROM Loss ROM Loss ROM Loss   Flexion moderate loss Min loss     Extension minimal loss Min loss    Side bending Right moderate loss Min loss    Side bending Left major loss Min loss    Rotation Right minimal loss WFL    Rotation Left major loss Min loss      Strength Testing   Evaluation Reassessment Reassessment   Motion Tested         Lower Extremity R L R L R L   Hip Flexion 4/5 4-/5 4/5 4/5     Hip IR 4+/5 4/5 NT NT     Hip ER 4-/5 4-/5 4/5 4/5     Knee Extension 4+/5 4+/5 5/5 5/5     Knee Flexion 4/5 4/5 4+/5 4+/5       Functional Testing     Evaluation Reassessment  Reassessment   Timed Up and Go 11.9 sec 7.97 sec sec   5 Time Sit to Stand w/out UE 23.3 sec 15.97 sec sec   Single Limb Stance R LE 3.3 sec 10.01 sec sec   Single Limb Stance L LE 20.6 sec 12.95 sec sec   2 Minute Walk Test 484 feet 596 ft = 181.66 m feet   6 Minute Walk Test 1425 feet 1844 ft = 562.05 m feet   Self Selected Walking Speed 1.21 m/sec 1.56 m/sec  (1.51 m/sec at 2 min) m/sec     GAIT:  Assistive Device used: none  Level of Assistance: independent  Patient displays the following gait deviations:  decreased step length and decreased weight shift.     Dec L knee     Minimum standards/norms:    TUG:  < 13.5 seconds/ Males 7.3 sec, Females 8.1 sec  SLS:  26-29 sec  Ages 20-69  Self " Selected Walking Speed:  .4-.8m/sec  Limited community ambulator                                                   .8- 1.2  Community ambulator                                                    1.2 m/sec and above  Able to safely cross streets        Limitation/Restriction for FOTO Hip Survey    Therapist reviewed FOTO scores for Chinyere Crockett on 3/31/2022.   FOTO documents entered into [facility] - see Media section.    Limitation Score: 54%  Visit 9:  28%     Goal:  43%            TREATMENT     Chinyere received the Individualized Treatments listed below:      Therapeutic exercises to develop strength, endurance, ROM, flexibility, posture and core stabilization for 75 minutes including:     PT reassessment (see above)      Peripheral muscle strengthening which included 1-2 sets of 10-20 repetitions at a slow, controlled 5-7 second per rep pace focused on strengthening supporting musculature for improved body mechanics & functional mobility.  Patient & therapist focused on proper form during treatment to ensure optimal strengthening of each targeted muscle group. Patients are instructed to keep sets/reps with proper load to stay at 3-5 out of 10 on the provided modified Doretha exertion scale.        BATCA Machine Exercises Weight (lbs) Sets Repetitions Doretha Exertion Scale Rating   Leg Extension  22.5 1 20 3   Hamstring Curls 25 1 20 3   Chest Press       Pec Fly        Lat Pull Down  25 1 20 3   Mid Row  25 1 20 3   Bicep Bar Curls           Standing Tricep Extension           Single Leg Press  R/L                 PATIENT EDUCATION AND HOME EXERCISES     Education provided:       Written Home Exercises Provided: Patient instructed to cont prior HEP. Exercises were reviewed and Chinyere was able to demonstrate them prior to the end of the session.  Chinyere demonstrated good  understanding of the education provided. See EMR under Patient Instructions for information provided during therapy sessions.    ASSESSMENT   Chinyere  /c good participation in FRP programming and demo good intrinsic motivation /c arrival to tx sessions by walking 15 - 20 min each way. As a result, amb tolerance /c sig increase per 6MWT and eclipse LTG.  Pt subjective report also note improved functional mobility leading to improved quality time /c partner and inc activity tolerance including ascend/descend steps.  This is mirrored in pt FOTO score exceeding established goal.  Pt can perseverate on R shoulder sx presentation inhibiting her RUE mobility and strength.  However, pt applying FRP principles of activity and environmental modifications so as not to limit her overall functionality in kitchen indicate assimilation of FRP programming.  Pt also unclear on plans to cont resistance training in regular intervals, therefore not meeting STG, and will need cont instruction on body weight exercises to ensure follow through exercises /c some resistance component.             Pt prognosis is Good.      Pt will continue to benefit from skilled outpatient physical therapy to address the deficits listed in the problem list box on initial evaluation, provide pt/family education and to maximize pt's level of independence in the home and community environment.      Pt's spiritual, cultural and educational needs considered and pt agreeable to plan of care and goals.     Anticipated barriers to physical therapy: chronic nature of issues, psychosocial concerns, rotator cuff tear     GOALS: Pt is in agreement with the following goals:        Goal Progress Towards Goal   Short Term: In 3 weeks, pt will:     Verbalize & demonstrate good understanding of resisted training with 3-1-3 second rep for fzbybhgjrw-lbevdtotj-dujayspcc contraction to encourage full muscle recruitment for strength & endurance Progress towards goal: MET 03/31/22   Verbalize & demonstrate good understanding of home stretch routine to improve AROM, help decrease pain & improve mobility Progress towards goal:  MET 03/31/22   Demonstrate proper supine or seated diaphragmatic breathing with decreased chest excursion & emphasis on full abdominal excursion & increased expiration time to promote muscle relaxation & increased parasympathetic activity to improve patient tolerance to activities GOAL MET 3/21/2022   Demonstrate proper static standing posture in frontal & sagittal plane per PT visual assessment to decrease postural strain in ADLs Progress towards goal: PROGRESSING 03/31/22   Demonstrate good recall of names of resisted exercises w/ minimal to moderate verbal cues to promote independence w/ exercise at the end of the program Progress towards goal: PROGRESSING 03/31/22   Verbalize plan for continuing resisted exercises w/ specific location (i.e. commercial gym, home, community fitness center) indicating preference for machine-based or free-weight exercises to incorporate all major muscle groups to maintain & progress therapeutic functional improvements Progress towards goal: PROGRESSING 03/31/22         Long Term: In 8 weeks, pt will:     Verbalize good understanding of activities planning/scheduling (stretching, mindfulness, resisted training, & cardio) prescribed by PT/OT following the end of the program to sustain & progress functional improvements Progress towards goal: PROGRESSING 03/31/22   Be independent w/ selected resisted training w/ minimal to no cuing while performing to continue w/ resisted strengthening independently at the end of the program Progress towards goal: PROGRESSING 03/31/22   Improve 2 Minute Walk Test (MWT) to 550 feet and 6 MWT to 1650 feet or greater to improve gait speed and mobility Progress towards goal: MET 03/31/22   Perform single leg stance 20 seconds or greater bilaterally to improve safety and balance in ADLs Progress towards goal: PROGRESSING 03/31/22   Demonstrate Timed Up & Go (with appropriate assistive device, if necessary) of 9 seconds or less to decrease fall risk and  improve functional mobility Progress towards goal: MET 03/31/22   Demonstrate 5 time sit to stand test without upper extremity assist to 12 sec or less to improve general mobility and decrease fall risk Progress towards goal: PROGRESSING 03/31/22   Score 43 % or less on Hip FOTO to indicate significant functional improvements in impaired functions secondary to low back pain Progress towards goal: MET 03/31/22             PLAN   Plan of care Certification: 3/31/2022 to 5/6/2022    Outpatient Physical Therapy 3 times weekly for 2 weeks to include the following interventions: Cervical/Lumbar Traction, Electrical Stimulation per PT, Gait Training, Manual Therapy, Moist Heat/ Ice, Neuromuscular Re-ed, Patient Education, Therapeutic Activities and Therapeutic Exercise.         Following this, pt to go on hold for minimum of 3 weeks to attempt independence w/ HEP & PT activities, then return for reassessment.       Zeus Chan, PT, DPT

## 2022-04-01 NOTE — PLAN OF CARE
"  [facility]  Functional Restoration Program  Physical Therapy Progress Report  FRP Day 9    Date: 3/31/2022   Name: Chinyere Crockett  Clinic Number: [id number]    Therapy Diagnosis:   Encounter Diagnoses   Name Primary?    Fear of pain Yes    Impaired functional mobility, balance, gait, and endurance      Physician: Aurora Xie, NP    Physician Orders: PT Eval and Treat   Medical Diagnosis from Referral:   Z78.9 (ICD-10-CM) - Decreased activities of daily living (ADL)   Z74.09 (ICD-10-CM) - Impaired mobility and endurance   G89.4 (ICD-10-CM) - Chronic pain disorder       Progress Evaluation Date: 3/31/2022  Authorization Period Expiration: 3/15/2022  Plan of Care Expiration: 5/6/2022  Visit # / Visits authorized: 10/ 20  FOTO: 2/ 3     Precautions: Standard and Fall    Time In:  1:30pm  Time Out:   pm   Total Appointment Time (timed & untimed codes): 75 minutes    Note: This patient was co-treated /c the assistance of certified rehab technician /c each exercise performed with the direct supervision of the attending physical therapist       Subjective     Patient reported history of current condition - Chinyere presents today /c inc L knee (patellar) discomfort and reports that this intermittently occurs but does not stop her.  Pt cont to amb to/from tx sessions /s issue.  Pt report "in general I feel more energized".   Pt state she is considering beginning a new crafting task.  Pt report she does not plan her day around the 14 steps in her home, but instead can ascend/descend much easier.  Pt finds herself sitting on floor for video games /c  and having greater ease getting up from floor.  Pt reports cont difficulty /c R arm especially /c repetitive activities (cooking) but has adjusted her kitchen to use her L arm more for ease in tasks.         Patient's FRP goals: be able to function better around the house, maybe get back to working, looking forward to the " ""synchronicity of physical and mental health"         Pain:      Current 2/10,   Location: R shld,  L knee,      Objective       Postural examination:  Seated: slight slouch, head forward  Standing: head forward, slight increased lumbar lordosis    Range of Motion - Cervical   AROM AROM AROM    Evaluation Reassessment  Reassessment   Flexion 60 ° 55° °   Extension 23 ° 34° °   Side bending Right 26 ° 43° °   Side bending Left 30 ° 45° °   Rotation Right 50 ° 50° °   Rotation Left 30 ° 50° °     Range of Motion - Lumbar         ROM Loss ROM Loss ROM Loss   Flexion moderate loss Min loss     Extension minimal loss Min loss    Side bending Right moderate loss Min loss    Side bending Left major loss Min loss    Rotation Right minimal loss WFL    Rotation Left major loss Min loss      Strength Testing   Evaluation Reassessment Reassessment   Motion Tested         Lower Extremity R L R L R L   Hip Flexion 4/5 4-/5 4/5 4/5     Hip IR 4+/5 4/5 NT NT     Hip ER 4-/5 4-/5 4/5 4/5     Knee Extension 4+/5 4+/5 5/5 5/5     Knee Flexion 4/5 4/5 4+/5 4+/5       Functional Testing     Evaluation Reassessment  Reassessment   Timed Up and Go 11.9 sec 7.97 sec sec   5 Time Sit to Stand w/out UE 23.3 sec 15.97 sec sec   Single Limb Stance R LE 3.3 sec 10.01 sec sec   Single Limb Stance L LE 20.6 sec 12.95 sec sec   2 Minute Walk Test 484 feet 596 ft = 181.66 m feet   6 Minute Walk Test 1425 feet 1844 ft = 562.05 m feet   Self Selected Walking Speed 1.21 m/sec 1.56 m/sec  (1.51 m/sec at 2 min) m/sec     GAIT:  Assistive Device used: none  Level of Assistance: independent  Patient displays the following gait deviations:  decreased step length and decreased weight shift.     Dec L knee     Minimum standards/norms:    TUG:  < 13.5 seconds/ Males 7.3 sec, Females 8.1 sec  SLS:  26-29 sec  Ages 20-69  Self Selected Walking Speed:  .4-.8m/sec  Limited community ambulator                                                   .8- 1.2  Community " ambulator                                                    1.2 m/sec and above  Able to safely cross streets        Limitation/Restriction for FOTO Hip Survey    Therapist reviewed FOTO scores for Chinyere Crockett on 3/31/2022.   FOTO documents entered into EPIC - see Media section.    Limitation Score: 54%  Visit 9:  28%     Goal:  43%            TREATMENT     Chinyere received the Individualized Treatments listed below:      Therapeutic exercises to develop strength, endurance, ROM, flexibility, posture and core stabilization for 75 minutes including:     PT reassessment (see above)      Peripheral muscle strengthening which included 1-2 sets of 10-20 repetitions at a slow, controlled 5-7 second per rep pace focused on strengthening supporting musculature for improved body mechanics & functional mobility.  Patient & therapist focused on proper form during treatment to ensure optimal strengthening of each targeted muscle group. Patients are instructed to keep sets/reps with proper load to stay at 3-5 out of 10 on the provided modified Doretha exertion scale.        BATCA Machine Exercises Weight (lbs) Sets Repetitions Doretha Exertion Scale Rating   Leg Extension  22.5 1 20 3   Hamstring Curls 25 1 20 3   Chest Press       Pec Fly        Lat Pull Down  25 1 20 3   Mid Row  25 1 20 3   Bicep Bar Curls           Standing Tricep Extension           Single Leg Press  R/L                 PATIENT EDUCATION AND HOME EXERCISES     Education provided:       Written Home Exercises Provided: Patient instructed to cont prior HEP. Exercises were reviewed and Chinyere was able to demonstrate them prior to the end of the session.  Chinyere demonstrated good  understanding of the education provided. See EMR under Patient Instructions for information provided during therapy sessions.    ASSESSMENT   Karma /c good participation in FRP programming and demo good intrinsic motivation /c arrival to tx sessions by walking 15 - 20 min each way.  As a result, amb tolerance /c sig increase per 6MWT and eclipse LTG.  Pt subjective report also note improved functional mobility leading to improved quality time /c partner and inc activity tolerance including ascend/descend steps.  This is mirrored in pt FOTO score exceeding established goal.  Pt can perseverate on R shoulder sx presentation inhibiting her RUE mobility and strength.  However, pt applying FRP principles of activity and environmental modifications so as not to limit her overall functionality in kitchen indicate assimilation of FRP programming.  Pt also unclear on plans to cont resistance training in regular intervals, therefore not meeting STG, and will need cont instruction on body weight exercises to ensure follow through exercises /c some resistance component.             Pt prognosis is Good.      Pt will continue to benefit from skilled outpatient physical therapy to address the deficits listed in the problem list box on initial evaluation, provide pt/family education and to maximize pt's level of independence in the home and community environment.      Pt's spiritual, cultural and educational needs considered and pt agreeable to plan of care and goals.     Anticipated barriers to physical therapy: chronic nature of issues, psychosocial concerns, rotator cuff tear     GOALS: Pt is in agreement with the following goals:        Goal Progress Towards Goal   Short Term: In 3 weeks, pt will:     Verbalize & demonstrate good understanding of resisted training with 3-1-3 second rep for eezaspkwfy-gsomxplxk-xrbfdwhwf contraction to encourage full muscle recruitment for strength & endurance Progress towards goal: MET 03/31/22   Verbalize & demonstrate good understanding of home stretch routine to improve AROM, help decrease pain & improve mobility Progress towards goal: MET 03/31/22   Demonstrate proper supine or seated diaphragmatic breathing with decreased chest excursion & emphasis on full abdominal  excursion & increased expiration time to promote muscle relaxation & increased parasympathetic activity to improve patient tolerance to activities GOAL MET 3/21/2022   Demonstrate proper static standing posture in frontal & sagittal plane per PT visual assessment to decrease postural strain in ADLs Progress towards goal: PROGRESSING 03/31/22   Demonstrate good recall of names of resisted exercises w/ minimal to moderate verbal cues to promote independence w/ exercise at the end of the program Progress towards goal: PROGRESSING 03/31/22   Verbalize plan for continuing resisted exercises w/ specific location (i.e. commercial gym, home, community fitness center) indicating preference for machine-based or free-weight exercises to incorporate all major muscle groups to maintain & progress therapeutic functional improvements Progress towards goal: PROGRESSING 03/31/22         Long Term: In 8 weeks, pt will:     Verbalize good understanding of activities planning/scheduling (stretching, mindfulness, resisted training, & cardio) prescribed by PT/OT following the end of the program to sustain & progress functional improvements Progress towards goal: PROGRESSING 03/31/22   Be independent w/ selected resisted training w/ minimal to no cuing while performing to continue w/ resisted strengthening independently at the end of the program Progress towards goal: PROGRESSING 03/31/22   Improve 2 Minute Walk Test (MWT) to 550 feet and 6 MWT to 1650 feet or greater to improve gait speed and mobility Progress towards goal: MET 03/31/22   Perform single leg stance 20 seconds or greater bilaterally to improve safety and balance in ADLs Progress towards goal: PROGRESSING 03/31/22   Demonstrate Timed Up & Go (with appropriate assistive device, if necessary) of 9 seconds or less to decrease fall risk and improve functional mobility Progress towards goal: MET 03/31/22   Demonstrate 5 time sit to stand test without upper extremity assist to  12 sec or less to improve general mobility and decrease fall risk Progress towards goal: PROGRESSING 03/31/22   Score 43 % or less on Hip FOTO to indicate significant functional improvements in impaired functions secondary to low back pain Progress towards goal: MET 03/31/22             PLAN   Plan of care Certification: 3/31/2022 to 5/6/2022    Outpatient Physical Therapy 3 times weekly for 2 weeks to include the following interventions: Cervical/Lumbar Traction, Electrical Stimulation per PT, Gait Training, Manual Therapy, Moist Heat/ Ice, Neuromuscular Re-ed, Patient Education, Therapeutic Activities and Therapeutic Exercise.         Following this, pt to go on hold for minimum of 3 weeks to attempt independence w/ HEP & PT activities, then return for reassessment.       Zeus Chna, PT, DPT

## 2022-04-04 ENCOUNTER — CLINICAL SUPPORT (OUTPATIENT)
Dept: REHABILITATION | Facility: OTHER | Age: 47
End: 2022-04-04
Payer: OTHER GOVERNMENT

## 2022-04-04 ENCOUNTER — OFFICE VISIT (OUTPATIENT)
Dept: PSYCHIATRY | Facility: OTHER | Age: 47
End: 2022-04-04
Payer: OTHER GOVERNMENT

## 2022-04-04 DIAGNOSIS — Z74.09 IMPAIRED FUNCTIONAL MOBILITY AND ACTIVITY TOLERANCE: Primary | ICD-10-CM

## 2022-04-04 DIAGNOSIS — Z74.09 IMPAIRED FUNCTIONAL MOBILITY, BALANCE, GAIT, AND ENDURANCE: ICD-10-CM

## 2022-04-04 DIAGNOSIS — Z78.9 DIFFICULTY WITH ACTIVITIES OF DAILY LIVING: ICD-10-CM

## 2022-04-04 DIAGNOSIS — F41.9 ANXIETY: Primary | ICD-10-CM

## 2022-04-04 DIAGNOSIS — Z78.9 ALTERATION IN INSTRUMENTAL ACTIVITIES OF DAILY LIVING (IADL): ICD-10-CM

## 2022-04-04 DIAGNOSIS — F40.298 FEAR OF PAIN: Primary | ICD-10-CM

## 2022-04-04 PROCEDURE — 97110 THERAPEUTIC EXERCISES: CPT

## 2022-04-04 PROCEDURE — 90853 PR GROUP PSYCHOTHERAPY: ICD-10-PCS | Mod: ,,, | Performed by: SOCIAL WORKER

## 2022-04-04 PROCEDURE — 97530 THERAPEUTIC ACTIVITIES: CPT

## 2022-04-04 PROCEDURE — 90853 GROUP PSYCHOTHERAPY: CPT | Mod: ,,, | Performed by: SOCIAL WORKER

## 2022-04-04 NOTE — PATIENT INSTRUCTIONS
[facility], O[facility]  HOME EXERCISE PROGRAM      Lateral Epicondylitis Conservative Management    Joint Protection:  Use larger joints and muscles when possible (resting objects in crook of elbow)  Stop activities before the point of discomfort  Avoid activities that put strain on painful or stiff joints  Avoid a tight or prolonged grasp on objects  Avoid any lifting or gripping with elbow in extension  If you must lift, lift with elbows bent at side, object close to you, and hands turned palm up.  Balance Rest and Activity:  Take frequent, short breaks to stretch  Avoid staying in one position for a long time  Alternate heavy and light activities  Eliminate unnecessary activities  Reduce the effort:  Avoid excessive loads. Dont be afraid to ask for help. Use carts and tabletops to make tasks easier.  Keep items nearby (such as keyboard)  Helpful Tips: slide objects; use your palms instead of fingers, keeps heavy items close to your body, use larger handles, pens and pencils, look for lightweight options, use wheels or carts to roll objects

## 2022-04-04 NOTE — PROGRESS NOTES
"    [facility]    Functional Restoration Program  Physical Therapy Treatment Note  FRP Day 10    Name: Chinyere Crockett  MRN:[id number]      Therapy Diagnosis:   Encounter Diagnoses   Name Primary?    Fear of pain Yes    Impaired functional mobility, balance, gait, and endurance      Physician: Aurora Xie NP    Date: 4/4/2022    Physician Orders: PT Eval and Treat   Medical Diagnosis from Referral:   Z78.9 (ICD-10-CM) - Decreased activities of daily living (ADL)   Z74.09 (ICD-10-CM) - Impaired mobility and endurance   G89.4 (ICD-10-CM) - Chronic pain disorder         Evaluation Date: 2/25/2022  Authorization Period Expiration: 12/31/22  Plan of Care Expiration: 5/6/2022   Visit # / Visits authorized: 11 / 20  FOTO: 2/ 3      Precautions: Standard and Fall     Time In:    1:40 pm  Time Out:  2:50 pm  Total Appointment Time (timed & untimed codes): 70 minutes      SUBJECTIVE     Karma report walking 30 in walk both Sat and Sunday.  Pt report no inc discomfort and no pain is what she expected considering the BLE strengthening she has been doing.  Pt also worked in garden including moving pavers and sweeping /s inc shoulder discomfort and pt note being happily surprised about that.      However, pt arrives today stating "I am having a bad mental health day" and following up that she is struggling /c her goal making.  Pt request inc physical challenge to dec her "having to think".          Patient's FRP goals: be able to function better around the house, maybe get back to working, looking forward to the "synchronicity of physical and mental health"       Current 4/10  Location: R ld, B hips (L worse),     OBJECTIVE     Objective Measures updated at progress report unless specified.          Limitation/Restriction for FOTO Hip Survey     Therapist reviewed FOTO scores for Chinyere Crockett on 3/31/2022.   FOTO documents entered into EPIC - see Media " section.     Limitation Score: 54%  Visit 9:  28%      Goal:  43%                    Treatment       Chinyere received the Individualized Treatments listed below:     Therapeutic exercises to develop strength, endurance, ROM, flexibility, posture and core stabilization for 70 minutes including:    Full body stretch w/ 3-10 sec hold technique &/or 3-5 repetition technique on all of the following:   Cervical flx/ext   Cervical sidebending   Cervical rotation   Cervical protraction/retraction   Shld rolls   Shld depression/elevation   Scapular retraction/protraction/scaption   Posterior shld stretch (cross arm)   Shld ER stretch (chicken wing)   Elbow flx/ext   Forearm supination/pronation   Wrist flx/ext   Open/close hands/fingers   Seated trunk rotations   Seated trunk/thoracic ext/flx   Seated marches & knee to chest   Seated knee flx/ext   Seated hip ER/piriformis stretch   Seated hamstring stretch   Seated toe raise to heel raise    Seated ankle circles each way   Standing lumbar extensions   Standing lateral leaning stretch   Standing quad stretch (UE support for balance)      Peripheral muscle strengthening which included 1-2 sets of 10-20 repetitions at a slow, controlled 5-7 second per rep pace focused on strengthening supporting musculature for improved body mechanics & functional mobility.  Patient & therapist focused on proper form during treatment to ensure optimal strengthening of each targeted muscle group. Patients are instructed to keep sets/reps with proper load to stay at 3-5 out of 10 on the provided modified Doretha exertion scale.       BATCA Machine Exercises Weight (lbs) Sets Repetitions Doretha Exertion Scale Rating   Leg Extension  25 1 20 3   Hamstring Curls 27.5 1 20 3   Chest Press (Wall Press) 22.5 1 20 4   Pec Fly (Supine) 22.5 1 20 4   Lat Pull Down       Mid Row - Prone        Bicep Bar Curls       Standing Tricep Extension       Single Leg Press  R/L  40 1 20 2       UBE  "seated   Level 5, > 4 speed 5 min RPE 4/10     Fitter board - hard setting, 20 tilt bouts    Fwd/Bwd    Trial 1- 2 hand finger tip fade to 1 hand      Trail 2 - EC, 2 hand finger tip, pt note, dec control /c cue for inc tilt pace   Pt ed on use of tennis ball to plantar fascitis    Lat    Trial 1- 1 hand finger tip    Trial 2- 1 hand finger tip, EC      Ball on wall  - Roll   In 90 flex - LUE x 20 CW, CCW,  RUE x 20 CW, CCW     In 90 horizAB -  LUE x 20 CW, CCW,  RUE x 20 CW, CCW            Patient Education and Home Exercises     Home Exercises Provided and Patient Education Provided     Education provided:     Written Home Exercises Provided: Patient instructed to cont prior HEP. Exercises were reviewed and Chinyere was able to demonstrate them prior to the end of the session.  Chinyere demonstrated good  understanding of the education provided. See EMR under Patient Instructions for information provided during therapy sessions    ASSESSMENT     Chinyere display inc motivation today resulting in seeming inc activity tolerance including tolerating inc weight /c resistance training.  Pt note inc challenge at UBE but stating "I like that one". Recommend return to UBE for cardiovascular and UE strength challenge.   At each exercise, pt advised to keep in mind RPE to avoid "boom/bust" cycle.  F/u recommended to assess sx response.  Pt psychological presentation offer initial distraction and concern as pt exhibit frustration /c the at home goal making process and limitation from.  However, pt /c good awareness of feelings and note requesting from P  appointments to address her feelings that may limit her progress.  PT encouraged pt to further explore her psychosocial challenges thru counseling.           Chinyere Is progressing well towards her goals.   Pt prognosis is Good.     Pt will continue to benefit from skilled outpatient physical therapy to address the deficits listed in the problem list box on initial " evaluation, provide pt/family education and to maximize pt's level of independence in the home and community environment.     Pt's spiritual, cultural and educational needs considered and pt agreeable to plan of care and goals.     Anticipated barriers to physical therapy: chronic nature of issues, psychosocial concerns, rotator cuff tear    GOALS: Pt is in agreement with the following goals:        Goal Progress Towards Goal   Short Term: In 3 weeks, pt will:     Verbalize & demonstrate good understanding of resisted training with 3-1-3 second rep for hkvsjzbffv-ptbbgtqyq-wwzrxglkx contraction to encourage full muscle recruitment for strength & endurance Progress towards goal: MET 03/31/22   Verbalize & demonstrate good understanding of home stretch routine to improve AROM, help decrease pain & improve mobility Progress towards goal: MET 03/31/22   Demonstrate proper supine or seated diaphragmatic breathing with decreased chest excursion & emphasis on full abdominal excursion & increased expiration time to promote muscle relaxation & increased parasympathetic activity to improve patient tolerance to activities GOAL MET 3/21/2022   Demonstrate proper static standing posture in frontal & sagittal plane per PT visual assessment to decrease postural strain in ADLs Progress towards goal: PROGRESSING 03/31/22   Demonstrate good recall of names of resisted exercises w/ minimal to moderate verbal cues to promote independence w/ exercise at the end of the program Progress towards goal: PROGRESSING 03/31/22   Verbalize plan for continuing resisted exercises w/ specific location (i.e. commercial gym, home, community fitness center) indicating preference for machine-based or free-weight exercises to incorporate all major muscle groups to maintain & progress therapeutic functional improvements Progress towards goal: PROGRESSING 03/31/22         Long Term: In 8 weeks, pt will:     Verbalize good understanding of activities  planning/scheduling (stretching, mindfulness, resisted training, & cardio) prescribed by PT/OT following the end of the program to sustain & progress functional improvements Progress towards goal: PROGRESSING 03/31/22   Be independent w/ selected resisted training w/ minimal to no cuing while performing to continue w/ resisted strengthening independently at the end of the program Progress towards goal: PROGRESSING 03/31/22   Improve 2 Minute Walk Test (MWT) to 550 feet and 6 MWT to 1650 feet or greater to improve gait speed and mobility Progress towards goal: MET 03/31/22   Perform single leg stance 20 seconds or greater bilaterally to improve safety and balance in ADLs Progress towards goal: PROGRESSING 03/31/22   Demonstrate Timed Up & Go (with appropriate assistive device, if necessary) of 9 seconds or less to decrease fall risk and improve functional mobility Progress towards goal: MET 03/31/22   Demonstrate 5 time sit to stand test without upper extremity assist to 12 sec or less to improve general mobility and decrease fall risk Progress towards goal: PROGRESSING 03/31/22   Score 43 % or less on Hip FOTO to indicate significant functional improvements in impaired functions secondary to low back pain Progress towards goal: MET 03/31/22             PLAN     Continue PT per MICAH Chan, PT, DPT

## 2022-04-04 NOTE — PROGRESS NOTES
"[facility]   Functional Restoration Program  Occupational Therapy Progress Report  [facility] Day 10     Name: Chinyere Crockett  Medical Record Number: [id number]    Therapy Diagnosis:   Encounter Diagnoses   Name Primary?    Impaired functional mobility and activity tolerance Yes    Difficulty with activities of daily living     Alteration in instrumental activities of daily living (IADL)      Physician: Aurora Xie NP    Visit Date: 4/4/2022    Physician Orders: OT Eval and Treat  Medical Diagnosis: Chronic pain disorder  Evaluation Date: 2/25/2022  Insurance Authorization Period Expiration: 12/31/2022  Plan of Care Certification Period: 6/17/2022 (UPDATED 3/31/22)  Visit # / Visits authorized: 11 / 20 (including eval)   FOTO: evaluation: 52% limitation  FOTO: reassessment: 47% limitation     Precautions:  Standard and Fall    Time In: 14:50  Time Out: 16:07  Total Billable Time: 75 minutes    Subjective     Chinyere reports "we did some things over the weekend for the birthday. Today, mentally, it's just a bad day. Things get triggered in this program and I need to get it set up to get some therapy". "I haven't completed cleaning the oven".         Pain:   Current: 3/ 10   Location: R shoulder and shoulder blade.    TREATMENT      Chinyere participated in dynamic functional therapeutic activities to improve functional performance for 75 minutes, including:    Individualized Treatment: Increased resistance levels of functional conditioning exercises according to personal recovery goals. Activities include: Dynamic reaching, finmk-mg-gsiqg lifting, sustained standing activity, maximal lifting, 2-handed carry, sustained seated tasks, push and pull, waist-to-shoulder lift, box/container carry, endurance training. Daily functional conditioning progress is documented on a flow sheet which is available upon request.      Pt completed functional lifting, floor to waist, 10 reps x 20# lbs " with exertion rated sort of hard (4 /10).    Chinyere participated in endurance activity using nustep for 10 minutes, starting at level 3 to improve functional endurance and progress towards increased MET level for improved community mobility and participation, rated as moderate (3/10), Chinyere re-educated on how to add mindfulness component to activity and how to pace appropriately by completed self check ins and grading activity as needed, with goal to reach moderate to sort of hard by end of activity. Completed 989 steps.    Pt participated in functional lift waist to shoulder, 15 reps x  12# # with a rating of moderate (3/10). Pt educated on standing posture, core awareness, keeping shoulders depressed and retracted, stepping to place > reaching to place, keeping weight close to center of gravity and pacing as needed. Reviewed massage technique for discomfort in elbow; provided education regarding stretches for this region, and use of heat prior to stretches, to promote tissue healing.    Pt completed maximal lift 5 reps with 30 #lbs, rated as sort of hard (4/10), continued education focused on neutral spine positioning and pushing through heels for safety and activation of correct muscles.    Pt completed dynamic reaching in various functional ranges, with continued focus on hip rotation/weight shifting, 8 reps x 1# lbs, rated as sort of hard (4/10) exertion; educated Chinyere about quality over quantity, as with increasing reps she has a tendency to protract shoulder blades.     In supine, performed diapragmatic breathing x 10 reps, then performed scapular pro/retraction and reverse pendulums, CW and CCW.   In seated, reverse quadriped position focusing on scapular pro/retraction as well as weight shifting on and off UE using hip movements.    Patient Education and Home Exercises     Education provided:   -Ms. Crockett received education regarding proper posture and body mechanics. She received education regarding  anticipated muscular soreness following lift testing and strategies for management were reviewed with patient including stretching, using ice, scheduled rest.  -Additional Education provided: rosy scale, pain cycle, z-lie, functional lifting mechanics, pursed lip and diaphragmatic breathing techniques  - energy banking  - stretching and conservative management for tennis elbow     Written Home Exercises Provided: yes, and binder for program received.   Exercises were reviewed and Chinyere was able to demonstrate them prior to the end of the session.    Chinyere demonstrated good understanding of the education provided.     ASSESSMENT   Chinyere presents to clinic emotionally distraught, stating that FRP program triggers certain emotions, and that she is moving forward to finding a therapist. Despite this, she was able to participate in activities presented to her, and was open to education and feedback. Continues to have difficulty maintaining scapular retraction while reaching and lifting over shoulder height; verbalizes understanding that quality is more important that quantity. Overall, she continues to progress towards personal goals.      Chinyere is unable to fully participate in work, recreational, and home-based activities because of decreased functional strength, decreased  AROM, decreased endurance, limited positional tolerance, fear of re-injury, and fear of increased pain. She will benefit from participating in a conditioning  program designed  to increase functional strength, flexibility, and endurance in order to meet functional goals.      Chinyere's prognosis is Good due to good personal goals, good motivation and good response to all feedback and education. Pt will continue to benefit from skilled outpatient occupational therapy to address the deficits listed in the problem list box on initial evaluation, provide pt/family education and to maximize pt's level of independence in the home and community  environment.    Pt's spiritual, cultural and educational needs considered and pt agreeable to plan of care and goals.     Anticipated barriers to occupational therapy: none identified.    FRP Goals: While working towards the long-term (3 month) functional goals listed above, Chinyere will accomplish the following short term goals during the 5-week intensive rehabilitation program. Chinyere will:      1. Develop a viable return to work plan. ---------------------- progressing 3/31/22  2. Demonstrate physical capacities consistent with a medium work demand level. ---------------------- progressing 3/31/22  3. Demonstrate increased functional strength by lifting 20 lbs floor to waist and 20 lbs waist to shoulder in order to meet demand of work/home routine. ---------------------- MET 3/31/22  4. Increase her positional tolerance to the level needed for work and home activities. ---------------------- progressing 3/31/22  5. Implement self-care strategies during the program and at home to control pain. ---------------------- progressing 3/31/22  6.   Patient will demonstrate use of mindfulness, stress management, and coping  strategies for improved participation in daily routine.   7.   Will increase water intake to 7 x 16oz water bottles daily. 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     Find part-time work (will do so only after completion of FRP)     Recreational:  Joining walking [place]   - walking tolerance     Gardening    Crafting    Daily Living:  Cooking      Cleaning     Hair care      Upper body dressing (bras).          Medium Physical  Demand Level      0/0         0/5               4/3     2/1     3/0       5/5     4/3     3/3     5/2  (Currently using sport bras)        not rated         not rated             not rated     3 / 6      0 / 0       4 / 5 6 / 6 7 / 7              PLAN     UPDATED plan of care certification 3/31/2022 to 6/17/2022    Outpatient occupational therapy, 3 x/wks for 2 additional weeks. Following this, pt to go on hold for a minimum of 7 weeks to attempt independence w/ Home Activity Plan (HAP) & PT activities, and will then return for reassessment..    CHRIS Richey, OTR/L, CEAS-I  4/4/2022

## 2022-04-05 NOTE — PROGRESS NOTES
"    [facility]    Functional Restoration Program  Physical Therapy Treatment Note  FRP Day 11    Name: Chinyere Crockett  MRN:[id number]      Therapy Diagnosis:   Encounter Diagnoses   Name Primary?    Fear of pain Yes    Impaired functional mobility, balance, gait, and endurance      Physician: Aurora Xie NP    Date: 4/6/2022    Physician Orders: PT Eval and Treat   Medical Diagnosis from Referral:   Z78.9 (ICD-10-CM) - Decreased activities of daily living (ADL)   Z74.09 (ICD-10-CM) - Impaired mobility and endurance   G89.4 (ICD-10-CM) - Chronic pain disorder         Evaluation Date: 2/25/2022  Authorization Period Expiration: 12/31/22  Plan of Care Expiration: 5/6/2022   Visit # / Visits authorized: 12 / 20  FOTO: 2/ 3      Precautions: Standard and Fall     Time In:    1:30 pm  Time Out: 2:45 pm  Total Appointment Time (timed & untimed codes): 75 minutes      SUBJECTIVE     Chinyere report poor sleep over the last 2 nights leading to inc pain presentation.  Pt states she has always realized a connection between life stressors and pain presentation and that her attempts at mindfulness activities for stress relief are of no assistance.   Pt reports her usual pattern is to not eat breakfast and sometimes not eat until 1pm and this includes days /c FRP.  Pt verbalize that she does not plan to change her AM eating habits.     Pt report tolerating last tx fair noting inc pain when returning home.  However, pt note amb home /p tx has become easier.         Patient's FRP goals: be able to function better around the house, maybe get back to working, looking forward to the "synchronicity of physical and mental health"       Current 4/10  Location: R ld, B hips (L worse),     OBJECTIVE     Objective Measures updated at progress report unless specified.          Limitation/Restriction for FOTO Hip Survey     Therapist reviewed FOTO scores for Chinyere Crockett on 3/31/2022. "   FOTO documents entered into [facility] - see Media section.     Limitation Score: 54%  Visit 9:  28%      Goal:  43%                    Treatment       Chinyere received the Individualized Treatments listed below:     Therapeutic exercises to develop strength, endurance, ROM, flexibility, posture and core stabilization for 75 minutes including:    Full body stretch w/ 3-10 sec hold technique &/or 3-5 repetition technique on all of the following:   Cervical flx/ext   Cervical sidebending   Cervical rotation   Cervical protraction/retraction   Shld rolls   Shld depression/elevation   Scapular retraction/protraction/scaption   Posterior shld stretch (cross arm)   Shld ER stretch (chicken wing)   Elbow flx/ext   Forearm supination/pronation   Wrist flx/ext   Open/close hands/fingers   Seated trunk rotations   Seated trunk/thoracic ext/flx   Seated marches & knee to chest   Seated knee flx/ext   Seated hip ER/piriformis stretch   Seated hamstring stretch   Seated toe raise to heel raise    Seated ankle circles each way   Standing lumbar extensions   Standing lateral leaning stretch   Standing quad stretch (UE support for balance)        Peripheral muscle strengthening which included 1-2 sets of 10-20 repetitions at a slow, controlled 5-7 second per rep pace focused on strengthening supporting musculature for improved body mechanics & functional mobility.  Patient & therapist focused on proper form during treatment to ensure optimal strengthening of each targeted muscle group. Patients are instructed to keep sets/reps with proper load to stay at 3-5 out of 10 on the provided modified Doretha exertion scale.       BATCA Machine Exercises Weight (lbs) Sets Repetitions Doretha Exertion Scale Rating   Leg Extension        Hamstring Curls       Chest Press (Wall Press)       Pec Fly (Supine)       Lat Pull Down       Mid Row       Bicep Bar Curls 10 1 20 4   Standing Tricep Extension 15 1 20 4   Single Leg Press   "R/L  45 1 20 4       Standing   Ball TrP self massage on R mid/lower trap, erector spinae of torso    Supine:   LTR on ball x 20    DKTC on ball x 10 5 sec hold    Open books x 10 cue for hand behind head to dec shoulder ext     UBE seated   Level 5, > 4 speed 4 min fwd/4 min bwd  RPE 5 / 10         Patient Education and Home Exercises     Home Exercises Provided and Patient Education Provided     Education provided:     Written Home Exercises Provided: Patient instructed to cont prior HEP. Exercises were reviewed and Chinyere was able to demonstrate them prior to the end of the session.  Chinyere demonstrated good  understanding of the education provided. See EMR under Patient Instructions for information provided during therapy sessions    ASSESSMENT     Chinyere arrive to tx in somewhat agitated state noting poor sleep quality and inc at home stressors contributing to inc pain presentation.  Pt /c dec activity tolerance today.  Supine position performed for diaphragmatic breathing in attempt to calm pt, however, pt immediately note R scapular "spasms".  Pt ed on self STM for TrP release. Pt note uses ball rolling at home providing min relief, therefore, pt encouraged to perform sustained pressure over triggered area.  After sustained TrP tx, pt able to return to supine position including completing spinal mobility exercises /c no further discomfort.  Recommend f/u to assess possible improvement in sx self management for inc activity tolerance.      Regarding AM eating habits, pt advised consistent and regular food intake is essential to maintain appropriate caloric and nutrient levels.  Pt /c unwillingness to change stating she is making (physical measure) improvements so sees no reason to change.  Pt will need f/u for clarification of recommendation from FRP programming.       Chinyere Is progressing well towards her goals.   Pt prognosis is Good.     Pt will continue to benefit from skilled outpatient physical therapy to " address the deficits listed in the problem list box on initial evaluation, provide pt/family education and to maximize pt's level of independence in the home and community environment.     Pt's spiritual, cultural and educational needs considered and pt agreeable to plan of care and goals.     Anticipated barriers to physical therapy: chronic nature of issues, psychosocial concerns, rotator cuff tear    GOALS: Pt is in agreement with the following goals:        Goal Progress Towards Goal   Short Term: In 3 weeks, pt will:     Verbalize & demonstrate good understanding of resisted training with 3-1-3 second rep for uzicgtnldp-nwkmoierw-mnegobmxm contraction to encourage full muscle recruitment for strength & endurance Progress towards goal: MET 03/31/22   Verbalize & demonstrate good understanding of home stretch routine to improve AROM, help decrease pain & improve mobility Progress towards goal: MET 03/31/22   Demonstrate proper supine or seated diaphragmatic breathing with decreased chest excursion & emphasis on full abdominal excursion & increased expiration time to promote muscle relaxation & increased parasympathetic activity to improve patient tolerance to activities GOAL MET 3/21/2022   Demonstrate proper static standing posture in frontal & sagittal plane per PT visual assessment to decrease postural strain in ADLs Progress towards goal: PROGRESSING 03/31/22   Demonstrate good recall of names of resisted exercises w/ minimal to moderate verbal cues to promote independence w/ exercise at the end of the program Progress towards goal: PROGRESSING 03/31/22   Verbalize plan for continuing resisted exercises w/ specific location (i.e. commercial gym, home, community fitness center) indicating preference for machine-based or free-weight exercises to incorporate all major muscle groups to maintain & progress therapeutic functional improvements Progress towards goal: PROGRESSING 03/31/22         Long Term: In 8  weeks, pt will:     Verbalize good understanding of activities planning/scheduling (stretching, mindfulness, resisted training, & cardio) prescribed by PT/OT following the end of the program to sustain & progress functional improvements Progress towards goal: PROGRESSING 03/31/22   Be independent w/ selected resisted training w/ minimal to no cuing while performing to continue w/ resisted strengthening independently at the end of the program Progress towards goal: PROGRESSING 03/31/22   Improve 2 Minute Walk Test (MWT) to 550 feet and 6 MWT to 1650 feet or greater to improve gait speed and mobility Progress towards goal: MET 03/31/22   Perform single leg stance 20 seconds or greater bilaterally to improve safety and balance in ADLs Progress towards goal: PROGRESSING 03/31/22   Demonstrate Timed Up & Go (with appropriate assistive device, if necessary) of 9 seconds or less to decrease fall risk and improve functional mobility Progress towards goal: MET 03/31/22   Demonstrate 5 time sit to stand test without upper extremity assist to 12 sec or less to improve general mobility and decrease fall risk Progress towards goal: PROGRESSING 03/31/22   Score 43 % or less on Hip FOTO to indicate significant functional improvements in impaired functions secondary to low back pain Progress towards goal: MET 03/31/22             PLAN     Continue PT per MICAH Chan, PT, DPT

## 2022-04-05 NOTE — PROGRESS NOTES
Group Psychotherapy    Site: [facility]    Clinical status of patient: Outpatient    3/28/2022    Length of service:[id number]    Referred by: Functional Restoration Program     Number of patients in attendance: 5    Target symptoms: Chronic Pain Management     Patient's response to intervention:  The patient's response to intervention is active listening.    Progress toward goals and other mental status changes:  The patient's progress toward goals is good.    Plan: group psychotherapy    Topics Covered: Pt attended CBT for Chronic Pain as part of her participation in Functional Restoration. Topics discussed today included a review of their weekend and what they did differently based upon what they have learned thus far. Pts discussed pain and how that impacts mental health (anxiety, depression and other mood disorders) as well as motivation or ability to complete tasks. We discussed realistic goal setting, will f/u in 2 days.

## 2022-04-06 ENCOUNTER — CLINICAL SUPPORT (OUTPATIENT)
Dept: REHABILITATION | Facility: OTHER | Age: 47
End: 2022-04-06
Payer: OTHER GOVERNMENT

## 2022-04-06 ENCOUNTER — OFFICE VISIT (OUTPATIENT)
Dept: PSYCHIATRY | Facility: OTHER | Age: 47
End: 2022-04-06
Payer: OTHER GOVERNMENT

## 2022-04-06 ENCOUNTER — TELEPHONE (OUTPATIENT)
Dept: NEUROLOGY | Facility: CLINIC | Age: 47
End: 2022-04-06
Payer: OTHER GOVERNMENT

## 2022-04-06 DIAGNOSIS — Z78.9 DIFFICULTY WITH ACTIVITIES OF DAILY LIVING: ICD-10-CM

## 2022-04-06 DIAGNOSIS — Z74.09 IMPAIRED FUNCTIONAL MOBILITY AND ACTIVITY TOLERANCE: Primary | ICD-10-CM

## 2022-04-06 DIAGNOSIS — Z74.09 IMPAIRED FUNCTIONAL MOBILITY, BALANCE, GAIT, AND ENDURANCE: ICD-10-CM

## 2022-04-06 DIAGNOSIS — F40.298 FEAR OF PAIN: Primary | ICD-10-CM

## 2022-04-06 DIAGNOSIS — F41.9 ANXIETY: Primary | ICD-10-CM

## 2022-04-06 DIAGNOSIS — Z78.9 ALTERATION IN INSTRUMENTAL ACTIVITIES OF DAILY LIVING (IADL): ICD-10-CM

## 2022-04-06 PROCEDURE — 90853 GROUP PSYCHOTHERAPY: CPT | Mod: ,,, | Performed by: SOCIAL WORKER

## 2022-04-06 PROCEDURE — 97110 THERAPEUTIC EXERCISES: CPT

## 2022-04-06 PROCEDURE — 90853 PR GROUP PSYCHOTHERAPY: ICD-10-PCS | Mod: ,,, | Performed by: SOCIAL WORKER

## 2022-04-06 PROCEDURE — 97530 THERAPEUTIC ACTIVITIES: CPT

## 2022-04-06 NOTE — TELEPHONE ENCOUNTER
----- Message from Heather Goldberg sent at 4/6/2022 10:21 AM CDT -----  Regarding: speak with nurse  Contact: patient  [phone]    please call patient ASAP said this is her fourth or fifth call to the office concerning migraine medication the doctor prescribed for her was given a coupon she can not use would like the doctor to call into the pharmacy so she can get it and start taking patient said she is very upset waiting on a call back today thanks.

## 2022-04-06 NOTE — PROGRESS NOTES
"[facility]   Functional Restoration Program  Occupational Therapy Progress Report  [facility]     Name: Chinyere Crockett  Medical Record Number: [id number]    Therapy Diagnosis:   Encounter Diagnoses   Name Primary?    Impaired functional mobility and activity tolerance Yes    Difficulty with activities of daily living     Alteration in instrumental activities of daily living (IADL)      Physician: Aurora Xie NP    Visit Date: 4/6/2022    Physician Orders: OT Eval and Treat  Medical Diagnosis: Chronic pain disorder  Evaluation Date: 2/25/2022  Insurance Authorization Period Expiration: 12/31/2022  Plan of Care Certification Period: 6/17/2022 (UPDATED 3/31/22)  Visit # / Visits authorized: 12 / 20 (including eval)   FOTO: evaluation: 52% limitation  FOTO: reassessment: 47% limitation     Precautions:  Standard and Fall    Time In: 2:50 PM  Time Out: 4:00 PM  Total Billable Time: 70 minutes    Subjective     Chinyere reports "I am not feeling good today, I have gotten about 7 hours or less of sleep in the last 2 days".         Pain:   Current: 5 / 10   Location: R shoulder and shoulder blade.    TREATMENT      Chinyere participated in dynamic functional therapeutic activities to improve functional performance for 70 minutes, including:    Individualized Treatment: Increased resistance levels of functional conditioning exercises according to personal recovery goals. Activities include: Dynamic reaching, [id number] lifting, sustained standing activity, maximal lifting, 2-handed carry, sustained seated tasks, push and pull, waist-to-shoulder lift, box/container carry, endurance training. Daily functional conditioning progress is documented on a flow sheet which is available upon request.      Pt completed sustained seated activity at table, x 10 minutes, rated sort of hard (4/10), with focus on weight shifting as needed and postural alignment. While seated pt with tearful " affect discussing fatigue, stress, pain and initiation taken to start talk therapy. Discussed mindfulness techniques outside of deep breathing she could try to calm nervous system (ie: shaking limbs and dancing to expel physical energy). Voiced understanding.     Chinyere participated in endurance activity using treadmill x 10 minutes, starting at 2.5 mph at 5% incline to improve functional endurance and progress towards increased MET level for improved community mobility and participation. Able to progress to 3.3 mph and 6% incline. Rated as hard (5/10), Chinyere re-educated on how to add mindfulness component to activity and how to pace appropriately by completed self check ins and grading activity as needed, with goal to reach moderate to sort of hard by end of activity. Able to carry conversation throughout activity re: frustration with neurologist about medications for seasonal migraines. Discussed frustration of potential hopefulness re: relief if medication works and simultaneous anticipatory dread waiting for onset of migraines. Discussed how to use mindfulness to decrease anticipatory stress. Added focus on pursed lip breathing. With increased SOB post activity and encouraged to complete pursed lip breathing and seated back and shoulder stretches as needed.      Pt completed maximal lift x10 reps with 20 lbs, rated as moderate (3/10), continued education focused on neutral spine positioning and pushing through heels for safety and activation of correct muscles.    Pt participated in functional lift waist to shoulder, 15 reps x  14 lbs with a rating of moderate (3/10). Pt educated on standing posture, core awareness, keeping shoulders depressed and retracted, stepping to place > reaching to place, keeping weight close to center of gravity and pacing as needed.    Pt completed functional lifting, floor to waist, 15 reps x 15 lbs with exertion rated sort of hard (4 /10), focused education on slow and controlled  movement and coordinating movements with breath. Pt noted she used this lift related to moving rocks etc in garden.     Pt completed dynamic reaching in various functional ranges, with continued focus on posture, core awareness, hip rotation/weight shifting, maintaining retraction and depression in shoulders and pacing as needed. Completed x10 reps x 1 lbs, rated as sort of hard (4/10) exertion.  Continues with tendency to protract shoulder blades but able to correct with verbal cues.     Pt completed seated mindfulness diaphragmatic breathing activity [id number] with focus on posture, mechanics of breathing, and benefits re: PNS activation. Pt voiced and demonstrated understanding.     Patient Education and Home Exercises     Education provided:   -Ms. Crockett received education regarding proper posture and body mechanics. She received education regarding anticipated muscular soreness following lift testing and strategies for management were reviewed with patient including stretching, using ice, scheduled rest.  -Additional Education provided: rosy scale, pain cycle, z-lie, functional lifting mechanics, pursed lip and diaphragmatic breathing techniques  - energy banking  - stretching and conservative management for tennis elbow     Written Home Exercises Provided: yes, and binder for program received.   Exercises were reviewed and Chinyere was able to demonstrate them prior to the end of the session.    Chinyere demonstrated good understanding of the education provided.     ASSESSMENT   Chinyere presents with tearful affect and increased fatigue upon arrival to OT session 2/2 increased fatigue, increased stress, increased pain, etc. She openly discussed decision to start talk therapy as well as frustration re: anticipatory stress due to season migraines and difficulty with medication. Despite heightened emotions, good tolerance to session this date. Continues to have difficulty with shoulder pain limiting function but with  good improved body awareness and able to pace more appropriately to minimize increased pain. Overall, she continues to progress towards personal goals.      Chinyere is unable to fully participate in work, recreational, and home-based activities because of decreased functional strength, decreased  AROM, decreased endurance, limited positional tolerance, fear of re-injury, and fear of increased pain. She will benefit from participating in a conditioning  program designed  to increase functional strength, flexibility, and endurance in order to meet functional goals.      Chinyere's prognosis is Good due to good personal goals, good motivation and good response to all feedback and education. Pt will continue to benefit from skilled outpatient occupational therapy to address the deficits listed in the problem list box on initial evaluation, provide pt/family education and to maximize pt's level of independence in the home and community environment.    Pt's spiritual, cultural and educational needs considered and pt agreeable to plan of care and goals.     Anticipated barriers to occupational therapy: none identified.    FRP Goals: While working towards the long-term (3 month) functional goals listed above, Chinyere will accomplish the following short term goals during the 5-week intensive rehabilitation program. Chinyere will:      1. Develop a viable return to work plan. ---------------------- progressing 3/31/22  2. Demonstrate physical capacities consistent with a medium work demand level. ---------------------- progressing 3/31/22  3. Demonstrate increased functional strength by lifting 20 lbs floor to waist and 20 lbs waist to shoulder in order to meet demand of work/home routine. ---------------------- MET 3/31/22  4. Increase her positional tolerance to the level needed for work and home activities. ---------------------- progressing 3/31/22  5. Implement self-care strategies during the program and at home to control pain.  ---------------------- progressing 3/31/22  6.   Patient will demonstrate use of mindfulness, stress management, and coping  strategies for improved participation in daily routine.   7.   Will increase water intake to 7 x 16oz water bottles daily. 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     Find part-time work (will do so only after completion of FRP)     Recreational:  Joining walking [employer] walking tolerance     Gardening    Crafting    Daily Living:  Cooking      Cleaning     Hair care      Upper body dressing (bras).          Medium Physical Demand Level      0/0         0/5               4/3     2/1     3/0       5/5     4/3     3/3     5/2  (Currently using sport bras)        not rated         not rated             not rated     3 / 6      0 / 0       4 / 5      6 / 6 7 / 7              PLAN     UPDATED plan of care certification 3/31/2022 to 6/17/2022    Outpatient occupational therapy, 3 x/wks for 2 additional weeks. Following this, pt to go on hold for a minimum of 7 weeks to attempt independence w/ Home Activity Plan (HAP) & PT activities, and will then return for reassessment..    Leola Villasenor OTR/BEVERLEY  4/6/2022

## 2022-04-06 NOTE — PROGRESS NOTES
"    [facility]    Functional Restoration Program  Physical Therapy Treatment Note  [facility] Day 12    Name: Chinyere Crockett  MRN:[id number]      Therapy Diagnosis:   Encounter Diagnoses   Name Primary?    Fear of pain Yes    Impaired functional mobility, balance, gait, and endurance      Physician: Aurora Xie NP    Date: 4/7/2022    Physician Orders: PT Eval and Treat   Medical Diagnosis from Referral:   Z78.9 (ICD-10-CM) - Decreased activities of daily living (ADL)   Z74.09 (ICD-10-CM) - Impaired mobility and endurance   G89.4 (ICD-10-CM) - Chronic pain disorder         Evaluation Date: 2/25/2022  Authorization Period Expiration: 12/31/22  Plan of Care Expiration: 5/6/2022   Visit # / Visits authorized: 13 / 20  FOTO: 2/ 3      Precautions: Standard and Fall     Time In:    1:30 pm  Time Out:  2:45 pm  Total Appointment Time (timed & untimed codes): 75 minutes      SUBJECTIVE     Chinyere report her at home stressors do cont to alter her body positioning/posture and pt note she finds herself making postural corrections.  Pt note last night taking muscle relaxer to that assisted her sleep quality.       Pt report inc enjoyment /c walks to/from tx since she has noticed inspirational messages on walking path.          Patient's FRP goals: be able to function better around the house, maybe get back to working, looking forward to the "synchronicity of physical and mental health"       Current 4/10  Location: R shld, B hips (L worse),     OBJECTIVE     Objective Measures updated at progress report unless specified.          Limitation/Restriction for FOTO Hip Survey     Therapist reviewed FOTO scores for Chinyere Crockett on 3/31/2022.   FOTO documents entered into [employer] - see Media section.     Limitation Score: 54%  Visit 9:  28%      Goal:  43%                    Treatment       Chinyere received the Individualized Treatments listed below:     Therapeutic exercises to develop " strength, endurance, ROM, flexibility, posture and core stabilization for 75 minutes including:    Full body stretch w/ 3-10 sec hold technique &/or 3-5 repetition technique on all of the following:   Cervical flx/ext   Cervical sidebending   Cervical rotation   Cervical protraction/retraction   Shld rolls   Shld depression/elevation   Scapular retraction/protraction/scaption   Posterior shld stretch (cross arm)   Shld ER stretch (chicken wing)   Elbow flx/ext   Forearm supination/pronation   Wrist flx/ext   Open/close hands/fingers   Seated trunk rotations   Seated trunk/thoracic ext/flx   Seated marches & knee to chest   Seated knee flx/ext   Seated hip ER/piriformis stretch   Seated hamstring stretch   Seated toe raise to heel raise    Seated ankle circles each way   Standing lumbar extensions   Standing lateral leaning stretch   Standing quad stretch (UE support for balance)        Peripheral muscle strengthening which included 1-2 sets of 10-20 repetitions at a slow, controlled 5-7 second per rep pace focused on strengthening supporting musculature for improved body mechanics & functional mobility.  Patient & therapist focused on proper form during treatment to ensure optimal strengthening of each targeted muscle group. Patients are instructed to keep sets/reps with proper load to stay at 3-5 out of 10 on the provided modified Doretha exertion scale.       BATCA Machine Exercises Weight (lbs) Sets Repetitions Doretha Exertion Scale Rating   Leg Extension  27.5 1 20 4   Hamstring Curls 30 1 20 4   Chest Press  25 1 15 5   Pec Fly  25 1 20 3   Lat Pull Down 27.5 1 20 3   Mid Row 27.5 1 20 3   Bicep Bar Curls       Standing Tricep Extension       Single Leg Press  R/L        *Pt ed on exercise names during performance.  Pt /c accurate recall        Supine:   LTR on ball x 20    DKTC on ball x 10 5 sec hold    Open books x 10 cue for hand behind head to dec shoulder ext    SL Clamshells x 20 /c R  TB     Prone   B hip ext x 10 each   PENNY 2 min         Patient Education and Home Exercises     Home Exercises Provided and Patient Education Provided     Education provided:     Written Home Exercises Provided: Patient instructed to cont prior HEP. Exercises were reviewed and Chinyere was able to demonstrate them prior to the end of the session.  Chinyere demonstrated good  understanding of the education provided. See EMR under Patient Instructions for information provided during therapy sessions    ASSESSMENT     Karma /c inc exercise tolerance today vs earlier in week and improved mood.  Pt verbalize that although /c cont home stressors she has found her walking acts as a mindfulness activity.  Pt tolerate supine exercises /s discomfort and transitioning between motions /c inc ease indicating improved spinal mobility and bed mobility. Pt cont to progress strength /c resistance exercises and able to identify areas on body that each exercise targets /c mod recall of names thereby meeting established goal.  Pt /s concrete plans for cont resistance exercise, therefore, cont encouraging pt to explore gym options for workouts /p FRP.            Chinyere Is progressing well towards her goals.   Pt prognosis is Good.     Pt will continue to benefit from skilled outpatient physical therapy to address the deficits listed in the problem list box on initial evaluation, provide pt/family education and to maximize pt's level of independence in the home and community environment.     Pt's spiritual, cultural and educational needs considered and pt agreeable to plan of care and goals.     Anticipated barriers to physical therapy: chronic nature of issues, psychosocial concerns, rotator cuff tear    GOALS: Pt is in agreement with the following goals:        Goal Progress Towards Goal   Short Term: In 3 weeks, pt will:     Verbalize & demonstrate good understanding of resisted training with 3-1-3 second rep for  dchvwhmfak-efyncehnj-uqwqalgxz contraction to encourage full muscle recruitment for strength & endurance Progress towards goal: MET 03/31/22   Verbalize & demonstrate good understanding of home stretch routine to improve AROM, help decrease pain & improve mobility Progress towards goal: MET 03/31/22   Demonstrate proper supine or seated diaphragmatic breathing with decreased chest excursion & emphasis on full abdominal excursion & increased expiration time to promote muscle relaxation & increased parasympathetic activity to improve patient tolerance to activities GOAL MET 3/21/2022   Demonstrate proper static standing posture in frontal & sagittal plane per PT visual assessment to decrease postural strain in ADLs Progress towards goal: PROGRESSING 03/31/22   Demonstrate good recall of names of resisted exercises w/ minimal to moderate verbal cues to promote independence w/ exercise at the end of the program Progress towards goal: MET 04/07/22   Verbalize plan for continuing resisted exercises w/ specific location (i.e. commercial gym, home, community fitness center) indicating preference for machine-based or free-weight exercises to incorporate all major muscle groups to maintain & progress therapeutic functional improvements Progress towards goal: PROGRESSING 03/31/22         Long Term: In 8 weeks, pt will:     Verbalize good understanding of activities planning/scheduling (stretching, mindfulness, resisted training, & cardio) prescribed by PT/OT following the end of the program to sustain & progress functional improvements Progress towards goal: PROGRESSING 03/31/22   Be independent w/ selected resisted training w/ minimal to no cuing while performing to continue w/ resisted strengthening independently at the end of the program Progress towards goal: PROGRESSING 03/31/22   Improve 2 Minute Walk Test (MWT) to 550 feet and 6 MWT to 1650 feet or greater to improve gait speed and mobility Progress towards goal: MET  03/31/22   Perform single leg stance 20 seconds or greater bilaterally to improve safety and balance in ADLs Progress towards goal: PROGRESSING 03/31/22   Demonstrate Timed Up & Go (with appropriate assistive device, if necessary) of 9 seconds or less to decrease fall risk and improve functional mobility Progress towards goal: MET 03/31/22   Demonstrate 5 time sit to stand test without upper extremity assist to 12 sec or less to improve general mobility and decrease fall risk Progress towards goal: PROGRESSING 03/31/22   Score 43 % or less on Hip FOTO to indicate significant functional improvements in impaired functions secondary to low back pain Progress towards goal: MET 03/31/22             PLAN     Continue PT per MICAH Chan, PT, DPT

## 2022-04-07 ENCOUNTER — CLINICAL SUPPORT (OUTPATIENT)
Dept: REHABILITATION | Facility: OTHER | Age: 47
End: 2022-04-07
Payer: OTHER GOVERNMENT

## 2022-04-07 ENCOUNTER — OFFICE VISIT (OUTPATIENT)
Dept: PSYCHIATRY | Facility: OTHER | Age: 47
End: 2022-04-07
Payer: OTHER GOVERNMENT

## 2022-04-07 DIAGNOSIS — F41.9 ANXIETY: Primary | ICD-10-CM

## 2022-04-07 DIAGNOSIS — Z78.9 ALTERATION IN INSTRUMENTAL ACTIVITIES OF DAILY LIVING (IADL): ICD-10-CM

## 2022-04-07 DIAGNOSIS — F40.298 FEAR OF PAIN: Primary | ICD-10-CM

## 2022-04-07 DIAGNOSIS — Z74.09 IMPAIRED FUNCTIONAL MOBILITY AND ACTIVITY TOLERANCE: Primary | ICD-10-CM

## 2022-04-07 DIAGNOSIS — Z78.9 DIFFICULTY WITH ACTIVITIES OF DAILY LIVING: ICD-10-CM

## 2022-04-07 DIAGNOSIS — F33.1 MODERATE EPISODE OF RECURRENT MAJOR DEPRESSIVE DISORDER: ICD-10-CM

## 2022-04-07 DIAGNOSIS — Z74.09 IMPAIRED FUNCTIONAL MOBILITY, BALANCE, GAIT, AND ENDURANCE: ICD-10-CM

## 2022-04-07 PROCEDURE — 90853 GROUP PSYCHOTHERAPY: CPT | Mod: ,,, | Performed by: SOCIAL WORKER

## 2022-04-07 PROCEDURE — 97110 THERAPEUTIC EXERCISES: CPT

## 2022-04-07 PROCEDURE — 97530 THERAPEUTIC ACTIVITIES: CPT

## 2022-04-07 PROCEDURE — 90853 PR GROUP PSYCHOTHERAPY: ICD-10-PCS | Mod: ,,, | Performed by: SOCIAL WORKER

## 2022-04-07 NOTE — PROGRESS NOTES
"[facility]   Functional Restoration Program  Occupational Therapy Progress Report  [facility] 12     Name: Chinyere Crockett  Medical Record Number: [id number]    Therapy Diagnosis:   Encounter Diagnoses   Name Primary?    Impaired functional mobility and activity tolerance Yes    Difficulty with activities of daily living     Alteration in instrumental activities of daily living (IADL)      Physician: Aurora Xie NP    Visit Date: 4/7/2022    Physician Orders: OT Eval and Treat  Medical Diagnosis: Chronic pain disorder  Evaluation Date: 2/25/2022  Insurance Authorization Period Expiration: 12/31/2022  Plan of Care Certification Period: 6/17/2022 (UPDATED 3/31/22)  Visit # / Visits authorized: 13 / 20 (including eval)   FOTO: evaluation: 52% limitation  FOTO: reassessment: 47% limitation     Precautions:  Standard and Fall    Time In: 2:45 PM  Time Out: 4:00 PM  Total Billable Time: 75 minutes    Subjective     Chinyere reports "I slept about 6 hours all at once and I woke up feeling a lot better. Still a lot of residual pain and a lot of tension and this shoulder keeps trying to tense up and flare but i'm making sure I keep my shoulder down".         Pain:   Current: 3 / 10   Location: R shoulder and shoulder blade.    TREATMENT      Chinyere participated in dynamic functional therapeutic activities to improve functional performance for 75 minutes, including:    Individualized Treatment: Increased resistance levels of functional conditioning exercises according to personal recovery goals. Activities include: Dynamic reaching, [id number] lifting, sustained standing activity, maximal lifting, 2-handed carry, sustained seated tasks, push and pull, waist-to-shoulder lift, box/container carry, endurance training. Daily functional conditioning progress is documented on a flow sheet which is available upon request.     Chinyere participated in endurance activity using nustep x 10 " minutes, starting at level 3.0 and able to progress to level 3.5 to improve functional endurance and progress towards increased MET level for improved community mobility and participation. Added to goal to maintain ~90 steps/min. Rated as sort of hard (4/10), Bertinma re-educated on how to add mindfulness component to activity and how to pace appropriately by completed self check ins and grading activity as needed, with goal to reach moderate to sort of hard by end of activity. Able to carry conversation throughout activity. Required use on only RUE 2/2 increased pain in LUE. With increased SOB post activity and encouraged to complete pursed lip breathing and seated back and shoulder stretches as needed.      Pt completed dynamic reaching in various functional ranges, with continued focus on posture, core awareness, hip rotation/weight shifting, maintaining retraction and depression in shoulders and pacing as needed. Completed 2x5 reps x 1 lbs, rated as sort of hard (4/10) exertion.  Continues with tendency to protract shoulder blades but able to correct with verbal cues. Improved independence with pacing and took standing rest break post 5 reps. Throughout activity discussed home modification she made re: end tablet to minimize twisting.     Pt completed functional lifting, floor to waist, 15 reps x 15 lbs with exertion rated sort of hard (4 /10), focused education on slow and controlled movement and coordinating movements with breath. Pt noted she used this lift related to moving rocks etc in garden. Continued difficulty with knees over toes resulting in increased L knee pain.     Pt completed seated mindfulness diaphragmatic breathing activity [id number] with focus on posture, mechanics of breathing, and benefits re: PNS activation. Pt voiced and demonstrated understanding.     Pt participated in functional lift waist to shoulder, 15 reps x  15 lbs with a rating of sort of hard  (4/10). Pt educated on standing posture,  "core awareness, keeping shoulders depressed and retracted, stepping to place > reaching to place, keeping weight close to center of gravity and pacing as needed.     Pt completed maximal lift x10 reps with 22 lbs, rated as sort of hard (4/10), continued education focused on neutral spine positioning and pushing through heels for safety and activation of correct muscles.     Pt completed yoga activity on mat, supine, quadriped, seated and standing, with focused education on getting on/off floor properly and safely, back mobility, hip flexibility, stress reduction, dynamic standing balance, posture, alignment and coordinating movement with breath, rated moderate (3/10). "everything feels better and looser, I will definitely incorporate this more regularly"     Patient Education and Home Exercises     Education provided:   -Ms. Crockett received education regarding proper posture and body mechanics. She received education regarding anticipated muscular soreness following lift testing and strategies for management were reviewed with patient including stretching, using ice, scheduled rest.  -Additional Education provided: rosy scale, pain cycle, z-lie, functional lifting mechanics, pursed lip and diaphragmatic breathing techniques  - energy banking  - stretching and conservative management for tennis elbow     Written Home Exercises Provided: yes, and binder for program received.   Exercises were reviewed and Chinyere was able to demonstrate them prior to the end of the session.    Chinyere demonstrated good understanding of the education provided.     ASSESSMENT   Chinyere presents with elevated mood and improved pain report upon arrival noting improved sleep quality and quantity last night. Good tolerance to session this date and with good tolerance to increased weight in functional lifting tasks. Good response to yoga activity. Improved body awareness throughout session despite continued difficulty with some mechanics. Able to " adjust with verbal cues as needed. Continues to have difficulty with shoulder pain limiting function, but overall, she continues to progress towards personal goals.      Chinyere is unable to fully participate in work, recreational, and home-based activities because of decreased functional strength, decreased  AROM, decreased endurance, limited positional tolerance, fear of re-injury, and fear of increased pain. She will benefit from participating in a conditioning  program designed  to increase functional strength, flexibility, and endurance in order to meet functional goals.      Chinyere's prognosis is Good due to good personal goals, good motivation and good response to all feedback and education. Pt will continue to benefit from skilled outpatient occupational therapy to address the deficits listed in the problem list box on initial evaluation, provide pt/family education and to maximize pt's level of independence in the home and community environment.    Pt's spiritual, cultural and educational needs considered and pt agreeable to plan of care and goals.     Anticipated barriers to occupational therapy: none identified.    FRP Goals: While working towards the long-term (3 month) functional goals listed above, Chinyere will accomplish the following short term goals during the 5-week intensive rehabilitation program. Chinyere will:      1. Develop a viable return to work plan. ---------------------- progressing 3/31/22  2. Demonstrate physical capacities consistent with a medium work demand level. ---------------------- progressing 3/31/22  3. Demonstrate increased functional strength by lifting 20 lbs floor to waist and 20 lbs waist to shoulder in order to meet demand of work/home routine. ---------------------- MET 3/31/22  4. Increase her positional tolerance to the level needed for work and home activities. ---------------------- progressing 3/31/22  5. Implement self-care strategies during the program and at home to  control pain. ---------------------- progressing 3/31/22  6.   Patient will demonstrate use of mindfulness, stress management, and coping  strategies for improved participation in daily routine.   7.   Will increase water intake to 7 x 16oz water bottles daily. 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     Find part-time work (will do so only after completion of FRP)     Recreational:  Joining walking "[employer]"   - walking tolerance     Gardening    Crafting    Daily Living:  Cooking      Cleaning     Hair care      Upper body dressing (bras).          Medium Physical Demand Level      0/0         0/5               4/3     2/1     3/0       5/5     4/3     3/3     5/2  (Currently using sport bras)        not rated         not rated             not rated     3 / 6      0 / 0       4 / 5      6 / 6 7 / 7              PLAN     UPDATED plan of care certification 3/31/2022 to 6/17/2022    Outpatient occupational therapy, 3 x/wks for 2 additional weeks. Following this, pt to go on hold for a minimum of 7 weeks to attempt independence w/ Home Activity Plan (HAP) & PT activities, and will then return for reassessment..    CAN Amaya/BEVERLEY  4/7/2022

## 2022-04-08 ENCOUNTER — TELEPHONE (OUTPATIENT)
Dept: PAIN MEDICINE | Facility: CLINIC | Age: 47
End: 2022-04-08
Payer: OTHER GOVERNMENT

## 2022-04-08 ENCOUNTER — PATIENT MESSAGE (OUTPATIENT)
Dept: NEUROLOGY | Facility: CLINIC | Age: 47
End: 2022-04-08
Payer: OTHER GOVERNMENT

## 2022-04-08 ENCOUNTER — PATIENT OUTREACH (OUTPATIENT)
Dept: ADMINISTRATIVE | Facility: OTHER | Age: 47
End: 2022-04-08
Payer: OTHER GOVERNMENT

## 2022-04-08 DIAGNOSIS — Z12.11 ENCOUNTER FOR FIT (FECAL IMMUNOCHEMICAL TEST) SCREENING: Primary | ICD-10-CM

## 2022-04-08 RX ORDER — SUMATRIPTAN SUCCINATE 100 MG/1
100 TABLET ORAL
Qty: 10 TABLET | Refills: 3 | Status: SHIPPED | OUTPATIENT
Start: 2022-04-08 | End: 2022-04-13 | Stop reason: SDUPTHER

## 2022-04-08 NOTE — TELEPHONE ENCOUNTER
Staff spoke with patient, who have stated to staff she was calling to ask provider a new referral to neurology (patient already have an appointment schedule 4/11/22(Mon) with NP).      Staff informed patient she can address this matter during her upcoming visit with NP      Pt verbalized understanding.

## 2022-04-08 NOTE — TELEPHONE ENCOUNTER
This message is for patient in regards to his/her appointment 04/11/22 at 9:40a       [facility] Policy: For the safety of all patients and staff members.     Patient Visitor policy: During this visit we're asking all patients to only have one visitor over the age of 18yrs old to accompany to be seen by Yonis Goldberg NP. If patient do not required assistance with their visit, we're asking all visitors to remain outside the waiting area.    Upon arriving to your schedule appointment; patients are required to wear a face mask, if patient do not have a face mask one will be provided entering the facility. If you have any questions or concerns please contact [phone]    Staff confirmed appt. SG

## 2022-04-08 NOTE — TELEPHONE ENCOUNTER
----- Message from Rogelio Mclaughlin sent at 4/8/2022 10:34 AM CDT -----  Regarding: return Call  Type: Patient Returning Call            Who Called:JW MENDEZ [[id number]]            Who Left Message for Patient:Cheri             Does the patient know what this is regarding? No            Best Call Back Number:[phone]

## 2022-04-11 ENCOUNTER — CLINICAL SUPPORT (OUTPATIENT)
Dept: REHABILITATION | Facility: OTHER | Age: 47
End: 2022-04-11
Payer: OTHER GOVERNMENT

## 2022-04-11 ENCOUNTER — OFFICE VISIT (OUTPATIENT)
Dept: PSYCHIATRY | Facility: OTHER | Age: 47
End: 2022-04-11
Payer: OTHER GOVERNMENT

## 2022-04-11 ENCOUNTER — OFFICE VISIT (OUTPATIENT)
Dept: PAIN MEDICINE | Facility: CLINIC | Age: 47
End: 2022-04-11
Payer: OTHER GOVERNMENT

## 2022-04-11 VITALS
RESPIRATION RATE: 18 BRPM | HEART RATE: 119 BPM | DIASTOLIC BLOOD PRESSURE: 103 MMHG | BODY MASS INDEX: 38.89 KG/M2 | SYSTOLIC BLOOD PRESSURE: 153 MMHG | TEMPERATURE: 100 F | WEIGHT: 242 LBS | HEIGHT: 66 IN

## 2022-04-11 DIAGNOSIS — F40.298 FEAR OF PAIN: Primary | ICD-10-CM

## 2022-04-11 DIAGNOSIS — F33.1 MODERATE EPISODE OF RECURRENT MAJOR DEPRESSIVE DISORDER: ICD-10-CM

## 2022-04-11 DIAGNOSIS — M25.511 ACUTE PAIN OF RIGHT SHOULDER: Primary | ICD-10-CM

## 2022-04-11 DIAGNOSIS — F41.9 ANXIETY: Primary | ICD-10-CM

## 2022-04-11 DIAGNOSIS — G89.4 CHRONIC PAIN DISORDER: ICD-10-CM

## 2022-04-11 DIAGNOSIS — Z78.9 DIFFICULTY WITH ACTIVITIES OF DAILY LIVING: ICD-10-CM

## 2022-04-11 DIAGNOSIS — Z74.09 IMPAIRED FUNCTIONAL MOBILITY, BALANCE, GAIT, AND ENDURANCE: ICD-10-CM

## 2022-04-11 DIAGNOSIS — Z78.9 ALTERATION IN INSTRUMENTAL ACTIVITIES OF DAILY LIVING (IADL): ICD-10-CM

## 2022-04-11 DIAGNOSIS — Z74.09 IMPAIRED FUNCTIONAL MOBILITY AND ACTIVITY TOLERANCE: Primary | ICD-10-CM

## 2022-04-11 PROCEDURE — 90853 PR GROUP PSYCHOTHERAPY: ICD-10-PCS | Mod: ,,, | Performed by: SOCIAL WORKER

## 2022-04-11 PROCEDURE — 90853 GROUP PSYCHOTHERAPY: CPT | Mod: ,,, | Performed by: SOCIAL WORKER

## 2022-04-11 PROCEDURE — 99999 PR PBB SHADOW E&M-EST. PATIENT-LVL IV: ICD-10-PCS | Mod: PBBFAC,,, | Performed by: NURSE PRACTITIONER

## 2022-04-11 PROCEDURE — 97110 THERAPEUTIC EXERCISES: CPT

## 2022-04-11 PROCEDURE — 99999 PR PBB SHADOW E&M-EST. PATIENT-LVL IV: CPT | Mod: PBBFAC,,, | Performed by: NURSE PRACTITIONER

## 2022-04-11 PROCEDURE — 99214 OFFICE O/P EST MOD 30 MIN: CPT | Mod: PBBFAC | Performed by: NURSE PRACTITIONER

## 2022-04-11 PROCEDURE — 99213 PR OFFICE/OUTPT VISIT, EST, LEVL III, 20-29 MIN: ICD-10-PCS | Mod: S$PBB,,, | Performed by: NURSE PRACTITIONER

## 2022-04-11 PROCEDURE — 97530 THERAPEUTIC ACTIVITIES: CPT

## 2022-04-11 PROCEDURE — 99213 OFFICE O/P EST LOW 20 MIN: CPT | Mod: S$PBB,,, | Performed by: NURSE PRACTITIONER

## 2022-04-11 RX ORDER — DICLOFENAC SODIUM 75 MG/1
75 TABLET, DELAYED RELEASE ORAL 2 TIMES DAILY
Qty: 60 TABLET | Refills: 2 | Status: SHIPPED | OUTPATIENT
Start: 2022-04-11 | End: 2022-07-11 | Stop reason: SDUPTHER

## 2022-04-11 RX ORDER — CYCLOBENZAPRINE HCL 10 MG
10 TABLET ORAL 2 TIMES DAILY PRN
Qty: 60 TABLET | Refills: 2 | Status: SHIPPED | OUTPATIENT
Start: 2022-04-11 | End: 2023-05-18

## 2022-04-11 NOTE — PROGRESS NOTES
"[facility]   Functional Restoration Program  Occupational Therapy Progress Report  [facility] Day 13     Name: Chinyere Crocektt  Medical Record Number: [id number]    Therapy Diagnosis:   No diagnosis found.  Physician: Aurora Xie NP    Visit Date: 4/11/2022    Physician Orders: OT Eval and Treat  Medical Diagnosis: Chronic pain disorder  Evaluation Date: 2/25/2022  Insurance Authorization Period Expiration: 12/31/2022  Plan of Care Certification Period: 6/17/2022 (UPDATED 3/31/22)  Visit # / Visits authorized: 14 / 20 (including eval)   FOTO: evaluation: 52% limitation  FOTO: reassessment: 47% limitation     Precautions:  Standard and Fall    Time In: 1:35 PM  Time Out: 2:45 PM  Total Billable Time: 70 minutes    Subjective     Chinyere reports "I am still fighting with my doctor about this medication and finally made some progress after reaching to patient advocacy".         Pain:   Current: 3 / 10   Location: B hips, (L>R), L ankle, R shoulder and "going on day 5 of a headache"    TREATMENT      Chinyere participated in dynamic functional therapeutic activities to improve functional performance for 75 minutes, including:    Individualized Treatment: Increased resistance levels of functional conditioning exercises according to personal recovery goals. Activities include: Dynamic reaching, [id number] lifting, sustained standing activity, maximal lifting, 2-handed carry, sustained seated tasks, push and pull, waist-to-shoulder lift, box/container carry, endurance training. Daily functional conditioning progress is documented on a flow sheet which is available upon request.     Full body stretch w/ 3-10 sec hold technique &/or 3-5 repetition technique on all of the following:   Cervical flx/ext   Cervical sidebending   Cervical rotation   Cervical protraction/retraction   Shld rolls   Shld depression/elevation   Scapular retraction/protraction/scaption   Posterior shld stretch " (cross arm)   Shld ER stretch (chicken wing)   Elbow flx/ext   Forearm supination/pronation   Wrist flx/ext   Open/close hands/fingers   Seated trunk rotations   Seated trunk/thoracic ext/flx   Seated marches & knee to chest   Seated knee flx/ext   Seated hip ER/piriformis stretch   Seated hamstring stretch   Seated toe raise to heel raise    Seated ankle circles each way   Standing lumbar extensions   Standing lateral leaning stretch   Standing quad stretch (UE support for balance)    Pt completed functional lifting, floor to waist, 15 reps x 17 lbs with exertion rated easy (2 /10), focused education on slow and controlled movement and coordinating movements with breath. Pt noted she used this lift related to moving rocks etc in garden. Continued difficulty with knees over toes resulting in increased L knee pain.     Pt participated in functional lift waist to shoulder, 15 reps x  15 lbs with a rating of moderate (3/10). Pt educated on standing posture, core awareness, keeping shoulders depressed and retracted, stepping to place > reaching to place, keeping weight close to center of gravity and pacing as needed.     Pt completed maximal lift x15 reps with 25 lbs, rated as moderate (3/10), continued education focused on neutral spine positioning and pushing through heels for safety and activation of correct muscles.     Pt completed dynamic reaching in various functional ranges, with continued focus on posture, core awareness, hip rotation/weight shifting, maintaining retraction and depression in shoulders and pacing as needed. Completed x15 reps x 2 lbs, rated as sort of hard (4/10) exertion.  Continues with tendency to protract shoulder blades but able to correct with verbal cues. Improved independence with pacing and took standing rest break post 5 reps.     Pt completed seated mindfulness diaphragmatic breathing activity [id number] with focus on posture, mechanics of breathing, and benefits re: PNS  activation. Pt voiced and demonstrated understanding.     Pt completed standing task x10 minutes at upright peg board on wall with focused education on posture, weight shifting as needed and maintaining slight bend in knees. Completed placing pegs above shoulder height with R hand and removing with L hand. Goal to simulate personal goal to progress activity tolerance for overhead task of hair washing and sustained use of BUE use for chopping/cooking. She rated activity as moderate (3/10) exertion.    Pt completed yoga activity on mat, supine, quadriped, seated and standing, with focused education on getting on/off floor properly and safely, back mobility, hip flexibility, stress reduction, dynamic standing balance, posture, alignment and coordinating movement with breath, rated moderate (3/10) exertion.     Patient Education and Home Exercises     Education provided:   -Ms. Crockett received education regarding proper posture and body mechanics. She received education regarding anticipated muscular soreness following lift testing and strategies for management were reviewed with patient including stretching, using ice, scheduled rest.  -Additional Education provided: rosy scale, pain cycle, z-lie, functional lifting mechanics, pursed lip and diaphragmatic breathing techniques  - energy banking  - stretching and conservative management for tennis elbow     Written Home Exercises Provided: yes, and binder for program received.   Exercises were reviewed and Chinyere was able to demonstrate them prior to the end of the session.    Chinyere demonstrated good understanding of the education provided.     ASSESSMENT   Chineyre presents with increased pain report and with continued frustration re: onset of headache and continued difficulty obtaining new medication. Despite increased pain she had good tolerance to session this date and with continued motivation and willingness to participate. Good continued response to yoga activity.  Continues to have difficulty with shoulder pain limiting function, and with heightened stress 2/2 headache, but overall, she continues to progress towards personal goals.      Chinyere is unable to fully participate in work, recreational, and home-based activities because of decreased functional strength, decreased  AROM, decreased endurance, limited positional tolerance, fear of re-injury, and fear of increased pain. She will benefit from participating in a conditioning  program designed  to increase functional strength, flexibility, and endurance in order to meet functional goals.      Chinyere's prognosis is Good due to good personal goals, good motivation and good response to all feedback and education. Pt will continue to benefit from skilled outpatient occupational therapy to address the deficits listed in the problem list box on initial evaluation, provide pt/family education and to maximize pt's level of independence in the home and community environment.    Pt's spiritual, cultural and educational needs considered and pt agreeable to plan of care and goals.     Anticipated barriers to occupational therapy: none identified.    FRP Goals: While working towards the long-term (3 month) functional goals listed above, Chinyere will accomplish the following short term goals during the 5-week intensive rehabilitation program. Chinyere will:      1. Develop a viable return to work plan. ---------------------- progressing 3/31/22  2. Demonstrate physical capacities consistent with a medium work demand level. ---------------------- progressing 3/31/22  3. Demonstrate increased functional strength by lifting 20 lbs floor to waist and 20 lbs waist to shoulder in order to meet demand of work/home routine. ---------------------- MET 3/31/22  4. Increase her positional tolerance to the level needed for work and home activities. ---------------------- progressing 3/31/22  5. Implement self-care strategies during the program and at home  to control pain. ---------------------- progressing 3/31/22  6.   Patient will demonstrate use of mindfulness, stress management, and coping  strategies for improved participation in daily routine.   7.   Will increase water intake to 7 x 16oz water bottles daily. 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     Find part-time work (will do so only after completion of FRP)     Recreational:  Joining walking [employer]   - walking tolerance     Gardening    Crafting    Daily Living:  Cooking      Cleaning     Hair care      Upper body dressing (bras).          Medium Physical Demand Level      0/0         0/5               4/3     2/1     3/0       5/5     4/3     3/3     5/2  (Currently using sport bras)        not rated         not rated             not rated     3 / 6      0 / 0       4 / 5      6 / 6 7 / 7              PLAN     UPDATED plan of care certification 3/31/2022 to 6/17/2022    Outpatient occupational therapy, 3 x/wks for 2 additional weeks. Following this, pt to go on hold for a minimum of 7 weeks to attempt independence w/ Home Activity Plan (HAP) & PT activities, and will then return for reassessment..    CAN Amaya/BEVERLEY  4/11/2022

## 2022-04-11 NOTE — PROGRESS NOTES
Chronic Pain - Established patient - Follow Up     Referring Physician: No ref. provider found    Chief Complaint:   Chief Complaint   Patient presents with    Back Pain    Shoulder Pain     And ankle pain       SUBJECTIVE: Disclaimer: This note has been generated using voice-recognition software. There may be typographical errors that have been missed during proof-reading    Last 3 PDI Scores 4/11/2022 2/1/2022 12/21/2021   Pain Disability Index (PDI) 11 21 15     Interval History 4/11/2022:  Mrs Crockett presents for follow up of pain complaints. She states doing very well with FRP and pleased with progress. She continues to take Diclofenac and Flexeril. She has noticed since prior visit having more daily functionality She states SE and never got over Nausea from Cymbalta and discussed she does not ever wish to be on this medication again.She does voice frustation with HA mgt regarding messaging to Neurology and prescriptions.     Interval History 2/1/2022:  Mrs Crockett presents for follow up. She has been attending Therapy for Right shoulder and while she endorses benefit from PT at times PT sets her back and exacerbates pain. She states this Thursday will be her last day for PT. And again while she has found progress she is not where she would like to be with functioning and pain. She does not wish to have surgical intervention or CSI at this time. She is frustrated with diffuse pain complaints and decreased daily functioning. She is taking Flexeril more frequently at this time but feels brain fog at times during the day. Diclofenac has been beneficial for her. She denies new areas of pain, denies neurological changes.     Interval History 12/21/2021:  The Pt presents for follow up of Right shoulder pain. She states doing fair and but continued scapular pain and shoulder pain with even cooking activities. She will be starting Shoulder PT in January. Does not wish to pursue CSI or surgical intervention at  this time without giving PT a try 1st. She is currently taking diclofenac with benefit and denies SE of medications.     Interval History 11/29/2021:  Pt presents to clinic for follow up of low back pain and new right sided scapula pain. Pt reports that her low back pain had improved with a combination of physical therapy and meloxicam. Unfortunately pt reports she had some GI upset and tried oral diclofenac with some relief.   Additionally pt reports right shoulder pain associated with right upper back spasms. Her spasms are associated with any movement of the shoulder. She denies any neck pain, arm pain or any feelings of numbness. She reports that her symptoms are relieved by ice.     Initial encounter 08/25/2021:  Chinyere Crockett presents to the clinic for the evaluation of low back pain. The pain started 3 years ago following a fall at work and symptoms have been worsening. She slipped on a wet floor and fell in a twisting motion onto her back while striking a mop bucket. Since that time she has dealt with hip and back pain. The hip pain is treated with GTB injections by Dr. Zazueta with sports medicine. She receives the injections every 3-4 months. When she feels the injection's effect wear off, she begins to notice her back pain worsening.     Brief history:    Pain Description:    The pain is located in the Left lower back area and does not radiate.      At BEST  4/10     At WORST  8/10 on the WORST day.      On average pain is rated as 6/10.     Today the pain is rated as 6/10    The pain is described as aching and burning      Symptoms interfere with daily activity, sleeping and work.     Exacerbating factors: Extension and rotation.      Mitigating factors heat, ice, medications and dry needling.     Patient denies night fever/night sweats, urinary incontinence, bowel incontinence, significant weight loss, significant motor weakness and loss of sensations.  Patient denies any suicidal or homicidal  ideations    Pain Medications:  Current:  Flexeril 10mg qHS PRN  Tylenol   OTC NSAIDs      Tried in Past:  NSAIDs - Mobic  TCA -Never  SNRI -Never  Anti-convulsants -Never  Muscle Relaxants -Never  Opioids-Never  Benzodiazepines -Never    Physical Therapy/Home Exercise:   No, most recently in Jan 2021  Not consistent with HEP       report:  Reviewed and consistent with medication use as prescribed.    Pain Procedures:   GTB injections with sports medicine    Chiropractor -yes  Acupuncture - never  TENS unit -never  Spinal decompression -never  Joint replacement -never    Imaging:    MRI SHOULDER WITHOUT CONTRAST RIGHT 12/8/2021     CLINICAL HISTORY:  Pain in right shoulderShoulder pain, rotator cuff disorder suspected, xray done;     TECHNIQUE:  MRI of right shoulder was performed on a 1.5T magnet utilizing the following sequences: Localizer; axial T2 FS; coronal T2 FS and PD FS; sagittal T1, T2 FS and PD FS.     COMPARISON:  None     FINDINGS:  Rotator cuff: There is some attenuation of caliber of the insertional fibers of the supraspinatus and infraspinatus tendons and some irregularity and abnormal signal along the bursal surface suggesting partial-thickness bursal sided tear involving approximately 50% of the tendon thickness.  Tear measures 2 cm in AP diameter.  Subscapularis tendon intact.Muscle bulk preserved.     Biceps: Long head biceps tendon is intact.     Labrum: Glenoid labrum is intact.     Glenohumeral Joint: There is no fracture or significant articular cartilage loss.  Bone marrow signal is normal.     Acromioclavicular joint: The AC joint is unremarkable.     Misc: There is no evidence of bursitis.     Impression:     Partial-thickness bursal sided tear of the supraspinatus and infraspinatus tendons measuring 2 cm in size.    XR L-Spine 09/27/2021  FINDINGS:  There is rightward curvature of the lumbar spine with discogenic change and lower facet hypertrophy.  Vertebral body heights appear  maintained.  No significant translation.  No evidence for lytic or blastic lesion.     Impression:     As above.     XR L-Spine 9/23/2020  FINDINGS:  Scoliosis convex to the right in the lumbar region is appreciated, alignment otherwise appearing unremarkable.  Vertebral body heights are normally maintained, without significant compression deformity at any level.  No significant disc narrowing.  The AP projection strongly suggests some prominent left-sided L4-5 facet arthropathy.  Minimal marginal vertebral endplate spurring is seen at a few thoracolumbar levels.  Vertebral endplates are well defined.  No osseous destructive process.  SI joints appear unremarkable.     Impression:     Lumbar dextroscoliosis and prominent left-sided facet arthropathy at L4-5.  No evidence of compression fracture.    XR hip/pelvis 9/23/2021   FINDINGS:  No acute fracture of the visualized lower lumbar spine, bony pelvis, or proximal femurs.  Preserved right and left femoral head contours.  Intact right and left SI joints and hip joint spaces.  Pelvic densities felt related to phleboliths unless concern for otherwise.  Some asymmetric radiodensity projects to the left side of the L4-L5 disc space that could be further evaluated with lumbar spine radiographs.     Impression:     No acute fracture.    History reviewed. No pertinent past medical history.  Past Surgical History:   Procedure Laterality Date    TONSILLECTOMY       Social History     Socioeconomic History    Marital status:    Tobacco Use    Smoking status: Never Smoker    Smokeless tobacco: Never Used   Substance and Sexual Activity    Alcohol use: Yes     Comment: occasionally    Drug use: Yes     Types: Marijuana    Sexual activity: Yes     Partners: Male     Birth control/protection: None     Family History   Problem Relation Age of Onset    COPD Mother     Breast cancer Mother 72    Heart attack Father     Heart disease Paternal Grandfather          ?quad bypass    Heart disease Paternal Uncle         quad bypass    Cancer Neg Hx     Colon cancer Neg Hx     Diabetes Neg Hx     Eclampsia Neg Hx     Hypertension Neg Hx     Miscarriages / Stillbirths Neg Hx     Ovarian cancer Neg Hx      labor Neg Hx     Stroke Neg Hx        Review of patient's allergies indicates:   Allergen Reactions    Hydrocodone      Dizziness and nausea        Current Outpatient Medications   Medication Sig    acetaminophen (TYLENOL ORAL) Take by mouth as needed.    cyclobenzaprine (FLEXERIL) 10 MG tablet Take 1 tablet (10 mg total) by mouth 2 (two) times daily as needed for Muscle spasms.    diclofenac (VOLTAREN) 75 MG EC tablet Take 1 tablet (75 mg total) by mouth 2 (two) times daily.    ergocalciferol (ERGOCALCIFEROL) 50,000 unit Cap Take 50,000 Units by mouth every 7 days.    ergocalciferol (ERGOCALCIFEROL) 50,000 unit Cap Take 1 capsule (50,000 Units total) by mouth every 7 days. (Patient not taking: Reported on 2022)    loratadine (CLARITIN) 10 mg tablet Take 1 tablet (10 mg total) by mouth once daily.    sumatriptan (IMITREX) 100 MG tablet Take 1 tablet (100 mg total) by mouth every 2 (two) hours as needed for Migraine. Not to exceed 2 per day. (Patient not taking: Reported on 2022)     Current Facility-Administered Medications   Medication    triamcinolone acetonide injection 40 mg    triamcinolone acetonide injection 40 mg       REVIEW OF SYSTEMS:    GENERAL:  No weight loss, malaise or fevers.  HEENT:   No recent changes in vision or hearing  NECK:  Negative for lumps, no difficulty with swallowing.  RESPIRATORY:  Negative for cough, wheezing or shortness of breath, patient denies any recent URI.  CARDIOVASCULAR:  Negative for chest pain, leg swelling or palpitations.  GI:  Negative for abdominal discomfort, blood in stools or black stools or change in bowel habits.  MUSCULOSKELETAL:  See HPI.  SKIN:  Negative for lesions, rash, and  "itching.  PSYCH:  No mood disorder or recent psychosocial stressors.  Patients sleep is not disturbed secondary to pain.  HEMATOLOGY/LYMPHOLOGY:  Negative for prolonged bleeding, bruising easily or swollen nodes.  Patient is not currently taking any anti-coagulants  ENDO: No history of diabetes or thyroid dysfunction  NEURO:   No history of headaches, syncope, paralysis, seizures or tremors.  All other reviewed and negative other than HPI.    OBJECTIVE:    BP (!) 153/103 (BP Location: Left arm, Patient Position: Sitting, BP Method: Medium (Automatic))   Pulse (!) 119   Temp 100.1 °F (37.8 °C) (Oral)   Resp 18   Ht 5' 6" (1.676 m)   Wt 109.8 kg (242 lb)   BMI 39.06 kg/m²       PHYSICAL EXAMINATION:  Voice normal.  Normal affect without evidence of distress.  Psych: tearful at times and voices frustration with continued decreased functioning and pain.   Gait: sitting in chair without difficulty     Prior PHYSICAL EXAMINATION:    GENERAL: Well appearing, in no acute distress, alert and oriented x3.  PSYCH:  Mood and affect appropriate.  SKIN: Skin color, texture, turgor normal, no rashes or lesions.  HEAD/FACE:  Normocephalic, atraumatic. Cranial nerves grossly intact.  CV: RRR with palpation of the radial artery.  PULM: No evidence of respiratory difficulty, symmetric chest rise.  GI:  Soft and non-tender.  BACK: Straight leg raising in the supine position is negative to radicular pain. TTP over the Left facet joints of the lumbar spine. No tenderness over spinous processes. Normal range of motion. Pain reproduction with rotation and extension.  EXTREMITIES: Knee ROM is full and pain free without obvious instability or laxity. Pain in buttock and lateral aspect of hip with external rotation of Left hip. ROM of the right shoulder full with significant pain after maneuvers were performed.   MUSCULOSKELETAL: Pain with internal rotation of the right shoulder, with tenderness to palpation over the shoulder anterior " and posteriorly.  There was discreet trigger point palpated over the rhomboid muscles on the right. TTP over sacroiliac joints bilaterally (L>R).  FABERs test is positive on Left.  FADIRs test is negative.   Bilateral lower extremity strength is normal and symmetric.  No atrophy or tone abnormalities are noted.   NEURO: Bilateral upper and lower extremity coordination and muscle stretch reflexes are physiologic and symmetric.  Plantar response are downgoing. No clonus.  No loss of sensation is noted.  GAIT: normal.    Lab Results   Component Value Date    WBC 9.31 08/19/2021    HGB 13.3 08/19/2021    HCT 40.7 08/19/2021    MCV 93 08/19/2021     08/19/2021       BMP  Lab Results   Component Value Date     08/19/2021    K 3.6 08/19/2021     08/19/2021    CO2 28 08/19/2021    BUN 11 08/19/2021    CREATININE 0.7 08/19/2021    CALCIUM 9.2 08/19/2021    ANIONGAP 8 08/19/2021    ESTGFRAFRICA >60 08/19/2021    EGFRNONAA >60 08/19/2021     Lab Results   Component Value Date    HGBA1C 5.0 08/19/2021       ASSESSMENT: 46 y.o. year old female with pain, consistent with     Encounter Diagnoses   Name Primary?    Acute pain of right shoulder Yes    Chronic pain disorder        PLAN:   - Prior records reviewed     - She is finding significant benefit from FRP and advised to continue.     - Continue diclofenac 75mg bid prn    - Refill Flexeril 10mg qh s     - Stop Cymbalta as she could not tolerate medication.     - Advised to keep FU with Neurology regarding continued mgt of HA    - RTC 3 months or sooner if needed.     The above plan and management options were discussed at length with patient. Patient is in agreement with the above and verbalized understanding. It will be communicated with the referring physician via electronic record, fax, or mail.       Yonis Goldberg  04/12/2022

## 2022-04-11 NOTE — PROGRESS NOTES
Group Psychotherapy    Site: [facility], [place]    Clinical status of patient: Outpatient    4/6/2022    Length of service:[id number]    Referred by: [facility]     Number of patients in attendance: 5    Target symptoms: Chronic Pain Management     Patient's response to intervention:  The patient's response to intervention is active listening.    Progress toward goals and other mental status changes:  The patient's progress toward goals is good.    Plan: group psychotherapy    Topics Covered: Pt attended CBT for Chronic Pain as part of her participation in Functional [facility]oration. Topics discussed today included a discussion of progress within the program. All members were present in the group. Main topics covered in the group session include change of habits in areas such as nutrition, exercise, and boundary setting. The group watched the Bharati Perales Talking about Shame SHALOM Talk, which covered areas such as vulnerability, shame, and guilt. The group ended with a discussion on shame and touched on the intersection of shame and gender and how those things impact anxiety, depression, other mood disorders and chronic pain. Will f/u in 1 day.

## 2022-04-11 NOTE — PROGRESS NOTES
"    [facility]    Functional Restoration Program  Physical Therapy Treatment Note  FRP Day 13    Name: Chinyere Crockett  MRN:[id number]      Therapy Diagnosis:   Encounter Diagnoses   Name Primary?    Fear of pain Yes    Impaired functional mobility, balance, gait, and endurance      Physician: Aurora Xie NP    Date: 4/11/2022    Physician Orders: PT Eval and Treat   Medical Diagnosis from Referral:   Z78.9 (ICD-10-CM) - Decreased activities of daily living (ADL)   Z74.09 (ICD-10-CM) - Impaired mobility and endurance   G89.4 (ICD-10-CM) - Chronic pain disorder         Evaluation Date: 2/25/2022  Authorization Period Expiration: 12/31/22  Plan of Care Expiration: 5/6/2022   Visit # / Visits authorized: 14 / 20  FOTO: 2/ 3      Precautions: Standard and Fall     Time In:    2:45 pm  Time Out:  4:00 pm  Total Appointment Time (timed & untimed codes): 75 minutes      SUBJECTIVE     Chinyere reports her mid back is bothering her a great deal w/ any pressure on it. She also feels her tennis elbow is acting up in addition to her shoulder.          Patient's FRP goals: be able to function better around the house, maybe get back to working, looking forward to the "synchronicity of physical and mental health"       Current 4/10  Location: [facility], B hips (L worse),     OBJECTIVE     Objective Measures updated at progress report unless specified.          Limitation/Restriction for FOTO Hip Survey     Therapist reviewed FOTO scores for Chinyere Crockett on 3/31/2022.   FOTO documents entered into [employer] - see Media section.     Limitation Score: 54%  Visit 9:  28%      Goal:  43%                    Treatment       Chinyere received the Individualized Treatments listed below:     Therapeutic exercises to develop strength, endurance, ROM, flexibility, posture and core stabilization for 75 minutes including:      Peripheral muscle strengthening which included 1-2 sets of 10-20 repetitions " at a slow, controlled 5-7 second per rep pace focused on strengthening supporting musculature for improved body mechanics & functional mobility.  Patient & therapist focused on proper form during treatment to ensure optimal strengthening of each targeted muscle group. Patients are instructed to keep sets/reps with proper load to stay at 3-5 out of 10 on the provided modified Doretha exertion scale.       Rev[employer]ut Machine Exercises Weight (lbs) Sets Repetitions Doretha Exertion Scale Rating   Leg Extension        Hamstring Curls       Chest Press  25 1 20 4   Pec Fly  25 1 20 4   Lat Pull Down 27.5 1 20 3   Mid Row 27.5 1 20 4   Bicep Bar Curls 10 1 20 4   Standing Tricep Extension 15 1 20 4   Single Leg Press  R/L  45 1 20 3     · Supine straight leg raise 2x10 B  · Supine bridge w/ belt 2x15  · Fitter board eyes closed w/  support 2x30 B  · Door pec stretch 2 way 2x30 sec B        Patient Education and Home Exercises     Home Exercises Provided and Patient Education Provided     Education provided:     Written Home Exercises Provided: Patient instructed to cont prior HEP. Exercises were reviewed and Chinyere was able to demonstrate them prior to the end of the session.  Chinyere demonstrated good  understanding of the education provided. See EMR under Patient Instructions for information provided during therapy sessions    ASSESSMENT     Chinyere w/ the need for increased rest breaks today due to pain, but responded well to mild hip activation exercises supine reporting it decreased the pain in her back a great deal. She needed some moderate cuing for recall of exercises & to perform correctly, but reported her brain was just distracted today.       Chinyere Is progressing well towards her goals.   Pt prognosis is Good.     Pt will continue to benefit from skilled outpatient physical therapy to address the deficits listed in the problem list box on initial evaluation, provide pt/family education and to maximize pt's level of  independence in the home and community environment.     Pt's spiritual, cultural and educational needs considered and pt agreeable to plan of care and goals.     Anticipated barriers to physical therapy: chronic nature of issues, psychosocial concerns, rotator cuff tear    GOALS: Pt is in agreement with the following goals:        Goal Progress Towards Goal   Short Term: In 3 weeks, pt will:     Verbalize & demonstrate good understanding of resisted training with 3-1-3 second rep for [id number]-[id number] contraction to encourage full muscle recruitment for strength & endurance Progress towards goal: MET 03/31/22   Verbalize & demonstrate good understanding of home stretch routine to improve AROM, help decrease pain & improve mobility Progress towards goal: MET 03/31/22   Demonstrate proper supine or seated diaphragmatic breathing with decreased chest excursion & emphasis on full abdominal excursion & increased expiration time to promote muscle relaxation & increased parasympathetic activity to improve patient tolerance to activities GOAL MET 3/21/2022   Demonstrate proper static standing posture in frontal & sagittal plane per PT visual assessment to decrease postural strain in ADLs Progress towards goal: PROGRESSING 03/31/22   Demonstrate good recall of names of resisted exercises w/ minimal to moderate verbal cues to promote independence w/ exercise at the end of the program Progress towards goal: MET 04/07/22   Verbalize plan for continuing resisted exercises w/ specific location (i.e. commercial gym, home, community fitness center) indicating preference for machine-based or free-weight exercises to incorporate all major muscle groups to maintain & progress therapeutic functional improvements Progress towards goal: PROGRESSING 03/31/22         Long Term: In 8 weeks, pt will:     Verbalize good understanding of activities planning/scheduling (stretching, mindfulness, resisted training, & cardio)  prescribed by PT/OT following the end of the program to sustain & progress functional improvements Progress towards goal: PROGRESSING 03/31/22   Be independent w/ selected resisted training w/ minimal to no cuing while performing to continue w/ resisted strengthening independently at the end of the program Progress towards goal: PROGRESSING 03/31/22   Improve 2 Minute Walk Test (MWT) to 550 feet and 6 MWT to 1650 feet or greater to improve gait speed and mobility Progress towards goal: MET 03/31/22   Perform single leg stance 20 seconds or greater bilaterally to improve safety and balance in ADLs Progress towards goal: PROGRESSING 03/31/22   Demonstrate Timed Up & Go (with appropriate assistive device, if necessary) of 9 seconds or less to decrease fall risk and improve functional mobility Progress towards goal: MET 03/31/22   Demonstrate 5 time sit to stand test without upper extremity assist to 12 sec or less to improve general mobility and decrease fall risk Progress towards goal: PROGRESSING 03/31/22   Score 43 % or less on Hip FOTO to indicate significant functional improvements in impaired functions secondary to low back pain Progress towards goal: MET 03/31/22             PLAN     Continue PT per POC     Carmelo Gonzalez, PT, DPT

## 2022-04-11 NOTE — PROGRESS NOTES
Group Psychotherapy    Site: [facility]    Clinical status of patient: Outpatient    4/7/2022    Length of service:[id number]    Referred by: Functional [facility]oration Program     Number of patients in attendance: 5    Target symptoms: Chronic Pain Management     Patient's response to intervention:  The patient's response to intervention is active listening.    Progress toward goals and other mental status changes:  The patient's progress toward goals is good.    Plan: group psychotherapy    Topics Covered: Pt attended CBT for Chronic Pain as part of her participation in Functional [facility]oration. Topics discussed today included a discussion on food psychology and how to be more mindful with choices and practicing intentionality. We discussed how intentionality impacts anxiety, depression, other mood disorders and chronic pain. We also went over weekend homework to stay on track, will f/u on Monday.

## 2022-04-13 ENCOUNTER — OFFICE VISIT (OUTPATIENT)
Dept: PSYCHIATRY | Facility: OTHER | Age: 47
End: 2022-04-13
Payer: OTHER GOVERNMENT

## 2022-04-13 ENCOUNTER — CLINICAL SUPPORT (OUTPATIENT)
Dept: REHABILITATION | Facility: OTHER | Age: 47
End: 2022-04-13
Payer: OTHER GOVERNMENT

## 2022-04-13 DIAGNOSIS — Z74.09 IMPAIRED FUNCTIONAL MOBILITY, BALANCE, GAIT, AND ENDURANCE: ICD-10-CM

## 2022-04-13 DIAGNOSIS — Z78.9 DIFFICULTY WITH ACTIVITIES OF DAILY LIVING: ICD-10-CM

## 2022-04-13 DIAGNOSIS — Z74.09 IMPAIRED FUNCTIONAL MOBILITY AND ACTIVITY TOLERANCE: Primary | ICD-10-CM

## 2022-04-13 DIAGNOSIS — F41.9 ANXIETY: Primary | ICD-10-CM

## 2022-04-13 DIAGNOSIS — Z78.9 ALTERATION IN INSTRUMENTAL ACTIVITIES OF DAILY LIVING (IADL): ICD-10-CM

## 2022-04-13 DIAGNOSIS — F33.1 MODERATE EPISODE OF RECURRENT MAJOR DEPRESSIVE DISORDER: ICD-10-CM

## 2022-04-13 DIAGNOSIS — F40.298 FEAR OF PAIN: Primary | ICD-10-CM

## 2022-04-13 PROCEDURE — 99499 UNLISTED E&M SERVICE: CPT | Mod: ,,, | Performed by: SOCIAL WORKER

## 2022-04-13 PROCEDURE — 97110 THERAPEUTIC EXERCISES: CPT

## 2022-04-13 PROCEDURE — 90791 PSYCH DIAGNOSTIC EVALUATION: CPT | Mod: ,,, | Performed by: SOCIAL WORKER

## 2022-04-13 PROCEDURE — 99499 NO LOS: ICD-10-PCS | Mod: ,,, | Performed by: SOCIAL WORKER

## 2022-04-13 PROCEDURE — 90791 PR PSYCHIATRIC DIAGNOSTIC EVALUATION: ICD-10-PCS | Mod: ,,, | Performed by: SOCIAL WORKER

## 2022-04-13 PROCEDURE — 97530 THERAPEUTIC ACTIVITIES: CPT

## 2022-04-13 RX ORDER — SUMATRIPTAN SUCCINATE 100 MG/1
100 TABLET ORAL
Qty: 10 TABLET | Refills: 3 | Status: SHIPPED | OUTPATIENT
Start: 2022-04-13 | End: 2023-04-13

## 2022-04-13 NOTE — PROGRESS NOTES
Group Psychotherapy    Site: [facility]    Clinical status of patient: Outpatient    3/30/2022    Length of service:[id number]    Referred by: Functional [facility]oration Program     Number of patients in attendance: 5    Target symptoms: Chronic Pain Management     Patient's response to intervention:  The patient's response to intervention is active listening.    Progress toward goals and other mental status changes:  The patient's progress toward goals is good.    Plan: group psychotherapy    Topics Covered: Pt attended CBT for Chronic Pain as part of her participation in Functional [facility]. Topics discussed today included a review of SMART goal setting and how goal setting can impact and be impacted by anxiety, depression, other mood disorders and chronic pain. Pts filled out goal setting worksheets and discussed the difficulty of setting goals previously with chronic pain. Will f/u in 1 day.

## 2022-04-13 NOTE — PROGRESS NOTES
"    [facility]    Functional Restoration Program  Physical Therapy Treatment Note  FRP Day 14    Name: Chinyere Crockett  MRN:[id number]      Therapy Diagnosis:   Encounter Diagnoses   Name Primary?    Fear of pain Yes    Impaired functional mobility, balance, gait, and endurance      Physician: Aurora Xie NP    Date: 4/13/2022    Physician Orders: PT Eval and Treat   Medical Diagnosis from Referral:   Z78.9 (ICD-10-CM) - Decreased activities of daily living (ADL)   Z74.09 (ICD-10-CM) - Impaired mobility and endurance   G89.4 (ICD-10-CM) - Chronic pain disorder         Evaluation Date: 2/25/2022  Authorization Period Expiration: 12/31/22  Plan of Care Expiration: 5/6/2022   Visit # / Visits authorized: 15 / 20  FOTO: 2/ 3      Precautions: Standard and Fall     Time In:    1:30 pm  Time Out:  2:45 pm  Total Appointment Time (timed & untimed codes): 75 minutes      SUBJECTIVE     Chinyere reports cont migrating headaches at 8 days now and notes this is typical pattern.   Pt note she expects pain to cont inc leading to sig disability.  Pt plans to modify her activity levels due to headaches including concern for her balance.  Pt states she does not have a different plan for her bad HA days vs prior to FRP as she has already tried mindfulness and breathing /s success.    Pt walked in park and visited some circuit training stations, but stepping away as others arrived to use the machines.    Patient's FRP goals: be able to function better around the house, maybe get back to working, looking forward to the "synchronicity of physical and mental health"       Current 4/10  Location: R ld, B hips (L worse),     OBJECTIVE     Objective Measures updated at progress report unless specified.          Limitation/Restriction for FOTO Hip Survey     Therapist reviewed FOTO scores for Chinyere Crockett on 3/31/2022.   FOTO documents entered into EPIC - see Media " section.     Limitation Score: 54%  Visit 9:  28%      Goal:  43%                 Treatment       Chinyere received the Individualized Treatments listed below:     Therapeutic exercises to develop strength, endurance, ROM, flexibility, posture and core stabilization for 75 minutes including:    Full body stretch w/ 3-10 sec hold technique &/or 3-5 repetition technique on all of the following:   Cervical flx/ext   Cervical sidebending   Cervical rotation   Cervical protraction/retraction   Shld rolls   Shld depression/elevation   Scapular retraction/protraction/scaption   Posterior shld stretch (cross arm)   Shld ER stretch (chicken wing)   Elbow flx/ext   Forearm supination/pronation   Wrist flx/ext   Open/close hands/fingers   Seated trunk rotations   Seated trunk/thoracic ext/flx   Seated marches & knee to chest   Seated knee flx/ext   Seated hip ER/piriformis stretch   Seated hamstring stretch   Seated toe raise to heel raise    Seated ankle circles each way   Standing lumbar extensions   Standing lateral leaning stretch   Standing quad stretch (UE support for balance)    Peripheral muscle strengthening which included 1-2 sets of 10-20 repetitions at a slow, controlled 5-7 second per rep pace focused on strengthening supporting musculature for improved body mechanics & functional mobility.  Patient & therapist focused on proper form during treatment to ensure optimal strengthening of each targeted muscle group. Patients are instructed to keep sets/reps with proper load to stay at 3-5 out of 10 on the provided modified Doretha exertion scale.       BATCA Machine Exercises Weight (lbs) Sets Repetitions Doretha Exertion Scale Rating   Leg Extension        Hamstring Curls       Chest Press  27.5 1 20 4   Pec Fly  27.5 1 20 3   Lat Pull Down       Mid Row       Bicep Bar Curls 12.5  1 20 3   Standing Tricep Extension 17.5 1 20 4   Single Leg Press  R/L  47.5 1 20 3       Fitter board - had setting, 30  tilt bouts    Fwd/Bwd    Trial 1 - EC  1 hand palmar    Trial 2 - EO /c conversation 1 hand palmar    Lat      Trial 1- EC 1 hand palmar    Soccer kick/pass              Ball stop /c foot on top for SL stance challenge LLE x 10, RLE x 10              Lat shuffle to ball L side x 3, R side x 3     Trial 2 - EC    Posture demonstration   Seated    Standing - cue for core activation    Seated on Tball   Pelvic tilts -     Fwd/Bwd x 10     Lat x 10 cue for dec WS     Patient Education and Home Exercises     Home Exercises Provided and Patient Education Provided     Education provided:   - encouraged to attempt circuit training stations     Written Home Exercises Provided: Patient instructed to cont prior HEP. Exercises were reviewed and Chinyere was able to demonstrate them prior to the end of the session.  Chinyere demonstrated good  understanding of the education provided. See EMR under Patient Instructions for information provided during therapy sessions    ASSESSMENT     Karma /c improved exercise tolerance today vs Monday and Ind /c resistance training including processing decisions on weight inc for strength challenge.  Pt also able to describe and perform corrections for improved posture thereby meeting STG.  Pt report sig inc ease placing feet on leg press plate indicating improved hip mobility and LE strength.  Pt advised circuit training stations are for all and encouraged not to be intimidated and to consider all the work she has done here as preparation for attempts at those stations.  However,regarding her HA, pt cont to demo signs of catastrophizing including stating inability to wear headbands and verbalize doubtfulness to perform mindfulness techniques.  Pt will need cont encouragement to attempt techniques including at 1 month f/u.         Chinyere Is progressing well towards her goals.   Pt prognosis is Good.     Pt will continue to benefit from skilled outpatient physical therapy to address the deficits listed  in the problem list box on initial evaluation, provide pt/family education and to maximize pt's level of independence in the home and community environment.     Pt's spiritual, cultural and educational needs considered and pt agreeable to plan of care and goals.     Anticipated barriers to physical therapy: chronic nature of issues, psychosocial concerns, rotator cuff tear    GOALS: Pt is in agreement with the following goals:        Goal Progress Towards Goal   Short Term: In 3 weeks, pt will:     Verbalize & demonstrate good understanding of resisted training with 3-1-3 second rep for [id number]-wqblrjkme-wiwcwfnch contraction to encourage full muscle recruitment for strength & endurance Progress towards goal: MET 03/31/22   Verbalize & demonstrate good understanding of home stretch routine to improve AROM, help decrease pain & improve mobility Progress towards goal: MET 03/31/22   Demonstrate proper supine or seated diaphragmatic breathing with decreased chest excursion & emphasis on full abdominal excursion & increased expiration time to promote muscle relaxation & increased parasympathetic activity to improve patient tolerance to activities GOAL MET 3/21/2022   Demonstrate proper static standing posture in frontal & sagittal plane per PT visual assessment to decrease postural strain in ADLs Progress towards goal: MET 04/13/22   Demonstrate good recall of names of resisted exercises w/ minimal to moderate verbal cues to promote independence w/ exercise at the end of the program Progress towards goal: MET 04/07/22   Verbalize plan for continuing resisted exercises w/ specific location (i.e. commercial gym, home, community fitness center) indicating preference for machine-based or free-weight exercises to incorporate all major muscle groups to maintain & progress therapeutic functional improvements Progress towards goal: PROGRESSING 03/31/22         Long Term: In 8 weeks, pt will:     Verbalize good understanding  of activities planning/scheduling (stretching, mindfulness, resisted training, & cardio) prescribed by PT/OT following the end of the program to sustain & progress functional improvements Progress towards goal: PROGRESSING 03/31/22   Be independent w/ selected resisted training w/ minimal to no cuing while performing to continue w/ resisted strengthening independently at the end of the program Progress towards goal: PROGRESSING 03/31/22   Improve 2 Minute Walk Test (MWT) to 550 feet and 6 MWT to 1650 feet or greater to improve gait speed and mobility Progress towards goal: MET 03/31/22   Perform single leg stance 20 seconds or greater bilaterally to improve safety and balance in ADLs Progress towards goal: PROGRESSING 03/31/22   Demonstrate Timed Up & Go (with appropriate assistive device, if necessary) of 9 seconds or less to decrease fall risk and improve functional mobility Progress towards goal: MET 03/31/22   Demonstrate 5 time sit to stand test without upper extremity assist to 12 sec or less to improve general mobility and decrease fall risk Progress towards goal: PROGRESSING 03/31/22   Score 43 % or less on Hip FOTO to indicate significant functional improvements in impaired functions secondary to low back pain Progress towards goal: MET 03/31/22             PLAN     Continue PT per MICAH Chan, PT, DPT

## 2022-04-13 NOTE — PROGRESS NOTES
"[facility]   Functional Restoration Program  Occupational Therapy Progress Report  [facility] Day 14     Name: Chinyere Crockett  Medical Record Number: [id number]    Therapy Diagnosis:   Encounter Diagnoses   Name Primary?    Impaired functional mobility and activity tolerance Yes    Difficulty with activities of daily living     Alteration in instrumental activities of daily living (IADL)      Physician: Aurora Xie NP    Visit Date: 4/13/2022    Physician Orders: OT Eval and Treat  Medical Diagnosis: Chronic pain disorder  Evaluation Date: 2/25/2022  Insurance Authorization Period Expiration: 12/31/2022  Plan of Care Certification Period: 6/17/2022 (UPDATED 3/31/22)  Visit # / Visits authorized: 15 / 20 (including eval)   FOTO: evaluation: 52% limitation  FOTO: reassessment: 47% limitation     Precautions:  Standard and Fall    Time In: 2:47 PM  Time Out: 4:00 PM  Total Billable Time: 73 minutes    Subjective     Chinyere reports "I feel good today".         Pain:   Current: 3 / 10   Location: shoulder and headache    TREATMENT      Chinyere participated in dynamic functional therapeutic activities to improve functional performance for 73 minutes, including:    Individualized Treatment: Increased resistance levels of functional conditioning exercises according to personal recovery goals. Activities include: Dynamic reaching, ojrtq-eb-cedun lifting, sustained standing activity, maximal lifting, 2-handed carry, sustained seated tasks, push and pull, waist-to-shoulder lift, box/container carry, endurance training.     Pt completed dynamic reaching in various functional ranges, with continued focus on posture, core awareness, hip rotation/weight shifting, maintaining retraction and depression in shoulders and pacing as needed. Completed x10 reps x 2 lbs, rated as sort of hard (4/10) exertion.  Continues with tendency to protract shoulder blades but able to correct with verbal cues. " Reported she was not able to complete 15 reps 2/2 increased pain in RUE. Completed seated breathing techniques and stretches at end of activity.      Pt completed maximal lift x15 reps with 28 lbs, rated as sort of hard (4/10), continued education focused on neutral spine positioning and pushing through heels for safety and activation of correct muscles.     Pt participated in functional lift waist to shoulder, 15 reps x  16 lbs with a rating of moderate (3/10). Pt educated on standing posture, core awareness, keeping shoulders depressed and retracted, stepping to place > reaching to place, keeping weight close to center of gravity and pacing as needed.     Chinyere participated in endurance activity using nustep x 10 minutes, starting at level 3.0 and able to progress to level 3.5 to improve functional endurance and progress towards increased MET level for improved community mobility and participation. Added to goal to maintain ~90 steps/min. Rated as sort of hard (4/10), Chinyere re-educated on how to add mindfulness component to activity and how to pace appropriately by completed self check ins and grading activity as needed, with goal to reach moderate to sort of hard by end of activity. Able to carry conversation throughout activity. Did not use BUE for majority of activity 2/2 increased B shoulder pain. Pt with increased SOB post activity and encouraged to complete pursed lip breathing and seated back and shoulder stretches as needed. Completed 1000 steps.    Pt completed functional lifting, floor to waist, 15 reps x 17 lbs with exertion rated moderate (3 /10), focused education on slow and controlled movement and coordinating movements with breath. Pt noted she used this lift related to moving rocks etc in garden.     Pt completed seated mindfulness diaphragmatic breathing activity [id number] with focus on posture, mechanics of breathing, and benefits re: PNS activation. Pt voiced and demonstrated understanding.      Pt re-educated on proper mechanics to get on and off floor using chair for support. Pt educated on steps to take to ensure safety if she were to have a fall: roll to back, complete body scan to see if all body parts are okay, roll to side, hands/knees, crawl to chair and then use steps to get on/off floor. Pt demonstrated understanding x2reps independently without use of chair. Pt with increased self-efficacy after completion of activity. Pt given handouts on proper mechanics. Pt rated as moderate (3/10) exertion.      Pt completed yoga activity on mat, supine, quadriped, seated and standing, with focused education on getting on/off floor properly and safely, back mobility, hip flexibility, stress reduction, dynamic standing balance, posture, alignment and coordinating movement with breath, rated moderate (3/10) exertion. Noted improved ease of getting on/off floor as well as improved ability to and tolerance to cross-legged.     Patient Education and Home Exercises     Education provided:   -Ms. Crockett received education regarding proper posture and body mechanics. She received education regarding anticipated muscular soreness following lift testing and strategies for management were reviewed with patient including stretching, using ice, scheduled rest.  -Additional Education provided: rosy scale, pain cycle, z-lie, functional lifting mechanics, pursed lip and diaphragmatic breathing techniques  - energy banking  - stretching and conservative management for tennis elbow     Written Home Exercises Provided: yes, and binder for program received.   Exercises were reviewed and Chinyere was able to demonstrate them prior to the end of the session.    Chinyere demonstrated good understanding of the education provided.     ASSESSMENT   Chinyere presents with increased pain in shoulders upon arrival. Despite this, she continues with good willingness to participate and good tolerance to weight increases in lifting tasks. Good  independence with pacing as needed. Good continued response to yoga activity and voiced continued desire to perform at home. Overall, she continues to progress towards personal goals.      Chinyere is unable to fully participate in work, recreational, and home-based activities because of decreased functional strength, decreased  AROM, decreased endurance, limited positional tolerance, fear of re-injury, and fear of increased pain. She will benefit from participating in a conditioning  program designed  to increase functional strength, flexibility, and endurance in order to meet functional goals.      Chinyere's prognosis is Good due to good personal goals, good motivation and good response to all feedback and education. Pt will continue to benefit from skilled outpatient occupational therapy to address the deficits listed in the problem list box on initial evaluation, provide pt/family education and to maximize pt's level of independence in the home and community environment.    Pt's spiritual, cultural and educational needs considered and pt agreeable to plan of care and goals.     Anticipated barriers to occupational therapy: none identified.    FRP Goals: While working towards the long-term (3 month) functional goals listed above, Chinyere will accomplish the following short term goals during the 5-week intensive rehabilitation program. Chinyere will:      1. Develop a viable return to work plan. ---------------------- progressing 3/31/22  2. Demonstrate physical capacities consistent with a medium work demand level. ---------------------- progressing 3/31/22  3. Demonstrate increased functional strength by lifting 20 lbs floor to waist and 20 lbs waist to shoulder in order to meet demand of work/home routine. ---------------------- MET 3/31/22  4. Increase her positional tolerance to the level needed for work and home activities. ---------------------- progressing 3/31/22  5. Implement self-care strategies during the  program and at home to control pain. ---------------------- progressing 3/31/22  6.   Patient will demonstrate use of mindfulness, stress management, and coping  strategies for improved participation in daily routine.   7.   Will increase water intake to 7 x 16oz water bottles daily. 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     Find part-time work (will do so only after completion of FRP)     Recreational:  Joining [employer] walking tolerance     Gardening    Crafting    Daily Living:  Cooking      Cleaning     Hair care      Upper body dressing (bras).          Medium Physical Demand Level      0/0         0/5               4/3     2/1     3/0       5/5     4/3     3/3     5/2  (Currently using sport bras)        not rated         not rated             not rated     3 / 6      0 / 0       4 / 5      6 / 6           7 / 7              PLAN     UPDATED plan of care certification 3/31/2022 to 6/17/2022    Outpatient occupational therapy, 3 x/wks for 2 additional weeks. Following this, pt to go on hold for a minimum of 7 weeks to attempt independence w/ Home Activity Plan (HAP) & PT activities, and will then return for reassessment..    CAN Amaya/BEVERLEY  4/13/2022

## 2022-04-14 ENCOUNTER — CLINICAL SUPPORT (OUTPATIENT)
Dept: REHABILITATION | Facility: OTHER | Age: 47
End: 2022-04-14
Payer: OTHER GOVERNMENT

## 2022-04-14 ENCOUNTER — OFFICE VISIT (OUTPATIENT)
Dept: PAIN MEDICINE | Facility: OTHER | Age: 47
End: 2022-04-14
Payer: OTHER GOVERNMENT

## 2022-04-14 DIAGNOSIS — Z74.09 IMPAIRED FUNCTIONAL MOBILITY, BALANCE, GAIT, AND ENDURANCE: ICD-10-CM

## 2022-04-14 DIAGNOSIS — Z78.9 ALTERATION IN INSTRUMENTAL ACTIVITIES OF DAILY LIVING (IADL): ICD-10-CM

## 2022-04-14 DIAGNOSIS — Z78.9 DIFFICULTY WITH ACTIVITIES OF DAILY LIVING: ICD-10-CM

## 2022-04-14 DIAGNOSIS — G89.4 CHRONIC PAIN DISORDER: Primary | ICD-10-CM

## 2022-04-14 DIAGNOSIS — F40.298 FEAR OF PAIN: Primary | ICD-10-CM

## 2022-04-14 DIAGNOSIS — Z74.09 IMPAIRED FUNCTIONAL MOBILITY AND ACTIVITY TOLERANCE: Primary | ICD-10-CM

## 2022-04-14 PROCEDURE — 97530 THERAPEUTIC ACTIVITIES: CPT

## 2022-04-14 PROCEDURE — 99366 TEAM CONF W/PAT BY HC PROF: CPT | Mod: S$PBB,,, | Performed by: NURSE PRACTITIONER

## 2022-04-14 PROCEDURE — 99366 PR TEAM CONFERENCE FACE-TO-FACE NONPHYSICIAN: ICD-10-PCS | Mod: S$PBB,,, | Performed by: NURSE PRACTITIONER

## 2022-04-14 PROCEDURE — 97110 THERAPEUTIC EXERCISES: CPT

## 2022-04-14 NOTE — PROGRESS NOTES
"[facility]   Functional Restoration Program  Occupational Therapy Progress Report  P Day 15       Name: Chinyere Crockett  Medical Record Number: [id number]    Therapy Diagnosis:   Encounter Diagnoses   Name Primary?    Impaired functional mobility and activity tolerance Yes    Difficulty with activities of daily living     Alteration in instrumental activities of daily living (IADL)      Physician: Aurora Xie NP    Visit Date: 4/14/2022    Physician Orders: OT Eval and Treat  Medical Diagnosis: Chronic pain disorder  Evaluation Date: 2/25/2022  Insurance Authorization Period Expiration: 12/31/2022  Plan of Care Certification Period: 6/17/2022 (UPDATED 3/31/22)  Visit # / Visits authorized: 16 / 20 (including eval)   FOTO: evaluation: 52% limitation  FOTO: reassessment: 47% limitation     Precautions:  Standard and Fall    Time In: 13:50  Time Out: 15:00  Total Billable Time: 70 minutes    Subjective     Chinyere reports "I have a headache; 8 days already. But I am just keep moving forward". "I know it's coming, because it's seasonal, and I am waiting for it. I won't be able to do much at that time".         Pain:   Current: 3 / 10   Location: shoulder and headache    TREATMENT      Chinyere participated in dynamic functional therapeutic activities to improve functional performance for 70 minutes, including:    Individualized Treatment: Increased resistance levels of functional conditioning exercises according to personal recovery goals. Activities include: Dynamic reaching, zwoww-[id number] lifting, sustained standing activity, maximal lifting, 2-handed carry, sustained seated tasks, push and pull, waist-to-shoulder lift, box/container carry, endurance training.     Pt completed functional lifting, floor to waist, 15 reps x 22# lbs with exertion rated moderate (3 /10).    Pt participated in functional lift waist to shoulder, 15 reps x  17 #lbs with a rating of moderate (3/10). " "Pt educated on standing posture, core awareness, keeping shoulders depressed and retracted, stepping to place > reaching to place, keeping weight close to center of gravity and pacing as needed.     Chinyere participated in endurance activity using nustep for 10 minutes, starting at level 3.2 to 3.5 to improve functional endurance and progress towards increased MET level for improved community mobility and participation, rated as sort of hard (4/10), Chinyree re-educated on how to add mindfulness component to activity and how to pace appropriately by completed self check ins and grading activity as needed, with goal to reach moderate to sort of hard by end of activity. Completed 1040 steps.     Chinyere completed activity in seated to discuss current habits/routines regarding daily schedule pre program, and set goals related to intentional movement, water intake, nutrition, stretching and mindfulness in order to create new daily schedule to include those goals.  Chinyere rated activity moderate (3/10) exertion.  During activity pt discussed anticipated barriers, anxieties and stressors regarding new schedule and self accountability; verbalized "knowing that migraine will peak and last, affecting what she can do, so do as much as she can now. Reiterated ability to stretch, participate in mindfulness and although having implemented stretching and mindfulness in daily routine, she anticipates that migraine will continue to strengthen.     Pt completed dynamic reaching in various functional ranges, with continued focus on hip rotation/weight shifting, 10 reps x 2# lbs, rated as moderate (3/10) exertion. Cues given to check in and retract scapula to stabilize shoulder with activity.    Pt completed maximal lift 5 reps with 28 #lbs, rated as moderate (3/10), continued education focused on neutral spine positioning and pushing through heels for safety and activation of correct muscles.    Patient Education and Home Exercises "     Education provided:   -Ms. Crockett received education regarding proper posture and body mechanics. She received education regarding anticipated muscular soreness following lift testing and strategies for management were reviewed with patient including stretching, using ice, scheduled rest.  -Additional Education provided: rosy scale, pain cycle, z-lie, functional lifting mechanics, pursed lip and diaphragmatic breathing techniques  - energy banking  - stretching and conservative management for tennis elbow     Written Home Exercises Provided: yes, and binder for program received.   Exercises were reviewed and Chinyere was able to demonstrate them prior to the end of the session.    Chinyere demonstrated good understanding of the education provided.     ASSESSMENT   Chinyere presents with report of headache, not being able to put hair up due to pressure on skull, but despite this, able to participate in functional lifting tasks. With discussion of daily routine, and during scheduling activity verbalizes expectation for decreased ability in the near future due to migraine that she is expecting to become worse in next few weeks, and unable to verbalize plan for self-acountability to participate in mindfulness, intentional movement, daily stretching routine etc.       Chinyere is unable to fully participate in work, recreational, and home-based activities because of decreased functional strength, decreased  AROM, decreased endurance, limited positional tolerance, fear of re-injury, and fear of increased pain. She will benefit from participating in a conditioning  program designed  to increase functional strength, flexibility, and endurance in order to meet functional goals.      Chinyere's prognosis is Good due to good personal goals, good motivation and good response to all feedback and education. Pt will continue to benefit from skilled outpatient occupational therapy to address the deficits listed in the problem list box on  initial evaluation, provide pt/family education and to maximize pt's level of independence in the home and community environment.    Pt's spiritual, cultural and educational needs considered and pt agreeable to plan of care and goals.     Anticipated barriers to occupational therapy: none identified.    [facility] Goals: While working towards the long-term (3 month) functional goals listed above, Chineyre will accomplish the following short term goals during the 5-week intensive rehabilitation program. Chinyere will:      1. Develop a viable return to work plan. ---------------------- progressing 3/31/22  2. Demonstrate physical capacities consistent with a medium work demand level. ---------------------- progressing 3/31/22  3. Demonstrate increased functional strength by lifting 20 lbs floor to waist and 20 lbs waist to shoulder in order to meet demand of work/home routine. ---------------------- MET 3/31/22  4. Increase her positional tolerance to the level needed for work and home activities. ---------------------- progressing 3/31/22  5. Implement self-care strategies during the program and at home to control pain. ---------------------- progressing 3/31/22  6.   Patient will demonstrate use of mindfulness, stress management, and coping  strategies for improved participation in daily routine.   7.   Will increase water intake to 7 x 16oz water bottles daily. 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     Find part-time work (will do so only after completion of FRP)     Recreational:  Joining walking [employer]   - walking tolerance      Gardening    Crafting    Daily Living:  Cooking      Cleaning     Hair care      Upper body dressing (bras).          Medium Physical Demand Level      0/0         0/5               4/3     2/1     3/0       5/5     4/3     3/3     5/2  (Currently using sport bras)        not rated         not rated             not rated     3 / 6      0 / 0       4 / 5 6 / 6 7 / 7              PLAN     Plan of care certification 3/31/2022 to 6/17/2022    Hold Occupational Therapy for a minimum of 7 weeks to attempt independence with Home Activity Plan (HAP) & PT activities, and will then return for OT reassessment.    CHRIS Richey, OTR/L, CEAS-I  4/14/2022

## 2022-04-14 NOTE — PROGRESS NOTES
Group Psychotherapy    Site: [facility]    Clinical status of patient: Outpatient    4/11/2022    Length of service:[id number]    Referred by: Functional Restoration Program     Number of patients in attendance: 5    Target symptoms: Chronic Pain Management     Patient's response to intervention:  The patient's response to intervention is active listening.    Progress toward goals and other mental status changes:  The patient's progress toward goals is good.    Plan: group psychotherapy    Topics Covered: Pt attended CBT for Chronic Pain as part of her participation in Functional Restoration. Topics discussed today included a review of their weekend and what they did differently based upon what they learned thus far in the program. Pt also discussed the neuromatrix of pain and how pain impacts anxiety, depression, and other mood disorders. Will f/u in 2 days.
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
## 2022-04-14 NOTE — PROGRESS NOTES
The team  met with Chinyere Crockett regarding her progress in the program and the plans moving forward.  Please refer to individual notes for plans of care.      She completed has completed the Functional Restoration Program. The table below notes Measures Outcomes comparing Day 1 vs. Day 16 of the Program:    Chinyere Crockett Cohort 55 Day 1 Day 16 % change   CATINA Depression-5  Anxiety-6  Stress-8 Depression- 7  Anxiety- 6  Stress- 15 Slight increase  No change  Increase   VAS  Pain today-2  Pain in the past week-5 Pain today- 2  Pain in the past week- 4 No change  20% improvement   Pain Catastrophizing Scale 18 33 Increase   Sleep Quality Instrument  15 15 No change    Pain Disability Index 34 13 62% improvement   Fear Avoidance Beliefs Fear of Physical pain -8  Fear of Pain caused by work/chores- 23  Total fear of pain- 44 Fear of Physical pain- 4  Fear of Pain caused by work/chores- 16  Total fear of pain- 28 50% decrease  30% decrease  36% decrease overall   Soniya Pain questionnaire 31 35 Slight increase   Low Back Disability  38% 22% 42% decrease   ACE score 7 N/A          This is amazing progress in just 5 weeks!    We discussed that this progress is the result of taking ownership and accountability of your chronic pain and overall health and of the hard work and effort that was put forth during the program.     We discussed staying motivated and using the new strategies learned in the program.    Chinyere Crockett will return for P Graduation ceremony with the group and all P Team members following individual meetings.     We will continue to monitor her progress in our year-long follow-up program at the following intervals:  1 month with PT  2 months with OT  3 months with NP  6 months with NP  9 months with LCSW  12 months with NP

## 2022-04-14 NOTE — PROGRESS NOTES
"    [facility]    Functional Restoration Program  Physical Therapy Treatment Note  FRP Day 15    Name: Chinyere Crockett  MRN:[id number]      Therapy Diagnosis:   Encounter Diagnoses   Name Primary?    Fear of pain Yes    Impaired functional mobility, balance, gait, and endurance      Physician: Aurora Xie NP    Date: 4/14/2022    Physician Orders: PT Eval and Treat   Medical Diagnosis from Referral:   Z78.9 (ICD-10-CM) - Decreased activities of daily living (ADL)   Z74.09 (ICD-10-CM) - Impaired mobility and endurance   G89.4 (ICD-10-CM) - Chronic pain disorder         Evaluation Date: 2/25/2022  Authorization Period Expiration: 12/31/22  Plan of Care Expiration: 5/6/2022   Visit # / Visits authorized: 16 / 20  FOTO: 2/ 3      Precautions: Standard and Fall     Time In:   12:30 pm  Time Out:  1:45 pm  Total Appointment Time (timed & untimed codes): 75 minutes      SUBJECTIVE     Chinyere reports slight inc in headache intensity since last visit but cont to attempt activities to "distract self" like reading.       Tomorrow, pt plans to go /c  tomorrow to assist cleaning out office including lifting trash bags, papers and sweeping.    Pt verbalize intent to begin crafting again /c possibly having a table at local [employer].  Pt also report expectation that her head ache will disable her for undetermined amount of time including not leaving the home.         Patient's FRP goals: be able to function better around the house, maybe get back to working, looking forward to the "synchronicity of physical and mental health"       Current 4/10  Location: [facility], B hips (L worse),     OBJECTIVE     Objective Measures updated at progress report unless specified.          Limitation/Restriction for FOTO Hip Survey     Therapist reviewed FOTO scores for Chinyere Crockett on 3/31/2022.   FOTO documents entered into [employer] - see Media section.     Limitation Score: 54%  Visit 9:  28% "      Goal:  43%                 Treatment       Chinyere received the Individualized Treatments listed below:     Therapeutic exercises to develop strength, endurance, ROM, flexibility, posture and core stabilization for 75 minutes including:    Full body stretch w/ 3-10 sec hold technique &/or 3-5 repetition technique on all of the following:   Cervical flx/ext   Cervical sidebending   Cervical rotation   Cervical protraction/retraction   Shld rolls   Shld depression/elevation   Scapular retraction/protraction/scaption   Posterior shld stretch (cross arm)   Shld ER stretch (chicken wing)   Elbow flx/ext   Forearm supination/pronation   Wrist flx/ext   Open/close hands/fingers   Seated trunk rotations   Seated trunk/thoracic ext/flx   Seated marches & knee to chest   Seated knee flx/ext   Seated hip ER/piriformis stretch   Seated hamstring stretch   Seated toe raise to heel raise    Seated ankle circles each way   Standing lumbar extensions   Standing lateral leaning stretch   Standing quad stretch (UE support for balance)    Peripheral muscle strengthening which included 1-2 sets of 10-20 repetitions at a slow, controlled 5-7 second per rep pace focused on strengthening supporting musculature for improved body mechanics & functional mobility.  Patient & therapist focused on proper form during treatment to ensure optimal strengthening of each targeted muscle group. Patients are instructed to keep sets/reps with proper load to stay at 3-5 out of 10 on the provided modified Doretha exertion scale.       BATCA Machine Exercises Weight (lbs) Sets Repetitions Doretha Exertion Scale Rating   Leg Extension  27.5 1 20 3   Hamstring Curls 30 1 20 3   Chest Press  27.5 1 20 3   Pec Fly  30 1 20 3   Lat Pull Down 30 1 20 3   Mid Row 30 1 20 3   Bicep Bar Curls       Standing Tricep Extension       Single Leg Press  R/L  47.5 1 20 3       Supine over rolled yoga mat   Positional release /c diaphragmatic breaths x  15    B shoulder flex 2# 2 x 10    B horizAB 2# 2 x 10    B serratus punch 2# 2 x 10   B snow angels 2 x 10       Standing shld horizontal abduction & adduction alternating holding 2 lb vertical bar to mimic sweeping x 20 each way (R over L & L over R)  Standing shld horizontal abduction & adduction alternating holding 2 lb vertical bar /c weight overhead x 20 each way (R over L & L over R)           Patient Education and Home Exercises     Home Exercises Provided and Patient Education Provided     Education provided:   -resistance exercise basics including RPE     Written Home Exercises Provided: Patient instructed to cont prior HEP. Exercises were reviewed and Chinyere was able to demonstrate them prior to the end of the session.  Chinyere demonstrated good  understanding of the education provided. See EMR under Patient Instructions for information provided during therapy sessions    ASSESSMENT     Chinyere's subjective report indicate improved self efficacy /c physical capability including seated tolerance for crafting and inc mobility and BUE strength for cleaning tasks.  For example, pt demo sweeping motion /c ergonomic components and verbalizes intent to apply skillset to cleaning tomorrow.   Also, between yesterday and today, pt able to complete full complement of machine based resistance exercises /s rest break and verbalize appropriateness of weight add/subtract.  Pt is appropriate for Ind resistance training.  However, pt subjective also note cont stressor and catastrophizing relating to headache resulting in likely limitation to cont physical measure progression and limitation to performing FRP rx resistance training.  Despite pt cont negative association, PT encouraged pt to cont daily stretching and walking as tolerated to prevent decline in functional activity tolerance which is to be reassessed at 1 month f/u per FRP protocol.         Chinyere Is progressing well towards her goals.   Pt prognosis is Good.     Pt  will continue to benefit from skilled outpatient physical therapy to address the deficits listed in the problem list box on initial evaluation, provide pt/family education and to maximize pt's level of independence in the home and community environment.     Pt's spiritual, cultural and educational needs considered and pt agreeable to plan of care and goals.     Anticipated barriers to physical therapy: chronic nature of issues, psychosocial concerns, rotator cuff tear    GOALS: Pt is in agreement with the following goals:        Goal Progress Towards Goal   Short Term: In 3 weeks, pt will:     Verbalize & demonstrate good understanding of resisted training with 3-1-3 second rep for nexhcwgpwv-qoclholbx-wzdztjomr contraction to encourage full muscle recruitment for strength & endurance Progress towards goal: MET 03/31/22   Verbalize & demonstrate good understanding of home stretch routine to improve AROM, help decrease pain & improve mobility Progress towards goal: MET 03/31/22   Demonstrate proper supine or seated diaphragmatic breathing with decreased chest excursion & emphasis on full abdominal excursion & increased expiration time to promote muscle relaxation & increased parasympathetic activity to improve patient tolerance to activities GOAL MET 3/21/2022   Demonstrate proper static standing posture in frontal & sagittal plane per PT visual assessment to decrease postural strain in ADLs Progress towards goal: MET 04/13/22   Demonstrate good recall of names of resisted exercises w/ minimal to moderate verbal cues to promote independence w/ exercise at the end of the program Progress towards goal: MET 04/07/22   Verbalize plan for continuing resisted exercises w/ specific location (i.e. commercial gym, home, community fitness center) indicating preference for machine-based or free-weight exercises to incorporate all major muscle groups to maintain & progress therapeutic functional improvements Progress towards  goal: PROGRESSING 03/31/22         Long Term: In 8 weeks, pt will:     Verbalize good understanding of activities planning/scheduling (stretching, mindfulness, resisted training, & cardio) prescribed by PT/OT following the end of the program to sustain & progress functional improvements Progress towards goal: PROGRESSING 03/31/22   Be independent w/ selected resisted training w/ minimal to no cuing while performing to continue w/ resisted strengthening independently at the end of the program Progress towards goal: PROGRESSING 03/31/22   Improve 2 Minute Walk Test (MWT) to 550 feet and 6 MWT to 1650 feet or greater to improve gait speed and mobility Progress towards goal: MET 03/31/22   Perform single leg stance 20 seconds or greater bilaterally to improve safety and balance in ADLs Progress towards goal: PROGRESSING 03/31/22   Demonstrate Timed Up & Go (with appropriate assistive device, if necessary) of 9 seconds or less to decrease fall risk and improve functional mobility Progress towards goal: MET 03/31/22   Demonstrate 5 time sit to stand test without upper extremity assist to 12 sec or less to improve general mobility and decrease fall risk Progress towards goal: PROGRESSING 03/31/22   Score 43 % or less on Hip FOTO to indicate significant functional improvements in impaired functions secondary to low back pain Progress towards goal: MET 03/31/22             PLAN     Continue PT per MICAH Chan, PT, DPT

## 2022-04-19 NOTE — PROGRESS NOTES
Group Psychotherapy    Site: [facility]    Clinical status of patient: Outpatient    3/31/2022    Length of service:[id number]    Referred by: Functional Restoration Program     Number of patients in attendance: 5    Target symptoms: Chronic Pain Management     Patient's response to intervention:  The patient's response to intervention is active listening.    Progress toward goals and other mental status changes:  The patient's progress toward goals is good.    Plan: group psychotherapy    Topics Covered: Pt attended CBT for Chronic Pain as part of her participation in Functional Restoration. Topics discussed today included a discussion on the neuromatrix of pain and what goes in to pain perception. We discussed how anxiety, depression and other mood disorders impact pain, also discussed homework for the weekend. Will f/u on Monday.

## 2022-04-20 NOTE — PROGRESS NOTES
Type of service: Individual    Content of session: The client is seeking therapy due to low mental health with symptoms of depression and anxiety. The client reports instability of living situations and familial matters have contributed to heightened anxiety. She expressed that patient advocacy has been a significant source of stress due to a lack of access to migraine medication due to insurance not covering the cost. Client feels responsible for supporting her s goals but feels hesitant to create her own goals due to rapid relocation/moves in the past. We discussed establishing self-care practices despite chaotic environments to bring into the session next week.       Note written by ROSARIO Anthony intern  I have met with the above student and agree with the assessment and note written. Any changes have been made and authorized by me.  Jania Sarabia, [facility] 2022 12:23 PM        Therapeutic techniques and approaches: behavior modification and supportive counseling. Rationale: appropriate for presenting issues.     Pt denies SI/HI. Mood was euthymic, affect matches verbal content. AAOx3, participated fully in session.     Time spent in counselinmins

## 2022-04-20 NOTE — PROGRESS NOTES
Group Psychotherapy    Site: [facility]    Clinical status of patient: Outpatient    4/4/2022    Length of service:[id number]    Referred by: Functional Restoration Program     Number of patients in attendance: 5    Target symptoms: Chronic Pain Management     Patient's response to intervention:  The patient's response to intervention is active listening.    Progress toward goals and other mental status changes:  The patient's progress toward goals is good.    Plan: group psychotherapy    Topics Covered: Pt attended CBT for Chronic Pain as part of her participation in Functional Restoration. Topics discussed today included a review of their weekend and what pts did differently based upon what they've learned in the program. We discussed boundary setting in the context of anxiety, depression, other mood disorders and chronic pain, pts discussed the success and challenges they've had with boundary setting and how it impacted their moods. Will f/u in 2 days.

## 2022-04-22 ENCOUNTER — TELEPHONE (OUTPATIENT)
Dept: NEUROLOGY | Facility: CLINIC | Age: 47
End: 2022-04-22
Payer: OTHER GOVERNMENT

## 2022-04-22 NOTE — TELEPHONE ENCOUNTER
----- Message from Autumn Russ sent at 4/22/2022 10:51 AM CDT -----  Contact: Pito Sheldon with [employer] calling in regards to a prior authorization for Rx : diclofenac (VOLTAREN) 75 MG EC tablet. Requesting call back       Pito @ [phone]      Ref# [id number]

## 2022-04-22 NOTE — TELEPHONE ENCOUNTER
Returned call to [employer] stating that Diclofenac PA is not complete due to them not being able to contact [facility] for clinical questions.  I will call [facility] to complete PA.

## 2022-04-25 NOTE — TELEPHONE ENCOUNTER
Medication ordered by Yonis Goldberg NP at a different location, so I will forward this to his staff to complete.

## 2022-05-12 NOTE — PROGRESS NOTES
"[facility]   Functional Restoration Program  Occupational Therapy Progress Report  FRP Follow up Visit    Name: Chinyere Crockett  Medical Record Number: [id number]    Therapy Diagnosis:   Encounter Diagnoses   Name Primary?    Impaired functional mobility and activity tolerance Yes    Difficulty with activities of daily living     Alteration in instrumental activities of daily living (IADL)      Physician: Aurora Xie NP    Visit Date: 5/13/2022    Physician Orders: OT Eval and Treat  Medical Diagnosis: Chronic pain disorder  Evaluation Date: 2/25/2022  Insurance Authorization Period Expiration: 12/31/2022  Plan of Care Certification Period: 6/17/2022 (UPDATED 3/31/22)  Visit # / Visits authorized: 15 / 20 (including eval)  FOTO: evaluation: 52% limitation  FOTO: reassessment: 47% limitation  FOTO: follow up: 32% limitation     Precautions:  Standard and Fall    Time In: 9:00 AM  Time Out: 10:00 AM  Total Billable Time: 60 minutes    Subjective     Chinyere reports "I'd be doing much better except the headache thing. I am still mid grade level with my headache and still able to talk to people, the only thing is 2 days ago I must have slept wrong on my shoulder and they have been killing me. Im really not doing too bad.  the temporary and the mental away from everything else I am doing pretty good. I can now do a lot more bending and I have started weeding the communal weeding all around our communal pool. I am walking about 2x/day. Getting harder with the sun because of my headaches. And the house is not bad and I am making progress, I even unearthed our weight bench. I just started working out at home but we did make the progress to find the machine so that's a start".     "I am cooking more, I made chicken and broccoli the other day".     "I feel like my only truly limiting factor is these headaches, otherwise I feel like im doing really well".          Pain: "   Current: 2 / 10  Worst: 2 / 10  Best: 0  10  Location: B shoulders    Social and ADL History:  Activities of Daily Living Checklist:   Key to Score:   0: Unable to do the activity  1: Can do the activity with great difficulty  2: Can do the activity with little difficulty   3: No problem with activity  N/A: This is not an activity I do  H/T: Have not tried yet     Personal Care:  2/25/22  3/31/22  5/13/22 Homemakin/25/22  3/31/22  5/13/22   Dressing Upper Body:  2 / 3 / 3 Meal preparation: 3 / 2 / 3   Dressing Lower Body:  3 / 3 / 3 Kitchen Cleanup: 3 / 2 / 3   Bathing:  3 / 3 / 3 Childcare:  na   Hair Care:  1 / 2 / 3 Grocery shoppin / 2 / 3   Sleep:   Vacuuming/mopping:  3 / 2       Emptying trash/ garbage bag: 3 / 2 / 2   General Mobility:    Bed making changing:  3 / 2 / 3   Sitting:  3 / 3 / 3 Laundry  2 / 3 / 3   Bendin / 3 / 3       Getting in/out of bed:  3 / 3 / 3 Home Maintenance:     Standin / 3 / 3 Mowing, raking:  Na / na / 2   Walkin / 3 / 3 Gardenin / 2 / 3   Twistin / 3 / 2 Home Repairs:      Liftin / 3 / 2 Painting:  na             Getting around:          Getting in and out of car:  3 / 3 / 3       Buckling up you seat belt:  2 / 3 / 3       Opening heavy doors:  2 / 2 / 3       Climbing/descending stairs:                      Personal Functional Three Month Goals: Performance and satisfaction noted below    OBJECTIVE      Chinyere participated in dynamic functional therapeutic activities to improve functional performance for 60 minutes, including:    Individualized Treatment:        Evaluation 22; Reassessment 3/31/22; follow up 22   5 times sit-stand  (adults 18-65 y/o) 23.3 seconds; 15.97 seconds; 11.96 seconds  >12 sec= fall risk for general elderly  >16 sec= fall risk for Parkinson's disease  >10 sec= balance/vestibular dysfunction (<59 y/o)  >14.2 sec= balance/vestibular dysfunction (>59 y/o)  >12 sec= fall risk for CVA  "  "I do feel like I am able to get  Up and down easier and so I want to do it more often"     Endurance Testing: Modified Ramp Treadmill Test    GAP  2/25/22 Re-assessment  3/31/22 Follow-up  5/13/22   Minutes Completed  3 mins 00 secs  6 min 00 sec  7 min 15 sec   % Grades  10 %  12%  12%   Speed (mph)  1.7 mph  2.5 mph  2.8 mph   METS 4  7  8    bpm  166 bpm  183 bpm   Doretha Rating Perceived Exertion Scale   5  4  4   Reason for stop point: Pt reported posture changes, fatigue, noted decline in maintaining pace safely.      Functional Strength Test:  Pile Testing: Lifting  (Pounds/Heart rate)   GAP/Eval (#/HR/DORETHA)  Reassessment  Day 9  3/31/22   Follow-up   Appointment  5/13/22    Repetitive Floor to Waist    12#/120/4 25#/146bpm/4     30#/169bpm/4    Repetitive Waist to Shoulder    10#/104/5 25#/144bpm/5         30#/159bpm/5     1- Time Maximum     20#/108/5  35#/113bpm/4     40#/152bpm/3     Carry-2 Handed (40ft)     12#/109/3    22#/129bpm/3   32#/161bpm/4     Work demand Level     Light    light-medium  medium     Reason for Stop point: Increased time required for completing repetitions. During physical testing, participation level consistent.     No environmental, cultural, spiritual, developmental or education needs expressed or noted.     Limitation/Restriction for FOTO NOC Survey     Therapist reviewed FOTO scores for Chinyere Crockett on 5/13//2022.   FOTO documents entered into [employer] - see Media section.     Limitation Score 2/25/22: 52%  Limitation Score reassessment 3/31/22: 47%  Limitation Score reassessment 5/13/22: 32%           Full body stretch w/ 3-10 sec hold technique &/or 3-5 repetition technique on all of the following:   Cervical flx/ext   Cervical sidebending   Cervical rotation   Cervical protraction/retraction   Shld rolls   Shld depression/elevation   Scapular retraction/protraction/scaption   Posterior shld stretch (cross arm)   Shld ER stretch (chicken " wing)   Elbow flx/ext   Forearm supination/pronation   Wrist flx/ext   Open/close hands/fingers   Seated trunk rotations   Seated trunk/thoracic ext/flx   Seated marches & knee to chest   Seated knee flx/ext   Seated hip ER/piriformis stretch   Seated hamstring stretch   Seated toe raise to heel raise    Seated ankle circles each way   Standing lumbar extensions   Standing lateral leaning stretch   Standing quad stretch (UE support for balance)    Patient Education and Home Exercises     Education provided:   -Ms. Crockett received education regarding proper posture and body mechanics. She received education regarding anticipated muscular soreness following lift testing and strategies for management were reviewed with patient including stretching, using ice, scheduled rest.  -Additional Education provided: rosy scale, pain cycle, z-lie, functional lifting mechanics, pursed lip and diaphragmatic breathing techniques  - energy banking     Written Home Exercises Provided: yes, and binder for program received.   Exercises were reviewed and Chinyere was able to demonstrate them prior to the end of the session.    Chinyere demonstrated good understanding of the education provided.     ASSESSMENT   Chinyere presents with report of beginning of seasonal cluster headache symptoms but with overall very positive self report of progress since program. Came for 1 month follow up with OT 2/2 scheduling conflict but made amazing progress in both objective and subjective measures. Notably, she decreased 5x sit to stand time to 11 seconds. Improved MET level from 7 to 8 mets and with progress in each aspect of PILE test by at least 5lbs. Subjectively she improved overall ratings in each personal goal seen in both ADL checklist and mod COPM chart below. Overall, she continues to progress towards personal goals.      Pt has made progress in fully participating in daily activities, recreational, and home-based activities 2/2  improved functional strength with strategies/exercises for improved AROM, endurance, positional tolerance, and decreased fear of re-injury and fear of increased pain.      Chinyere's prognosis is Good due to good personal goals, good motivation and good response to all feedback and education. Pt will continue to benefit from skilled outpatient occupational therapy to address the deficits listed in the problem list box on initial evaluation, provide pt/family education and to maximize pt's level of independence in the home and community environment.    Pt's spiritual, cultural and educational needs considered and pt agreeable to plan of care and goals.     Anticipated barriers to occupational therapy: none identified.    FRP Goals: While working towards the long-term (3 month) functional goals listed above, Chinyere will accomplish the following short term goals during the 5-week intensive rehabilitation program. Chinyere will:      1. Develop a viable return to work plan. ---------------------- progressing 5/13/22  2. Demonstrate physical capacities consistent with a medium work demand level. ---------------------- MET 5/13/22  3. Demonstrate increased functional strength by lifting 20 lbs floor to waist and 20 lbs waist to shoulder in order to meet demand of work/home routine. ---------------------- MET 3/31/22  4. Increase her positional tolerance to the level needed for work and home activities. ---------------------- MET 5/13/22  5. Implement self-care strategies during the program and at home to control pain. ---------------------- MET 5/13/22  6.   Patient will demonstrate use of mindfulness, stress management, and coping  strategies for improved participation in daily routine. ---------------------- MET 5/13/22  7.   Will increase water intake to 7 x 16oz water bottles daily. 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     Find part-time work (will do so only after completion of FRP)     Recreational:  Joining walking [employer]   - walking tolerance     Gardening    Crafting    Daily Living:  Cooking      Cleaning     Hair care      Upper body dressing (bras).          Medium Physical Demand Level      0/0         0/5               4/3     2/1     3/0       5/5     4/3     3/3     5/2  (Currently using sport bras)        not rated         not rated             not rated     3 / 6      0 / 0       4 / 5      6 / 6           7 / 7 8 / 8         na             9 / 9      9 / 9      8 / 8        10 / 10      8 / 9      9 / 9      8 / 8        PLAN     Pt discharged from Occupational Therapy services at this time secondary to improved independence in ADLs & IADLs and improved functional capacity to participate in meaningful activities.     Leola Villasenor, OTR/L  5/13/2022

## 2022-05-13 ENCOUNTER — CLINICAL SUPPORT (OUTPATIENT)
Dept: REHABILITATION | Facility: OTHER | Age: 47
End: 2022-05-13
Payer: OTHER GOVERNMENT

## 2022-05-13 DIAGNOSIS — Z78.9 DIFFICULTY WITH ACTIVITIES OF DAILY LIVING: ICD-10-CM

## 2022-05-13 DIAGNOSIS — Z78.9 ALTERATION IN INSTRUMENTAL ACTIVITIES OF DAILY LIVING (IADL): ICD-10-CM

## 2022-05-13 DIAGNOSIS — Z74.09 IMPAIRED FUNCTIONAL MOBILITY AND ACTIVITY TOLERANCE: Primary | ICD-10-CM

## 2022-05-13 PROCEDURE — 97530 THERAPEUTIC ACTIVITIES: CPT

## 2022-05-30 ENCOUNTER — PATIENT MESSAGE (OUTPATIENT)
Dept: ADMINISTRATIVE | Facility: HOSPITAL | Age: 47
End: 2022-05-30
Payer: OTHER GOVERNMENT

## 2022-06-08 ENCOUNTER — OFFICE VISIT (OUTPATIENT)
Dept: PSYCHIATRY | Facility: OTHER | Age: 47
End: 2022-06-08
Payer: OTHER GOVERNMENT

## 2022-06-08 DIAGNOSIS — F41.9 ANXIETY: Primary | ICD-10-CM

## 2022-06-08 DIAGNOSIS — F33.1 MODERATE EPISODE OF RECURRENT MAJOR DEPRESSIVE DISORDER: ICD-10-CM

## 2022-06-08 PROCEDURE — 99499 UNLISTED E&M SERVICE: CPT | Mod: ,,, | Performed by: SOCIAL WORKER

## 2022-06-08 PROCEDURE — 99499 NO LOS: ICD-10-PCS | Mod: ,,, | Performed by: SOCIAL WORKER

## 2022-06-11 ENCOUNTER — PATIENT MESSAGE (OUTPATIENT)
Dept: ADMINISTRATIVE | Facility: HOSPITAL | Age: 47
End: 2022-06-11
Payer: OTHER GOVERNMENT

## 2022-06-14 ENCOUNTER — CLINICAL SUPPORT (OUTPATIENT)
Dept: REHABILITATION | Facility: OTHER | Age: 47
End: 2022-06-14
Payer: OTHER GOVERNMENT

## 2022-06-14 DIAGNOSIS — F40.298 FEAR OF PAIN: Primary | ICD-10-CM

## 2022-06-14 DIAGNOSIS — Z74.09 IMPAIRED FUNCTIONAL MOBILITY, BALANCE, GAIT, AND ENDURANCE: ICD-10-CM

## 2022-06-14 PROCEDURE — 97110 THERAPEUTIC EXERCISES: CPT

## 2022-06-14 NOTE — PROGRESS NOTES
"NOTE: This is a duplicate copy utilized for reassessment purposes & continuity of care.  See pt's plan of care for co-signed copy.    [facility]  Functional Restoration Program  Physical Therapy Discharge Summary      Date: 6/14/2022   Name: Chinyere Crockett  Clinic Number: [id number]    Therapy Diagnosis:   Encounter Diagnoses   Name Primary?    Fear of pain Yes    Impaired functional mobility, balance, gait, and endurance      Physician: Aurora Xie, NP    Physician Orders: PT Eval and Treat   Medical Diagnosis from Referral:   Z78.9 (ICD-10-CM) - Decreased activities of daily living (ADL)   Z74.09 (ICD-10-CM) - Impaired mobility and endurance   G89.4 (ICD-10-CM) - Chronic pain disorder       Progress Evaluation Date: 6/14/2022  Authorization Period Expiration: 3/15/2022  Plan of Care Expiration: 5/6/2022  Visit # / Visits authorized: 16/ 20  FOTO: 3/ 3     Precautions: Standard and Fall    Time In:  8:00a  Time Out:  9:00a  Total Appointment Time (timed & untimed codes): 60 minutes        Subjective     Patient reports that since her 1 month f/u, she is doing a little bit better. She now has a small weight bench at home & free weights that she is using for a regular routine for about 2 weeks consistent. She is trying to go for at least 1x/day when the weather isn't so bad & thinks her walking is useful for mindfulness. Her  has been out of town & that is why she was able to develop a routine, but she is confident she can keep it up. Her headaches have been a lot better; "they (migraines) were not half as bad as they usually have been." She is aware of her posture & is independent w/ correcting it when she needs. Overall, she is doing daily exercise, stretching & mindfulness & feels she is going to continue to progress.       Patient's FRP goals: be able to function better around the house, maybe get back to working, looking forward to the "synchronicity of " "physical and mental health"         Pain:      Current 1/10, worst 4/10; best 1/10  Location: R shld,  L side of body      Objective         Range of Motion - Cervical   AROM AROM AROM    Evaluation Reassessment  Reassessment  6/14/2022   Flexion 60 ° 55° 52°   Extension 23 ° 34° 41°   Side bending Right 26 ° 43° 43°   Side bending Left 30 ° 45° 42°   Rotation Right 50 ° 50° 68°   Rotation Left 30 ° 50° 65°     Range of Motion - Lumbar         ROM Loss ROM Loss ROM Loss  6/14/2022   Flexion moderate loss Min loss  WFL   Extension minimal loss Min loss WFL   Side bending Right moderate loss Min loss WFL   Side bending Left major loss Min loss WFL   Rotation Right minimal loss WFL Min loss   Rotation Left major loss Min loss WFL     Strength Testing   Evaluation Reassessment Reassessment  6/14/2022   Motion Tested         Lower Extremity R L R L R L   Hip Flexion 4/5 4-/5 4/5 4/5 4+/5 4+/5   Hip IR 4+/5 4/5 NT NT 4+/5 4+/5   Hip ER 4-/5 4-/5 4/5 4/5 4+/5 4+/5   Knee Extension 4+/5 4+/5 5/5 5/5 5/5 5/5   Knee Flexion 4/5 4/5 4+/5 4+/5 4+/5 5/5     Functional Testing     Evaluation Reassessment  Reassessment  6/14/2022   Timed Up and Go 11.9 sec 7.97 sec 6.7sec   5 Time Sit to Stand w/out UE 23.3 sec 15.97 sec 11.1sec   Single Limb Stance R LE 3.3 sec 10.01 sec 10.8 sec   Single Limb Stance L LE 20.6 sec 12.95 sec >30 sec   2 Minute Walk Test 484 feet 596 ft 642 feet   6 Minute Walk Test 1425 feet 1844 ft  1938 feet   Self Selected Walking Speed 1.21 m/sec 1.56 m/sec 1.64 m/sec   Post 6 minute walk test vitals: 136 bpm & 97% O2          Limitation/Restriction for FOTO Hip Survey    Therapist reviewed FOTO scores for Chinyere Crockett on 6/14/2022.   FOTO documents entered into [employer] - see Media section.    Limitation Score: 54%  Visit 9:  28%   6/14/2022 Visit 15: 25%    Goal:  43%            TREATMENT     Chinyere received the Individualized Treatments listed below:      Therapeutic exercises to develop strength, " endurance, ROM, flexibility, posture and core stabilization for 60 minutes including:     PT reassessment (see above)          PATIENT EDUCATION AND HOME EXERCISES     Education provided: none      Written Home Exercises Provided: Patient instructed to cont prior HEP. Exercises were reviewed and Chinyere was able to demonstrate them prior to the end of the session.  Chinyere demonstrated good  understanding of the education provided. See EMR under Patient Instructions for information provided during therapy sessions.    ASSESSMENT   Chinyere w/ excellent progress in her objective measures w/ gait speed above normative values. Her TUG score not indicative of fall risk, but she is slightly deficient on R LE single leg stance that she reports does not impact her function. She also demonstrated excellent endurance in her walking test w/ appropriate vitals. Overall, she has met all goals except single leg stance on R LE & she is appropriate for d/c at this time.        Pt prognosis is Good.      Pt will continue to benefit from skilled outpatient physical therapy to address the deficits listed in the problem list box on initial evaluation, provide pt/family education and to maximize pt's level of independence in the home and community environment.      Pt's spiritual, cultural and educational needs considered and pt agreeable to plan of care and goals.     Anticipated barriers to physical therapy: chronic nature of issues, psychosocial concerns, rotator cuff tear     GOALS: Pt is in agreement with the following goals:        Goal Progress Towards Goal   Short Term: In 3 weeks, pt will:     Verbalize & demonstrate good understanding of resisted training with 3-1-3 second rep for [id number]-dylvfdyqh-vazuuyrxx contraction to encourage full muscle recruitment for strength & endurance Progress towards goal: MET 03/31/22   Verbalize & demonstrate good understanding of home stretch routine to improve AROM, help decrease pain & improve  mobility Progress towards goal: MET 03/31/22   Demonstrate proper supine or seated diaphragmatic breathing with decreased chest excursion & emphasis on full abdominal excursion & increased expiration time to promote muscle relaxation & increased parasympathetic activity to improve patient tolerance to activities GOAL MET 3/21/2022   Demonstrate proper static standing posture in frontal & sagittal plane per PT visual assessment to decrease postural strain in ADLs Met 6/14/2022   Demonstrate good recall of names of resisted exercises w/ minimal to moderate verbal cues to promote independence w/ exercise at the end of the program Met 6/14/2022   Verbalize plan for continuing resisted exercises w/ specific location (i.e. commercial gym, home, community fitness center) indicating preference for machine-based or free-weight exercises to incorporate all major muscle groups to maintain & progress therapeutic functional improvements Met 6/14/2022         Long Term: In 8 weeks, pt will:     Verbalize good understanding of activities planning/scheduling (stretching, mindfulness, resisted training, & cardio) prescribed by PT/OT following the end of the program to sustain & progress functional improvements Met 6/14/2022   Be independent w/ selected resisted training w/ minimal to no cuing while performing to continue w/ resisted strengthening independently at the end of the program Met 6/14/2022   Improve 2 Minute Walk Test (MWT) to 550 feet and 6 MWT to 1650 feet or greater to improve gait speed and mobility Progress towards goal: MET 03/31/22   Perform single leg stance 20 seconds or greater bilaterally to improve safety and balance in ADLs Partly met; R LE not met   Demonstrate Timed Up & Go (with appropriate assistive device, if necessary) of 9 seconds or less to decrease fall risk and improve functional mobility Progress towards goal: MET 03/31/22   Demonstrate 5 time sit to stand test without upper extremity assist to 12  sec or less to improve general mobility and decrease fall risk Met 6/14/2022   Score 43 % or less on Hip FOTO to indicate significant functional improvements in impaired functions secondary to low back pain Progress towards goal: MET 03/31/22             PLAN   Discharge from PT       Carmelo Gonzalez, PT, DPT

## 2022-06-20 ENCOUNTER — PATIENT OUTREACH (OUTPATIENT)
Dept: FAMILY MEDICINE | Facility: CLINIC | Age: 47
End: 2022-06-20
Payer: OTHER GOVERNMENT

## 2022-06-20 NOTE — PROGRESS NOTES
[employer] was given to patient on 6/20/2022 10:08 AM       Rosio Antunez  Nurse Clinical Care Coordinator  Internal Medicine  [facility]  [phone]

## 2022-07-08 ENCOUNTER — TELEPHONE (OUTPATIENT)
Dept: PAIN MEDICINE | Facility: CLINIC | Age: 47
End: 2022-07-08
Payer: OTHER GOVERNMENT

## 2022-07-08 NOTE — TELEPHONE ENCOUNTER
This message is for patient in regards to his/her appointment 07/11/22 at 9:00a   With Yonis Goldberg NP.      [facility] Policy: For the safety of all patients and staff members.     Patient Visitor policy: During this visit we're informing all patients that face mask are now optional, upon visit you have the options of requesting clinical staff to wear a mask for your protection and thiers.      If you have any questions or concerns please contact [phone]      Staff juan antonio Tompkins

## 2022-07-11 ENCOUNTER — OFFICE VISIT (OUTPATIENT)
Dept: PAIN MEDICINE | Facility: CLINIC | Age: 47
End: 2022-07-11
Payer: OTHER GOVERNMENT

## 2022-07-11 VITALS
DIASTOLIC BLOOD PRESSURE: 93 MMHG | HEART RATE: 105 BPM | SYSTOLIC BLOOD PRESSURE: 126 MMHG | BODY MASS INDEX: 37.45 KG/M2 | HEIGHT: 66 IN | WEIGHT: 233 LBS

## 2022-07-11 DIAGNOSIS — G89.4 CHRONIC PAIN DISORDER: Primary | ICD-10-CM

## 2022-07-11 DIAGNOSIS — M51.36 DDD (DEGENERATIVE DISC DISEASE), LUMBAR: ICD-10-CM

## 2022-07-11 DIAGNOSIS — Z74.09 IMPAIRED MOBILITY AND ENDURANCE: ICD-10-CM

## 2022-07-11 DIAGNOSIS — Z78.9 DECREASED ACTIVITIES OF DAILY LIVING (ADL): ICD-10-CM

## 2022-07-11 PROCEDURE — 99214 PR OFFICE/OUTPT VISIT, EST, LEVL IV, 30-39 MIN: ICD-10-PCS | Mod: S$PBB,,, | Performed by: NURSE PRACTITIONER

## 2022-07-11 PROCEDURE — 99999 PR PBB SHADOW E&M-EST. PATIENT-LVL III: CPT | Mod: PBBFAC,,, | Performed by: NURSE PRACTITIONER

## 2022-07-11 PROCEDURE — 99213 OFFICE O/P EST LOW 20 MIN: CPT | Mod: PBBFAC | Performed by: NURSE PRACTITIONER

## 2022-07-11 PROCEDURE — 99999 PR PBB SHADOW E&M-EST. PATIENT-LVL III: ICD-10-PCS | Mod: PBBFAC,,, | Performed by: NURSE PRACTITIONER

## 2022-07-11 PROCEDURE — 99214 OFFICE O/P EST MOD 30 MIN: CPT | Mod: S$PBB,,, | Performed by: NURSE PRACTITIONER

## 2022-07-11 RX ORDER — CYCLOBENZAPRINE HCL 10 MG
10 TABLET ORAL NIGHTLY
Qty: 30 TABLET | Refills: 2 | Status: SHIPPED | OUTPATIENT
Start: 2022-07-11 | End: 2022-08-10

## 2022-07-11 RX ORDER — DICLOFENAC SODIUM 75 MG/1
75 TABLET, DELAYED RELEASE ORAL 2 TIMES DAILY
Qty: 60 TABLET | Refills: 2 | Status: SHIPPED | OUTPATIENT
Start: 2022-07-11 | End: 2022-11-02

## 2022-07-11 NOTE — PROGRESS NOTES
Chronic Pain - Established patient - Follow Up     Referring Physician: No ref. provider found    Chief Complaint:   No chief complaint on file.      SUBJECTIVE: Disclaimer: This note has been generated using voice-recognition software. There may be typographical errors that have been missed during proof-reading    Last 3 PDI Scores 4/11/2022 2/1/2022 12/21/2021   Pain Disability Index (PDI) 11 21 15       Interval History 7/11/2022:  Mrs Crockett presents for follow up. HA manageable and correlated to pressure change in weather, states they are mild and constant. Overall found benefit from FRP. Continues to take diclofenac and flexeril qhs with benefit and denies SE of medications.     Interval History 4/11/2022:  Mrs Crockett presents for follow up of pain complaints. She states doing very well with FRP and pleased with progress. She continues to take Diclofenac and Flexeril. She has noticed since prior visit having more daily functionality She states SE and never got over Nausea from Cymbalta and discussed she does not ever wish to be on this medication again.She does voice frustation with HA mgt regarding messaging to Neurology and prescriptions.     Interval History 2/1/2022:  Mrs Crockett presents for follow up. She has been attending Therapy for Right shoulder and while she endorses benefit from PT at times PT sets her back and exacerbates pain. She states this Thursday will be her last day for PT. And again while she has found progress she is not where she would like to be with functioning and pain. She does not wish to have surgical intervention or CSI at this time. She is frustrated with diffuse pain complaints and decreased daily functioning. She is taking Flexeril more frequently at this time but feels brain fog at times during the day. Diclofenac has been beneficial for her. She denies new areas of pain, denies neurological changes.     Interval History 12/21/2021:  The Pt presents for follow up of  Right shoulder pain. She states doing fair and but continued scapular pain and shoulder pain with even cooking activities. She will be starting Shoulder PT in January. Does not wish to pursue CSI or surgical intervention at this time without giving PT a try 1st. She is currently taking diclofenac with benefit and denies SE of medications.     Interval History 11/29/2021:  Pt presents to clinic for follow up of low back pain and new right sided scapula pain. Pt reports that her low back pain had improved with a combination of physical therapy and meloxicam. Unfortunately pt reports she had some GI upset and tried oral diclofenac with some relief.   Additionally pt reports right shoulder pain associated with right upper back spasms. Her spasms are associated with any movement of the shoulder. She denies any neck pain, arm pain or any feelings of numbness. She reports that her symptoms are relieved by ice.     Initial encounter 08/25/2021:  Chinyere Crockett presents to the clinic for the evaluation of low back pain. The pain started 3 years ago following a fall at work and symptoms have been worsening. She slipped on a wet floor and fell in a twisting motion onto her back while striking a mop bucket. Since that time she has dealt with hip and back pain. The hip pain is treated with GTB injections by Dr. Zazueta with sports medicine. She receives the injections every 3-4 months. When she feels the injection's effect wear off, she begins to notice her back pain worsening.     Brief history:    Pain Description:    The pain is located in the Left lower back area and does not radiate.      At BEST  4/10     At WORST  8/10 on the WORST day.      On average pain is rated as 6/10.     Today the pain is rated as 6/10    The pain is described as aching and burning      Symptoms interfere with daily activity, sleeping and work.     Exacerbating factors: Extension and rotation.      Mitigating factors heat, ice, medications  and dry needling.     Patient denies night fever/night sweats, urinary incontinence, bowel incontinence, significant weight loss, significant motor weakness and loss of sensations.  Patient denies any suicidal or homicidal ideations    Pain Medications:  Current:  Flexeril 10mg qHS PRN  Tylenol   OTC NSAIDs      Tried in Past:  NSAIDs - Mobic  TCA -Never  SNRI -Never  Anti-convulsants -Never  Muscle Relaxants -Never  Opioids-Never  Benzodiazepines -Never    Physical Therapy/Home Exercise:   No, most recently in Jan 2021  Not consistent with HEP       report:  Reviewed and consistent with medication use as prescribed.    Pain Procedures:   GTB injections with sports medicine    Chiropractor -yes  Acupuncture - never  TENS unit -never  Spinal decompression -never  Joint replacement -never    Imaging:    MRI SHOULDER WITHOUT CONTRAST RIGHT 12/8/2021     CLINICAL HISTORY:  Pain in right shoulderShoulder pain, rotator cuff disorder suspected, xray done;     TECHNIQUE:  MRI of right shoulder was performed on a 1.5T magnet utilizing the following sequences: Localizer; axial T2 FS; coronal T2 FS and PD FS; sagittal T1, T2 FS and PD FS.     COMPARISON:  None     FINDINGS:  Rotator cuff: There is some attenuation of caliber of the insertional fibers of the supraspinatus and infraspinatus tendons and some irregularity and abnormal signal along the bursal surface suggesting partial-thickness bursal sided tear involving approximately 50% of the tendon thickness.  Tear measures 2 cm in AP diameter.  Subscapularis tendon intact.Muscle bulk preserved.     Biceps: Long head biceps tendon is intact.     Labrum: Glenoid labrum is intact.     Glenohumeral Joint: There is no fracture or significant articular cartilage loss.  Bone marrow signal is normal.     Acromioclavicular joint: The AC joint is unremarkable.     Misc: There is no evidence of bursitis.     Impression:     Partial-thickness bursal sided tear of the supraspinatus  and infraspinatus tendons measuring 2 cm in size.    XR L-Spine 09/27/2021  FINDINGS:  There is rightward curvature of the lumbar spine with discogenic change and lower facet hypertrophy.  Vertebral body heights appear maintained.  No significant translation.  No evidence for lytic or blastic lesion.     Impression:     As above.     XR L-Spine 9/23/2020  FINDINGS:  Scoliosis convex to the right in the lumbar region is appreciated, alignment otherwise appearing unremarkable.  Vertebral body heights are normally maintained, without significant compression deformity at any level.  No significant disc narrowing.  The AP projection strongly suggests some prominent left-sided L4-5 facet arthropathy.  Minimal marginal vertebral endplate spurring is seen at a few thoracolumbar levels.  Vertebral endplates are well defined.  No osseous destructive process.  SI joints appear unremarkable.     Impression:     Lumbar dextroscoliosis and prominent left-sided facet arthropathy at L4-5.  No evidence of compression fracture.    XR hip/pelvis 9/23/2021   FINDINGS:  No acute fracture of the visualized lower lumbar spine, bony pelvis, or proximal femurs.  Preserved right and left femoral head contours.  Intact right and left SI joints and hip joint spaces.  Pelvic densities felt related to phleboliths unless concern for otherwise.  Some asymmetric radiodensity projects to the left side of the L4-L5 disc space that could be further evaluated with lumbar spine radiographs.     Impression:     No acute fracture.    History reviewed. No pertinent past medical history.  Past Surgical History:   Procedure Laterality Date    TONSILLECTOMY       Social History     Socioeconomic History    Marital status:    Tobacco Use    Smoking status: Never Smoker    Smokeless tobacco: Never Used   Substance and Sexual Activity    Alcohol use: Yes     Comment: occasionally    Drug use: Yes     Types: Marijuana    Sexual activity: Yes      Partners: Male     Birth control/protection: None     Family History   Problem Relation Age of Onset    COPD Mother     Breast cancer Mother 72    Heart attack Father     Heart disease Paternal Grandfather         ?quad bypass    Heart disease Paternal Uncle         quad bypass    Cancer Neg Hx     Colon cancer Neg Hx     Diabetes Neg Hx     Eclampsia Neg Hx     Hypertension Neg Hx     Miscarriages / Stillbirths Neg Hx     Ovarian cancer Neg Hx      labor Neg Hx     Stroke Neg Hx        Review of patient's allergies indicates:   Allergen Reactions    Hydrocodone      Dizziness and nausea        Current Outpatient Medications   Medication Sig    cyclobenzaprine (FLEXERIL) 10 MG tablet Take 1 tablet (10 mg total) by mouth every evening.    diclofenac (VOLTAREN) 75 MG EC tablet Take 1 tablet (75 mg total) by mouth 2 (two) times daily.    ergocalciferol (ERGOCALCIFEROL) 50,000 unit Cap Take 1 capsule (50,000 Units total) by mouth every 7 days. (Patient not taking: Reported on 2022)    sumatriptan (IMITREX) 100 MG tablet Take 1 tablet (100 mg total) by mouth every 2 (two) hours as needed for Migraine. Not to exceed 2 per day.     Current Facility-Administered Medications   Medication    triamcinolone acetonide injection 40 mg    triamcinolone acetonide injection 40 mg       REVIEW OF SYSTEMS:    GENERAL:  No weight loss, malaise or fevers.  HEENT:   No recent changes in vision or hearing  NECK:  Negative for lumps, no difficulty with swallowing.  RESPIRATORY:  Negative for cough, wheezing or shortness of breath, patient denies any recent URI.  CARDIOVASCULAR:  Negative for chest pain, leg swelling or palpitations.  GI:  Negative for abdominal discomfort, blood in stools or black stools or change in bowel habits.  MUSCULOSKELETAL:  See HPI.  SKIN:  Negative for lesions, rash, and itching.  PSYCH:  No mood disorder or recent psychosocial stressors.  Patients sleep is not disturbed secondary  "to pain.  HEMATOLOGY/LYMPHOLOGY:  Negative for prolonged bleeding, bruising easily or swollen nodes.  Patient is not currently taking any anti-coagulants  ENDO: No history of diabetes or thyroid dysfunction  NEURO:   No history of headaches, syncope, paralysis, seizures or tremors.  All other reviewed and negative other than HPI.    OBJECTIVE:    BP (!) 126/93   Pulse 105   Ht 5' 6" (1.676 m)   Wt 105.7 kg (233 lb)   BMI 37.61 kg/m²       PHYSICAL EXAMINATION:  Voice normal.  Normal affect without evidence of distress.  Psych: tearful at times and voices frustration with continued decreased functioning and pain.   Gait: sitting in chair without difficulty     Prior PHYSICAL EXAMINATION:    GENERAL: Well appearing, in no acute distress, alert and oriented x3.  PSYCH:  Mood and affect appropriate.  SKIN: Skin color, texture, turgor normal, no rashes or lesions.  HEAD/FACE:  Normocephalic, atraumatic. Cranial nerves grossly intact.  CV: RRR with palpation of the radial artery.  PULM: No evidence of respiratory difficulty, symmetric chest rise.  GI:  Soft and non-tender.  BACK: Straight leg raising in the supine position is negative to radicular pain. TTP over the Left facet joints of the lumbar spine. No tenderness over spinous processes. Normal range of motion. Pain reproduction with rotation and extension.  EXTREMITIES: Knee ROM is full and pain free without obvious instability or laxity. Pain in buttock and lateral aspect of hip with external rotation of Left hip. ROM of the right shoulder full with significant pain after maneuvers were performed.   MUSCULOSKELETAL: Pain with internal rotation of the right shoulder, with tenderness to palpation over the shoulder anterior and posteriorly.  There was discreet trigger point palpated over the rhomboid muscles on the right. TTP over sacroiliac joints bilaterally (L>R).  FABERs test is positive on Left.  FADIRs test is negative.   Bilateral lower extremity strength is " normal and symmetric.  No atrophy or tone abnormalities are noted.   NEURO: Bilateral upper and lower extremity coordination and muscle stretch reflexes are physiologic and symmetric.  Plantar response are downgoing. No clonus.  No loss of sensation is noted.  GAIT: normal.    Lab Results   Component Value Date    WBC 9.31 08/19/2021    HGB 13.3 08/19/2021    HCT 40.7 08/19/2021    MCV 93 08/19/2021     08/19/2021       BMP  Lab Results   Component Value Date     08/19/2021    K 3.6 08/19/2021     08/19/2021    CO2 28 08/19/2021    BUN 11 08/19/2021    CREATININE 0.7 08/19/2021    CALCIUM 9.2 08/19/2021    ANIONGAP 8 08/19/2021    ESTGFRAFRICA >60 08/19/2021    EGFRNONAA >60 08/19/2021     Lab Results   Component Value Date    HGBA1C 5.0 08/19/2021       ASSESSMENT: 46 y.o. year old female with pain, consistent with     Encounter Diagnoses   Name Primary?    Chronic pain disorder Yes    Decreased activities of daily living (ADL)     Impaired mobility and endurance     DDD (degenerative disc disease), lumbar        PLAN:   - Prior records reviewed     - She is finding significant benefit from FRP and advised to continue.     - Refill diclofenac 75mg bid prn    - Refill Flexeril 10mg qhs     - RTC offered FU, will return PRN    The above plan and management options were discussed at length with patient. Patient is in agreement with the above and verbalized understanding. It will be communicated with the referring physician via electronic record, fax, or mail.       Yonis Goldberg  07/11/2022     I spent a total of 30 minutes on the day of the visit.  This includes face to face time and non-face to face time preparing to see the patient by reviewing previous labs/imaging, obtaining and/or reviewing separately obtained history, documenting clinical information in the electronic or other health record, independently interpreting results and communicating results to the  patient/family/caregiver.

## 2022-07-25 ENCOUNTER — OFFICE VISIT (OUTPATIENT)
Dept: PSYCHIATRY | Facility: OTHER | Age: 47
End: 2022-07-25
Payer: OTHER GOVERNMENT

## 2022-07-25 DIAGNOSIS — F41.9 ANXIETY: Primary | ICD-10-CM

## 2022-07-25 PROCEDURE — 99499 UNLISTED E&M SERVICE: CPT | Mod: ,,, | Performed by: STUDENT IN AN ORGANIZED HEALTH CARE EDUCATION/TRAINING PROGRAM

## 2022-07-25 PROCEDURE — 99499 NO LOS: ICD-10-PCS | Mod: ,,, | Performed by: STUDENT IN AN ORGANIZED HEALTH CARE EDUCATION/TRAINING PROGRAM

## 2022-07-30 NOTE — PROGRESS NOTES
Aox3. Pt complaining of feeling weak for the past 2 days. Pt also states her HGB was 8.1 at PCP today and they suggested she come into Emergency Department. Reports being 15 weeks pregnant. PMHx of anemia. Denies n/v, SOB, chest pain, dysuria. Respirations even and unlabored. Skin warm to touch. Physical Therapy Daily Treatment Note     Name: Chinyere Crockett  Clinic Number: [id number]    Therapy Diagnosis:   Encounter Diagnoses   Name Primary?    Shoulder weakness Yes    Chronic right shoulder pain     Decreased functional activity tolerance      Physician: Mariana De León MD    Visit Date: 1/27/2022    Physician Orders: PT Eval and Treat   Medical Diagnosis from Referral: Chronic right shoulder pain [M25.511, G89.29]  Evaluation Date: 1/4/2022  Authorization Period Expiration: 12/31/2022  Plan of Care Expiration: 03/01/2022  Visit # / Visits authorized: 6/20    Progress Note Due: 02/27/2022  FOTO: 2/2     Precautions: Standard, anxiety      Time In: 8:00 am   Time Out: 9:05 am  Total Appointment Time (timed & untimed codes):55 minutes    Subjective   Pt reports: she is able to do more things around the house and use her arm more with less pain, stills struggling with any motion that mimics opening a jar    She was compliant with home exercise program.   Response to previous treatment: Minimal soreness  Functional change: None yet    Pain:3-4/10  Location: right posterolateral shoulder/ (R) middle trap/rhomboid     Objective          Active Range of Motion in (degrees): - reassessed 1/27/2022  Shoulder Right Left   Flexion 160 WFL   Abduction 165 WFL   ER 90* WFL   IR  80 WFL      Upper Extremity Strength - reassessed 1/27/2022  (R) UE   (L) UE     Elbow flexion: 4/5 Elbow flexion: 4/5   Elbow extension: 4/5 Elbow extension: 4/5   Shoulder elevation: 4/5 Shoulder elevation: 4/5   Shoulder flexion: 4-/5 Shoulder flexion: 4/5   Shoulder Abduction: 4-/5 Shoulder abduction: 4/5   Shoulder ER 4/5 Shoulder ER 4/5   Shoulder IR 4/5 Shoulder IR 4/5   Lower Trap 3/5 Lower Trap 3/5   Middle Trap 3/5 Middle Trap 3/5   Rhomboids 3-/5 Rhomboids 3-/5          Chinyere received therapeutic exercises to develop strength, endurance, ROM, flexibility, posture and core stabilization for 45 minutes including:    Ex's  "in bold performed today    AAROM on pulleys for Shoulder flex/scaption and abduction to tolerance x 2 minutes each  Open books x10  Posterior capsule stretch 3 x 20 sec  Supine AAROM Shoulder flexion with 3# dowel x 10    Supine horizontal abduction RTB 15x3"  Scap retract/rows RTB 2 x 10  Shoulder extension 2x10 RTB  serratus wall slides (Pillow case) 2 x 10   Closed chain scap protract on wall 2 x 10  Lat stretch holding onto chest press 3x20"  No money RTB 15x3"  Sidelying ER with 2# 2 x 10  Prone over T-ball against plinth   Prone row x 10   scaption (Y") x 10   H-ABd ("T") x 10   Row x 10    Not performed:   Shoulder ER/IR walkouts with YTB x 10   shoulder isometrics x10  Supine Scap protract 2 x 10  Supine Shoulder ER with dowel x 10  Sidelying shoulder flex to 90 degrees x 10  Supine serratus punches 1# dowel 1 x 10 and 1 x 5 ( stopped due to onset of (R) rhomboid/middle trap cramping)  Myofascial release (Rhomboid/posterior capsule)with lacrosse ball on wall x 1 minute  SOC RTB 2x10    Chinyere received the following manual therapy techniques: Joint mobilizations were applied to the: (R) shoulder for 10 minutes, including: inferior/posterior GH glides grade ll, GH oscillations, STM to right rhomboid/middle trap/posterolateral shoulder with and without roller massage ball.     Chinyere received cold pack for 10 minutes to (R) shoulder and Rhomboid/middle trap for pain control.. (Patient declined application of cryotherapy today due to time constraints but states she will apply at home.    Home Exercises Provided and Patient Education Provided     Education provided:   - cues with ex's  - Cues for postural awareness  - Patient educated to attempt to refrain from sleeping on her (R) side however, she indicates that she has tried other positions but cannot get comfortable enough to sleep.    Written Home Exercises Provided: Patient instructed to cont prior HEP. Added ER/IR walkouts 1/21/22  Exercises were reviewed and " Chinyere was able to demonstrate them prior to the end of the session.  Chinyere demonstrated good  understanding of the education provided.     See EMR under Patient Instructions for exercises provided prior visit and today's visit.    Assessment   Patient presents today with slight improvement in pain levels. Reassessment was performed with pt demo improved strength and ROM of R UE with decreased pain and improved FOTO score. Progressed therex today with good tolerance and no adverse effects. Pt reporting significant relief with LS and lats stretches today. Continue to progress as tolerated.     Chinyere Is progressing well towards her goals.   Pt prognosis is Good.     Pt will continue to benefit from skilled outpatient physical therapy to address the deficits listed in the problem list box on initial evaluation, provide pt/family education and to maximize pt's level of independence in the home and community environment.     Pt's spiritual, cultural and educational needs considered and pt agreeable to plan of care and goals.     Anticipated barriers to physical therapy: None    GOALS: Short Term Goals: 4 weeks  1.Report decreased in pain at worse less than  <   / =  8  /10  to increase tolerance for functional mobility. MET 1/27/2022  2. Pt to improve range of motion by 25% to allow for improved functional mobility to allow for improvement in IADLs. MET 1/27/2022  3. Increased right shoulder MMT 1/2 grade to increase tolerance for ADL and work activities. MET 1/27/2022  4. Pt to report be conscious of impaired sitting and standing posture daily to decrease pain. MET 1/27/2022  5. Pt to tolerate HEP to improve ROM and independence with ADL's. MET 1/27/2022     Long Term Goals: 8 weeks  1.Report decreased in pain at worse less than  <   / =  2 /10  to increase tolerance for functional mobility. (not met, progressing)  2.  Patient will demonstrate improved overall function per FOTO Survey to 35% score or less. (not met,  "progressing)  3.Increased right shoulder MMT 1 grade to increase tolerance for ADL and work activities. (not met, progressing)  4. Pt to report and demonstrate proper posture in standing and sitting to decrease pain. (not met, progressing)  5. Pt to be Independent with HEP to improve ROM and independence with ADL's. (not met, progressing)  6. Pt to improve range of motion by 75% to allow for improved functional mobility to allow for improvement in IADLs. (not met, progressing)  7. Pt will be able to complete making of "minis" while maintain good postural awareness and little to no (no more than 2/10 on PRS) pain.(not met, progressing)    Plan     Plan of care Certification: 1/4/2022 to 03/01/2022     Outpatient Physical Therapy 2 times weekly for 8 weeks to include the following interventions: Cervical/Lumbar Traction, Electrical Stimulation PRN, Gait Training, Manual Therapy, Moist Heat/ Ice, Neuromuscular Re-ed, Patient Education, Self Care, Therapeutic Activities and Therapeutic Exercise. Dry needling Progress HEP towards D/C. Recommend F/U with MD if symptoms worsen or do not resolve. Patient may be seen by a PTA for treatment to carry out their plan of care.  Face-to-face conferences will be held.    Edison Pierce, PT     "
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
## 2022-08-10 NOTE — PROGRESS NOTES
6/08/22 3:00pm    Social Work Intern Yvette Ponce        Chinyere appears to be in good spirits, despite having several sources of stressors at the moment. Silvia  is on duty and will not be home for a few weeks. The client discussed stress towards her mothers new medical concern of an embolism. Her mother had planned a trip to visit Chinyere prior to this urgent discovery, so Chinyere is not sure what the next week will entail. Chinyere has expressed a difficult relationship with her mother in past sessions, although there have been some improvements since her mother apologized for her behavior in the past. We made plans for next week to discuss how those interactions went and how she prioritized her well-being during high stress. The client expresses a positive win in her physical and mental health due to her improved skin condition that has relieved her of pain and self-critical thoughts.

## 2022-08-22 ENCOUNTER — OFFICE VISIT (OUTPATIENT)
Dept: PSYCHIATRY | Facility: OTHER | Age: 47
End: 2022-08-22
Payer: OTHER GOVERNMENT

## 2022-08-22 DIAGNOSIS — F41.9 ANXIETY: Primary | ICD-10-CM

## 2022-08-22 DIAGNOSIS — F33.1 MODERATE EPISODE OF RECURRENT MAJOR DEPRESSIVE DISORDER: ICD-10-CM

## 2022-08-22 PROCEDURE — 99499 NO LOS: ICD-10-PCS | Mod: ,,, | Performed by: STUDENT IN AN ORGANIZED HEALTH CARE EDUCATION/TRAINING PROGRAM

## 2022-08-22 PROCEDURE — 99499 UNLISTED E&M SERVICE: CPT | Mod: ,,, | Performed by: STUDENT IN AN ORGANIZED HEALTH CARE EDUCATION/TRAINING PROGRAM

## 2022-08-24 NOTE — PROGRESS NOTES
"Chinyere Crockett   intern Yvette Kris  8/22/22 at 4:00pm      In the beginning of the session, Chinyere shared a story of how she extended a gift to a local friend that was intended to be given to her best friend before she passed. She said the act felt like a release of weight, but also an act that her best friend would have wanted.     Moving further into the session, Chinyere touched on how her mother is becoming more forgetful, and how this has affected their interactions. She shared that she did establish boundaries with her mother that we discussed in our previous session; her mother seems to be respecting those boundaries thus far. Given the recent decline in her mother's functioning, Chinyere is putting effort into having a positive experience with her mother before she returns home.    She shares that she has made progress in the making her home feel like "home." Given the status of her broken AC unit, she has spent the majority of her time in her bedroom where they purchased a window unit. This had an effect on her mental health as she felt crowded and limited to that room; however, her living room space has started to be more habitable, which has helped her mental health as she doesn't feel as restricted. She states that she had a rare moment in which she was truly happy, in a moment she was in her home just being with her .    We plan to meet next on 8/29 at 10:30am.      "

## 2022-08-29 ENCOUNTER — OFFICE VISIT (OUTPATIENT)
Dept: PSYCHIATRY | Facility: OTHER | Age: 47
End: 2022-08-29
Payer: OTHER GOVERNMENT

## 2022-08-29 DIAGNOSIS — F33.1 MODERATE EPISODE OF RECURRENT MAJOR DEPRESSIVE DISORDER: ICD-10-CM

## 2022-08-29 DIAGNOSIS — F41.9 ANXIETY: Primary | ICD-10-CM

## 2022-08-29 PROCEDURE — 99499 NO LOS: ICD-10-PCS | Mod: ,,, | Performed by: STUDENT IN AN ORGANIZED HEALTH CARE EDUCATION/TRAINING PROGRAM

## 2022-08-29 PROCEDURE — 99499 UNLISTED E&M SERVICE: CPT | Mod: ,,, | Performed by: STUDENT IN AN ORGANIZED HEALTH CARE EDUCATION/TRAINING PROGRAM
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
## 2022-09-07 ENCOUNTER — OFFICE VISIT (OUTPATIENT)
Dept: PSYCHIATRY | Facility: OTHER | Age: 47
End: 2022-09-07
Payer: OTHER GOVERNMENT

## 2022-09-07 DIAGNOSIS — F41.9 ANXIETY: Primary | ICD-10-CM

## 2022-09-07 PROCEDURE — 99499 NO LOS: ICD-10-PCS | Mod: ,,, | Performed by: STUDENT IN AN ORGANIZED HEALTH CARE EDUCATION/TRAINING PROGRAM

## 2022-09-07 PROCEDURE — 99499 UNLISTED E&M SERVICE: CPT | Mod: ,,, | Performed by: STUDENT IN AN ORGANIZED HEALTH CARE EDUCATION/TRAINING PROGRAM
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
## 2022-09-20 ENCOUNTER — TELEPHONE (OUTPATIENT)
Dept: INTERNAL MEDICINE | Facility: CLINIC | Age: 47
End: 2022-09-20
Payer: OTHER GOVERNMENT

## 2022-09-20 DIAGNOSIS — Z12.31 ENCOUNTER FOR SCREENING MAMMOGRAM FOR BREAST CANCER: Primary | ICD-10-CM

## 2022-09-20 NOTE — TELEPHONE ENCOUNTER
----- Message from Lizzie Orosco sent at 9/20/2022 11:37 AM CDT -----  Type:  Mammogram    Caller is requesting to schedule their annual mammogram appointment.  Order is not listed in EPIC.  Please enter order and contact patient to schedule.  Name of Caller: pt     Where would they like the mammogram performed? [facility]    Would the patient rather a call back or a response via [facility]? call    Best Call Back Number: [phone] (home)       Additional Information:

## 2022-09-26 ENCOUNTER — CLINICAL SUPPORT (OUTPATIENT)
Dept: PSYCHIATRY | Facility: OTHER | Age: 47
End: 2022-09-26
Payer: OTHER GOVERNMENT

## 2022-09-26 DIAGNOSIS — F41.9 ANXIETY: Primary | ICD-10-CM

## 2022-09-26 PROCEDURE — 99499 UNLISTED E&M SERVICE: CPT | Mod: ,,, | Performed by: STUDENT IN AN ORGANIZED HEALTH CARE EDUCATION/TRAINING PROGRAM

## 2022-09-26 PROCEDURE — 99499 NO LOS: ICD-10-PCS | Mod: ,,, | Performed by: STUDENT IN AN ORGANIZED HEALTH CARE EDUCATION/TRAINING PROGRAM
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
## 2022-09-30 ENCOUNTER — HOSPITAL ENCOUNTER (OUTPATIENT)
Dept: RADIOLOGY | Facility: OTHER | Age: 47
Discharge: HOME | End: 2022-09-30
Attending: STUDENT IN AN ORGANIZED HEALTH CARE EDUCATION/TRAINING PROGRAM
Payer: OTHER GOVERNMENT

## 2022-09-30 DIAGNOSIS — Z12.31 ENCOUNTER FOR SCREENING MAMMOGRAM FOR BREAST CANCER: ICD-10-CM

## 2022-09-30 PROCEDURE — 77067 SCR MAMMO BI INCL CAD: CPT | Mod: 26,,, | Performed by: INTERNAL MEDICINE

## 2022-09-30 PROCEDURE — 77067 SCR MAMMO BI INCL CAD: CPT | Mod: TC

## 2022-09-30 PROCEDURE — 77067 MAMMO DIGITAL SCREENING BILAT WITH TOMO: ICD-10-PCS | Mod: 26,,, | Performed by: INTERNAL MEDICINE

## 2022-09-30 PROCEDURE — 77063 BREAST TOMOSYNTHESIS BI: CPT | Mod: 26,,, | Performed by: INTERNAL MEDICINE

## 2022-09-30 PROCEDURE — 77063 MAMMO DIGITAL SCREENING BILAT WITH TOMO: ICD-10-PCS | Mod: 26,,, | Performed by: INTERNAL MEDICINE

## 2022-10-03 ENCOUNTER — CLINICAL SUPPORT (OUTPATIENT)
Dept: PSYCHIATRY | Facility: OTHER | Age: 47
End: 2022-10-03
Payer: OTHER GOVERNMENT

## 2022-10-03 DIAGNOSIS — F41.9 ANXIETY: Primary | ICD-10-CM

## 2022-10-03 PROCEDURE — 99499 UNLISTED E&M SERVICE: CPT | Mod: ,,, | Performed by: STUDENT IN AN ORGANIZED HEALTH CARE EDUCATION/TRAINING PROGRAM

## 2022-10-03 PROCEDURE — 99499 NO LOS: ICD-10-PCS | Mod: ,,, | Performed by: STUDENT IN AN ORGANIZED HEALTH CARE EDUCATION/TRAINING PROGRAM

## 2022-10-12 ENCOUNTER — CLINICAL SUPPORT (OUTPATIENT)
Dept: PSYCHIATRY | Facility: OTHER | Age: 47
End: 2022-10-12
Payer: OTHER GOVERNMENT

## 2022-10-12 DIAGNOSIS — F33.1 MODERATE EPISODE OF RECURRENT MAJOR DEPRESSIVE DISORDER: ICD-10-CM

## 2022-10-12 DIAGNOSIS — F41.9 ANXIETY: Primary | ICD-10-CM

## 2022-10-12 PROCEDURE — 99499 UNLISTED E&M SERVICE: CPT | Mod: ,,, | Performed by: STUDENT IN AN ORGANIZED HEALTH CARE EDUCATION/TRAINING PROGRAM

## 2022-10-12 PROCEDURE — 99499 NO LOS: ICD-10-PCS | Mod: ,,, | Performed by: STUDENT IN AN ORGANIZED HEALTH CARE EDUCATION/TRAINING PROGRAM
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
## 2022-10-12 NOTE — PROGRESS NOTES
"Chinyere shared that her migraines had returned last week, but was only extreme for a few days instead of continuous weeks. She is feeling better overall but rates her migraine pain as a 3 out of 10 (1 being no pain, 10 being the most pain). Her migraine affected her ability to complete tasks, responsibilities, and manage her mental health, but her symptoms are improving. We discussed the spoons theory, and Chinyere said she often has no spoons, especially when she is having a migraine. We discussed what activities, people, and places might provide her with more "spoons." Chinyere expressed that she is very stressed as she has been applying for job positions, but hasn't received any response. For Chinyere, crafting is a major source for self-care and managing stress. She said she used to listen to music all of the time, which would help re-energize her, but she says she has "unlearned" how to enjoy her passions. We discussed how she can implement more joyful practices in her routine.      We plan to meet next Wednesday at 10:30am.  "

## 2022-10-19 ENCOUNTER — CLINICAL SUPPORT (OUTPATIENT)
Dept: PSYCHIATRY | Facility: OTHER | Age: 47
End: 2022-10-19
Payer: OTHER GOVERNMENT

## 2022-10-19 NOTE — PROGRESS NOTES
"Chinyere has been experiencing a migraine for two weeks, which has been an exhausting and painful obstacle in managing her routine. She has managed her medication, which has helped ease some of the pain and discomfort. Her migraine is migratory, so she cannot anticipate the kind of pain she will experience day-to-day. Although this has been stressful and tiring, Chinyere has directed her energy towards crafting, which has been an incredibly helpful mindfulness practice. She is able to focus her attention, somewhat diverting the attention from her pain to a more positive outlet. She had been completing lots of beautiful artwork projects lately, and feels she is "finding (her) niche." She says that her work has also acted as a helpful distraction from her familial stress. Her mother denied home healthcare, which is a resource she needs to live functionally. Chinyere has had to distance herself from the situation to prioritize her own needs over, especially considering the migraine she has had for the previous two weeks. She expressed that her anxiety has heightened, but the anxiety doesn't have a definitive trigger. We discussed how she reacts to her anxiety, both physically and emotionally. She says that she takes deep breaths and tries to slow down her thoughts. She expressed that she has previously considered that she was "not worth" practicing self care; she recognizes the fallacies in this thought, but she still struggles to discredit it. I reminded Chinyere that her art practice is a form of self care when she wants to engage, create, and express herself. I proposed that she grows her definition of self care and what it means for her. I also recommended that she revisit the self-care wheel from the [employer].    We plan to discuss her progress on Monday, 10/24 at 10:30am.    "

## 2022-11-07 ENCOUNTER — CLINICAL SUPPORT (OUTPATIENT)
Dept: PSYCHIATRY | Facility: OTHER | Age: 47
End: 2022-11-07
Payer: OTHER GOVERNMENT

## 2022-11-07 DIAGNOSIS — F41.9 ANXIETY: Primary | ICD-10-CM

## 2022-11-07 DIAGNOSIS — F33.1 MODERATE EPISODE OF RECURRENT MAJOR DEPRESSIVE DISORDER: ICD-10-CM

## 2022-11-07 PROCEDURE — 99499 NO LOS: ICD-10-PCS | Mod: ,,, | Performed by: STUDENT IN AN ORGANIZED HEALTH CARE EDUCATION/TRAINING PROGRAM

## 2022-11-07 PROCEDURE — 99499 UNLISTED E&M SERVICE: CPT | Mod: ,,, | Performed by: STUDENT IN AN ORGANIZED HEALTH CARE EDUCATION/TRAINING PROGRAM

## 2022-11-07 NOTE — PROGRESS NOTES
"Chinyere expressed that she has had a good week considering she has been experiencing some pain. While managing her seasonal migraine, she also has experienced relatively new shoulder pain.   We have discussed in several sessions that Chinyere has been looking for a working position. She has been applying to jobs for weeks, but has not had any response. However, she sent two applications yesterday and is hopeful that one of them could be a potential opportunity for work.  She expressed that she holds fear for technology, as it has been difficult for her to navigate in the past. She has experienced panic attacks when not knowing how to work a program, so we discussed ways in which she could better educate herself on the software she would use.  She shared that she has "had spoons lately," referring to the Spoon Method gauging motivation and energy. She has been able to accomplish many tasks and feels proud of herself for doing so. This demonstrates a significant shift from previous sessions when Chinyere would express self-criticism for incomplete tasks. Chinyere has made progress in identifying her strengths and utilizing energy banking (for both physical and mental health).   She shared that she has been meeting her health goals and has cut out alcohol usage. She feels proud of the weight that she has lost and the mental motivation it has provided her. We discussed previous insecurities and self-criticizing behavior patterns, and then contrasted them with the perspective Chinyere holds now.   We plan to continue discussing self-confidence and anxiety management in our next session on Wednesday, November 16th, 2022 at 10:30am.    "

## 2022-11-28 ENCOUNTER — CLINICAL SUPPORT (OUTPATIENT)
Dept: PSYCHIATRY | Facility: OTHER | Age: 47
End: 2022-11-28
Payer: OTHER GOVERNMENT

## 2022-11-28 DIAGNOSIS — F41.9 ANXIETY: Primary | ICD-10-CM

## 2022-11-28 PROCEDURE — 99499 UNLISTED E&M SERVICE: CPT | Mod: ,,, | Performed by: STUDENT IN AN ORGANIZED HEALTH CARE EDUCATION/TRAINING PROGRAM

## 2022-11-28 PROCEDURE — 99499 NO LOS: ICD-10-PCS | Mod: ,,, | Performed by: STUDENT IN AN ORGANIZED HEALTH CARE EDUCATION/TRAINING PROGRAM

## 2022-11-28 NOTE — PROGRESS NOTES
"Chinyere seemed to be in a bright mood as she walked into the office. She said that she is "doing well," considering the stress that her family is experiencing. She shared that her grandmother is sick, which is worrying as her mother can't physically care for her. She plans to make a call after our appointment to hear updates on her grandmother's condition. Over Thanksgiving, Chinyere did not reach out to her father, which has led her to feel some amount of guilt. Since their last conversation was emotionally charged due to differences in political ideologies, Chinyere is hesitant to reengage. She says that touching base with the family is a current stressor.    Dagoberto and Chinyere are feeling exacerbated stress due to family health concerns. Their life can be heavily influenced by several different instances, such as their parent's passing. Chinyere expresses anxiety about Dagoberto' career path, since there are new developments in the  that may undermine his experience.     She has been practicing self care by walking more, which has made her feel proud of herself. Crafting has also been a major source of stress relief. Chinyere brought into my office some of the projects she is working on; she has dedicated a great deal of time, creativity, and attention to her art process.     Chinyere and I plan to meet for our final session on Monday, 12/5 at 10:30am.    "

## 2022-12-05 ENCOUNTER — CLINICAL SUPPORT (OUTPATIENT)
Dept: PSYCHIATRY | Facility: OTHER | Age: 47
End: 2022-12-05
Payer: OTHER GOVERNMENT

## 2022-12-05 DIAGNOSIS — F33.1 MODERATE EPISODE OF RECURRENT MAJOR DEPRESSIVE DISORDER: ICD-10-CM

## 2022-12-05 DIAGNOSIS — F41.9 ANXIETY: Primary | ICD-10-CM

## 2022-12-05 PROCEDURE — 99499 UNLISTED E&M SERVICE: CPT | Mod: ,,, | Performed by: STUDENT IN AN ORGANIZED HEALTH CARE EDUCATION/TRAINING PROGRAM

## 2022-12-05 PROCEDURE — 99499 NO LOS: ICD-10-PCS | Mod: ,,, | Performed by: STUDENT IN AN ORGANIZED HEALTH CARE EDUCATION/TRAINING PROGRAM

## 2022-12-07 NOTE — PROGRESS NOTES
"For our last appointment together, chinyere and I discussed how she is processing current events in her life, comparing her present reactions to stress with her past reactions to stress. Chinyere has grown less critical of herself, a major improvement was noted by Chinyere and myself. She has practiced more self-compassion during moments of high stress. Chinyere has relied on her art making process and crafting as a productive and expressive coping skill. These practices have provided great stress and anxiety relief. She also said that she is "proud of (herself and her work)," which is a drastic contrast from her self-concept in the beginning of our sessions. She anticipates a "tumultuous start to the New Year," as her maternal side of the family is facing health problems. She says that she will continue to take care of her physical and mental health in the meantime. Chinyere has been a determined, motivated, and insightful client. She has improved her boundary setting techniques and demonstrates positive outlooks despite great stress.    "

## 2023-01-20 ENCOUNTER — TELEPHONE (OUTPATIENT)
Dept: PAIN MEDICINE | Facility: CLINIC | Age: 48
End: 2023-01-20
Payer: OTHER GOVERNMENT

## 2023-01-23 ENCOUNTER — PATIENT MESSAGE (OUTPATIENT)
Dept: PAIN MEDICINE | Facility: OTHER | Age: 48
End: 2023-01-23
Payer: OTHER GOVERNMENT

## 2023-01-23 ENCOUNTER — OFFICE VISIT (OUTPATIENT)
Dept: PAIN MEDICINE | Facility: CLINIC | Age: 48
End: 2023-01-23
Payer: OTHER GOVERNMENT

## 2023-01-23 VITALS
DIASTOLIC BLOOD PRESSURE: 93 MMHG | HEIGHT: 66 IN | WEIGHT: 216 LBS | RESPIRATION RATE: 18 BRPM | HEART RATE: 122 BPM | BODY MASS INDEX: 34.72 KG/M2 | SYSTOLIC BLOOD PRESSURE: 136 MMHG

## 2023-01-23 DIAGNOSIS — M70.61 TROCHANTERIC BURSITIS OF BOTH HIPS: Primary | ICD-10-CM

## 2023-01-23 DIAGNOSIS — M70.62 TROCHANTERIC BURSITIS OF BOTH HIPS: Primary | ICD-10-CM

## 2023-01-23 PROCEDURE — 99214 PR OFFICE/OUTPT VISIT, EST, LEVL IV, 30-39 MIN: ICD-10-PCS | Mod: S$PBB,,, | Performed by: NURSE PRACTITIONER

## 2023-01-23 PROCEDURE — 99999 PR PBB SHADOW E&M-EST. PATIENT-LVL III: ICD-10-PCS | Mod: PBBFAC,,, | Performed by: NURSE PRACTITIONER

## 2023-01-23 PROCEDURE — 99213 OFFICE O/P EST LOW 20 MIN: CPT | Mod: PBBFAC | Performed by: NURSE PRACTITIONER

## 2023-01-23 PROCEDURE — 99999 PR PBB SHADOW E&M-EST. PATIENT-LVL III: CPT | Mod: PBBFAC,,, | Performed by: NURSE PRACTITIONER

## 2023-01-23 PROCEDURE — 99214 OFFICE O/P EST MOD 30 MIN: CPT | Mod: S$PBB,,, | Performed by: NURSE PRACTITIONER

## 2023-01-23 RX ORDER — CYCLOBENZAPRINE HCL 10 MG
10 TABLET ORAL 3 TIMES DAILY PRN
Qty: 90 TABLET | Refills: 5 | Status: SHIPPED | OUTPATIENT
Start: 2023-01-23 | End: 2023-02-22

## 2023-01-23 NOTE — H&P (VIEW-ONLY)
Chronic Pain - Established patient - Follow Up     Referring Physician: No ref. provider found    Chief Complaint:   Chief Complaint   Patient presents with    Low-back Pain    Shoulder Pain     right       SUBJECTIVE: Disclaimer: This note has been generated using voice-recognition software. There may be typographical errors that have been missed during proof-reading    Last 3 PDI Scores 1/23/2023 4/11/2022 2/1/2022   Pain Disability Index (PDI) 15 11 21     Interval History 1/23/2023:  Mrs Crockett presents for follow up and having exacerbated B, lateral hip pain. She has had GTB injections done by Orthopedic in past. She denies newer areas of pain or neurological changes. She is taking Flexeril 10mg qhs PRN with benefit and no significant SE of medication. She did additionally strain shoulder but this has improved.     Interval History 7/11/2022:  Mrs Crockett presents for follow up. HA manageable and correlated to pressure change in weather, states they are mild and constant. Overall found benefit from FRP. Continues to take diclofenac and flexeril qhs with benefit and denies SE of medications.     Interval History 4/11/2022:  Mrs Crockett presents for follow up of pain complaints. She states doing very well with FRP and pleased with progress. She continues to take Diclofenac and Flexeril. She has noticed since prior visit having more daily functionality She states SE and never got over Nausea from Cymbalta and discussed she does not ever wish to be on this medication again.She does voice frustation with HA mgt regarding messaging to Neurology and prescriptions.     Interval History 2/1/2022:  Mrs Crockett presents for follow up. She has been attending Therapy for Right shoulder and while she endorses benefit from PT at times PT sets her back and exacerbates pain. She states this Thursday will be her last day for PT. And again while she has found progress she is not where she would like to be with  functioning and pain. She does not wish to have surgical intervention or CSI at this time. She is frustrated with diffuse pain complaints and decreased daily functioning. She is taking Flexeril more frequently at this time but feels brain fog at times during the day. Diclofenac has been beneficial for her. She denies new areas of pain, denies neurological changes.     Interval History 12/21/2021:  The Pt presents for follow up of Right shoulder pain. She states doing fair and but continued scapular pain and shoulder pain with even cooking activities. She will be starting Shoulder PT in January. Does not wish to pursue CSI or surgical intervention at this time without giving PT a try 1st. She is currently taking diclofenac with benefit and denies SE of medications.     Interval History 11/29/2021:  Pt presents to clinic for follow up of low back pain and new right sided scapula pain. Pt reports that her low back pain had improved with a combination of physical therapy and meloxicam. Unfortunately pt reports she had some GI upset and tried oral diclofenac with some relief.   Additionally pt reports right shoulder pain associated with right upper back spasms. Her spasms are associated with any movement of the shoulder. She denies any neck pain, arm pain or any feelings of numbness. She reports that her symptoms are relieved by ice.     Initial encounter 08/25/2021:  Chinyere Crockett presents to the clinic for the evaluation of low back pain. The pain started 3 years ago following a fall at work and symptoms have been worsening. She slipped on a wet floor and fell in a twisting motion onto her back while striking a mop bucket. Since that time she has dealt with hip and back pain. The hip pain is treated with GTB injections by Dr. Zazueta with sports medicine. She receives the injections every 3-4 months. When she feels the injection's effect wear off, she begins to notice her back pain worsening.     Brief  history:    Pain Description:    The pain is located in the Left lower back area and does not radiate.      At BEST  4/10     At WORST  8/10 on the WORST day.      On average pain is rated as 6/10.     Today the pain is rated as 6/10    The pain is described as aching and burning      Symptoms interfere with daily activity, sleeping and work.     Exacerbating factors: Extension and rotation.      Mitigating factors heat, ice, medications and dry needling.     Patient denies night fever/night sweats, urinary incontinence, bowel incontinence, significant weight loss, significant motor weakness and loss of sensations.  Patient denies any suicidal or homicidal ideations    Pain Medications:  Current:  Flexeril 10mg qHS PRN  Tylenol   OTC NSAIDs      Tried in Past:  NSAIDs - Mobic  TCA -Never  SNRI -Never  Anti-convulsants -Never  Muscle Relaxants -Never  Opioids-Never  Benzodiazepines -Never    Physical Therapy/Home Exercise:   No, most recently in Jan 2021  Not consistent with HEP       report:  Reviewed and consistent with medication use as prescribed.    Pain Procedures:   GTB injections with sports medicine    Chiropractor -yes  Acupuncture - never  TENS unit -never  Spinal decompression -never  Joint replacement -never    Imaging:    MRI SHOULDER WITHOUT CONTRAST RIGHT 12/8/2021     CLINICAL HISTORY:  Pain in right shoulderShoulder pain, rotator cuff disorder suspected, xray done;     TECHNIQUE:  MRI of right shoulder was performed on a 1.5T magnet utilizing the following sequences: Localizer; axial T2 FS; coronal T2 FS and PD FS; sagittal T1, T2 FS and PD FS.     COMPARISON:  None     FINDINGS:  Rotator cuff: There is some attenuation of caliber of the insertional fibers of the supraspinatus and infraspinatus tendons and some irregularity and abnormal signal along the bursal surface suggesting partial-thickness bursal sided tear involving approximately 50% of the tendon thickness.  Tear measures 2 cm in AP  diameter.  Subscapularis tendon intact.Muscle bulk preserved.     Biceps: Long head biceps tendon is intact.     Labrum: Glenoid labrum is intact.     Glenohumeral Joint: There is no fracture or significant articular cartilage loss.  Bone marrow signal is normal.     Acromioclavicular joint: The AC joint is unremarkable.     Misc: There is no evidence of bursitis.     Impression:     Partial-thickness bursal sided tear of the supraspinatus and infraspinatus tendons measuring 2 cm in size.    XR L-Spine 09/27/2021  FINDINGS:  There is rightward curvature of the lumbar spine with discogenic change and lower facet hypertrophy.  Vertebral body heights appear maintained.  No significant translation.  No evidence for lytic or blastic lesion.     Impression:     As above.     XR L-Spine 9/23/2020  FINDINGS:  Scoliosis convex to the right in the lumbar region is appreciated, alignment otherwise appearing unremarkable.  Vertebral body heights are normally maintained, without significant compression deformity at any level.  No significant disc narrowing.  The AP projection strongly suggests some prominent left-sided L4-5 facet arthropathy.  Minimal marginal vertebral endplate spurring is seen at a few thoracolumbar levels.  Vertebral endplates are well defined.  No osseous destructive process.  SI joints appear unremarkable.     Impression:     Lumbar dextroscoliosis and prominent left-sided facet arthropathy at L4-5.  No evidence of compression fracture.    XR hip/pelvis 9/23/2021   FINDINGS:  No acute fracture of the visualized lower lumbar spine, bony pelvis, or proximal femurs.  Preserved right and left femoral head contours.  Intact right and left SI joints and hip joint spaces.  Pelvic densities felt related to phleboliths unless concern for otherwise.  Some asymmetric radiodensity projects to the left side of the L4-L5 disc space that could be further evaluated with lumbar spine radiographs.     Impression:     No  acute fracture.    History reviewed. No pertinent past medical history.  Past Surgical History:   Procedure Laterality Date    TONSILLECTOMY       Social History     Socioeconomic History    Marital status:    Tobacco Use    Smoking status: Never    Smokeless tobacco: Never   Substance and Sexual Activity    Alcohol use: Yes     Comment: occasionally    Drug use: Yes     Types: Marijuana    Sexual activity: Yes     Partners: Male     Birth control/protection: None     Family History   Problem Relation Age of Onset    COPD Mother     Breast cancer Mother 72    Heart attack Father     Heart disease Paternal Grandfather         ?quad bypass    Heart disease Paternal Uncle         quad bypass    Cancer Neg Hx     Colon cancer Neg Hx     Diabetes Neg Hx     Eclampsia Neg Hx     Hypertension Neg Hx     Miscarriages / Stillbirths Neg Hx     Ovarian cancer Neg Hx      labor Neg Hx     Stroke Neg Hx        Review of patient's allergies indicates:   Allergen Reactions    Hydrocodone      Dizziness and nausea        Current Outpatient Medications   Medication Sig    diclofenac (VOLTAREN) 75 MG EC tablet TAKE 1 TABLET(75 MG) BY MOUTH TWICE DAILY    cyclobenzaprine (FLEXERIL) 10 MG tablet Take 1 tablet (10 mg total) by mouth 3 (three) times daily as needed for Muscle spasms.    ergocalciferol (ERGOCALCIFEROL) 50,000 unit Cap Take 1 capsule (50,000 Units total) by mouth every 7 days. (Patient not taking: Reported on 2023)    sumatriptan (IMITREX) 100 MG tablet Take 1 tablet (100 mg total) by mouth every 2 (two) hours as needed for Migraine. Not to exceed 2 per day. (Patient not taking: Reported on 2023)     Current Facility-Administered Medications   Medication    triamcinolone acetonide injection 40 mg    triamcinolone acetonide injection 40 mg       REVIEW OF SYSTEMS:    GENERAL:  No weight loss, malaise or fevers.  HEENT:   No recent changes in vision or hearing  NECK:  Negative for lumps, no difficulty  "with swallowing.  RESPIRATORY:  Negative for cough, wheezing or shortness of breath, patient denies any recent URI.  CARDIOVASCULAR:  Negative for chest pain, leg swelling or palpitations.  GI:  Negative for abdominal discomfort, blood in stools or black stools or change in bowel habits.  MUSCULOSKELETAL:  See HPI.  SKIN:  Negative for lesions, rash, and itching.  PSYCH:  No mood disorder or recent psychosocial stressors.  Patients sleep is not disturbed secondary to pain.  HEMATOLOGY/LYMPHOLOGY:  Negative for prolonged bleeding, bruising easily or swollen nodes.  Patient is not currently taking any anti-coagulants  ENDO: No history of diabetes or thyroid dysfunction  NEURO:   No history of headaches, syncope, paralysis, seizures or tremors.  All other reviewed and negative other than HPI.    OBJECTIVE:    BP (!) 136/93   Pulse (!) 122   Resp 18   Ht 5' 6" (1.676 m)   Wt 98 kg (216 lb)   BMI 34.86 kg/m²       PHYSICAL EXAMINATION:  Voice normal.  Normal affect without evidence of distress.  Psych: tearful at times and voices frustration with continued decreased functioning and pain.   Gait: sitting in chair without difficulty     Prior PHYSICAL EXAMINATION:    GENERAL: Well appearing, in no acute distress, alert and oriented x3.  PSYCH:  Mood and affect appropriate.  SKIN: Skin color, texture, turgor normal, no rashes or lesions.  HEAD/FACE:  Normocephalic, atraumatic. Cranial nerves grossly intact.  CV: RRR with palpation of the radial artery.  PULM: No evidence of respiratory difficulty, symmetric chest rise.  GI:  Soft and non-tender.  BACK: Straight leg raising in the supine position is negative to radicular pain. TTP over the Left facet joints of the lumbar spine. No tenderness over spinous processes. Normal range of motion. Pain reproduction with rotation and extension.  EXTREMITIES: Knee ROM is full and pain free without obvious instability or laxity. Pain in buttock and lateral aspect of hip with " external rotation of Left hip. ROM of the right shoulder full with significant pain after maneuvers were performed.   MUSCULOSKELETAL: Pain with internal rotation of the right shoulder, with tenderness to palpation over the shoulder anterior and posteriorly.  There was discreet trigger point palpated over the rhomboid muscles on the right. TTP over sacroiliac joints bilaterally (L>R).  FABERs test is positive on Left.  FADIRs test is negative.   Bilateral lower extremity strength is normal and symmetric.  No atrophy or tone abnormalities are noted.   NEURO: Bilateral upper and lower extremity coordination and muscle stretch reflexes are physiologic and symmetric.  Plantar response are downgoing. No clonus.  No loss of sensation is noted.  GAIT: normal.    Lab Results   Component Value Date    WBC 9.31 08/19/2021    HGB 13.3 08/19/2021    HCT 40.7 08/19/2021    MCV 93 08/19/2021     08/19/2021       BMP  Lab Results   Component Value Date     08/19/2021    K 3.6 08/19/2021     08/19/2021    CO2 28 08/19/2021    BUN 11 08/19/2021    CREATININE 0.7 08/19/2021    CALCIUM 9.2 08/19/2021    ANIONGAP 8 08/19/2021    ESTGFRAFRICA >60 08/19/2021    EGFRNONAA >60 08/19/2021     Lab Results   Component Value Date    HGBA1C 5.0 08/19/2021       ASSESSMENT: 47 y.o. year old female with pain, consistent with     Encounter Diagnosis   Name Primary?    Trochanteric bursitis of both hips Yes         PLAN:   - Prior records reviewed     - Returning of Trochanteric bursitis and prior injections by orthopedic with benefit     - Will s/f GTB injections bilaterally with Dr Lakhani      - Refill Flexeril 10mg qhs     - Continue HEP    - RTC 2 weeks after procedure    The above plan and management options were discussed at length with patient. Patient is in agreement with the above and verbalized understanding. It will be communicated with the referring physician via electronic record, fax, or mail.       Yonis Goldberg   01/23/2023     I spent a total of 30 minutes on the day of the visit.  This includes face to face time and non-face to face time preparing to see the patient by reviewing previous labs/imaging, obtaining and/or reviewing separately obtained history, documenting clinical information in the electronic or other health record, independently interpreting results and communicating results to the patient/family/caregiver.

## 2023-01-25 ENCOUNTER — OFFICE VISIT (OUTPATIENT)
Dept: PSYCHIATRY | Facility: OTHER | Age: 48
End: 2023-01-25
Payer: OTHER GOVERNMENT

## 2023-01-25 DIAGNOSIS — F33.1 MODERATE EPISODE OF RECURRENT MAJOR DEPRESSIVE DISORDER: ICD-10-CM

## 2023-01-25 DIAGNOSIS — F41.9 ANXIETY: Primary | ICD-10-CM

## 2023-01-25 PROCEDURE — 90791 PR PSYCHIATRIC DIAGNOSTIC EVALUATION: ICD-10-PCS | Mod: ,,, | Performed by: NURSE PRACTITIONER

## 2023-01-25 PROCEDURE — 90791 PSYCH DIAGNOSTIC EVALUATION: CPT | Mod: ,,, | Performed by: NURSE PRACTITIONER

## 2023-02-01 ENCOUNTER — IMMUNIZATION (OUTPATIENT)
Dept: INTERNAL MEDICINE | Facility: CLINIC | Age: 48
End: 2023-02-01
Payer: OTHER GOVERNMENT

## 2023-02-01 ENCOUNTER — CLINICAL SUPPORT (OUTPATIENT)
Dept: PSYCHIATRY | Facility: OTHER | Age: 48
End: 2023-02-01
Payer: OTHER GOVERNMENT

## 2023-02-01 DIAGNOSIS — F33.1 MODERATE EPISODE OF RECURRENT MAJOR DEPRESSIVE DISORDER: Primary | ICD-10-CM

## 2023-02-01 DIAGNOSIS — Z23 NEED FOR VACCINATION: Primary | ICD-10-CM

## 2023-02-01 DIAGNOSIS — F41.9 ANXIETY: ICD-10-CM

## 2023-02-01 PROCEDURE — 91312 COVID-19, MRNA, LNP-S, BIVALENT BOOSTER, PF, 30 MCG/0.3 ML DOSE: CPT | Mod: PBBFAC

## 2023-02-01 PROCEDURE — 99499 UNLISTED E&M SERVICE: CPT | Mod: ,,, | Performed by: STUDENT IN AN ORGANIZED HEALTH CARE EDUCATION/TRAINING PROGRAM

## 2023-02-01 PROCEDURE — 99499 NO LOS: ICD-10-PCS | Mod: ,,, | Performed by: STUDENT IN AN ORGANIZED HEALTH CARE EDUCATION/TRAINING PROGRAM

## 2023-02-01 PROCEDURE — 0124A COVID-19, MRNA, LNP-S, BIVALENT BOOSTER, PF, 30 MCG/0.3 ML DOSE: CPT | Mod: PBBFAC

## 2023-02-02 NOTE — PROGRESS NOTES
Met with pt for 9 month f/u from FRP. Pt filled out FRP measures, results below.    Results of the Questionnaires can be found in the research folder.     Discussed with pt progress in treating chronic pain after FRP. Discussed how pt is doing integrating regular workouts, stretching, lifting, walking, mindfulness and CBT techniques into daily life. Overall, pt coping well. Continues to manage depression, will begin seeing SW intern for continued therapy.     Type of therapy provided: Behavioral modification, supportive counseling, testing. Rationale: appropriate to modality and program.     Pt denies SI/HI. AAOx3, able to participate in session fully. Will f/u as needed.

## 2023-02-08 ENCOUNTER — CLINICAL SUPPORT (OUTPATIENT)
Dept: PSYCHIATRY | Facility: OTHER | Age: 48
End: 2023-02-08
Payer: OTHER GOVERNMENT

## 2023-02-08 DIAGNOSIS — F41.9 ANXIETY: ICD-10-CM

## 2023-02-08 DIAGNOSIS — F33.1 MODERATE EPISODE OF RECURRENT MAJOR DEPRESSIVE DISORDER: Primary | ICD-10-CM

## 2023-02-08 PROCEDURE — 99499 NO LOS: ICD-10-PCS | Mod: ,,, | Performed by: STUDENT IN AN ORGANIZED HEALTH CARE EDUCATION/TRAINING PROGRAM

## 2023-02-08 PROCEDURE — 99499 UNLISTED E&M SERVICE: CPT | Mod: ,,, | Performed by: STUDENT IN AN ORGANIZED HEALTH CARE EDUCATION/TRAINING PROGRAM

## 2023-02-14 ENCOUNTER — HOSPITAL ENCOUNTER (OUTPATIENT)
Facility: OTHER | Age: 48
Discharge: HOME | End: 2023-02-14
Attending: ANESTHESIOLOGY | Admitting: ANESTHESIOLOGY
Payer: OTHER GOVERNMENT

## 2023-02-14 VITALS
DIASTOLIC BLOOD PRESSURE: 98 MMHG | HEIGHT: 66 IN | OXYGEN SATURATION: 97 % | HEART RATE: 83 BPM | WEIGHT: 219 LBS | SYSTOLIC BLOOD PRESSURE: 148 MMHG | BODY MASS INDEX: 35.2 KG/M2 | TEMPERATURE: 98 F | RESPIRATION RATE: 16 BRPM

## 2023-02-14 DIAGNOSIS — M70.61 GREATER TROCHANTERIC BURSITIS OF BOTH HIPS: Primary | ICD-10-CM

## 2023-02-14 DIAGNOSIS — G89.29 CHRONIC PAIN: ICD-10-CM

## 2023-02-14 DIAGNOSIS — M70.62 GREATER TROCHANTERIC BURSITIS OF BOTH HIPS: Primary | ICD-10-CM

## 2023-02-14 PROCEDURE — 20610 PR DRAIN/INJECT LARGE JOINT/BURSA: ICD-10-PCS | Mod: 50,,, | Performed by: ANESTHESIOLOGY

## 2023-02-14 PROCEDURE — 63600175 PHARM REV CODE 636 W HCPCS: Performed by: ANESTHESIOLOGY

## 2023-02-14 PROCEDURE — 25500020 PHARM REV CODE 255: Performed by: ANESTHESIOLOGY

## 2023-02-14 PROCEDURE — 77002 NEEDLE LOCALIZATION BY XRAY: CPT | Mod: 26,,, | Performed by: ANESTHESIOLOGY

## 2023-02-14 PROCEDURE — 77002 PR FLUOROSCOPIC GUIDANCE NEEDLE PLACEMENT: ICD-10-PCS | Mod: 26,,, | Performed by: ANESTHESIOLOGY

## 2023-02-14 PROCEDURE — 20610 DRAIN/INJ JOINT/BURSA W/O US: CPT | Mod: 50 | Performed by: ANESTHESIOLOGY

## 2023-02-14 PROCEDURE — 25000003 PHARM REV CODE 250: Performed by: ANESTHESIOLOGY

## 2023-02-14 PROCEDURE — 77002 NEEDLE LOCALIZATION BY XRAY: CPT | Performed by: ANESTHESIOLOGY

## 2023-02-14 PROCEDURE — 20610 DRAIN/INJ JOINT/BURSA W/O US: CPT | Mod: 50,,, | Performed by: ANESTHESIOLOGY

## 2023-02-14 RX ORDER — LIDOCAINE HYDROCHLORIDE 20 MG/ML
INJECTION, SOLUTION INFILTRATION; PERINEURAL
Status: DISCONTINUED | OUTPATIENT
Start: 2023-02-14 | End: 2023-02-14 | Stop reason: HOSPADM

## 2023-02-14 RX ORDER — TRIAMCINOLONE ACETONIDE 40 MG/ML
INJECTION, SUSPENSION INTRA-ARTICULAR; INTRAMUSCULAR
Status: DISCONTINUED | OUTPATIENT
Start: 2023-02-14 | End: 2023-02-14 | Stop reason: HOSPADM

## 2023-02-14 RX ORDER — BUPIVACAINE HYDROCHLORIDE 2.5 MG/ML
INJECTION, SOLUTION EPIDURAL; INFILTRATION; INTRACAUDAL
Status: DISCONTINUED | OUTPATIENT
Start: 2023-02-14 | End: 2023-02-14 | Stop reason: HOSPADM

## 2023-02-14 RX ORDER — SODIUM CHLORIDE 9 MG/ML
500 INJECTION, SOLUTION INTRAVENOUS CONTINUOUS
Status: ACTIVE | OUTPATIENT
Start: 2023-02-14

## 2023-02-14 NOTE — OP NOTE
Greater Trochanteric Bursa Injection under Fluoroscopic Guidance    The procedure, risks, benefits, and options were discussed with the patient. There are no contraindications to the procedure. The patent expressed understanding and agreed to the procedure. Informed written consent was obtained prior to the start of the procedure and can be found in the patient's chart.    PATIENT NAME: Chinyere Crockett   MRN: [id number]     DATE OF PROCEDURE: 02/14/2023    PROCEDURE: Bilateral Greater Trochanteric Bursa Injection under Fluoroscopic Guidance    PRE-OP DIAGNOSIS: Trochanteric bursitis of both hips [M70.61, M70.62]    POST-OP DIAGNOSIS: Same    PHYSICIAN: Kristin Lakhani MD    ASSISTANTS:   Braeden Bates MD  [employer] Pain Fellow    MEDICATIONS INJECTED: Preservative-free Kenalog 40mg with 7cc of Bupivacine 0.25%     LOCAL ANESTHETIC INJECTED: Xylocaine 2%     SEDATION: None    ESTIMATED BLOOD LOSS: None    COMPLICATIONS: None    TECHNIQUE: Time-out was performed to identify the patient and procedure to be performed. With the patient laying in a prone position, the surgical area was prepped and draped in the usual sterile fashion using ChloraPrep and a fenestrated drape. The area overlying the greater trochanteric bursa was determined under fluoroscopy guidance. Skin anesthesia was achieved by injecting Lidocaine 2% over the injection site. The greater trochanteric bursa was then approached with a 22 gauge, 3.5 inch spinal quinke needle that was introduced under fluoroscopic guidance in the AP view. Once the needle tip was in the area of the bursa, and there was no blood aspiration, Contrast dye  Omnipaque (300mg/mL) was injected to confirm placement and there was no vascular runoff. 4 mL of the medication mixture listed above was injected slowly. The needles were removed and bleeding was nil. A sterile dressing was applied. No specimens collected. The patient tolerated the procedure well.    The patient was monitored  after the procedure in the recovery area. They were given post-procedure and discharge instructions to follow at home. The patient was discharged in a stable condition.      Kristin Lakhani MD

## 2023-02-14 NOTE — DISCHARGE SUMMARY
Discharge Note  Short Stay      SUMMARY     Admit Date: 2/14/2023    Attending Physician: Kristin Lakhani      Discharge Physician: Kristin Lakhani      Discharge Date: 2/14/2023 9:46 AM    Procedure(s) (LRB):  INJECTION, JOINT BILATERAL GTB CONTRAST (Bilateral)    Final Diagnosis: Trochanteric bursitis of both hips [M70.61, M70.62]    Disposition: Home or self care    Patient Instructions:   Current Discharge Medication List        CONTINUE these medications which have NOT CHANGED    Details   cyclobenzaprine (FLEXERIL) 10 MG tablet Take 1 tablet (10 mg total) by mouth 3 (three) times daily as needed for Muscle spasms.  Qty: 90 tablet, Refills: 5      diclofenac (VOLTAREN) 75 MG EC tablet TAKE 1 TABLET(75 MG) BY MOUTH TWICE DAILY  Qty: 60 tablet, Refills: 2      ergocalciferol (ERGOCALCIFEROL) 50,000 unit Cap Take 1 capsule (50,000 Units total) by mouth every 7 days.  Qty: 8 capsule, Refills: 0      sumatriptan (IMITREX) 100 MG tablet Take 1 tablet (100 mg total) by mouth every 2 (two) hours as needed for Migraine. Not to exceed 2 per day.  Qty: 10 tablet, Refills: 3                 Discharge Diagnosis: Trochanteric bursitis of both hips [M70.61, M70.62]  Condition on Discharge: Stable with no complications to procedure   Diet on Discharge: Same as before.  Activity: as per instruction sheet.  Discharge to: Home with a responsible adult.  Follow up: 2-4 weeks       Please call my office or pager at [phone] if experienced any weakness or loss of sensation, fever > 101.5, pain uncontrolled with oral medications, persistent nausea/vomiting/or diarrhea, redness or drainage from the incisions, or any other worrisome concerns. If physician on call was not reached or could not communicate with our office for any reason please go to the nearest emergency department      Kristin Lakhani  02/14/2023

## 2023-02-14 NOTE — DISCHARGE INSTRUCTIONS

## 2023-02-24 NOTE — PROGRESS NOTES
Client: Chinyere  Date: 23, 10:30am  Type of service: Individual  Content of session:  Client mentioned feeling stressed out about her  going out of town so much in the  next couple of months due to his work in the [employer]. Client expressed feeling  awkward and lonely going to parades by herself in the next few weeks. Client reported  waking up in the night with her heart racing and says she is one of those people who wakes up  and is just on. Client typically wakes up  when she is having sleeping troubles as he  can help her fall back to sleep. Recommended using a guided meditation drew to help regulate  her breathing in those instances when she wakes up and cannot get back to sleep. During a  couple of days in the last week, client mentioned forgetting to take her meds and to eat lunch  until late in the afternoon. Client recognized that she did not feel well after waiting so long in the  day to take her medication. Client demonstrated improved boundary setting over the last week  by allowing her neighbors son to take care of his mother and her health issues and not feeling  like she needed to take on that responsibility. Client is looking forward to getting injections in her  hips on the  which help her feel like a normal person for a few weeks.  Therapeutic techniques and approaches: behavior modification and supportive counseling.  Rationale: appropriate for presenting issues.  Pt denies SI/HI. Mood was euthymic, affect matches verbal content. AAOx3, participated fully in  session.  Time spent in counselinmins

## 2023-02-24 NOTE — PROGRESS NOTES
Karma 2/1 (10:30am)  Client was very eager to provide her life story. Client was passionate and expressive when  discussing her tumultuous relationship with her mother and distaste for growing up within a  Orthodoxy family in that people tell you not to act in certain ways. Client mentioned financial  stress various times and reported frustration about the lack of job offers shes received. Client  expressed great stress and anxiety about her feeling responsible for the well-being of her  elderly neighbor who has fallen multiple times. Client doesnt trust the neighbors son to  properly take care of his mother and therefore has taken it upon herself to be on call the  majority of the time and feels guilty when she leaves the apartment in case they might need  her if she falls again. Client demonstrated intense feelings of empathy for the neighbor because  she has had a shit life and demonstrates a desire to be there for her because she knows what  its like to not have anyone to rely on. Client mentioned self-care activities like practicing yoga  in the morning with her  and deep breathing techniques that in the past have helped  her manage stress and react better to people and situations in her life that evoke strong  emotions. Client displays difficulty with setting boundaries, causing her self-care routine to  diminish or disappear and her pain to increase.  Note written by MABLE Arthur  I have met with the above student and agree with the assessment and note written. Any changes have been made and authorized by me.  Jania Sarabia, [facility] 02/24/2023 1:25 PM

## 2023-03-01 ENCOUNTER — CLINICAL SUPPORT (OUTPATIENT)
Dept: PSYCHIATRY | Facility: OTHER | Age: 48
End: 2023-03-01
Payer: OTHER GOVERNMENT

## 2023-03-01 ENCOUNTER — OFFICE VISIT (OUTPATIENT)
Dept: PAIN MEDICINE | Facility: CLINIC | Age: 48
End: 2023-03-01
Payer: OTHER GOVERNMENT

## 2023-03-01 VITALS
HEIGHT: 66 IN | HEART RATE: 88 BPM | SYSTOLIC BLOOD PRESSURE: 138 MMHG | DIASTOLIC BLOOD PRESSURE: 102 MMHG | WEIGHT: 215.38 LBS | BODY MASS INDEX: 34.61 KG/M2

## 2023-03-01 DIAGNOSIS — Z74.09 IMPAIRED MOBILITY AND ENDURANCE: ICD-10-CM

## 2023-03-01 DIAGNOSIS — F41.9 ANXIETY: ICD-10-CM

## 2023-03-01 DIAGNOSIS — F33.1 MODERATE EPISODE OF RECURRENT MAJOR DEPRESSIVE DISORDER: Primary | ICD-10-CM

## 2023-03-01 DIAGNOSIS — M70.61 GREATER TROCHANTERIC BURSITIS OF BOTH HIPS: Primary | ICD-10-CM

## 2023-03-01 DIAGNOSIS — M70.62 GREATER TROCHANTERIC BURSITIS OF BOTH HIPS: Primary | ICD-10-CM

## 2023-03-01 PROCEDURE — 99499 UNLISTED E&M SERVICE: CPT | Mod: ,,, | Performed by: NURSE PRACTITIONER

## 2023-03-01 PROCEDURE — 99999 PR PBB SHADOW E&M-EST. PATIENT-LVL III: CPT | Mod: PBBFAC,,, | Performed by: NURSE PRACTITIONER

## 2023-03-01 PROCEDURE — 99999 PR PBB SHADOW E&M-EST. PATIENT-LVL III: ICD-10-PCS | Mod: PBBFAC,,, | Performed by: NURSE PRACTITIONER

## 2023-03-01 PROCEDURE — 99213 OFFICE O/P EST LOW 20 MIN: CPT | Mod: S$PBB,,, | Performed by: NURSE PRACTITIONER

## 2023-03-01 PROCEDURE — 99213 OFFICE O/P EST LOW 20 MIN: CPT | Mod: PBBFAC | Performed by: NURSE PRACTITIONER

## 2023-03-01 PROCEDURE — 99499 NO LOS: ICD-10-PCS | Mod: ,,, | Performed by: NURSE PRACTITIONER

## 2023-03-01 PROCEDURE — 99213 PR OFFICE/OUTPT VISIT, EST, LEVL III, 20-29 MIN: ICD-10-PCS | Mod: S$PBB,,, | Performed by: NURSE PRACTITIONER

## 2023-03-01 NOTE — PROGRESS NOTES
Chronic Pain - Established patient - Follow Up     Referring Physician: No ref. provider found    Chief Complaint:   No chief complaint on file.      SUBJECTIVE: Disclaimer: This note has been generated using voice-recognition software. There may be typographical errors that have been missed during proof-reading    Last 3 PDI Scores 3/1/2023 1/23/2023 4/11/2022   Pain Disability Index (PDI) 12 15 11     Interval History 3/1/2023:  Mrs Crockett presents for follow up evaluation of B hip pain. She is s/p B GTB injection with significant benefit and pain control to the areas. She is having bad day with LOPEZ today. She is pleased with progress of GTB injection which are improving ambulation and functioning.     Interval History 1/23/2023:  Mrs Crockett presents for follow up and having exacerbated B, lateral hip pain. She has had GTB injections done by Orthopedic in past. She denies newer areas of pain or neurological changes. She is taking Flexeril 10mg qhs PRN with benefit and no significant SE of medication. She did additionally strain shoulder but this has improved.     Interval History 7/11/2022:  Mrs Crockett presents for follow up. HA manageable and correlated to pressure change in weather, states they are mild and constant. Overall found benefit from FRP. Continues to take diclofenac and flexeril qhs with benefit and denies SE of medications.     Interval History 4/11/2022:  Mrs Crockett presents for follow up of pain complaints. She states doing very well with FRP and pleased with progress. She continues to take Diclofenac and Flexeril. She has noticed since prior visit having more daily functionality She states SE and never got over Nausea from Cymbalta and discussed she does not ever wish to be on this medication again.She does voice frustation with HA mgt regarding messaging to Neurology and prescriptions.     Interval History 2/1/2022:  Mrs Crockett presents for follow up. She has been attending Therapy  for Right shoulder and while she endorses benefit from PT at times PT sets her back and exacerbates pain. She states this Thursday will be her last day for PT. And again while she has found progress she is not where she would like to be with functioning and pain. She does not wish to have surgical intervention or CSI at this time. She is frustrated with diffuse pain complaints and decreased daily functioning. She is taking Flexeril more frequently at this time but feels brain fog at times during the day. Diclofenac has been beneficial for her. She denies new areas of pain, denies neurological changes.     Interval History 12/21/2021:  The Pt presents for follow up of Right shoulder pain. She states doing fair and but continued scapular pain and shoulder pain with even cooking activities. She will be starting Shoulder PT in January. Does not wish to pursue CSI or surgical intervention at this time without giving PT a try 1st. She is currently taking diclofenac with benefit and denies SE of medications.     Interval History 11/29/2021:  Pt presents to clinic for follow up of low back pain and new right sided scapula pain. Pt reports that her low back pain had improved with a combination of physical therapy and meloxicam. Unfortunately pt reports she had some GI upset and tried oral diclofenac with some relief.   Additionally pt reports right shoulder pain associated with right upper back spasms. Her spasms are associated with any movement of the shoulder. She denies any neck pain, arm pain or any feelings of numbness. She reports that her symptoms are relieved by ice.     Initial encounter 08/25/2021:  Chinyere Crockett presents to the clinic for the evaluation of low back pain. The pain started 3 years ago following a fall at work and symptoms have been worsening. She slipped on a wet floor and fell in a twisting motion onto her back while striking a mop bucket. Since that time she has dealt with hip and back  pain. The hip pain is treated with GTB injections by Dr. Zazueta with sports medicine. She receives the injections every 3-4 months. When she feels the injection's effect wear off, she begins to notice her back pain worsening.     Brief history:    Pain Description:    The pain is located in the Left lower back area and does not radiate.      At BEST  4/10     At WORST  8/10 on the WORST day.      On average pain is rated as 6/10.     Today the pain is rated as 6/10    The pain is described as aching and burning      Symptoms interfere with daily activity, sleeping and work.     Exacerbating factors: Extension and rotation.      Mitigating factors heat, ice, medications and dry needling.     Patient denies night fever/night sweats, urinary incontinence, bowel incontinence, significant weight loss, significant motor weakness and loss of sensations.  Patient denies any suicidal or homicidal ideations    Pain Medications:  Current:  Flexeril 10mg qHS PRN  Tylenol   OTC NSAIDs      Tried in Past:  NSAIDs - Mobic  TCA -Never  SNRI -Never  Anti-convulsants -Never  Muscle Relaxants -Never  Opioids-Never  Benzodiazepines -Never    Physical Therapy/Home Exercise:   No, most recently in Jan 2021  Not consistent with HEP       report:  Reviewed and consistent with medication use as prescribed.    Pain Procedures:   GTB injections with sports medicine  2/13/2023 B GTB injection     Chiropractor -yes  Acupuncture - never  TENS unit -never  Spinal decompression -never  Joint replacement -never    Imaging:    MRI SHOULDER WITHOUT CONTRAST RIGHT 12/8/2021     CLINICAL HISTORY:  Pain in right shoulderShoulder pain, rotator cuff disorder suspected, xray done;     TECHNIQUE:  MRI of right shoulder was performed on a 1.5T magnet utilizing the following sequences: Localizer; axial T2 FS; coronal T2 FS and PD FS; sagittal T1, T2 FS and PD FS.     COMPARISON:  None     FINDINGS:  Rotator cuff: There is some attenuation of caliber of the  insertional fibers of the supraspinatus and infraspinatus tendons and some irregularity and abnormal signal along the bursal surface suggesting partial-thickness bursal sided tear involving approximately 50% of the tendon thickness.  Tear measures 2 cm in AP diameter.  Subscapularis tendon intact.Muscle bulk preserved.     Biceps: Long head biceps tendon is intact.     Labrum: Glenoid labrum is intact.     Glenohumeral Joint: There is no fracture or significant articular cartilage loss.  Bone marrow signal is normal.     Acromioclavicular joint: The AC joint is unremarkable.     Misc: There is no evidence of bursitis.     Impression:     Partial-thickness bursal sided tear of the supraspinatus and infraspinatus tendons measuring 2 cm in size.    XR L-Spine 09/27/2021  FINDINGS:  There is rightward curvature of the lumbar spine with discogenic change and lower facet hypertrophy.  Vertebral body heights appear maintained.  No significant translation.  No evidence for lytic or blastic lesion.     Impression:     As above.     XR L-Spine 9/23/2020  FINDINGS:  Scoliosis convex to the right in the lumbar region is appreciated, alignment otherwise appearing unremarkable.  Vertebral body heights are normally maintained, without significant compression deformity at any level.  No significant disc narrowing.  The AP projection strongly suggests some prominent left-sided L4-5 facet arthropathy.  Minimal marginal vertebral endplate spurring is seen at a few thoracolumbar levels.  Vertebral endplates are well defined.  No osseous destructive process.  SI joints appear unremarkable.     Impression:     Lumbar dextroscoliosis and prominent left-sided facet arthropathy at L4-5.  No evidence of compression fracture.    XR hip/pelvis 9/23/2021   FINDINGS:  No acute fracture of the visualized lower lumbar spine, bony pelvis, or proximal femurs.  Preserved right and left femoral head contours.  Intact right and left SI joints and hip  joint spaces.  Pelvic densities felt related to phleboliths unless concern for otherwise.  Some asymmetric radiodensity projects to the left side of the L4-L5 disc space that could be further evaluated with lumbar spine radiographs.     Impression:     No acute fracture.    History reviewed. No pertinent past medical history.  Past Surgical History:   Procedure Laterality Date    INJECTION OF JOINT Bilateral 2023    Procedure: INJECTION, JOINT BILATERAL GTB CONTRAST;  Surgeon: Kristin Lakhani MD;  [facility];  Service: Pain Management;  Laterality: Bilateral;    TONSILLECTOMY       Social History     Socioeconomic History    Marital status:    Tobacco Use    Smoking status: Never    Smokeless tobacco: Never   Substance and Sexual Activity    Alcohol use: Yes     Comment: occasionally    Drug use: Yes     Types: Marijuana    Sexual activity: Yes     Partners: Male     Birth control/protection: None     Family History   Problem Relation Age of Onset    COPD Mother     Breast cancer Mother 72    Heart attack Father     Heart disease Paternal Grandfather         ?quad bypass    Heart disease Paternal Uncle         quad bypass    Cancer Neg Hx     Colon cancer Neg Hx     Diabetes Neg Hx     Eclampsia Neg Hx     Hypertension Neg Hx     Miscarriages / Stillbirths Neg Hx     Ovarian cancer Neg Hx      labor Neg Hx     Stroke Neg Hx        Review of patient's allergies indicates:   Allergen Reactions    Hydrocodone      Dizziness and nausea        Current Outpatient Medications   Medication Sig    diclofenac (VOLTAREN) 75 MG EC tablet TAKE 1 TABLET(75 MG) BY MOUTH TWICE DAILY    ergocalciferol (ERGOCALCIFEROL) 50,000 unit Cap Take 1 capsule (50,000 Units total) by mouth every 7 days. (Patient not taking: Reported on 2023)    sumatriptan (IMITREX) 100 MG tablet Take 1 tablet (100 mg total) by mouth every 2 (two) hours as needed for Migraine. Not to exceed 2 per day. (Patient not taking:  "Reported on 1/23/2023)     Current Facility-Administered Medications   Medication    triamcinolone acetonide injection 40 mg    triamcinolone acetonide injection 40 mg     Facility-Administered Medications Ordered in Other Visits   Medication    0.9%  NaCl infusion       REVIEW OF SYSTEMS:    GENERAL:  No weight loss, malaise or fevers.  HEENT:   No recent changes in vision or hearing  NECK:  Negative for lumps, no difficulty with swallowing.  RESPIRATORY:  Negative for cough, wheezing or shortness of breath, patient denies any recent URI.  CARDIOVASCULAR:  Negative for chest pain, leg swelling or palpitations.  GI:  Negative for abdominal discomfort, blood in stools or black stools or change in bowel habits.  MUSCULOSKELETAL:  See HPI.  SKIN:  Negative for lesions, rash, and itching.  PSYCH:  No mood disorder or recent psychosocial stressors.  Patients sleep is not disturbed secondary to pain.  HEMATOLOGY/LYMPHOLOGY:  Negative for prolonged bleeding, bruising easily or swollen nodes.  Patient is not currently taking any anti-coagulants  ENDO: No history of diabetes or thyroid dysfunction  NEURO:   No history of headaches, syncope, paralysis, seizures or tremors.  All other reviewed and negative other than HPI.    OBJECTIVE:    BP (!) 138/102 (BP Location: Right arm, Patient Position: Sitting, BP Method: Large (Automatic))   Pulse 88   Ht 5' 6" (1.676 m)   Wt 97.7 kg (215 lb 6.2 oz)   BMI 34.76 kg/m²       PHYSICAL EXAMINATION:  Voice normal.  Normal affect without evidence of distress.  Psych: tearful at times and voices frustration with continued decreased functioning and pain.   Gait: sitting in chair without difficulty     Prior PHYSICAL EXAMINATION:    GENERAL: Well appearing, in no acute distress, alert and oriented x3.  PSYCH:  Mood and affect appropriate.  SKIN: Skin color, texture, turgor normal, no rashes or lesions.  HEAD/FACE:  Normocephalic, atraumatic. Cranial nerves grossly intact.  CV: RRR with " palpation of the radial artery.  PULM: No evidence of respiratory difficulty, symmetric chest rise.  GI:  Soft and non-tender.  BACK: Straight leg raising in the supine position is negative to radicular pain. TTP over the Left facet joints of the lumbar spine. No tenderness over spinous processes. Normal range of motion. Pain reproduction with rotation and extension.  EXTREMITIES: Knee ROM is full and pain free without obvious instability or laxity. Pain in buttock and lateral aspect of hip with external rotation of Left hip. ROM of the right shoulder full with significant pain after maneuvers were performed.   MUSCULOSKELETAL: Pain with internal rotation of the right shoulder, with tenderness to palpation over the shoulder anterior and posteriorly.  There was discreet trigger point palpated over the rhomboid muscles on the right. TTP over sacroiliac joints bilaterally (L>R).  FABERs test is positive on Left.  FADIRs test is negative.   Bilateral lower extremity strength is normal and symmetric.  No atrophy or tone abnormalities are noted.   NEURO: Bilateral upper and lower extremity coordination and muscle stretch reflexes are physiologic and symmetric.  Plantar response are downgoing. No clonus.  No loss of sensation is noted.  GAIT: normal.    Lab Results   Component Value Date    WBC 9.31 08/19/2021    HGB 13.3 08/19/2021    HCT 40.7 08/19/2021    MCV 93 08/19/2021     08/19/2021       BMP  Lab Results   Component Value Date     08/19/2021    K 3.6 08/19/2021     08/19/2021    CO2 28 08/19/2021    BUN 11 08/19/2021    CREATININE 0.7 08/19/2021    CALCIUM 9.2 08/19/2021    ANIONGAP 8 08/19/2021    ESTGFRAFRICA >60 08/19/2021    EGFRNONAA >60 08/19/2021     Lab Results   Component Value Date    HGBA1C 5.0 08/19/2021       ASSESSMENT: 47 y.o. year old female with pain, consistent with     Encounter Diagnoses   Name Primary?    Greater trochanteric bursitis of both hips Yes    Impaired mobility  and endurance            PLAN:   - Prior records reviewed     - Prior imaging reviewed    - s/p GTB injections bilaterally with significant benefit     - ContinueFlexeril 10mg qhs     - Continue HEP    - RTC PRN    The above plan and management options were discussed at length with patient. Patient is in agreement with the above and verbalized understanding. It will be communicated with the referring physician via electronic record, fax, or mail.       Yonis Goldberg  03/01/2023

## 2023-03-08 ENCOUNTER — CLINICAL SUPPORT (OUTPATIENT)
Dept: PSYCHIATRY | Facility: OTHER | Age: 48
End: 2023-03-08
Payer: OTHER GOVERNMENT

## 2023-03-08 DIAGNOSIS — F41.9 ANXIETY: ICD-10-CM

## 2023-03-08 DIAGNOSIS — F33.1 MODERATE EPISODE OF RECURRENT MAJOR DEPRESSIVE DISORDER: Primary | ICD-10-CM

## 2023-03-08 PROCEDURE — 99499 UNLISTED E&M SERVICE: CPT | Mod: ,,, | Performed by: STUDENT IN AN ORGANIZED HEALTH CARE EDUCATION/TRAINING PROGRAM

## 2023-03-08 PROCEDURE — 99499 NO LOS: ICD-10-PCS | Mod: ,,, | Performed by: STUDENT IN AN ORGANIZED HEALTH CARE EDUCATION/TRAINING PROGRAM

## 2023-03-20 NOTE — PROGRESS NOTES
Type of service: Individual    Content of session: Client expressed that she was feeling really low during the past week and that our last session had brought up a lot of challenging emotions for her. A reoccurring theme in our work so far is the lack of community and connection that she has in her life aside from her . Client expressed sadness about going from growing up with such a big family and now having such a small inner [place] after losing a lot of family members and friends over the last few years. We discussed one of her previous jobs at [employer] and how she misses a lot of elements of that work environment and expressed how hard its been for her to find a job that she can get to since she doesnt drive. Client discussed that she is not willing to spend an hour commuting somewhere on public transportation so her options are very limited. 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 Also, recommended building relationships and community through her love of crafting by either joining a local crafting group or finding other opportunities to learn and make friendships with others who share her passion for creativity.      Therapeutic techniques and approaches: behavior modification and supportive counseling. Rationale: appropriate for presenting issues.     Pt denies SI/HI. Mood was euthymic, affect matches verbal content. AAOx3, participated fully in session.     Time spent in counselinmins      Leola Dobson, Social Work Intern 2023 10:01 AM

## 2023-03-20 NOTE — PROGRESS NOTES
Type of service: Individual    Content of session: Client was dealing with a painful headache at the time of her appointment. The headache started the night before, severely disturbed her sleep, and it didnt resolve in the morning. Client described her seasonal migraines as typically lasting 6-8 weeks. Client discussed spending most of [place] by herself and described feeling absolutely alone. Client said she was striving to make the best of it but when youre alone and prone to depression, [place] hern[place] can be amazing or horrible. Additionally, she expressed feeling angry and was at times mildly aggressive to those rude people who kept standing in front of her during the parades. Client mentioned that her neighbor likely passed away the other night and is still feeling guilty for not being able to do more to help her. Client went on to mention a phone call with her mother who fell the other night and started to get very agitated when describing the content of the call. Client often struggles with negative feelings towards her mother and to come back to baseline after taking with her mother, I dont know if she knows how much I detest her. Discussed techniques for dealing with anxiety and emotional regulation especially in navigating potentially frustrating situations like the one described with her mother.     Therapeutic techniques and approaches: behavior modification and supportive counseling. Rationale: appropriate for presenting issues.     Pt denies SI/HI. Mood was euthymic, affect matches verbal content. AAOx3, participated fully in session.     Time spent in counselinmins      Leola Dobson, Social Work Intern 2023 10:39 AM

## 2023-03-22 ENCOUNTER — CLINICAL SUPPORT (OUTPATIENT)
Dept: PSYCHIATRY | Facility: OTHER | Age: 48
End: 2023-03-22
Payer: OTHER GOVERNMENT

## 2023-03-22 DIAGNOSIS — F33.1 MODERATE EPISODE OF RECURRENT MAJOR DEPRESSIVE DISORDER: Primary | ICD-10-CM

## 2023-03-22 PROCEDURE — 99499 NO LOS: ICD-10-PCS | Mod: ,,, | Performed by: STUDENT IN AN ORGANIZED HEALTH CARE EDUCATION/TRAINING PROGRAM

## 2023-03-22 PROCEDURE — 99499 UNLISTED E&M SERVICE: CPT | Mod: ,,, | Performed by: STUDENT IN AN ORGANIZED HEALTH CARE EDUCATION/TRAINING PROGRAM

## 2023-03-29 ENCOUNTER — CLINICAL SUPPORT (OUTPATIENT)
Dept: PSYCHIATRY | Facility: OTHER | Age: 48
End: 2023-03-29
Payer: OTHER GOVERNMENT

## 2023-03-29 DIAGNOSIS — F41.9 ANXIETY: ICD-10-CM

## 2023-03-29 DIAGNOSIS — F33.1 MODERATE EPISODE OF RECURRENT MAJOR DEPRESSIVE DISORDER: Primary | ICD-10-CM

## 2023-03-29 PROCEDURE — 99499 NO LOS: ICD-10-PCS | Mod: ,,,

## 2023-03-29 PROCEDURE — 99499 UNLISTED E&M SERVICE: CPT | Mod: ,,,

## 2023-04-12 ENCOUNTER — CLINICAL SUPPORT (OUTPATIENT)
Dept: PSYCHIATRY | Facility: OTHER | Age: 48
End: 2023-04-12
Payer: OTHER GOVERNMENT

## 2023-04-12 DIAGNOSIS — F41.9 ANXIETY: Primary | ICD-10-CM

## 2023-04-12 DIAGNOSIS — F33.1 MODERATE EPISODE OF RECURRENT MAJOR DEPRESSIVE DISORDER: ICD-10-CM

## 2023-04-12 PROCEDURE — 99499 NO LOS: ICD-10-PCS | Mod: ,,, | Performed by: SOCIAL WORKER

## 2023-04-12 PROCEDURE — 99499 UNLISTED E&M SERVICE: CPT | Mod: ,,, | Performed by: SOCIAL WORKER

## 2023-04-13 ENCOUNTER — OFFICE VISIT (OUTPATIENT)
Dept: PAIN MEDICINE | Facility: OTHER | Age: 48
End: 2023-04-13
Payer: OTHER GOVERNMENT

## 2023-04-13 DIAGNOSIS — G89.29 CHRONIC RIGHT SHOULDER PAIN: ICD-10-CM

## 2023-04-13 DIAGNOSIS — M70.62 GREATER TROCHANTERIC BURSITIS OF BOTH HIPS: ICD-10-CM

## 2023-04-13 DIAGNOSIS — M70.61 GREATER TROCHANTERIC BURSITIS OF BOTH HIPS: ICD-10-CM

## 2023-04-13 DIAGNOSIS — Z74.09 IMPAIRED MOBILITY AND ENDURANCE: ICD-10-CM

## 2023-04-13 DIAGNOSIS — Z78.9 DECREASED ACTIVITIES OF DAILY LIVING (ADL): ICD-10-CM

## 2023-04-13 DIAGNOSIS — M25.511 CHRONIC RIGHT SHOULDER PAIN: ICD-10-CM

## 2023-04-13 DIAGNOSIS — G89.4 CHRONIC PAIN DISORDER: Primary | ICD-10-CM

## 2023-04-13 PROCEDURE — 99214 PR OFFICE/OUTPT VISIT, EST, LEVL IV, 30-39 MIN: ICD-10-PCS | Mod: S$PBB,,, | Performed by: NURSE PRACTITIONER

## 2023-04-13 PROCEDURE — 99214 OFFICE O/P EST MOD 30 MIN: CPT | Mod: S$PBB,,, | Performed by: NURSE PRACTITIONER

## 2023-04-13 NOTE — PROGRESS NOTES
Functional [facility]oration Program    Follow Up Visit Note    Subjective:       Chief Complaint Requiring Rehabilitation: Chronic Pain    Consulted by: Yonis Goldberg NP (Pain Mgmt)    12 month f/u post FRP 4/13/2022:  Chinyere Crockett returns to clinic today for 12 month f/u post FRP. She feels like the program has been very beneficial. She has lost approximately 35 pounds since completing the program. She been working on a diet. She also has cut back on drinking. She does not have access to the gym but a home exercise routine. She also takes walks consistently. The walks also help her mindset. Her exercise routine has ebbed and flowed. She has taken points from the program regarding body mechanics, especially with cleaning. She is pacing herself better. She will spread yardwork/gardening over several days. She is overall very happy with the program.         HPI:   Chinyere Crockett is a 47 y.o. female who presents today for the Functional Restoration Program Medical Screening Visit. Has a variety of pain issues. Has a torn rotator cuff on right side and associated shoulder pain, bursitis in hips worse on left, and lower back left sided pain. She also has seasonal migraines that may last 6-8 weeks at a time. Has had hip bursitis for many years- 12-13 years- gradual onset and progression. Worked at a  at that time and feels this may have impacted her pain. 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 Back pain- has mild scoliosis. Had a bad fall at work a few years ago and has had pain and issues since then. She has pain everyday. Her pain is worse with weather changes (feels it in her bones), too much activity (like sweeping or something with that motion; has to slow that down). She uses ice, heat and a TENS unit to help with pain. She feels weakness in her left ankle; says I think I broke it years ago but did not have insurance so  did what I had to do. No paresthesias. No bowel/bladder dysfunction. 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       Ambulatory status:  Walks independently. About 2 years ago she was under a lot of stress (very much work related) and her pain was worse and she would have to rely on her  to get around sometimes.     Balance problems?  Says balance is pretty good. No syncope. No dizziness     Exercise routine:  She walks. Has some home equipment. Tries to do PT exercise with band at home for shoulder.     Physical Therapy?  Yes. [facility]; recently finished; they worked on her right shoulder as this came up during the course of her therapy.       Current pain medications:  cymbalta   Flexeril prn   Tylenol prn       Pain management injections:  Yes- for bursitis     Relevant surgeries:  None     Denies any hx of prolonged hospitalizations or illness     Pain affecting function at work, home, and/or recreationally?  Yes    Work Hx:  Unemployed.     Sleep Hx:  Says I used to have terrible sleep- very broken, appx 4 hours a night. Takes unisom nearly every night. Will wake up after about 3 hours but can fall back asleep. Feels like sleep is going fairly well right now.     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 Lots of work stress over the years.  in  so is gone a lot- HAs seem to coincide with when he is gone. Has lost a lot of loved ones in the last 2 years so also dealing with grief.  Mother and GM have also had medical problems.     To help with stress she crafts. She is trying to est an [employer] shop.     No mental health hospitalizations. Mother has a hx of mental illness and hospitalizations.     Social information:  and lives with her . 4 cats.     Smoker? No       Alcohol use? Yes. Has cut back on alcohol recently as intake increased during  pandemic      Drug use? None      History of substance abuse? None       No past medical history on file.    Past Surgical History:   Procedure Laterality Date    INJECTION OF JOINT Bilateral 2/14/2023    Procedure: INJECTION, JOINT BILATERAL GTB CONTRAST;  Surgeon: Kristin Lakhani MD;  Location: [facility];  Service: Pain Management;  Laterality: Bilateral;    TONSILLECTOMY         Review of patient's allergies indicates:   Allergen Reactions    Hydrocodone      Dizziness and nausea        Current Outpatient Medications   Medication Sig Dispense Refill    diclofenac (VOLTAREN) 75 MG EC tablet TAKE 1 TABLET(75 MG) BY MOUTH TWICE DAILY 60 tablet 2    ergocalciferol (ERGOCALCIFEROL) 50,000 unit Cap Take 1 capsule (50,000 Units total) by mouth every 7 days. (Patient not taking: Reported on 1/23/2023) 8 capsule 0    sumatriptan (IMITREX) 100 MG tablet Take 1 tablet (100 mg total) by mouth every 2 (two) hours as needed for Migraine. Not to exceed 2 per day. (Patient not taking: Reported on 1/23/2023) 10 tablet 3     Current Facility-Administered Medications   Medication Dose Route Frequency Provider Last Rate Last Admin    triamcinolone acetonide injection 40 mg  40 mg INTRABURSAL 1 time in Clinic/HOD Daryl Zazueta DO        triamcinolone acetonide injection 40 mg  40 mg INTRABURSAL 1 time in Clinic/HOD Daryl Zazueta,          Facility-Administered Medications Ordered in Other Visits   Medication Dose Route Frequency Provider Last Rate Last Admin    0.9%  NaCl infusion  500 mL Intravenous Continuous Domingo Jernigan MD           Family History   Problem Relation Age of Onset    COPD Mother     Breast cancer Mother 72    Heart attack Father     Heart disease Paternal Grandfather         ?quad bypass    Heart disease Paternal Uncle         quad bypass    Cancer Neg Hx     Colon cancer Neg Hx     Diabetes Neg Hx     Eclampsia Neg Hx     Hypertension Neg Hx     Miscarriages / Stillbirths Neg Hx     Ovarian  cancer Neg Hx      labor Neg Hx     Stroke Neg Hx        Social History     Socioeconomic History    Marital status:    Tobacco Use    Smoking status: Never    Smokeless tobacco: Never   Substance and Sexual Activity    Alcohol use: Yes     Comment: occasionally    Drug use: Yes     Types: Marijuana    Sexual activity: Yes     Partners: Male     Birth control/protection: None              Objective:        GEN: Well developed, well nourished. No acute distress. Fully alert, oriented, and appropriate. Speech normal nicole.   PSYCH: Normal affect. Thought content appropriate.  CHEST: Breathing symmetric. No audible wheezing.  SKIN: Warm, dry. No rash or discoloration on exposed areas.         Imaging:      EXAMINATION:  MRI SHOULDER WITHOUT CONTRAST RIGHT     CLINICAL HISTORY:  Pain in right shoulderShoulder pain, rotator cuff disorder suspected, xray done;     TECHNIQUE:  MRI of right shoulder was performed on a 1.5T magnet utilizing the following sequences: Localizer; axial T2 FS; coronal T2 FS and PD FS; sagittal T1, T2 FS and PD FS.     COMPARISON:  None     FINDINGS:  Rotator cuff: There is some attenuation of caliber of the insertional fibers of the supraspinatus and infraspinatus tendons and some irregularity and abnormal signal along the bursal surface suggesting partial-thickness bursal sided tear involving approximately 50% of the tendon thickness.  Tear measures 2 cm in AP diameter.  Subscapularis tendon intact.Muscle bulk preserved.     Biceps: Long head biceps tendon is intact.     Labrum: Glenoid labrum is intact.     Glenohumeral Joint: There is no fracture or significant articular cartilage loss.  Bone marrow signal is normal.     Acromioclavicular joint: The AC joint is unremarkable.     Misc: There is no evidence of bursitis.     Impression:     Partial-thickness bursal sided tear of the supraspinatus and infraspinatus tendons measuring 2 cm in size.        Electronically signed by:  Daryl SloanWillie Manuel Bettencourt  Date:                                            12/09/2021  Time:                                           08:38        EXAMINATION:  MRI THORACIC SPINE WITHOUT CONTRAST     CLINICAL HISTORY:  acute thoracic back pain radiating across back and around to front of chest, concerning for nerve/disc pathology;  Pain in thoracic spine     TECHNIQUE:  Multiplanar, multisequence images were performed through the thoracic spine.  Contrast was not administered.     COMPARISON:  Multiplanar, multisequence images of the thoracic spine without IV contrast.     FINDINGS:  Alignment: Normal.     Vertebrae: Vertebral heights are well maintained with no evidence of infiltrative process or acute fractures.     Discs: No significant abnormality.     Cord: No signal abnormality is identified.     Degenerative findings: No significant spinal canal stenosis or neural foraminal narrowing throughout the thoracic spine.     Soft tissue and paraspinous muscles: Unremarkable.     Impression:     No significant abnormality throughout the thoracic spine.     Electronically signed by resident: Matilde Frazier  Date:                                            06/15/2021  Time:                                           14:36  EXAMINATION:  XR HIPS BILATERAL 2 VIEW INCL AP PELVIS     CLINICAL HISTORY:  Pain in right hip     TECHNIQUE:  AP view of the pelvis and frogleg lateral views of both hips were performed.     COMPARISON:  None.     FINDINGS:  No acute fracture of the visualized lower lumbar spine, bony pelvis, or proximal femurs.  Preserved right and left femoral head contours.  Intact right and left SI joints and hip joint spaces.  Pelvic densities felt related to phleboliths unless concern for otherwise.  Some asymmetric radiodensity projects to the left side of the L4-L5 disc space that could be further evaluated with lumbar spine radiographs.     Impression:     No acute fracture.        Electronically signed by:  Ankti Estrada  Date:                                            09/23/2020    Assessment:     Encounter Diagnoses   Name Primary?    Chronic pain disorder Yes    Greater trochanteric bursitis of both hips     Chronic right shoulder pain     Impaired mobility and endurance     Decreased activities of daily living (ADL)          Plan:     Diagnoses and all orders for this visit:    Chronic pain disorder    Greater trochanteric bursitis of both hips    Chronic right shoulder pain    Impaired mobility and endurance    Decreased activities of daily living (ADL)      Chinyere Crockett returns today for 12 month f/u. See HPI for details. We discussed how she is integrating the program into daily life.      We discussed how consistent use of the skills learned in the program leads to lasting positive results.     RTC PRN.     I spent a total of 30 minutes with the patient, and greater than 50% of the time was spent in counseling and education.     The above plan and management options were discussed at length with patient. Patient is in agreement with the above and verbalized understanding.

## 2023-04-19 ENCOUNTER — OFFICE VISIT (OUTPATIENT)
Dept: PSYCHIATRY | Facility: OTHER | Age: 48
End: 2023-04-19
Payer: OTHER GOVERNMENT

## 2023-04-19 DIAGNOSIS — F41.9 ANXIETY: ICD-10-CM

## 2023-04-19 DIAGNOSIS — F33.1 MODERATE EPISODE OF RECURRENT MAJOR DEPRESSIVE DISORDER: Primary | ICD-10-CM

## 2023-04-19 PROCEDURE — 99499 UNLISTED E&M SERVICE: CPT | Mod: ,,, | Performed by: STUDENT IN AN ORGANIZED HEALTH CARE EDUCATION/TRAINING PROGRAM

## 2023-04-19 PROCEDURE — 99499 NO LOS: ICD-10-PCS | Mod: ,,, | Performed by: STUDENT IN AN ORGANIZED HEALTH CARE EDUCATION/TRAINING PROGRAM

## 2023-04-19 NOTE — PROGRESS NOTES
Type of service: Individual    Content of session: Client reported that she is doing well in spite of her s recent departure for work. While she reported feeling lonely, she also said she felt liberated. Client expressed that she has had more time to clean out the house and work on her crafts because she can do everything fully on her own schedule. Sleeping has been an issue for the client as she said that she has a hard time turning her mind off at night and often runs through her to-do list and all the things she has to do the next day. Suggested some mindfulness techniques to quiet the mind before bedtime. Client set the goal of walking twice a day and feeling confident in her ability to stick to it. Client as set the goal of going to bed around the same time every night because she has been staying up to late the last few nights getting lost in her crafting.      Therapeutic techniques and approaches: behavior modification and supportive counseling. Rationale: appropriate for presenting issues.     Pt denies SI/HI. Mood was euthymic, affect matches verbal content. AAOx3, participated fully in session.     Time spent in counselinmins      Leola Dobson, Social Work Intern 2023 1:04 PM

## 2023-04-19 NOTE — PROGRESS NOTES
"Type of service: Individual    Content of session: Client expressed that she had a "great past week" and was feeling like things were moving in the right direction for the first time in a long time. Client said that she had recently sold a few of her crafts online and was feeling validated and proud of herself for the progress she has made. Client also had a phone call with her mother that went better than usual. Client demonstrated a great deal of confidence through her body language and posture. Client got teary-eyed when discussing her lack of support system here in [place] because she had hoped that by now she would have more friends in town. However, client has a few close online friends who are a good support system. We examined how the social support online could be translated to finding more in-person friends. We discussed different ideas and ways she could put herself out there to make new social connections. Client said that she would like to put more effort into doing social things and meeting people but lacks the funds to do anything that would cost money. At the end of the session client stated, "this felt really good this week". Client planning return for follow up next Wednesday.     Therapeutic techniques and approaches: behavior modification and supportive counseling. Rationale: appropriate for presenting issues.     Pt denies SI/HI. Mood was euthymic, affect matches verbal content. AAOx3, participated fully in session.     Time spent in counselinmins     Leola Dobson, Social Work Intern 2023 3:18 PM     "

## 2023-04-19 NOTE — PROGRESS NOTES
Type of service: Individual    Content of session:   Client was brought to tears a few times throughout session discussing her anxiety about her  going out of town for a long period of time for his work. Client shared that this time last year when he went away is when everything started to go downhill for them and was the start of their financial spiral. As a result, his departure is bringing up a lot of stress and anxiety for the client. We discussed other social supports and people she could lean on while her  is away, and client expressed not wanting to feel like a burden when she asks others for help. Client mentioned that as a child she had to become hyper-independent and doesnt want to burden her  with her worries because he already has so much on his plate. Client mentioned that the reason she no longer drives is because shes been in various wreaks throughout her life. In following session, we will discuss the importance of asking for help and tactics she can implement to help her feel more comfortable in doing so.      Therapeutic techniques and approaches: behavior modification and supportive counseling. Rationale: appropriate for presenting issues.     Pt denies SI/HI. Mood was euthymic, affect matches verbal content. AAOx3, participated fully in session.     Time spent in counselinmins      Leola Dobson, Social Work Intern 2023 1:03 PM

## 2023-04-19 NOTE — PROGRESS NOTES
Therapy Evaluation    Patient Name: Chinyere Crockett  Date of Visit: 3/29/2023  MRN: [id number]  Diagnosis: No diagnosis found.  Referring Physician: No ref. provider found      Subjective:   Total Time:  0    Objective    Assessment  Chinyere is a 47 y.o. female referred to outpatient {[id number]} and presents with a medical diagnosis of ***. She demonstrates limitations as described in the problem list. These impairments are limiting the patient's ability to ***.    Assessment    FOTO    Plan

## 2023-04-26 ENCOUNTER — OFFICE VISIT (OUTPATIENT)
Dept: PSYCHIATRY | Facility: OTHER | Age: 48
End: 2023-04-26
Payer: OTHER GOVERNMENT

## 2023-04-26 DIAGNOSIS — F41.9 ANXIETY: Primary | ICD-10-CM

## 2023-04-26 PROCEDURE — 99499 NO LOS: ICD-10-PCS | Mod: ,,, | Performed by: NURSE PRACTITIONER

## 2023-04-26 PROCEDURE — 99499 UNLISTED E&M SERVICE: CPT | Mod: ,,, | Performed by: NURSE PRACTITIONER

## 2023-04-26 NOTE — PROGRESS NOTES
"Type of service: Individual    Content of session: Client reported that she has been feeling "pretty good" over the last week and was really glad to make an official sale of a few of her crafts online. However, client expressed feeling frustrated with one of her mother's most recent gestures to her and how she "never listens". Client said she would feel a lot less stressed if she cut her mother out of her life and the 15 years that she did cut her mother off she remembers feeling very free. Talked about how this relationship impacts her anxiety. Discussed anxiety coping skills that will help client to regulate after emotionally charged conversations with difficult people in her life, life her mother.     Therapeutic techniques and approaches: behavior modification and supportive counseling. Rationale: appropriate for presenting issues.     Pt denies SI/HI. Mood was euthymic, affect matches verbal content. AAOx3, participated fully in session.     Time spent in counselinmins      Leola Dobson, Social Work Intern 2023 3:38 PM     "

## 2023-05-17 ENCOUNTER — OFFICE VISIT (OUTPATIENT)
Dept: PSYCHIATRY | Facility: OTHER | Age: 48
End: 2023-05-17
Payer: OTHER GOVERNMENT

## 2023-05-17 DIAGNOSIS — F41.9 ANXIETY: Primary | ICD-10-CM

## 2023-05-17 PROCEDURE — 99499 UNLISTED E&M SERVICE: CPT | Mod: ,,, | Performed by: STUDENT IN AN ORGANIZED HEALTH CARE EDUCATION/TRAINING PROGRAM

## 2023-05-17 PROCEDURE — 99499 NO LOS: ICD-10-PCS | Mod: ,,, | Performed by: STUDENT IN AN ORGANIZED HEALTH CARE EDUCATION/TRAINING PROGRAM

## 2023-05-17 NOTE — PROGRESS NOTES
"Type of service: Individual    Content of session: Client shared that the last two weeks have felt better than usual and she feels more of a urge to "go outside daily". Client expressed that she has found a new alexandra in feeding the birds every day in her neighborhood which helps her get out if the house more regularly. Client also shared a recent difficult concert experience but we examined the way she employed her strengths to overcome the hurdles and still make the best of the experience. Discussed anxiety coping skills for future instances that could bring up a similar feeling of discomfort and lack of control over external circumstances. Explored future goals for our work together and client shared a desire to work on ways to feel less fear in social settings as well as methods for overcoming daily procrastination.     Therapeutic techniques and approaches: behavior modification and supportive counseling. Rationale: appropriate for presenting issues.     Pt denies SI/HI. Mood was euthymic, affect matches verbal content. AAOx3, participated fully in session.     Time spent in counselinmins      Leola Dobson, Social Work Intern 2023 8:48 AM     "

## 2023-05-18 RX ORDER — CYCLOBENZAPRINE HCL 10 MG
TABLET ORAL
Qty: 60 TABLET | Refills: 2 | Status: SHIPPED | OUTPATIENT
Start: 2023-05-18 | End: 2023-11-09 | Stop reason: SDUPTHER

## 2023-05-24 ENCOUNTER — OFFICE VISIT (OUTPATIENT)
Dept: PSYCHIATRY | Facility: OTHER | Age: 48
End: 2023-05-24
Payer: OTHER GOVERNMENT

## 2023-05-24 DIAGNOSIS — F41.9 ANXIETY: Primary | ICD-10-CM

## 2023-05-24 PROCEDURE — 99499 NO LOS: ICD-10-PCS | Mod: ,,, | Performed by: STUDENT IN AN ORGANIZED HEALTH CARE EDUCATION/TRAINING PROGRAM

## 2023-05-24 PROCEDURE — 99499 UNLISTED E&M SERVICE: CPT | Mod: ,,, | Performed by: STUDENT IN AN ORGANIZED HEALTH CARE EDUCATION/TRAINING PROGRAM

## 2023-05-24 NOTE — PROGRESS NOTES
Type of service: Individual    Content of session: Client reported that this last week has been harder for her because her knee pain has been bothering her more than usual. As a result, client has been taking less walks due to the pain. Discussed the impact client's anxiety plays in her fear of furthering her injury and/or doing additional damage to her body. Client discussed a recent phone call she had with her father that she found upsetting due to him pointing out her weight in relation to her knee and other joint pains. Client expressed that her family has always commented on her weight and her body in a negative and judgmental manner throughout her life. Discussed ways to set healthy expectations and boundaries with family members. Client discussed the ways that has impacted her desire to want to go out into the world and be around people for fear of being judged for her size and appearance. Talked about anxiety coping techniques that can be employed in social situations.     Therapeutic techniques and approaches: behavior modification and supportive counseling. Rationale: appropriate for presenting issues.     Pt denies SI/HI. Mood was euthymic, affect matches verbal content. AAOx3, participated fully in session.     Time spent in counselinmins      Leola Dobson, Social Work Intern 2023 2:59 PM

## 2023-05-31 ENCOUNTER — OFFICE VISIT (OUTPATIENT)
Dept: PSYCHIATRY | Facility: OTHER | Age: 48
End: 2023-05-31
Payer: OTHER GOVERNMENT

## 2023-05-31 DIAGNOSIS — F41.9 ANXIETY: Primary | ICD-10-CM

## 2023-05-31 PROCEDURE — 99499 NO LOS: ICD-10-PCS | Mod: ,,, | Performed by: STUDENT IN AN ORGANIZED HEALTH CARE EDUCATION/TRAINING PROGRAM

## 2023-05-31 PROCEDURE — 99499 UNLISTED E&M SERVICE: CPT | Mod: ,,, | Performed by: STUDENT IN AN ORGANIZED HEALTH CARE EDUCATION/TRAINING PROGRAM

## 2023-05-31 NOTE — PROGRESS NOTES
Type of service: Individual    Content of session: In today's session, the client discussed her pattern of self-defeating behavior in response to past traumas. Client talked about the extreme financial stress that she and her  have been feeling the last few years. Client also discussed the difficult feelings she was left with in the wake of a phone conversation she had with her father last week. Used open-ended question, rephrasing, and reflection to help identify connections between current behavior patterns and her childhood experiences. The precipitants of triggers of certain anxious feelings and behaviors were explored today. Another goal for the client is to improve capacity for setting boundaries for self and others. Additionally we are working to improve social skills with family members and others. Worked to encourage the client in regard to her creativity to help enhance her self-regard. During the session the client was provided with emotional support and given unconditional positive regard.    Therapeutic techniques and approaches: behavior modification and supportive counseling. Rationale: appropriate for presenting issues.     Pt denies SI/HI. Mood was euthymic, affect matches verbal content. AAOx3, participated fully in session.     Time spent in counselinmins     Leola Dobson, Social Work Intern 2023 11:26 AM

## 2023-06-14 ENCOUNTER — OFFICE VISIT (OUTPATIENT)
Dept: PSYCHIATRY | Facility: OTHER | Age: 48
End: 2023-06-14
Payer: OTHER GOVERNMENT

## 2023-06-14 DIAGNOSIS — F41.9 ANXIETY: ICD-10-CM

## 2023-06-14 DIAGNOSIS — F33.1 MODERATE EPISODE OF RECURRENT MAJOR DEPRESSIVE DISORDER: Primary | ICD-10-CM

## 2023-06-14 PROCEDURE — 99499 UNLISTED E&M SERVICE: CPT | Mod: ,,, | Performed by: SOCIAL WORKER

## 2023-06-14 PROCEDURE — 99499 NO LOS: ICD-10-PCS | Mod: ,,, | Performed by: SOCIAL WORKER

## 2023-06-14 NOTE — PROGRESS NOTES
"Type of service: Individual    Content of session:     In today's session, the patient shared about the various hurdles she's faced over the last week. Pt expressed that she's feeling burnout from her crafting after not receiving any feedback from her last post on Facebook. Pt explained that when she receives negative feedback about something she's doing, she immediately  tops and gives up the task. Pt said that she has a hard time trusting herself and easily gets "stuck in negative feedback loops". Pt made the connection that her actions are highly motivated by the praise and affirmation of others. Discussed how this behavior impacts and is impacted by her anxiety. Discussed various anxiety coping skills to help build resilience and the capacity to complete tasks that she feels are important. Pt is having a hard time setting boundaries with her mother who she has a very strained relationship with. We are working to improve boundary setting with will ultimately help to assuage anxiety and depression in the patient. Used open-ended question, rephrasing, and reflection to help identify connections between current behavior patterns and her childhood experiences with her family members. During the session the client was provided with emotional support and given unconditional positive regard.    Therapeutic techniques and approaches: behavior modification and supportive counseling. Rationale: appropriate for presenting issues.     Pt denies SI/HI. Mood was euthymic, affect matches verbal content. AAOx3, participated fully in session.     Time spent in counselinmins      Leola Dobsno, Social Work Intern 2023 4:15 PM   "

## 2023-06-21 ENCOUNTER — OFFICE VISIT (OUTPATIENT)
Dept: PSYCHIATRY | Facility: OTHER | Age: 48
End: 2023-06-21
Payer: OTHER GOVERNMENT

## 2023-06-21 DIAGNOSIS — F41.9 ANXIETY: ICD-10-CM

## 2023-06-21 DIAGNOSIS — F33.1 MODERATE EPISODE OF RECURRENT MAJOR DEPRESSIVE DISORDER: Primary | ICD-10-CM

## 2023-06-21 PROCEDURE — 99499 NO LOS: ICD-10-PCS | Mod: ,,, | Performed by: STUDENT IN AN ORGANIZED HEALTH CARE EDUCATION/TRAINING PROGRAM

## 2023-06-21 PROCEDURE — 99499 UNLISTED E&M SERVICE: CPT | Mod: ,,, | Performed by: STUDENT IN AN ORGANIZED HEALTH CARE EDUCATION/TRAINING PROGRAM

## 2023-06-21 NOTE — PROGRESS NOTES
"Type of service: Individual    Content of session: In today's session, pt discusses what a good day she was having and how she considered calling to cancel our appointment. Pt spoke of waking up with a lot of energy "which only happened every few weeks" and her desire to make the most of it by getting as much as possible accomplished around the house. We discussed the "boom and bust" cycle as well as the importance of pacing in light of her chronic pain that she experiences in her hips and shoulders and knees. A goal for session was to improve pt insight and judgement. Used open ended questions and rephrasing to discuss the impact of anxiety on feelings of needing to complete a task once starting. Talked about different anxiety coping skills that the patient can use on days when she feels a need to "do it all". In this session, patient was given emotional support, structure and unconditional positive regard.     Therapeutic techniques and approaches: behavior modification and supportive counseling. Rationale: appropriate for presenting issues.     Pt denies SI/HI. Mood was euthymic, affect matches verbal content. AAOx3, participated fully in session.     Time spent in counselinmins      Leola Dobson, Social Work Intern 2023 3:51 PM   "

## 2023-06-28 ENCOUNTER — OFFICE VISIT (OUTPATIENT)
Dept: PSYCHIATRY | Facility: OTHER | Age: 48
End: 2023-06-28
Payer: OTHER GOVERNMENT

## 2023-06-28 DIAGNOSIS — F41.9 ANXIETY: Primary | ICD-10-CM

## 2023-06-28 PROCEDURE — 99499 NO LOS: ICD-10-PCS | Mod: ,,, | Performed by: STUDENT IN AN ORGANIZED HEALTH CARE EDUCATION/TRAINING PROGRAM

## 2023-06-28 PROCEDURE — 99499 UNLISTED E&M SERVICE: CPT | Mod: ,,, | Performed by: STUDENT IN AN ORGANIZED HEALTH CARE EDUCATION/TRAINING PROGRAM

## 2023-06-28 NOTE — PROGRESS NOTES
Type of service: Individual    Content of session: In today's session, a goal for the client was to build anxiety coping skills. The triggers of certain anxious feelings and behaviors were explored today. Worked to identify connections between her childhood experiences and her current feelings of regret and fear for the future. Discussed anxiety coping skills that the patient can use before or after the precipitants of anxious feelings. Used open ended questions, rephrasing and reflection when discussing the patient's experience of going through old photos. Client expressed that a goal for the next week is to better balance her household chores with her search for her new job and crafting endeavors. Client also said that she was unable to set up an appointment with a psychiatrist but plans to do so in the next week. Patient was provided with emotional support and unconditional positive regard.     Therapeutic techniques and approaches: behavior modification and supportive counseling. Rationale: appropriate for presenting issues.     Pt denies SI/HI. Mood was euthymic, affect matches verbal content. AAOx3, participated fully in session.     Time spent in counselinmins     Leola Dobson, Social Work Intern 2023 4:19 PM

## 2023-07-05 ENCOUNTER — OFFICE VISIT (OUTPATIENT)
Dept: PSYCHIATRY | Facility: OTHER | Age: 48
End: 2023-07-05
Payer: OTHER GOVERNMENT

## 2023-07-05 DIAGNOSIS — F41.9 ANXIETY: Primary | ICD-10-CM

## 2023-07-05 PROCEDURE — 99499 NO LOS: ICD-10-PCS | Mod: ,,, | Performed by: STUDENT IN AN ORGANIZED HEALTH CARE EDUCATION/TRAINING PROGRAM

## 2023-07-05 PROCEDURE — 99499 UNLISTED E&M SERVICE: CPT | Mod: ,,, | Performed by: STUDENT IN AN ORGANIZED HEALTH CARE EDUCATION/TRAINING PROGRAM
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
## 2023-07-11 ENCOUNTER — HOSPITAL ENCOUNTER (OUTPATIENT)
Dept: RADIOLOGY | Facility: OTHER | Age: 48
Discharge: HOME | End: 2023-07-11
Attending: STUDENT IN AN ORGANIZED HEALTH CARE EDUCATION/TRAINING PROGRAM
Payer: OTHER GOVERNMENT

## 2023-07-11 ENCOUNTER — LAB VISIT (OUTPATIENT)
Dept: LAB | Facility: OTHER | Age: 48
End: 2023-07-11
Attending: STUDENT IN AN ORGANIZED HEALTH CARE EDUCATION/TRAINING PROGRAM
Payer: OTHER GOVERNMENT

## 2023-07-11 ENCOUNTER — OFFICE VISIT (OUTPATIENT)
Dept: INTERNAL MEDICINE | Facility: CLINIC | Age: 48
End: 2023-07-11
Payer: OTHER GOVERNMENT

## 2023-07-11 ENCOUNTER — PATIENT MESSAGE (OUTPATIENT)
Dept: INTERNAL MEDICINE | Facility: CLINIC | Age: 48
End: 2023-07-11
Payer: OTHER GOVERNMENT

## 2023-07-11 VITALS
OXYGEN SATURATION: 97 % | DIASTOLIC BLOOD PRESSURE: 60 MMHG | HEIGHT: 66 IN | SYSTOLIC BLOOD PRESSURE: 127 MMHG | BODY MASS INDEX: 34.4 KG/M2 | WEIGHT: 214.06 LBS | HEART RATE: 109 BPM

## 2023-07-11 DIAGNOSIS — Z13.6 ENCOUNTER FOR LIPID SCREENING FOR CARDIOVASCULAR DISEASE: ICD-10-CM

## 2023-07-11 DIAGNOSIS — Z00.00 ANNUAL PHYSICAL EXAM: Primary | ICD-10-CM

## 2023-07-11 DIAGNOSIS — M25.562 ACUTE PAIN OF LEFT KNEE: ICD-10-CM

## 2023-07-11 DIAGNOSIS — F41.9 ANXIETY: ICD-10-CM

## 2023-07-11 DIAGNOSIS — Z00.00 ANNUAL PHYSICAL EXAM: ICD-10-CM

## 2023-07-11 DIAGNOSIS — R73.01 ELEVATED FASTING GLUCOSE: ICD-10-CM

## 2023-07-11 DIAGNOSIS — Z91.89 AT RISK FOR BLEEDING: ICD-10-CM

## 2023-07-11 DIAGNOSIS — Z12.11 SCREENING FOR MALIGNANT NEOPLASM OF COLON: ICD-10-CM

## 2023-07-11 DIAGNOSIS — E55.9 VITAMIN D DEFICIENCY: ICD-10-CM

## 2023-07-11 DIAGNOSIS — Z12.31 ENCOUNTER FOR SCREENING MAMMOGRAM FOR MALIGNANT NEOPLASM OF BREAST: ICD-10-CM

## 2023-07-11 DIAGNOSIS — Z13.220 ENCOUNTER FOR LIPID SCREENING FOR CARDIOVASCULAR DISEASE: ICD-10-CM

## 2023-07-11 LAB
25(OH)D3+25(OH)D2 SERPL-MCNC: 22 NG/ML (ref 30–96)
ALBUMIN SERPL BCP-MCNC: 4.5 G/DL (ref 3.5–5.2)
ALP SERPL-CCNC: 117 U/L (ref 55–135)
ALT SERPL W/O P-5'-P-CCNC: 24 U/L (ref 10–44)
ANION GAP SERPL CALC-SCNC: 11 MMOL/L (ref 8–16)
AST SERPL-CCNC: 25 U/L (ref 10–40)
BASOPHILS # BLD AUTO: 0.04 K/UL (ref 0–0.2)
BASOPHILS NFR BLD: 0.5 % (ref 0–1.9)
BILIRUB SERPL-MCNC: 0.5 MG/DL (ref 0.1–1)
BUN SERPL-MCNC: 11 MG/DL (ref 6–20)
CALCIUM SERPL-MCNC: 9.9 MG/DL (ref 8.7–10.5)
CHLORIDE SERPL-SCNC: 103 MMOL/L (ref 95–110)
CHOLEST SERPL-MCNC: 203 MG/DL (ref 120–199)
CHOLEST/HDLC SERPL: 3 {RATIO} (ref 2–5)
CO2 SERPL-SCNC: 24 MMOL/L (ref 23–29)
CREAT SERPL-MCNC: 0.8 MG/DL (ref 0.5–1.4)
DIFFERENTIAL METHOD: NORMAL
EOSINOPHIL # BLD AUTO: 0 K/UL (ref 0–0.5)
EOSINOPHIL NFR BLD: 0.5 % (ref 0–8)
ERYTHROCYTE [DISTWIDTH] IN BLOOD BY AUTOMATED COUNT: 12.2 % (ref 11.5–14.5)
EST. GFR  (NO RACE VARIABLE): >60 ML/MIN/1.73 M^2
ESTIMATED AVG GLUCOSE: 94 MG/DL (ref 68–131)
GLUCOSE SERPL-MCNC: 102 MG/DL (ref 70–110)
HBA1C MFR BLD: 4.9 % (ref 4–5.6)
HCT VFR BLD AUTO: 43.7 % (ref 37–48.5)
HDLC SERPL-MCNC: 68 MG/DL (ref 40–75)
HDLC SERPL: 33.5 % (ref 20–50)
HGB BLD-MCNC: 14.2 G/DL (ref 12–16)
IMM GRANULOCYTES # BLD AUTO: 0.03 K/UL (ref 0–0.04)
IMM GRANULOCYTES NFR BLD AUTO: 0.4 % (ref 0–0.5)
LDLC SERPL CALC-MCNC: 103.2 MG/DL (ref 63–159)
LYMPHOCYTES # BLD AUTO: 2.7 K/UL (ref 1–4.8)
LYMPHOCYTES NFR BLD: 35.3 % (ref 18–48)
MCH RBC QN AUTO: 30.7 PG (ref 27–31)
MCHC RBC AUTO-ENTMCNC: 32.5 G/DL (ref 32–36)
MCV RBC AUTO: 94 FL (ref 82–98)
MONOCYTES # BLD AUTO: 0.7 K/UL (ref 0.3–1)
MONOCYTES NFR BLD: 9 % (ref 4–15)
NEUTROPHILS # BLD AUTO: 4.1 K/UL (ref 1.8–7.7)
NEUTROPHILS NFR BLD: 54.3 % (ref 38–73)
NONHDLC SERPL-MCNC: 135 MG/DL
NRBC BLD-RTO: 0 /100 WBC
PLATELET # BLD AUTO: 309 K/UL (ref 150–450)
PMV BLD AUTO: 9.6 FL (ref 9.2–12.9)
POTASSIUM SERPL-SCNC: 3.8 MMOL/L (ref 3.5–5.1)
PROT SERPL-MCNC: 8.2 G/DL (ref 6–8.4)
RBC # BLD AUTO: 4.63 M/UL (ref 4–5.4)
SODIUM SERPL-SCNC: 138 MMOL/L (ref 136–145)
TRIGL SERPL-MCNC: 159 MG/DL (ref 30–150)
TSH SERPL DL<=0.005 MIU/L-ACNC: 2.18 UIU/ML (ref 0.4–4)
WBC # BLD AUTO: 7.59 K/UL (ref 3.9–12.7)

## 2023-07-11 PROCEDURE — 73562 X-RAY EXAM OF KNEE 3: CPT | Mod: TC,FY,LT

## 2023-07-11 PROCEDURE — 85025 COMPLETE CBC W/AUTO DIFF WBC: CPT | Performed by: STUDENT IN AN ORGANIZED HEALTH CARE EDUCATION/TRAINING PROGRAM

## 2023-07-11 PROCEDURE — 99214 OFFICE O/P EST MOD 30 MIN: CPT | Mod: 25,S$PBB,, | Performed by: STUDENT IN AN ORGANIZED HEALTH CARE EDUCATION/TRAINING PROGRAM

## 2023-07-11 PROCEDURE — 99214 PR OFFICE/OUTPT VISIT, EST, LEVL IV, 30-39 MIN: ICD-10-PCS | Mod: 25,S$PBB,, | Performed by: STUDENT IN AN ORGANIZED HEALTH CARE EDUCATION/TRAINING PROGRAM

## 2023-07-11 PROCEDURE — 84443 ASSAY THYROID STIM HORMONE: CPT | Performed by: STUDENT IN AN ORGANIZED HEALTH CARE EDUCATION/TRAINING PROGRAM

## 2023-07-11 PROCEDURE — 80053 COMPREHEN METABOLIC PANEL: CPT | Performed by: STUDENT IN AN ORGANIZED HEALTH CARE EDUCATION/TRAINING PROGRAM

## 2023-07-11 PROCEDURE — 99396 PR PREVENTIVE VISIT,EST,40-64: ICD-10-PCS | Mod: S$PBB,,, | Performed by: STUDENT IN AN ORGANIZED HEALTH CARE EDUCATION/TRAINING PROGRAM

## 2023-07-11 PROCEDURE — 80061 LIPID PANEL: CPT | Performed by: STUDENT IN AN ORGANIZED HEALTH CARE EDUCATION/TRAINING PROGRAM

## 2023-07-11 PROCEDURE — 83036 HEMOGLOBIN GLYCOSYLATED A1C: CPT | Performed by: STUDENT IN AN ORGANIZED HEALTH CARE EDUCATION/TRAINING PROGRAM

## 2023-07-11 PROCEDURE — 99999 PR PBB SHADOW E&M-EST. PATIENT-LVL V: CPT | Mod: PBBFAC,,, | Performed by: STUDENT IN AN ORGANIZED HEALTH CARE EDUCATION/TRAINING PROGRAM

## 2023-07-11 PROCEDURE — 99999 PR PBB SHADOW E&M-EST. PATIENT-LVL V: ICD-10-PCS | Mod: PBBFAC,,, | Performed by: STUDENT IN AN ORGANIZED HEALTH CARE EDUCATION/TRAINING PROGRAM

## 2023-07-11 PROCEDURE — 36415 COLL VENOUS BLD VENIPUNCTURE: CPT | Performed by: STUDENT IN AN ORGANIZED HEALTH CARE EDUCATION/TRAINING PROGRAM

## 2023-07-11 PROCEDURE — 73562 XR KNEE 3 VIEW LEFT: ICD-10-PCS | Mod: 26,LT,, | Performed by: RADIOLOGY

## 2023-07-11 PROCEDURE — 73562 X-RAY EXAM OF KNEE 3: CPT | Mod: 26,LT,, | Performed by: RADIOLOGY

## 2023-07-11 PROCEDURE — 99396 PREV VISIT EST AGE 40-64: CPT | Mod: S$PBB,,, | Performed by: STUDENT IN AN ORGANIZED HEALTH CARE EDUCATION/TRAINING PROGRAM

## 2023-07-11 PROCEDURE — 82306 VITAMIN D 25 HYDROXY: CPT | Performed by: STUDENT IN AN ORGANIZED HEALTH CARE EDUCATION/TRAINING PROGRAM

## 2023-07-11 PROCEDURE — 99215 OFFICE O/P EST HI 40 MIN: CPT | Mod: PBBFAC | Performed by: STUDENT IN AN ORGANIZED HEALTH CARE EDUCATION/TRAINING PROGRAM

## 2023-07-11 RX ORDER — ERGOCALCIFEROL 1.25 MG/1
50000 CAPSULE ORAL
Qty: 12 CAPSULE | Refills: 0 | Status: SHIPPED | OUTPATIENT
Start: 2023-07-11 | End: 2023-07-11 | Stop reason: SDUPTHER

## 2023-07-11 RX ORDER — ERGOCALCIFEROL 1.25 MG/1
50000 CAPSULE ORAL
Qty: 12 CAPSULE | Refills: 2 | Status: SHIPPED | OUTPATIENT
Start: 2023-07-11 | End: 2024-01-11 | Stop reason: SDUPTHER

## 2023-07-11 RX ORDER — MELOXICAM 15 MG/1
15 TABLET ORAL DAILY
Qty: 30 TABLET | Refills: 0 | Status: SHIPPED | OUTPATIENT
Start: 2023-07-11 | End: 2023-08-11
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
## 2023-07-11 NOTE — PATIENT INSTRUCTIONS
Call to make an appointment within [facility] for psychiatry (meds) / psychology (counseling)   [phone]     [facility]    [facility] psychiatrists:   Aurora Barragan(psychiatrist) [address] Phone: [phone]   Catalino Jean (psychiatrist) [phone], [phone] [address] [facility]   Dr. Rafal Comer - [phone]   Dr. Myrna French - [phone]   Dr. Yulia Judd - [phone]   Dr. Rajeev Escobar - [phone]     [facility]: [phone]     Therapy/Psychology:   You can try anyone of these number to see if your insurance is accepted or you would have to call your insurance.     [facility] ([facility]   Address: [facility] [place] [address]   Phone: [phone]   [email or web address]     [facility] [address], [address]   Phone: [phone]   You can email for an appointment at: [email or web address]     [facility]   [address] [place] [address] [address], [facility], [address]   [email or web address]   Dr. Jennifer Reilly, [phone] or [email or web address]   Dr. Carlita Horton, [phone] or [email or web address]     Mendy De Jesus LCSW (therapist) [phone]   [address] [facility]   Divine Patricia LCSW (therapist) [phone]   [address] [facility]   Edda ESCALANTEW (therapist) [phone]   [address] [place] [address]   NARAYAN Jean         LCSW                    [phone]   Bill Nathan [phone] (therapist) [address] [facility]   Beau Patricia (therapist) [phone]  [address] [facility]   Kennedy Chicas (therapist) [phone] [address] [facility]     [facility] Counseling [phone]   [facility] [address] Marcelo [address]     Employee Assistance Program (EAP)   Check through your employer's HR.     [facility] (accepts medicaid)  [address]  [phone]  or [phone]  ([id number])    Online Therapist:     [email or web address]     Free Guided Meditations   [email or web address]   [email or web address]   [email or web address]

## 2023-07-11 NOTE — TELEPHONE ENCOUNTER
Patient was informed that [facility] don't have Ortho for her knee. She decided to keep the appointment that she had already rescheduled.

## 2023-07-12 ENCOUNTER — OFFICE VISIT (OUTPATIENT)
Dept: PSYCHIATRY | Facility: OTHER | Age: 48
End: 2023-07-12
Payer: OTHER GOVERNMENT

## 2023-07-12 DIAGNOSIS — F41.9 ANXIETY: ICD-10-CM

## 2023-07-12 DIAGNOSIS — F33.1 MODERATE EPISODE OF RECURRENT MAJOR DEPRESSIVE DISORDER: Primary | ICD-10-CM

## 2023-07-12 PROCEDURE — 99499 NO LOS: ICD-10-PCS | Mod: ,,, | Performed by: STUDENT IN AN ORGANIZED HEALTH CARE EDUCATION/TRAINING PROGRAM

## 2023-07-12 PROCEDURE — 99499 UNLISTED E&M SERVICE: CPT | Mod: ,,, | Performed by: STUDENT IN AN ORGANIZED HEALTH CARE EDUCATION/TRAINING PROGRAM

## 2023-07-14 NOTE — PROGRESS NOTES
"Type of service: Individual    Content of session: Pt described an event that happened in  which caused her to lose her job, saying that "everything has gone downhill after that". Talked about how this loss has impacted anxiety and depression over the years. Discussed anxiety and depression coping skills that patient can use on a daily basis. Pt stated that she wants to be able to dance to music again but her pain (knee) has been keeping her from doing so and impacting her mental health. Discussed differences between hurt vs. Harm and ways she can still incorporate music and movement in her life even with her current levels of pain. A goal for the patient today was to improve insight and judgment about events of the past and how to use them as lessons for moving forward in her job search. Pt was given emotional support, structure and unconditional positive regard.     Therapeutic techniques and approaches: behavior modification and supportive counseling. Rationale: appropriate for presenting issues.     Pt denies SI/HI. Mood was euthymic, affect matches verbal content. AAOx3, participated fully in session.     Time spent in counselinmins      Leola Dobson, Social Work Intern 2023 2:41 PM     "

## 2023-07-19 ENCOUNTER — OFFICE VISIT (OUTPATIENT)
Dept: PSYCHIATRY | Facility: OTHER | Age: 48
End: 2023-07-19
Payer: OTHER GOVERNMENT

## 2023-07-19 DIAGNOSIS — F41.9 ANXIETY: Primary | ICD-10-CM

## 2023-07-19 DIAGNOSIS — F33.1 MODERATE EPISODE OF RECURRENT MAJOR DEPRESSIVE DISORDER: ICD-10-CM

## 2023-07-19 PROCEDURE — 99499 NO LOS: ICD-10-PCS | Mod: ,,, | Performed by: STUDENT IN AN ORGANIZED HEALTH CARE EDUCATION/TRAINING PROGRAM

## 2023-07-19 PROCEDURE — 99499 UNLISTED E&M SERVICE: CPT | Mod: ,,, | Performed by: STUDENT IN AN ORGANIZED HEALTH CARE EDUCATION/TRAINING PROGRAM

## 2023-07-19 NOTE — PROGRESS NOTES
Type of service: Individual    Content of session: In today's session, reevaluated Pt's goals for the next few months of therapy. Pt reported feeling that she's made progress in improving insight as well as self-soothing behaviors. Pt hopes to continue to improve her social skills and capacity for setting boundaries for herself with difficult people in her life. Pt said that she was able to do some excessive in the pool twice in the last week and would like to make that into a habit. Pt also discussed feeling stuck in her job search processes and in their financial situation. Talked about how this impacts and is impacted by anxiety and depression. Discussed a variety of coping skills for both anxiety and depression. PT was given emotional support and unconditional positive regard.     Therapeutic techniques and approaches: behavior modification and supportive counseling. Rationale: appropriate for presenting issues.     Pt denies SI/HI. Mood was euthymic, affect matches verbal content. AAOx3, participated fully in session.     Time spent in counselinmins      Leola Dobson, Social Work Intern 2023 4:27 PM

## 2023-08-02 ENCOUNTER — OFFICE VISIT (OUTPATIENT)
Dept: PSYCHIATRY | Facility: OTHER | Age: 48
End: 2023-08-02
Payer: OTHER GOVERNMENT

## 2023-08-02 DIAGNOSIS — F33.1 MODERATE EPISODE OF RECURRENT MAJOR DEPRESSIVE DISORDER: Primary | ICD-10-CM

## 2023-08-02 DIAGNOSIS — F41.9 ANXIETY: ICD-10-CM

## 2023-08-02 PROCEDURE — 99499 UNLISTED E&M SERVICE: CPT | Mod: ,,, | Performed by: STUDENT IN AN ORGANIZED HEALTH CARE EDUCATION/TRAINING PROGRAM

## 2023-08-02 PROCEDURE — 99499 NO LOS: ICD-10-PCS | Mod: ,,, | Performed by: STUDENT IN AN ORGANIZED HEALTH CARE EDUCATION/TRAINING PROGRAM

## 2023-08-11 DIAGNOSIS — M25.562 ACUTE PAIN OF LEFT KNEE: ICD-10-CM

## 2023-08-11 RX ORDER — MELOXICAM 15 MG/1
15 TABLET ORAL
Qty: 5 TABLET | Refills: 0 | Status: SHIPPED | OUTPATIENT
Start: 2023-08-11 | End: 2023-08-17 | Stop reason: SDUPTHER

## 2023-08-11 NOTE — TELEPHONE ENCOUNTER
Refill Routing Note   Medication(s) are not appropriate for processing by [facility] for the following reason(s):      Medication outside of protocol    [facility] action(s):  Route Care Due:  None identified            Appointments  past 12m or future 3m with PCP    Date Provider   Last Visit   7/11/2023 Harrison Lanza MD   Next Visit   1/11/2024 Harrison Lanza MD   ED visits in past 90 days: 0        Note composed:10:42 AM 08/11/2023

## 2023-08-14 ENCOUNTER — OFFICE VISIT (OUTPATIENT)
Dept: SPORTS MEDICINE | Facility: CLINIC | Age: 48
End: 2023-08-14
Payer: OTHER GOVERNMENT

## 2023-08-14 VITALS
BODY MASS INDEX: 34.67 KG/M2 | DIASTOLIC BLOOD PRESSURE: 70 MMHG | WEIGHT: 215.75 LBS | HEIGHT: 66 IN | SYSTOLIC BLOOD PRESSURE: 132 MMHG

## 2023-08-14 DIAGNOSIS — G89.29 CHRONIC PAIN OF LEFT KNEE: Primary | ICD-10-CM

## 2023-08-14 DIAGNOSIS — M25.562 CHRONIC PAIN OF LEFT KNEE: Primary | ICD-10-CM

## 2023-08-14 PROCEDURE — 99204 PR OFFICE/OUTPT VISIT, NEW, LEVL IV, 45-59 MIN: ICD-10-PCS | Mod: S$PBB,,, | Performed by: PHYSICIAN ASSISTANT

## 2023-08-14 PROCEDURE — 99999 PR PBB SHADOW E&M-EST. PATIENT-LVL IV: ICD-10-PCS | Mod: PBBFAC,,, | Performed by: PHYSICIAN ASSISTANT

## 2023-08-14 PROCEDURE — 99204 OFFICE O/P NEW MOD 45 MIN: CPT | Mod: S$PBB,,, | Performed by: PHYSICIAN ASSISTANT

## 2023-08-14 PROCEDURE — 99999 PR PBB SHADOW E&M-EST. PATIENT-LVL IV: CPT | Mod: PBBFAC,,, | Performed by: PHYSICIAN ASSISTANT

## 2023-08-14 PROCEDURE — 99214 OFFICE O/P EST MOD 30 MIN: CPT | Mod: PBBFAC,PN | Performed by: PHYSICIAN ASSISTANT

## 2023-08-14 NOTE — PROGRESS NOTES
NEW PATIENT    HISTORY OF PRESENT ILLNESS   47 y.o. Female with a history of left knee pain who complains today of having continued lateral-sided knee pain over the last 2-3 months.  She denies having any injury but attributes her symptoms to increasing her physical activity and attempting to walk several times per day for weight loss.  After walking on uneven ground, she began having lateral-sided pain with occasional sharp, stabbing sensations.  Her symptoms appear to be exacerbated when the knee is in a bent position, particularly when crossing her legs.  She also has discomfort with any rotational movements.  She was seen by her primary care physician 5 weeks ago and given a prescription for meloxicam.  She states that this has given her no significant improvement.        + mechanical symptoms, + instability    Is affecting ADLs.  Pain is 8/10 at it's worst.        PAST MEDICAL HISTORY    History reviewed. No pertinent past medical history.    PAST SURGICAL HISTORY     Past Surgical History:   Procedure Laterality Date    INJECTION OF JOINT Bilateral 2023    Procedure: INJECTION, JOINT BILATERAL GTB CONTRAST;  Surgeon: Kristin Lakhani MD;  Location: [facility];  Service: Pain Management;  Laterality: Bilateral;    TONSILLECTOMY         FAMILY HISTORY    Family History   Problem Relation Age of Onset    COPD Mother     Breast cancer Mother 72    Heart attack Father     Heart disease Paternal Grandfather         ?quad bypass    Heart disease Paternal Uncle         quad bypass    Cancer Neg Hx     Colon cancer Neg Hx     Diabetes Neg Hx     Eclampsia Neg Hx     Hypertension Neg Hx     Miscarriages / Stillbirths Neg Hx     Ovarian cancer Neg Hx      labor Neg Hx     Stroke Neg Hx        SOCIAL HISTORY    Social History     Socioeconomic History    Marital status:    Tobacco Use    Smoking status: Never    Smokeless tobacco: Never   Substance and Sexual Activity    Alcohol use: Yes      Comment: occasionally    Drug use: Yes     Types: Marijuana    Sexual activity: Yes     Partners: Male     Birth control/protection: None       MEDICATIONS      Current Outpatient Medications:     cyclobenzaprine (FLEXERIL) 10 MG tablet, TAKE 1 TABLET(10 MG) BY MOUTH TWICE DAILY AS NEEDED FOR MUSCLE SPASMS, Disp: 60 tablet, Rfl: 2    diclofenac (VOLTAREN) 75 MG EC tablet, TAKE 1 TABLET(75 MG) BY MOUTH TWICE DAILY, Disp: 60 tablet, Rfl: 2    ergocalciferol (ERGOCALCIFEROL) 50,000 unit Cap, Take 1 capsule (50,000 Units total) by mouth every 7 days., Disp: 12 capsule, Rfl: 2    meloxicam (MOBIC) 15 MG tablet, TAKE 1 TABLET(15 MG) BY MOUTH EVERY DAY, Disp: 5 tablet, Rfl: 0    Current Facility-Administered Medications:     triamcinolone acetonide injection 40 mg, 40 mg, INTRABURSAL, 1 time in Clinic/HOD, Daryl Zazueta, DO    triamcinolone acetonide injection 40 mg, 40 mg, INTRABURSAL, 1 time in Clinic/HOD, Daryl Zazueta, DO    Facility-Administered Medications Ordered in Other Visits:     0.9%  NaCl infusion, 500 mL, Intravenous, Continuous, Domingo Jernigan MD    ALLERGIES     Review of patient's allergies indicates:   Allergen Reactions    Duloxetine      Vs tizanidine unclear reaction, pt disliked?    Hydrocodone      Dizziness and nausea     Tizanidine      Unclear negative reaction vs duloxetine         REVIEW OF SYSTEMS   Constitution: Negative. Negative for chills, fever and night sweats.   HENT: Negative for congestion and headaches.    Eyes: Negative for blurred vision, left vision loss and right vision loss.   Cardiovascular: Negative for chest pain and syncope.   Respiratory: Negative for cough and shortness of breath.    Endocrine: Negative for polydipsia, polyphagia and polyuria.   Hematologic/Lymphatic: Negative for bleeding problem. Does not bruise/bleed easily.   Skin: Negative for dry skin, itching and rash.   Musculoskeletal: Negative for falls. Positive for left knee pain.  Gastrointestinal:  "Negative for abdominal pain and bowel incontinence.   Genitourinary: Negative for bladder incontinence and nocturia.   Neurological: Negative for disturbances in coordination, loss of balance and seizures.   Psychiatric/Behavioral: Negative for depression. The patient does not have insomnia.    Allergic/Immunologic: Negative for hives and persistent infections.     PHYSICAL EXAMINATION    Vitals: /70 (BP Location: Right arm, Patient Position: Sitting, BP Method: Medium (Manual))   Ht 5' 6" (1.676 m)   Wt 97.8 kg (215 lb 11.5 oz)   BMI 34.82 kg/m²     General: The patient appears active and healthy with no apparent physical problems.  No disturbance of mood or affect is demonstrated. Alert and Oriented.    HEENT: Eyes normal, pupils equally round, nose normal.    Resp: Equal and symmetrical chest rises. No wheezing    CV: Regular rate    Neck: Supple; nonpainful range of motion.    Extremities: no cyanosis, clubbing, edema, or diffuse swelling.  Palpable pulses, good capillary refill of the digits.  No coolness, discoloration, edema or obvious varicosities.    Skin: no lesions noted.    Lymphatic: no detected adenopathy in the upper or lower extremities.    Neurologic: normal mental status, normal reflexes, normal gait and balance.  Patient is alert and oriented to person, place and time.  No flaccidity or spasticity is noted.  No motor or sensory deficits are noted.  Light touch is intact    Orthopaedic: Knee Musculoskeletal Exam  Gait    Antalgic: left    Inspection    Left      Erythema: none        Effusion: mild        Edema: none        Ecchymosis: none        Deformity: none        Alignment: normal        Previous incision: no previous incision    Palpation    Left      Increased warmth: none        Masses: none        Crepitus: none        Tenderness: present          Lateral joint line: severe          Lateral retinaculum: moderate          LCL: moderate          Medial joint line: mild      Range " of Motion    Left      Active extension: -5      Passive extension: 0      Active flexion: 110      Passive flexion: 120    Strength    Left      Left knee strength is normal.       Extension: 5/5. Extension is not affected by pain.       Flexion: 5/5. Flexion is not affected by pain.      Instability    Left      Varus stress grade: normal      Valgus stress grade: normal      Anterior drawer: normal      Posterior drawer: normal      Medial Kevin test: negative      Lateral Kevin test: positive      Lachman: negative    Neurovascular    Left      Left knee neurovascular exam is normal.        Pulses - DP: normal      Pulses - PT: normal    Special Signs    Left      Straight leg raise: normal      J sign: none        Patellar compression: mild        Patellar apprehension: mild          IMAGING    X-Ray Knee 3 View Left  EXAMINATION:  XR KNEE 3 VIEW LEFT    CLINICAL HISTORY:  Pain in left knee    FINDINGS:  Left knee: Three views left knee: No fracture dislocation bone destruction or OCD seen.    Electronically signed by: Ishan Moralez MD  Date:    07/11/2023  Time:    11:08    I have personally reviewed the x-ray images and report.  There are no acute findings.  No fracture or dislocation.  The joint space appears to be well preserved in all 3 compartments.  No significant osteophyte formation.  No change in alignment.    IMPRESSION       ICD-10-CM ICD-9-CM   1. Chronic pain of left knee  M25.562 719.46    G89.29 338.29       MEDICATIONS PRESCRIBED      None    RECOMMENDATIONS     MRI left knee for evaluation of chronic lateral-sided knee pain and suspected tear of the lateral meniscus  Continue activity modification, icing, and meloxicam as needed for pain and swelling  Return to clinic for MRI results      All questions were answered, pt will contact us for questions or concerns in the interim.

## 2023-08-17 DIAGNOSIS — M25.562 ACUTE PAIN OF LEFT KNEE: ICD-10-CM

## 2023-08-17 RX ORDER — MELOXICAM 15 MG/1
15 TABLET ORAL DAILY PRN
Qty: 5 TABLET | Refills: 0 | Status: SHIPPED | OUTPATIENT
Start: 2023-08-17 | End: 2023-09-07 | Stop reason: SDUPTHER

## 2023-08-19 ENCOUNTER — HOSPITAL ENCOUNTER (OUTPATIENT)
Dept: RADIOLOGY | Facility: OTHER | Age: 48
Discharge: HOME | End: 2023-08-19
Attending: PHYSICIAN ASSISTANT
Payer: OTHER GOVERNMENT

## 2023-08-19 DIAGNOSIS — M25.562 CHRONIC PAIN OF LEFT KNEE: ICD-10-CM

## 2023-08-19 DIAGNOSIS — G89.29 CHRONIC PAIN OF LEFT KNEE: ICD-10-CM

## 2023-08-19 PROCEDURE — 73721 MRI KNEE WITHOUT CONTRAST LEFT: ICD-10-PCS | Mod: 26,LT,, | Performed by: RADIOLOGY

## 2023-08-19 PROCEDURE — 73721 MRI JNT OF LWR EXTRE W/O DYE: CPT | Mod: 26,LT,, | Performed by: RADIOLOGY

## 2023-08-19 PROCEDURE — 73721 MRI JNT OF LWR EXTRE W/O DYE: CPT | Mod: TC,LT

## 2023-08-21 ENCOUNTER — OFFICE VISIT (OUTPATIENT)
Dept: SPORTS MEDICINE | Facility: CLINIC | Age: 48
End: 2023-08-21
Payer: OTHER GOVERNMENT

## 2023-08-21 VITALS — HEIGHT: 66 IN | BODY MASS INDEX: 34.82 KG/M2 | SYSTOLIC BLOOD PRESSURE: 122 MMHG | DIASTOLIC BLOOD PRESSURE: 82 MMHG

## 2023-08-21 DIAGNOSIS — M22.42 CHONDROMALACIA, PATELLA, LEFT: ICD-10-CM

## 2023-08-21 DIAGNOSIS — M25.562 CHRONIC PAIN OF LEFT KNEE: Primary | ICD-10-CM

## 2023-08-21 DIAGNOSIS — G89.29 CHRONIC PAIN OF LEFT KNEE: Primary | ICD-10-CM

## 2023-08-21 PROCEDURE — 99999PBSHW PR PBB SHADOW TECHNICAL ONLY FILED TO HB: ICD-10-PCS | Mod: PBBFAC,,,

## 2023-08-21 PROCEDURE — 99214 OFFICE O/P EST MOD 30 MIN: CPT | Mod: 25,S$PBB,, | Performed by: PHYSICIAN ASSISTANT

## 2023-08-21 PROCEDURE — 99999 PR PBB SHADOW E&M-EST. PATIENT-LVL III: ICD-10-PCS | Mod: PBBFAC,,, | Performed by: PHYSICIAN ASSISTANT

## 2023-08-21 PROCEDURE — 20611 DRAIN/INJ JOINT/BURSA W/US: CPT | Mod: S$PBB,LT,, | Performed by: PHYSICIAN ASSISTANT

## 2023-08-21 PROCEDURE — 99999PBSHW PR PBB SHADOW TECHNICAL ONLY FILED TO HB: Mod: PBBFAC,,,

## 2023-08-21 PROCEDURE — 20611 LARGE JOINT ASPIRATION/INJECTION: L KNEE: ICD-10-PCS | Mod: S$PBB,LT,, | Performed by: PHYSICIAN ASSISTANT

## 2023-08-21 PROCEDURE — 99213 OFFICE O/P EST LOW 20 MIN: CPT | Mod: PBBFAC,PN | Performed by: PHYSICIAN ASSISTANT

## 2023-08-21 PROCEDURE — 99214 PR OFFICE/OUTPT VISIT, EST, LEVL IV, 30-39 MIN: ICD-10-PCS | Mod: 25,S$PBB,, | Performed by: PHYSICIAN ASSISTANT

## 2023-08-21 PROCEDURE — 99999 PR PBB SHADOW E&M-EST. PATIENT-LVL III: CPT | Mod: PBBFAC,,, | Performed by: PHYSICIAN ASSISTANT

## 2023-08-21 PROCEDURE — 20611 DRAIN/INJ JOINT/BURSA W/US: CPT | Mod: PBBFAC,PN | Performed by: PHYSICIAN ASSISTANT

## 2023-08-21 RX ORDER — BUPIVACAINE HYDROCHLORIDE 2.5 MG/ML
2 INJECTION, SOLUTION INFILTRATION; PERINEURAL
Status: DISCONTINUED | OUTPATIENT
Start: 2023-08-21 | End: 2023-08-21 | Stop reason: HOSPADM

## 2023-08-21 RX ORDER — TRIAMCINOLONE ACETONIDE 40 MG/ML
40 INJECTION, SUSPENSION INTRA-ARTICULAR; INTRAMUSCULAR
Status: DISCONTINUED | OUTPATIENT
Start: 2023-08-21 | End: 2023-08-21 | Stop reason: HOSPADM

## 2023-08-21 RX ADMIN — TRIAMCINOLONE ACETONIDE 40 MG: 40 INJECTION, SUSPENSION INTRA-ARTICULAR; INTRAMUSCULAR at 09:08

## 2023-08-21 RX ADMIN — BUPIVACAINE HYDROCHLORIDE 2 ML: 2.5 INJECTION, SOLUTION INFILTRATION; PERINEURAL at 09:08

## 2023-08-21 NOTE — PROGRESS NOTES
ESTABLISHED PATIENT    History 8/21/2023:  Chinyere returns here today for follow-up evaluation of her left knee and to discuss her MRI results.  She continues to have intermittent pain along the anterior aspect of the knee with some radiation laterally.  She occasionally has a stabbing sensation which causes her knee to feel slightly unstable.    History 8/14/2023:  47 y.o. Female with a history of left knee pain who complains today of having continued lateral-sided knee pain over the last 2-3 months.  She denies having any injury but attributes her symptoms to increasing her physical activity and attempting to walk several times per day for weight loss.  After walking on uneven ground, she began having lateral-sided pain with occasional sharp, stabbing sensations.  Her symptoms appear to be exacerbated when the knee is in a bent position, particularly when crossing her legs.  She also has discomfort with any rotational movements.  She was seen by her primary care physician 5 weeks ago and given a prescription for meloxicam.  She states that this has given her no significant improvement.        + mechanical symptoms, + instability    Is affecting ADLs.  Pain is 8/10 at it's worst.        PAST MEDICAL HISTORY    History reviewed. No pertinent past medical history.    PAST SURGICAL HISTORY     Past Surgical History:   Procedure Laterality Date    INJECTION OF JOINT Bilateral 2/14/2023    Procedure: INJECTION, JOINT BILATERAL GTB CONTRAST;  Surgeon: Kristin Lakhani MD;  Location: [facility];  Service: Pain Management;  Laterality: Bilateral;    TONSILLECTOMY         FAMILY HISTORY    Family History   Problem Relation Age of Onset    COPD Mother     Breast cancer Mother 72    Heart attack Father     Heart disease Paternal Grandfather         ?quad bypass    Heart disease Paternal Uncle         quad bypass    Cancer Neg Hx     Colon cancer Neg Hx     Diabetes Neg Hx     Eclampsia Neg Hx     Hypertension Neg Hx      Miscarriages / Stillbirths Neg Hx     Ovarian cancer Neg Hx      labor Neg Hx     Stroke Neg Hx        SOCIAL HISTORY    Social History     Socioeconomic History    Marital status:    Tobacco Use    Smoking status: Never    Smokeless tobacco: Never   Substance and Sexual Activity    Alcohol use: Yes     Comment: occasionally    Drug use: Yes     Types: Marijuana    Sexual activity: Yes     Partners: Male     Birth control/protection: None       MEDICATIONS      Current Outpatient Medications:     cyclobenzaprine (FLEXERIL) 10 MG tablet, TAKE 1 TABLET(10 MG) BY MOUTH TWICE DAILY AS NEEDED FOR MUSCLE SPASMS, Disp: 60 tablet, Rfl: 2    ergocalciferol (ERGOCALCIFEROL) 50,000 unit Cap, Take 1 capsule (50,000 Units total) by mouth every 7 days., Disp: 12 capsule, Rfl: 2    meloxicam (MOBIC) 15 MG tablet, Take 1 tablet (15 mg total) by mouth daily as needed for Pain., Disp: 5 tablet, Rfl: 0    Current Facility-Administered Medications:     triamcinolone acetonide injection 40 mg, 40 mg, INTRABURSAL, 1 time in Clinic/HOD, Daryl Zazueta, DO    triamcinolone acetonide injection 40 mg, 40 mg, INTRABURSAL, 1 time in Clinic/HOD, Daryl Zazueta, DO    Facility-Administered Medications Ordered in Other Visits:     0.9%  NaCl infusion, 500 mL, Intravenous, Continuous, Domingo Jernigan MD    ALLERGIES     Review of patient's allergies indicates:   Allergen Reactions    Duloxetine      Vs tizanidine unclear reaction, pt disliked?    Hydrocodone      Dizziness and nausea     Tizanidine      Unclear negative reaction vs duloxetine         REVIEW OF SYSTEMS   Constitution: Negative. Negative for chills, fever and night sweats.   HENT: Negative for congestion and headaches.    Eyes: Negative for blurred vision, left vision loss and right vision loss.   Cardiovascular: Negative for chest pain and syncope.   Respiratory: Negative for cough and shortness of breath.    Endocrine: Negative for polydipsia, polyphagia and  "polyuria.   Hematologic/Lymphatic: Negative for bleeding problem. Does not bruise/bleed easily.   Skin: Negative for dry skin, itching and rash.   Musculoskeletal: Negative for falls. Positive for left knee pain.  Gastrointestinal: Negative for abdominal pain and bowel incontinence.   Genitourinary: Negative for bladder incontinence and nocturia.   Neurological: Negative for disturbances in coordination, loss of balance and seizures.   Psychiatric/Behavioral: Negative for depression. The patient does not have insomnia.    Allergic/Immunologic: Negative for hives and persistent infections.     PHYSICAL EXAMINATION    Vitals: /82 (BP Location: Right arm, Patient Position: Sitting, BP Method: Large (Manual))   Ht 5' 6" (1.676 m)   BMI 34.82 kg/m²     General: The patient appears active and healthy with no apparent physical problems.  No disturbance of mood or affect is demonstrated. Alert and Oriented.    HEENT: Eyes normal, pupils equally round, nose normal.    Resp: Equal and symmetrical chest rises. No wheezing    CV: Regular rate    Neck: Supple; nonpainful range of motion.    Extremities: no cyanosis, clubbing, edema, or diffuse swelling.  Palpable pulses, good capillary refill of the digits.  No coolness, discoloration, edema or obvious varicosities.    Skin: no lesions noted.    Lymphatic: no detected adenopathy in the upper or lower extremities.    Neurologic: normal mental status, normal reflexes, normal gait and balance.  Patient is alert and oriented to person, place and time.  No flaccidity or spasticity is noted.  No motor or sensory deficits are noted.  Light touch is intact    Orthopaedic: Knee Musculoskeletal Exam  Gait    Antalgic: left    Inspection    Left      Erythema: none        Effusion: mild        Edema: none        Ecchymosis: none        Deformity: none        Alignment: normal        Previous incision: no previous incision    Palpation    Left      Increased warmth: none        " Masses: none        Crepitus: patellofemoral        Crepitus comment: mild      Tenderness: present          Lateral joint line: moderate          Lateral retinaculum: moderate          LCL: moderate          Medial joint line: severe          Medial retinaculum: moderate      Range of Motion    Left      Active extension: 5      Passive extension: 0      Active flexion: 110      Passive flexion: 120    Strength    Left      Left knee strength is normal.       Extension: 5/5. Extension is not affected by pain.       Flexion: 5/5. Flexion is not affected by pain.      Instability    Left      Varus stress grade: normal      Valgus stress grade: normal      Anterior drawer: normal      Posterior drawer: normal      Medial Kevin test: negative      Lateral Kevin test: positive      Lachman: 1+    Neurovascular    Left      Left knee neurovascular exam is normal.        Pulses - DP: normal      Pulses - PT: normal    Special Signs    Left      Straight leg raise: normal      J sign: none        Patellar compression: mild        Patellar apprehension: mild          IMAGING    MRI Knee Without Contrast Left  Narrative: EXAMINATION:  MRI KNEE WITHOUT CONTRAST LEFT    CLINICAL HISTORY:  Knee pain, chronic, negative xray (Age >= 5y);Meniscal tear, untreated, new symptoms;Please evaluate the lateral meniscus;Pain in left knee    TECHNIQUE:  Multiplanar, multisequence images were performed about the LEFT knee.    COMPARISON:  None    FINDINGS:  **MENISCI:    Medial meniscus: Intact anterior horn, body, and posterior horn. Low level increased signal intensity within  meniscus, not meeting criteria for meniscal tear, most consistent with myxoid change.    Lateral meniscus: Intact anterior horn, body, and posterior horn.  No evidence for meniscal tear.  Incidental ganglia adjacent to the anterior horn lateral meniscus.    Meniscal root attachment: Intact    Popliteal hiatus, superior and inferior meniscal fascicles:Intact  and visualized.    **LIGAMENTS:    Anterior cruciate ligament: ACL demonstrates diffuse thickening containing ill-defined intraligamentous T2-hyperintensity, which splays the intact ACL fibers consistent with ACL ganglion/mucinous degeneration.  Minimal cystic change at the AC into the tibial spines, degenerative in nature.    Posterior cruciate ligament: Continuous, with normal signal.    Medial collateral ligament: Intact.    Lateral collateral ligament and  biceps femoris: Intact.    Iliotibial band (ITB): No evidence for ITB syndrome.    Popliteus tendon and popliteofibular ligament: Intact.    Posterior medial and posterior lateral corners: Intact.    **TENDONS:    Quadriceps tendon: Intact.    Patella tendon: Intact.    Joint fluid: Physiologic.    Hoffa's fat pad: Normal.    Intact medial retinaculum/ MPFL.    Intact lateral retinaculum.    **CARTILAGE:    Patellofemoral:Preserved medial and  lateral patellar facet cartilage.Median ridge demonstrates no focal abnormality.  Preserved trochlear groove cartilage.  Preservedanterior medial and anterior lateral femoral trochlea facet cartilage.    Medial tibiofemoral: Articular cartilage is preserved without focal defects or subchondral marrow edema.Internal aspect of medial femoral condyle cartilage is intact.    Lateral tibiofemoral: Articular cartilage is preserved without focal defects or subchondral marrow edema.Cartilage at posterior medial aspect of lateral tibial plateau is intact.    **OTHER:    Bone marrow: No fracture or marrow replacing process.    Miscellaneous: The gastrocnemius muscles are normal.No evident plica thickening.  Impression: No evidence for meniscal tear.  No evidence for significant degenerative change or occult osseous injury.    Mild mucoid degeneration of the ACL, incidental.  Ganglion, incidental anterior to the anterior horn lateral meniscus.    Electronically signed by: Braeden Griffiths MD  Date:    08/19/2023  Time:    16:22    I  have personally reviewed the MRI images and report today.  I have discussed the findings in depth with the patient.  There is no evidence of a full-thickness meniscal tear.  There does appear to be a degenerative change of the ACL with ganglion cyst formation at the footprint and adjacent to the anterior horn of the lateral meniscus.  There also appears to be some bone marrow changes at the footprint.    X-Ray Knee 3 View Left  Order: [id number]  Status: Final result     Visible to patient: Yes (seen)     Next appt: 08/23/2023 at 10:30 AM in Psychiatry ( STUDENT INTERN, [facility])     Dx: Acute pain of left knee     0 Result Notes    1 Patient Communication  Details    Reading Physician Reading Date Result Priority   Ishan Moralez III, MD  [phone]  [phone] 7/11/2023 Routine     Narrative & Impression  EXAMINATION:  XR KNEE 3 VIEW LEFT     CLINICAL HISTORY:  Pain in left knee     FINDINGS:  Left knee: Three views left knee: No fracture dislocation bone destruction or OCD seen.        Electronically signed by: Ishan Moralez MD  Date:                                            07/11/2023  Time:                                           11:08           Exam Ended: 07/11/23 11:07 Last Resulted: 07/11/23 11:08             I have personally reviewed the x-ray images and report.  There are no acute findings.  No fracture or dislocation.  The joint space appears to be well preserved in all 3 compartments.  No significant osteophyte formation.  No change in alignment.    IMPRESSION       ICD-10-CM ICD-9-CM   1. Chronic pain of left knee  M25.562 719.46    G89.29 338.29   2. Chondromalacia, patella, left  M22.42 717.7         MEDICATIONS PRESCRIBED      None    RECOMMENDATIONS     Her ACL does not look completely normal and appears to have some degenerative changes with ganglion cyst formation and underlying bone marrow edema at the footprint.  On examination, there does appear to be mild laxity and  retropatellar crepitus consistent with chondromalacia patella.  I am not exactly sure what is causing her chronic discomfort.  We had a lengthy discussion today regarding her options and she elected to proceed with a diagnostic intra-articular corticosteroid injection.  Depending on her response to this injection, she may require a diagnostic knee arthroscopy.  Left knee intra-articular CSI administered under ultrasound guidance  Continue activity modification, icing, and diclofenac as needed for pain and swelling  Return to clinic in 3 weeks for repeat evaluation      Large Joint Aspiration/Injection: L knee    Date/Time: 8/21/2023 9:30 AM    Performed by: Yonis Mace PA-C  Authorized by: Yonis Mace PA-C    Consent Done?:  Yes (Verbal)  Indications:  Pain and diagnostic evaluation  Site marked: the procedure site was marked    Timeout: prior to procedure the correct patient, procedure, and site was verified      Local anesthesia used?: Yes    Local anesthetic:  Co-phenylcaine spray    Details:  Needle Size:  22 G  Ultrasonic Guidance for needle placement?: Yes (Ultrasound guidance was utilized for needle localization.  Dynamic visualization of the needle was continuous and maintained accurate placement.  Images were saved and stored for documentation.)    Images are saved and documented.  Approach: superolateral.  Location:  Knee  Site:  L knee  Medications:  40 mg triamcinolone acetonide 40 mg/mL; 2 mL BUPivacaine 0.25% (2.5 mg/ml) 0.25 % (2.5 mg/mL)  Patient tolerance:  Patient tolerated the procedure well with no immediate complications          All questions were answered, pt will contact us for questions or concerns in the interim.

## 2023-08-23 ENCOUNTER — CLINICAL SUPPORT (OUTPATIENT)
Dept: PSYCHIATRY | Facility: OTHER | Age: 48
End: 2023-08-23
Payer: OTHER GOVERNMENT

## 2023-08-23 DIAGNOSIS — F41.9 ANXIETY: Primary | ICD-10-CM

## 2023-08-23 DIAGNOSIS — F33.1 MODERATE EPISODE OF RECURRENT MAJOR DEPRESSIVE DISORDER: ICD-10-CM

## 2023-08-23 PROCEDURE — 99499 UNLISTED E&M SERVICE: CPT | Mod: ,,, | Performed by: NURSE PRACTITIONER

## 2023-08-23 PROCEDURE — 99499 NO LOS: ICD-10-PCS | Mod: ,,, | Performed by: NURSE PRACTITIONER

## 2023-08-28 NOTE — PROGRESS NOTES
Type of service: Individual    Content of session: In today's session the pt described her struggle with a bout of migraine headaches that she has been experiencing over the last few weeks. The pt expressed feelings of frustration over her condition since she went the last year without getting a migraine she believed that she was done with them. Talked about how this impacts anxiety and depression. A goal for the patient was to build coping skills during the times when she is having a migraine episode for days or weeks on end. Discussed various anxiety and depression coping skills. Today's therapeutic efforts included the use of open ended questions, rephrasing, and reflection. The pt was given emotional support and unconditional positive regard.     Therapeutic techniques and approaches: behavior modification and supportive counseling. Rationale: appropriate for presenting issues.     Pt denies SI/HI. Mood was euthymic, affect matches verbal content. AAOx3, participated fully in session.     Time spent in counselinmins      Leola Dobson, Social Work Intern 2023 10:40 AM

## 2023-08-29 ENCOUNTER — OFFICE VISIT (OUTPATIENT)
Dept: OBSTETRICS AND GYNECOLOGY | Facility: CLINIC | Age: 48
End: 2023-08-29
Payer: OTHER GOVERNMENT

## 2023-08-29 VITALS — BODY MASS INDEX: 35.04 KG/M2 | WEIGHT: 218.06 LBS | HEIGHT: 66 IN

## 2023-08-29 DIAGNOSIS — Z12.4 CERVICAL CANCER SCREENING: Primary | ICD-10-CM

## 2023-08-29 PROCEDURE — 99999 PR PBB SHADOW E&M-EST. PATIENT-LVL II: CPT | Mod: PBBFAC,,, | Performed by: STUDENT IN AN ORGANIZED HEALTH CARE EDUCATION/TRAINING PROGRAM

## 2023-08-29 PROCEDURE — 99396 PREV VISIT EST AGE 40-64: CPT | Mod: S$PBB,,, | Performed by: STUDENT IN AN ORGANIZED HEALTH CARE EDUCATION/TRAINING PROGRAM

## 2023-08-29 PROCEDURE — 99212 OFFICE O/P EST SF 10 MIN: CPT | Mod: PBBFAC | Performed by: STUDENT IN AN ORGANIZED HEALTH CARE EDUCATION/TRAINING PROGRAM

## 2023-08-29 PROCEDURE — 99396 PR PREVENTIVE VISIT,EST,40-64: ICD-10-PCS | Mod: S$PBB,,, | Performed by: STUDENT IN AN ORGANIZED HEALTH CARE EDUCATION/TRAINING PROGRAM

## 2023-08-29 PROCEDURE — 99999 PR PBB SHADOW E&M-EST. PATIENT-LVL II: ICD-10-PCS | Mod: PBBFAC,,, | Performed by: STUDENT IN AN ORGANIZED HEALTH CARE EDUCATION/TRAINING PROGRAM

## 2023-08-29 PROCEDURE — 88175 CYTOPATH C/V AUTO FLUID REDO: CPT | Performed by: STUDENT IN AN ORGANIZED HEALTH CARE EDUCATION/TRAINING PROGRAM

## 2023-08-29 PROCEDURE — 87624 HPV HI-RISK TYP POOLED RSLT: CPT | Performed by: STUDENT IN AN ORGANIZED HEALTH CARE EDUCATION/TRAINING PROGRAM

## 2023-08-29 RX ORDER — DICLOFENAC SODIUM 50 MG/1
50 TABLET, DELAYED RELEASE ORAL 2 TIMES DAILY
COMMUNITY
End: 2023-09-11 | Stop reason: SDUPTHER

## 2023-08-29 NOTE — PROGRESS NOTES
History & Physical  Gynecology      SUBJECTIVE:     Chief Complaint: Annual Exam       History of Present Illness:  48 y.o. female   here for WWE. Patient's last menstrual period was 2018 (lmp unknown).. .     Cervical cancer screening:    Most recent:  NILM/HR HPV neg    History abnormal: no  Breast cancer screening: UTD, TC 12%   Colon cancer screening: not yet had, has been discussing with PCP    Family history breast cancer: mother 70s  Family history ovarian cancer: no  Family history colon cancer: no'      Review of patient's allergies indicates:   Allergen Reactions    Duloxetine      Vs tizanidine unclear reaction, pt disliked?    Hydrocodone      Dizziness and nausea     Tizanidine      Unclear negative reaction vs duloxetine       History reviewed. No pertinent past medical history.  Past Surgical History:   Procedure Laterality Date    INJECTION OF JOINT Bilateral 2023    Procedure: INJECTION, JOINT BILATERAL GTB CONTRAST;  Surgeon: Kristin Lakhani MD;  Location: [facility];  Service: Pain Management;  Laterality: Bilateral;    TONSILLECTOMY       OB History          0    Para   0    Term   0       0    AB   0    Living   0         SAB   0    IAB   0    Ectopic   0    Multiple   0    Live Births   0               Family History   Problem Relation Age of Onset    COPD Mother     Breast cancer Mother 72    Heart attack Father     Heart disease Paternal Grandfather         ?quad bypass    Heart disease Paternal Uncle         quad bypass    Cancer Neg Hx     Colon cancer Neg Hx     Diabetes Neg Hx     Eclampsia Neg Hx     Hypertension Neg Hx     Miscarriages / Stillbirths Neg Hx     Ovarian cancer Neg Hx      labor Neg Hx     Stroke Neg Hx      Social History     Tobacco Use    Smoking status: Never    Smokeless tobacco: Never   Substance Use Topics    Alcohol use: Yes     Comment: occasionally    Drug use: Yes     Types: Marijuana       Current Outpatient  Medications   Medication Sig    cyclobenzaprine (FLEXERIL) 10 MG tablet TAKE 1 TABLET(10 MG) BY MOUTH TWICE DAILY AS NEEDED FOR MUSCLE SPASMS    diclofenac (VOLTAREN) 50 MG EC tablet Take 50 mg by mouth 2 (two) times daily.    ergocalciferol (ERGOCALCIFEROL) 50,000 unit Cap Take 1 capsule (50,000 Units total) by mouth every 7 days.    meloxicam (MOBIC) 15 MG tablet Take 1 tablet (15 mg total) by mouth daily as needed for Pain. (Patient not taking: Reported on 8/29/2023)     Current Facility-Administered Medications   Medication    triamcinolone acetonide injection 40 mg    triamcinolone acetonide injection 40 mg     Facility-Administered Medications Ordered in Other Visits   Medication    0.9%  NaCl infusion         Review of Systems:  Review of Systems   Constitutional:  Negative for chills, fever and unexpected weight change.   HENT:  Negative for tinnitus.    Respiratory:  Negative for cough and shortness of breath.    Cardiovascular:  Negative for chest pain, palpitations and leg swelling.   Gastrointestinal:  Negative for abdominal pain, constipation, diarrhea, nausea and vomiting.   Endocrine: Negative for diabetes, hyperthyroidism and hypothyroidism.   Genitourinary:  Negative for dysuria, pelvic pain, vaginal discharge and postmenopausal bleeding.   Musculoskeletal:  Positive for arthralgias (rotator cuff). Negative for myalgias.   Integumentary:  Negative for rash, hair changes, breast mass, nipple discharge, breast skin changes and breast tenderness.   Neurological:  Negative for seizures, syncope and headaches.   Hematological:  Does not bruise/bleed easily.   Psychiatric/Behavioral:  Negative for depression. The patient is not nervous/anxious.    Breast: Negative for lump, mass, mastodynia, nipple discharge, skin changes and tenderness       OBJECTIVE:     Physical Exam:  Physical Exam  Exam conducted with a chaperone present.   Constitutional:       General: She is not in acute distress.     Appearance:  Normal appearance. She is well-developed.   HENT:      Head: Normocephalic and atraumatic.      Nose: No epistaxis.   Eyes:      Conjunctiva/sclera: Conjunctivae normal.   Pulmonary:      Effort: Pulmonary effort is normal. No respiratory distress.   Chest:   Breasts:     Right: No inverted nipple, mass, nipple discharge, skin change or tenderness.      Left: No inverted nipple, mass, nipple discharge, skin change or tenderness.   Abdominal:      General: There is no distension.      Palpations: Abdomen is soft. There is no mass.      Tenderness: There is no abdominal tenderness. There is no guarding or rebound.      Hernia: No hernia is present.   Genitourinary:     General: Normal vulva.      Pubic Area: No rash.       Labia:         Right: No rash, tenderness or lesion.         Left: No rash, tenderness or lesion.       Urethra: No prolapse, urethral pain, urethral swelling or urethral lesion.      Vagina: Normal. No vaginal discharge, tenderness or bleeding.      Cervix: No cervical motion tenderness, discharge, friability or lesion.      Uterus: Normal. Not enlarged and not tender.       Adnexa: Right adnexa normal and left adnexa normal.        Right: No mass, tenderness or fullness.          Left: No mass, tenderness or fullness.     Musculoskeletal:         General: No tenderness. Normal range of motion.      Right lower leg: No edema.      Left lower leg: No edema.   Skin:     General: Skin is warm and dry.      Findings: No erythema.   Neurological:      Mental Status: She is alert and oriented to person, place, and time.   Psychiatric:         Mood and Affect: Mood normal.         Behavior: Behavior normal.           ASSESSMENT:       ICD-10-CM ICD-9-CM    1. Cervical cancer screening  Z12.4 V76.2 Liquid-Based Pap Smear, Screening      HPV High Risk Genotypes, PCR             Plan:      Chinyere was seen today for annual exam.    Diagnoses and all orders for this visit:    Cervical cancer screening  -      Liquid-Based Pap Smear, Screening  -     HPV High Risk Genotypes, PCR    Age appropriate ACS cervical cancer screening counseling reviewed. In absence of severe dysplasia history, and pending negative/normal results today, may be ok to space screening interval up to 5 years. Ongoing yearly WWE continues to be recommended.     RTC 1 year or sooner PRN    Pema Turcios

## 2023-08-30 ENCOUNTER — OFFICE VISIT (OUTPATIENT)
Dept: PSYCHIATRY | Facility: OTHER | Age: 48
End: 2023-08-30
Payer: OTHER GOVERNMENT

## 2023-08-30 DIAGNOSIS — F33.1 MODERATE EPISODE OF RECURRENT MAJOR DEPRESSIVE DISORDER: Primary | ICD-10-CM

## 2023-08-30 DIAGNOSIS — F41.9 ANXIETY: ICD-10-CM

## 2023-08-30 PROCEDURE — 99499 NO LOS: ICD-10-PCS | Mod: ,,, | Performed by: STUDENT IN AN ORGANIZED HEALTH CARE EDUCATION/TRAINING PROGRAM

## 2023-08-30 PROCEDURE — 99499 UNLISTED E&M SERVICE: CPT | Mod: ,,, | Performed by: STUDENT IN AN ORGANIZED HEALTH CARE EDUCATION/TRAINING PROGRAM

## 2023-08-30 NOTE — PROGRESS NOTES
Type of service: Individual    Content of session: In today's session, a goal for the pt was to improve insight and judgement in relationship to various family members. Pt said a phone call she had with her father last week was upsetting and then was on her mind in the days following the conversation. Talked about how this impacts and is impacted by anxiety and depression. The pt is working to improve her capacity for setting boundaries for herself in conversations with others particularly in relationship to politics. Discussed coping skills for anxiety and depression. Today's therapeutic efforts included the use of open ended questions, rephrasing and reflection. The pt was given emotional support, structure and unconditional positive regard.     Therapeutic techniques and approaches: behavior modification and supportive counseling. Rationale: appropriate for presenting issues.     Pt denies SI/HI. Mood was euthymic, affect matches verbal content. AAOx3, participated fully in session.     Time spent in counselinmins      Leola Dobson, Social Work Intern 2023 4:17 PM

## 2023-09-05 RX ORDER — DICLOFENAC SODIUM 50 MG/1
50 TABLET, DELAYED RELEASE ORAL 2 TIMES DAILY
Qty: 60 TABLET | Refills: 0 | OUTPATIENT
Start: 2023-09-05

## 2023-09-05 NOTE — TELEPHONE ENCOUNTER
----- Message from Apolonia Carcamo sent at 9/5/2023 10:30 AM CDT -----  Who Called:JW MENDEZ [[id number]]           New Prescription or Refill : Refill      RX Name and Strength:  diclofenac (VOLTAREN) 50 MG EC tablet          Local or Mail Order : Local   Mail Order            Preferred Pharmacy:[id number] [address] [place] [facility] [place] [address]      Would the patient rather a call back or a response via [facility]? yes        Best Call Back Number:  [phone]        Additional Information: none

## 2023-09-06 ENCOUNTER — OFFICE VISIT (OUTPATIENT)
Dept: PSYCHIATRY | Facility: OTHER | Age: 48
End: 2023-09-06
Payer: OTHER GOVERNMENT

## 2023-09-06 DIAGNOSIS — F33.1 MODERATE EPISODE OF RECURRENT MAJOR DEPRESSIVE DISORDER: ICD-10-CM

## 2023-09-06 DIAGNOSIS — F41.9 ANXIETY: Primary | ICD-10-CM

## 2023-09-06 PROCEDURE — 99499 UNLISTED E&M SERVICE: CPT | Mod: ,,, | Performed by: STUDENT IN AN ORGANIZED HEALTH CARE EDUCATION/TRAINING PROGRAM

## 2023-09-06 PROCEDURE — 99499 NO LOS: ICD-10-PCS | Mod: ,,, | Performed by: STUDENT IN AN ORGANIZED HEALTH CARE EDUCATION/TRAINING PROGRAM

## 2023-09-06 NOTE — TELEPHONE ENCOUNTER
----- Message from Sofía Cheek sent at 9/6/2023  4:47 PM CDT -----   Name of Who is Calling:     What is the request in detail: patient request call back in reference to medication     diclofenac (VOLTAREN) 50 MG EC tablet    /patient state medication was denied at pharmacy   Please contact to further discuss and advise      Can the clinic reply by [facility]:     What Number to Call Back if not in [facility]:  [phone]

## 2023-09-07 ENCOUNTER — TELEPHONE (OUTPATIENT)
Dept: INTERNAL MEDICINE | Facility: CLINIC | Age: 48
End: 2023-09-07
Payer: OTHER GOVERNMENT

## 2023-09-07 DIAGNOSIS — M25.562 ACUTE PAIN OF LEFT KNEE: Primary | ICD-10-CM

## 2023-09-07 LAB
FINAL PATHOLOGIC DIAGNOSIS: NORMAL
Lab: NORMAL

## 2023-09-07 RX ORDER — MELOXICAM 15 MG/1
15 TABLET ORAL DAILY PRN
Qty: 5 TABLET | Refills: 0 | Status: SHIPPED | OUTPATIENT
Start: 2023-09-07 | End: 2023-09-11 | Stop reason: ALTCHOICE

## 2023-09-07 NOTE — TELEPHONE ENCOUNTER
Staff spoke to patient and informed her that her PCP took her off of the Diclofenac and gave her Meloxicam in it's place. A refill request will be submitted.

## 2023-09-07 NOTE — TELEPHONE ENCOUNTER
----- Message from Rikki Schwarz sent at 9/7/2023 11:30 AM CDT -----  Type:  Patient Returning Call    Who Called:pt   Who Left Message for Patient:  Does the patient know what this is regarding?:rx   Would the patient rather a call back or a response via [facility]? Call  Best Call Back Number:[phone]  Additional Information: pt needs an refill on her diclofenac (VOLTAREN) 75 MG EC tablet , pt state that she called into the pharmacy and the was informed that the prescription has been cancelled and the pt would like to know why. Please give the pt an call back to the number listed on file, to advise

## 2023-09-07 NOTE — TELEPHONE ENCOUNTER
----- Message from Yasmany Cameron MA sent at 9/7/2023  2:17 PM CDT -----  Contact: JW MENDEZ [[id number]]    ----- Message -----  From: Aurora Laws  Sent: 9/7/2023  12:26 PM CDT  To: [facility] Staff    Type: Call Back      Who called: JW MENDEZ [[id number]]      What is the request in detail: Patient is requesting a call back in regard to her diclofenac (VOLTAREN) 75 MG EC tablet, she would like to know if she can continue with this prescription. She states that she would stop taking the other Anti-inflammatory.   Please advise.     Can the clinic reply by [facility]? No       Would the patient rather a call back or a response via [facility]? Call back       Best call back number: [phone] (home)       Additional Information:

## 2023-09-11 ENCOUNTER — OFFICE VISIT (OUTPATIENT)
Dept: SPORTS MEDICINE | Facility: CLINIC | Age: 48
End: 2023-09-11
Payer: OTHER GOVERNMENT

## 2023-09-11 ENCOUNTER — TELEPHONE (OUTPATIENT)
Dept: INTERNAL MEDICINE | Facility: CLINIC | Age: 48
End: 2023-09-11
Payer: OTHER GOVERNMENT

## 2023-09-11 VITALS — WEIGHT: 218 LBS | HEIGHT: 66 IN | BODY MASS INDEX: 35.03 KG/M2

## 2023-09-11 DIAGNOSIS — M25.562 CHRONIC PAIN OF LEFT KNEE: Primary | ICD-10-CM

## 2023-09-11 DIAGNOSIS — G89.29 CHRONIC PAIN OF LEFT KNEE: Primary | ICD-10-CM

## 2023-09-11 PROCEDURE — 99999 PR PBB SHADOW E&M-EST. PATIENT-LVL III: ICD-10-PCS | Mod: PBBFAC,,, | Performed by: PHYSICIAN ASSISTANT

## 2023-09-11 PROCEDURE — 99999 PR PBB SHADOW E&M-EST. PATIENT-LVL III: CPT | Mod: PBBFAC,,, | Performed by: PHYSICIAN ASSISTANT

## 2023-09-11 PROCEDURE — 99214 PR OFFICE/OUTPT VISIT, EST, LEVL IV, 30-39 MIN: ICD-10-PCS | Mod: S$PBB,,, | Performed by: PHYSICIAN ASSISTANT

## 2023-09-11 PROCEDURE — 99213 OFFICE O/P EST LOW 20 MIN: CPT | Mod: PBBFAC,PN | Performed by: PHYSICIAN ASSISTANT

## 2023-09-11 PROCEDURE — 99214 OFFICE O/P EST MOD 30 MIN: CPT | Mod: S$PBB,,, | Performed by: PHYSICIAN ASSISTANT

## 2023-09-11 RX ORDER — DICLOFENAC SODIUM 50 MG/1
50 TABLET, DELAYED RELEASE ORAL 2 TIMES DAILY PRN
Qty: 60 TABLET | Refills: 1 | Status: SHIPPED | OUTPATIENT
Start: 2023-09-11 | End: 2023-11-09 | Stop reason: SDUPTHER

## 2023-09-11 NOTE — PROGRESS NOTES
Type of service: Individual    Content of session: In todays session, the pt shared ways that new coping skills are helping in her relationships with family members. Pt discussed that she finds herself spending less time ruminating on hurtful or insensitive comments that family members make to her. The triggers of certain anxious feelings and behaviors were explored today with the patient. Pt feels  that she has improved social skills in the last few months. Discussed how these shifts in mindset positively impact anxiety and depression. Todays therapeutic efforts included the use of open ended questions, rephrasing, and reflection. The pt was given emotional support, structure, and unconditional positive regard.    Therapeutic techniques and approaches: behavior modification and supportive counseling. Rationale: appropriate for presenting issues.     Pt denies SI/HI. Mood was euthymic, affect matches verbal content. AAOx3, participated fully in session.     Time spent in counselinmins      Leola Dobson, Social Work Intern 2023 1:34 PM

## 2023-09-11 NOTE — TELEPHONE ENCOUNTER
----- Message from Jeannine Nagy MA sent at 9/8/2023 12:17 PM CDT -----  Regarding: Refill Request  Who Called:JW MENDEZ [[id number]]           New Prescription or Refill : Refill      RX Name and Strength:  meloxicam (MOBIC) 15 MG tablet         30 day or 90 day RX: 30         local  Local or Mail Order :          [facility] #[id number] - [address] AT [facility] [place] [facility]  Preferred Pharmacy:      Would the patient rather a call back or a response via [facility]? Call       [phone]  Best Call Back Number:        Additional:  PT WOULD LIKE A 30 DAY SUPPLY......

## 2023-09-11 NOTE — PROGRESS NOTES
ESTABLISHED PATIENT    History 9/11/2023:  Chinyere returns here today for follow-up evaluation of her left knee.  She was given a diagnostic intra-articular corticosteroid at her last visit.  She states that this injection gave her no relief.  She is still having lateral-sided pain particularly with physical activity.  Her symptoms will improve with rest, but she has trouble walking down her stairs when 1st getting up in the morning.  She is still having an occasional sharp, stabbing pain along the lateral aspect of the knee with weight-bearing.  She occasionally only notices some swelling laterally.    History 8/21/2023:  Chinyere returns here today for follow-up evaluation of her left knee and to discuss her MRI results.  She continues to have intermittent pain along the anterior aspect of the knee with some radiation laterally.  She occasionally has a stabbing sensation which causes her knee to feel slightly unstable.    History 8/14/2023:  48 y.o. Female with a history of left knee pain who complains today of having continued lateral-sided knee pain over the last 2-3 months.  She denies having any injury but attributes her symptoms to increasing her physical activity and attempting to walk several times per day for weight loss.  After walking on uneven ground, she began having lateral-sided pain with occasional sharp, stabbing sensations.  Her symptoms appear to be exacerbated when the knee is in a bent position, particularly when crossing her legs.  She also has discomfort with any rotational movements.  She was seen by her primary care physician 5 weeks ago and given a prescription for meloxicam.  She states that this has given her no significant improvement.        + mechanical symptoms, + instability    Is affecting ADLs.  Pain is 8/10 at it's worst.        PAST MEDICAL HISTORY    History reviewed. No pertinent past medical history.    PAST SURGICAL HISTORY     Past Surgical History:   Procedure Laterality Date     INJECTION OF JOINT Bilateral 2023    Procedure: INJECTION, JOINT BILATERAL GTB CONTRAST;  Surgeon: Kristin Lakhani MD;  Location: [facility];  Service: Pain Management;  Laterality: Bilateral;    TONSILLECTOMY         FAMILY HISTORY    Family History   Problem Relation Age of Onset    COPD Mother     Breast cancer Mother 72    Heart attack Father     Heart disease Paternal Grandfather         ?quad bypass    Heart disease Paternal Uncle         quad bypass    Cancer Neg Hx     Colon cancer Neg Hx     Diabetes Neg Hx     Eclampsia Neg Hx     Hypertension Neg Hx     Miscarriages / Stillbirths Neg Hx     Ovarian cancer Neg Hx      labor Neg Hx     Stroke Neg Hx        SOCIAL HISTORY    Social History     Socioeconomic History    Marital status:    Tobacco Use    Smoking status: Never    Smokeless tobacco: Never   Substance and Sexual Activity    Alcohol use: Yes     Comment: occasionally    Drug use: Yes     Types: Marijuana    Sexual activity: Yes     Partners: Male     Birth control/protection: None       MEDICATIONS      Current Outpatient Medications:     cyclobenzaprine (FLEXERIL) 10 MG tablet, TAKE 1 TABLET(10 MG) BY MOUTH TWICE DAILY AS NEEDED FOR MUSCLE SPASMS, Disp: 60 tablet, Rfl: 2    ergocalciferol (ERGOCALCIFEROL) 50,000 unit Cap, Take 1 capsule (50,000 Units total) by mouth every 7 days., Disp: 12 capsule, Rfl: 2    diclofenac (VOLTAREN) 50 MG EC tablet, Take 1 tablet (50 mg total) by mouth 2 (two) times daily as needed (pain/swelling)., Disp: 60 tablet, Rfl: 1    Current Facility-Administered Medications:     triamcinolone acetonide injection 40 mg, 40 mg, INTRABURSAL, 1 time in Clinic/HOD, Daryl aZzueta, DO    triamcinolone acetonide injection 40 mg, 40 mg, INTRABURSAL, 1 time in Clinic/HOD, Daryl Zazueta, DO    Facility-Administered Medications Ordered in Other Visits:     0.9%  NaCl infusion, 500 mL, Intravenous, Continuous, Domingo Jernigan MD    ALLERGIES  "    Review of patient's allergies indicates:   Allergen Reactions    Duloxetine      Vs tizanidine unclear reaction, pt disliked?    Hydrocodone      Dizziness and nausea     Tizanidine      Unclear negative reaction vs duloxetine         REVIEW OF SYSTEMS   Constitution: Negative. Negative for chills, fever and night sweats.   HENT: Negative for congestion and headaches.    Eyes: Negative for blurred vision, left vision loss and right vision loss.   Cardiovascular: Negative for chest pain and syncope.   Respiratory: Negative for cough and shortness of breath.    Endocrine: Negative for polydipsia, polyphagia and polyuria.   Hematologic/Lymphatic: Negative for bleeding problem. Does not bruise/bleed easily.   Skin: Negative for dry skin, itching and rash.   Musculoskeletal: Negative for falls. Positive for left knee pain.  Gastrointestinal: Negative for abdominal pain and bowel incontinence.   Genitourinary: Negative for bladder incontinence and nocturia.   Neurological: Negative for disturbances in coordination, loss of balance and seizures.   Psychiatric/Behavioral: Negative for depression. The patient does not have insomnia.    Allergic/Immunologic: Negative for hives and persistent infections.     PHYSICAL EXAMINATION    Vitals: Ht 5' 6" (1.676 m)   Wt 98.9 kg (218 lb)   LMP 03/16/2018 (LMP Unknown) Comment: spotting/ short periord 3/16/18  BMI 35.19 kg/m²     General: The patient appears active and healthy with no apparent physical problems.  No disturbance of mood or affect is demonstrated. Alert and Oriented.    HEENT: Eyes normal, pupils equally round, nose normal.    Resp: Equal and symmetrical chest rises. No wheezing    CV: Regular rate    Neck: Supple; nonpainful range of motion.    Extremities: no cyanosis, clubbing, edema, or diffuse swelling.  Palpable pulses, good capillary refill of the digits.  No coolness, discoloration, edema or obvious varicosities.    Skin: no lesions noted.    Lymphatic: no " detected adenopathy in the upper or lower extremities.    Neurologic: normal mental status, normal reflexes, normal gait and balance.  Patient is alert and oriented to person, place and time.  No flaccidity or spasticity is noted.  No motor or sensory deficits are noted.  Light touch is intact    Orthopaedic: Knee Musculoskeletal Exam  Gait    Antalgic: left    Inspection    Left      Erythema: none        Effusion: mild        Edema: none        Ecchymosis: none        Deformity: none        Alignment: normal        Previous incision: no previous incision    Palpation    Left      Increased warmth: none        Masses: none        Crepitus: patellofemoral        Crepitus comment: mild      Tenderness: present          Lateral joint line: severe          Lateral retinaculum: moderate          LCL: moderate          Medial joint line: severe          Medial retinaculum: moderate          Patellar tendon: mild      Range of Motion    Left      Active extension: 5      Passive extension: 0      Active flexion: 110      Passive flexion: 120    Strength    Left      Left knee strength is normal.       Extension: 5/5. Extension is not affected by pain.       Flexion: 5/5. Flexion is not affected by pain.      Instability    Left      Varus stress grade: normal      Valgus stress grade: normal      Anterior drawer: normal      Posterior drawer: normal      Medial Kevin test: positive      Lateral Kevin test: positive      Lachman: 1+    Neurovascular    Left      Left knee neurovascular exam is normal.        Pulses - DP: normal      Pulses - PT: normal    Special Signs    Left      Straight leg raise: normal      J sign: none        Patellar compression: mild        Patellar apprehension: mild          IMAGING    MRI Knee Without Contrast Left  Narrative: EXAMINATION:  MRI KNEE WITHOUT CONTRAST LEFT    CLINICAL HISTORY:  Knee pain, chronic, negative xray (Age >= 5y);Meniscal tear, untreated, new symptoms;Please  evaluate the lateral meniscus;Pain in left knee    TECHNIQUE:  Multiplanar, multisequence images were performed about the LEFT knee.    COMPARISON:  None    FINDINGS:  **MENISCI:    Medial meniscus: Intact anterior horn, body, and posterior horn. Low level increased signal intensity within  meniscus, not meeting criteria for meniscal tear, most consistent with myxoid change.    Lateral meniscus: Intact anterior horn, body, and posterior horn.  No evidence for meniscal tear.  Incidental ganglia adjacent to the anterior horn lateral meniscus.    Meniscal root attachment: Intact    Popliteal hiatus, superior and inferior meniscal fascicles:Intact and visualized.    **LIGAMENTS:    Anterior cruciate ligament: ACL demonstrates diffuse thickening containing ill-defined intraligamentous T2-hyperintensity, which splays the intact ACL fibers consistent with ACL ganglion/mucinous degeneration.  Minimal cystic change at the AC into the tibial spines, degenerative in nature.    Posterior cruciate ligament: Continuous, with normal signal.    Medial collateral ligament: Intact.    Lateral collateral ligament and  biceps femoris: Intact.    Iliotibial band (ITB): No evidence for ITB syndrome.    Popliteus tendon and popliteofibular ligament: Intact.    Posterior medial and posterior lateral corners: Intact.    **TENDONS:    Quadriceps tendon: Intact.    Patella tendon: Intact.    Joint fluid: Physiologic.    Hoffa's fat pad: Normal.    Intact medial retinaculum/ MPFL.    Intact lateral retinaculum.    **CARTILAGE:    Patellofemoral:Preserved medial and  lateral patellar facet cartilage.Median ridge demonstrates no focal abnormality.  Preserved trochlear groove cartilage.  Preservedanterior medial and anterior lateral femoral trochlea facet cartilage.    Medial tibiofemoral: Articular cartilage is preserved without focal defects or subchondral marrow edema.Internal aspect of medial femoral condyle cartilage is intact.    Lateral  tibiofemoral: Articular cartilage is preserved without focal defects or subchondral marrow edema.Cartilage at posterior medial aspect of lateral tibial plateau is intact.    **OTHER:    Bone marrow: No fracture or marrow replacing process.    Miscellaneous: The gastrocnemius muscles are normal.No evident plica thickening.  Impression: No evidence for meniscal tear.  No evidence for significant degenerative change or occult osseous injury.    Mild mucoid degeneration of the ACL, incidental.  Ganglion, incidental anterior to the anterior horn lateral meniscus.    Electronically signed by: Braeden Griffiths MD  Date:    08/19/2023  Time:    16:22    I have personally reviewed the MRI images and report today.  I have discussed the findings in depth with the patient.  There is no evidence of a full-thickness meniscal tear.  There does appear to be a degenerative change of the ACL with ganglion cyst formation at the footprint and adjacent to the anterior horn of the lateral meniscus.  There also appears to be some bone marrow changes at the footprint.    X-Ray Knee 3 View Left  Order: [id number]  Status: Final result     Visible to patient: Yes (seen)     Next appt: 08/23/2023 at 10:30 AM in Psychiatry ( STUDENT INTERN, [facility])     Dx: Acute pain of left knee     0 Result Notes    1 Patient Communication  Details    Reading Physician Reading Date Result Priority   Ishan Moralez III, MD  [phone]  [phone] 7/11/2023 Routine     Narrative & Impression  EXAMINATION:  XR KNEE 3 VIEW LEFT     CLINICAL HISTORY:  Pain in left knee     FINDINGS:  Left knee: Three views left knee: No fracture dislocation bone destruction or OCD seen.        Electronically signed by: Ishan Moralez MD  Date:                                            07/11/2023  Time:                                           11:08           Exam Ended: 07/11/23 11:07 Last Resulted: 07/11/23 11:08             I have personally reviewed the x-ray images  and report.  There are no acute findings.  No fracture or dislocation.  The joint space appears to be well preserved in all 3 compartments.  No significant osteophyte formation.  No change in alignment.    IMPRESSION       ICD-10-CM ICD-9-CM   1. Chronic pain of left knee  M25.562 719.46    G89.29 338.29           MEDICATIONS PRESCRIBED      None    RECOMMENDATIONS     48-year-old female with continued left knee pain that has failed conservative treatment including time, activity modification, prescription strength oral NSAIDs, and recent intra-articular corticosteroid injection.  Her ACL does not look completely normal and appears to have some degenerative changes with ganglion cyst formation and underlying bone marrow edema at the footprint on the MRI.  On examination, there does appear to be mild laxity and retropatellar crepitus consistent with chondromalacia patella.  I am not exactly sure what is causing her chronic discomfort.  We had a lengthy discussion today regarding her options and she elected to proceed with a diagnostic intra-articular corticosteroid injection at her last visit but it gave her no relief.  We discussed her options again today and I recommend she either consider formal physical therapy or a diagnostic knee arthroscopy; the risks, benefits, and postoperative recovery of knee arthroscopy with potential medial meniscal repair versus meniscectomy, chondroplasty, synovectomy, and all other indicated procedures were discussed  She would like to consider surgical intervention, so I recommend she follow-up with Taina Lorenz MD for possible surgical planning  Continue activity modification, icing, and diclofenac as needed for pain and swelling  Return to clinic with me preoperatively      Procedures        All questions were answered, pt will contact us for questions or concerns in the interim.

## 2023-09-13 ENCOUNTER — OFFICE VISIT (OUTPATIENT)
Dept: PSYCHIATRY | Facility: OTHER | Age: 48
End: 2023-09-13
Payer: OTHER GOVERNMENT

## 2023-09-13 DIAGNOSIS — F41.9 ANXIETY: ICD-10-CM

## 2023-09-13 DIAGNOSIS — F33.1 MODERATE EPISODE OF RECURRENT MAJOR DEPRESSIVE DISORDER: Primary | ICD-10-CM

## 2023-09-13 PROCEDURE — 99499 NO LOS: ICD-10-PCS | Mod: ,,, | Performed by: STUDENT IN AN ORGANIZED HEALTH CARE EDUCATION/TRAINING PROGRAM

## 2023-09-13 PROCEDURE — 99499 UNLISTED E&M SERVICE: CPT | Mod: ,,, | Performed by: STUDENT IN AN ORGANIZED HEALTH CARE EDUCATION/TRAINING PROGRAM

## 2023-09-13 NOTE — PROGRESS NOTES
"Type of service: Individual    Content of session: In today's session, the pt spoke of her anger and frustration around dealing with increased pain over the last week. Pt described symptoms that seem associated with significant stress. Pt discussed a recent fall that she had over this past week when she was rushing to go  a call from her doctor's office. Pt has been struggling to get one of her medications refilled and has been in "extreme pain and discomfort" without it. Talked about how this impacts and is impacted by anxiety and depression. Discussed different coping skills to use for anxiety and depression. Discussed the pain cycle as well as ways the pt can activate the parasympathetic nervous system during such times of stress and discomfort.  Therapeutic efforts included the use of open ended questions, rephrasing, and reflection. The pt was given emotional support and unconditional positive regard.     Therapeutic techniques and approaches: behavior modification and supportive counseling. Rationale: appropriate for presenting issues.     Pt denies SI/HI. Mood was euthymic, affect matches verbal content. AAOx3, participated fully in session.     Time spent in counselinmins     Leola Dobson, Social Work Intern 2023 11:48 AM      "

## 2023-09-14 ENCOUNTER — TELEPHONE (OUTPATIENT)
Dept: INTERNAL MEDICINE | Facility: CLINIC | Age: 48
End: 2023-09-14
Payer: OTHER GOVERNMENT

## 2023-09-25 ENCOUNTER — OFFICE VISIT (OUTPATIENT)
Dept: SPORTS MEDICINE | Facility: CLINIC | Age: 48
End: 2023-09-25
Payer: OTHER GOVERNMENT

## 2023-09-25 VITALS — WEIGHT: 218.13 LBS | BODY MASS INDEX: 35.21 KG/M2

## 2023-09-25 DIAGNOSIS — M22.42 CHONDROMALACIA, PATELLA, LEFT: ICD-10-CM

## 2023-09-25 DIAGNOSIS — M23.012: Primary | ICD-10-CM

## 2023-09-25 PROCEDURE — 99214 OFFICE O/P EST MOD 30 MIN: CPT | Mod: S$PBB,,, | Performed by: ORTHOPAEDIC SURGERY

## 2023-09-25 PROCEDURE — 99999 PR PBB SHADOW E&M-EST. PATIENT-LVL III: ICD-10-PCS | Mod: PBBFAC,,, | Performed by: ORTHOPAEDIC SURGERY

## 2023-09-25 PROCEDURE — 99214 PR OFFICE/OUTPT VISIT, EST, LEVL IV, 30-39 MIN: ICD-10-PCS | Mod: S$PBB,,, | Performed by: ORTHOPAEDIC SURGERY

## 2023-09-25 PROCEDURE — 99213 OFFICE O/P EST LOW 20 MIN: CPT | Mod: PBBFAC | Performed by: ORTHOPAEDIC SURGERY

## 2023-09-25 PROCEDURE — 99999 PR PBB SHADOW E&M-EST. PATIENT-LVL III: CPT | Mod: PBBFAC,,, | Performed by: ORTHOPAEDIC SURGERY

## 2023-09-25 NOTE — PROGRESS NOTES
NEW PATIENT    HISTORY OF PRESENT ILLNESS   48 y.o. Female with a history of left knee pain who is referred today by Yonis Mace PA-C. 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 She states this past spring she was starting a new exercise regimen. She states after the first few weeks she started to have knee pain. She states she had to exercise less and the pain has not gotten any better. She has mostly lateral sided knee pain. She has pain at night. She also has some pain behind her knee cap. She has not between able to find any relief.       - mechanical symptoms, - instability    Is affecting ADLs.  Pain is 3/10 at it's worst.        PAST MEDICAL HISTORY    History reviewed. No pertinent past medical history.    PAST SURGICAL HISTORY     Past Surgical History:   Procedure Laterality Date    INJECTION OF JOINT Bilateral 2023    Procedure: INJECTION, JOINT BILATERAL GTB CONTRAST;  Surgeon: Kristin Lakhani MD;  Location: [facility];  Service: Pain Management;  Laterality: Bilateral;    TONSILLECTOMY         FAMILY HISTORY    Family History   Problem Relation Age of Onset    COPD Mother     Breast cancer Mother 72    Heart attack Father     Heart disease Paternal Grandfather         ?quad bypass    Heart disease Paternal Uncle         quad bypass    Cancer Neg Hx     Colon cancer Neg Hx     Diabetes Neg Hx     Eclampsia Neg Hx     Hypertension Neg Hx     Miscarriages / Stillbirths Neg Hx     Ovarian cancer Neg Hx      labor Neg Hx     Stroke Neg Hx        SOCIAL HISTORY    Social History     Socioeconomic History    Marital status:    Tobacco Use    Smoking status: Never    Smokeless tobacco: Never   Substance and Sexual Activity    Alcohol use: Yes     Comment: occasionally    Drug use: Yes     Types: Marijuana    Sexual activity: Yes     Partners: Male     Birth control/protection: None       MEDICATIONS      Current Outpatient Medications:      cyclobenzaprine (FLEXERIL) 10 MG tablet, TAKE 1 TABLET(10 MG) BY MOUTH TWICE DAILY AS NEEDED FOR MUSCLE SPASMS, Disp: 60 tablet, Rfl: 2    diclofenac (VOLTAREN) 50 MG EC tablet, Take 1 tablet (50 mg total) by mouth 2 (two) times daily as needed (pain/swelling)., Disp: 60 tablet, Rfl: 1    ergocalciferol (ERGOCALCIFEROL) 50,000 unit Cap, Take 1 capsule (50,000 Units total) by mouth every 7 days., Disp: 12 capsule, Rfl: 2    Current Facility-Administered Medications:     triamcinolone acetonide injection 40 mg, 40 mg, INTRABURSAL, 1 time in Clinic/HOD, Daryl Zazueta, DO    triamcinolone acetonide injection 40 mg, 40 mg, INTRABURSAL, 1 time in Clinic/HOD, Daryl Zazueta,     Facility-Administered Medications Ordered in Other Visits:     0.9%  NaCl infusion, 500 mL, Intravenous, Continuous, Domingo Jernigan MD    ALLERGIES     Review of patient's allergies indicates:   Allergen Reactions    Duloxetine      Vs tizanidine unclear reaction, pt disliked?    Hydrocodone      Dizziness and nausea     Tizanidine      Unclear negative reaction vs duloxetine         REVIEW OF SYSTEMS   Constitution: Negative. Negative for chills, fever and night sweats.   HENT: Negative for congestion and headaches.    Eyes: Negative for blurred vision, left vision loss and right vision loss.   Cardiovascular: Negative for chest pain and syncope.   Respiratory: Negative for cough and shortness of breath.    Endocrine: Negative for polydipsia, polyphagia and polyuria.   Hematologic/Lymphatic: Negative for bleeding problem. Does not bruise/bleed easily.   Skin: Negative for dry skin, itching and rash.   Musculoskeletal: Negative for falls. Positive for left knee pain  Gastrointestinal: Negative for abdominal pain and bowel incontinence.   Genitourinary: Negative for bladder incontinence and nocturia.   Neurological: Negative for disturbances in coordination, loss of balance and seizures.   Psychiatric/Behavioral: Negative for depression.  The patient does not have insomnia.    Allergic/Immunologic: Negative for hives and persistent infections.     PHYSICAL EXAMINATION    Vitals: Wt 98.9 kg (218 lb 2.3 oz)   LMP 03/16/2018 (LMP Unknown) Comment: spotting/ short periord 3/16/18  BMI 35.21 kg/m²     General: The patient appears active and healthy with no apparent physical problems.  No disturbance of mood or affect is demonstrated. Alert and Oriented.    HEENT: Eyes normal, pupils equally round, nose normal.    Resp: Equal and symmetrical chest rises. No wheezing    CV: Regular rate    Neck: Supple; nonpainful range of motion.    Extremities: no cyanosis, clubbing, edema, or diffuse swelling.  Palpable pulses, good capillary refill of the digits.  No coolness, discoloration, edema or obvious varicosities.    Skin: no lesions noted.    Lymphatic: no detected adenopathy in the upper or lower extremities.    Neurologic: normal mental status, normal reflexes, normal gait and balance.  Patient is alert and oriented to person, place and time.  No flaccidity or spasticity is noted.  No motor or sensory deficits are noted.  Light touch is intact    Orthopaedic:     KNEE EXAM - LEFT    Inspection:   Normal skin color and appearance with no scars, no ecchymosis and no effusion.      ROM:   0° - 145°.      Palpation:   There is no tenderness along medial joint line, medial plica, medial collateral ligament, pes bursa, iliotibial band, lateral collateral ligament, popliteal fossa, patellar tendon, or quadriceps tendon. Tender lateral joint line    Stability: - anterior drawer, - Lachman, - pivot shift and - posterior drawer.      No instability with varus or valgus stress at 0° or 30°. Negative dial  test at 30° and 90°.    Tests:   - Kevins test.  - patellar compression - grind test, - crepitus.  There is no patellar apprehension.     - J Sign. - Shane's. - patellar tilt. - Deanne. Lateral patella translation  2 quadrants. Medial patella translation 2  quadrants    Motor:   Quadriceps strength is 5/5 and hamstrings strength is 5/5. Hamstrings show no tightness.      Neuro:   Distal neurovascular status is normal    Vascular: Negative Homans and no palpable popliteal cords. 2+ pedal pulse with brisk cap refill    Gait Normal      IMAGING    MRI Knee Without Contrast Left  Narrative: EXAMINATION:  MRI KNEE WITHOUT CONTRAST LEFT    CLINICAL HISTORY:  Knee pain, chronic, negative xray (Age >= 5y);Meniscal tear, untreated, new symptoms;Please evaluate the lateral meniscus;Pain in left knee    TECHNIQUE:  Multiplanar, multisequence images were performed about the LEFT knee.    COMPARISON:  None    FINDINGS:  **MENISCI:    Medial meniscus: Intact anterior horn, body, and posterior horn. Low level increased signal intensity within  meniscus, not meeting criteria for meniscal tear, most consistent with myxoid change.    Lateral meniscus: Intact anterior horn, body, and posterior horn.  No evidence for meniscal tear.  Incidental ganglia adjacent to the anterior horn lateral meniscus.    Meniscal root attachment: Intact    Popliteal hiatus, superior and inferior meniscal fascicles:Intact and visualized.    **LIGAMENTS:    Anterior cruciate ligament: ACL demonstrates diffuse thickening containing ill-defined intraligamentous T2-hyperintensity, which splays the intact ACL fibers consistent with ACL ganglion/mucinous degeneration.  Minimal cystic change at the AC into the tibial spines, degenerative in nature.    Posterior cruciate ligament: Continuous, with normal signal.    Medial collateral ligament: Intact.    Lateral collateral ligament and  biceps femoris: Intact.    Iliotibial band (ITB): No evidence for ITB syndrome.    Popliteus tendon and popliteofibular ligament: Intact.    Posterior medial and posterior lateral corners: Intact.    **TENDONS:    Quadriceps tendon: Intact.    Patella tendon: Intact.    Joint fluid: Physiologic.    Hoffa's fat pad: Normal.    Intact  medial retinaculum/ MPFL.    Intact lateral retinaculum.    **CARTILAGE:    Patellofemoral:Preserved medial and  lateral patellar facet cartilage.Median ridge demonstrates no focal abnormality.  Preserved trochlear groove cartilage.  Preservedanterior medial and anterior lateral femoral trochlea facet cartilage.    Medial tibiofemoral: Articular cartilage is preserved without focal defects or subchondral marrow edema.Internal aspect of medial femoral condyle cartilage is intact.    Lateral tibiofemoral: Articular cartilage is preserved without focal defects or subchondral marrow edema.Cartilage at posterior medial aspect of lateral tibial plateau is intact.    **OTHER:    Bone marrow: No fracture or marrow replacing process.    Miscellaneous: The gastrocnemius muscles are normal.No evident plica thickening.  Impression: No evidence for meniscal tear.  No evidence for significant degenerative change or occult osseous injury.    Mild mucoid degeneration of the ACL, incidental.  Ganglion, incidental anterior to the anterior horn lateral meniscus.    Electronically signed by: Braeden Griffiths MD  Date:    08/19/2023  Time:    16:22    It is evident on her MRI that she has likely an anterior horn lateral meniscus tear which is abnormal based on MRI findings.    IMPRESSION       ICD-10-CM ICD-9-CM   1. Cyst of anterior horn of medial meniscus of left knee  M23.012 717.5   2. Chondromalacia, patella, left  M22.42 717.7       MEDICATIONS PRESCRIBED      None    RECOMMENDATIONS     Surgical versus non-surgical options discussed today; left knee arthroscopy with lateral menisectomy, chondroplasty and possible synovectomy  The patient elected to proceed with operative intervention after all risks, benefits, and alternatives were discussed with the patient.  The risks of surgery include bleeding, scar formation, injuries to nerves, arteries and veins, need for additional surgeries, blood clots, infections, inability to return to  the same level of the performance and the risk of anesthesia.   RTC for pre-op with Yonis Mace PA-C      All questions were answered, pt will contact us for questions or concerns in the interim.

## 2023-09-26 DIAGNOSIS — M23.012: ICD-10-CM

## 2023-09-26 DIAGNOSIS — M22.42 CHONDROMALACIA, PATELLA, LEFT: Primary | ICD-10-CM

## 2023-09-27 ENCOUNTER — OFFICE VISIT (OUTPATIENT)
Dept: PSYCHIATRY | Facility: OTHER | Age: 48
End: 2023-09-27
Payer: OTHER GOVERNMENT

## 2023-09-27 ENCOUNTER — OFFICE VISIT (OUTPATIENT)
Dept: INTERNAL MEDICINE | Facility: CLINIC | Age: 48
End: 2023-09-27
Payer: OTHER GOVERNMENT

## 2023-09-27 VITALS — OXYGEN SATURATION: 99 % | HEART RATE: 83 BPM | BODY MASS INDEX: 35.08 KG/M2 | WEIGHT: 218.25 LBS | HEIGHT: 66 IN

## 2023-09-27 DIAGNOSIS — Z01.818 PRE-OP EXAM: Primary | ICD-10-CM

## 2023-09-27 DIAGNOSIS — F33.1 MODERATE EPISODE OF RECURRENT MAJOR DEPRESSIVE DISORDER: ICD-10-CM

## 2023-09-27 DIAGNOSIS — F41.9 ANXIETY: Primary | ICD-10-CM

## 2023-09-27 PROCEDURE — 99999 PR PBB SHADOW E&M-EST. PATIENT-LVL III: ICD-10-PCS | Mod: PBBFAC,,, | Performed by: STUDENT IN AN ORGANIZED HEALTH CARE EDUCATION/TRAINING PROGRAM

## 2023-09-27 PROCEDURE — 99499 UNLISTED E&M SERVICE: CPT | Mod: ,,, | Performed by: STUDENT IN AN ORGANIZED HEALTH CARE EDUCATION/TRAINING PROGRAM

## 2023-09-27 PROCEDURE — 99213 OFFICE O/P EST LOW 20 MIN: CPT | Mod: PBBFAC | Performed by: STUDENT IN AN ORGANIZED HEALTH CARE EDUCATION/TRAINING PROGRAM

## 2023-09-27 PROCEDURE — 99213 OFFICE O/P EST LOW 20 MIN: CPT | Mod: S$PBB,,, | Performed by: STUDENT IN AN ORGANIZED HEALTH CARE EDUCATION/TRAINING PROGRAM

## 2023-09-27 PROCEDURE — 99499 NO LOS: ICD-10-PCS | Mod: ,,, | Performed by: STUDENT IN AN ORGANIZED HEALTH CARE EDUCATION/TRAINING PROGRAM

## 2023-09-27 PROCEDURE — 99999 PR PBB SHADOW E&M-EST. PATIENT-LVL III: CPT | Mod: PBBFAC,,, | Performed by: STUDENT IN AN ORGANIZED HEALTH CARE EDUCATION/TRAINING PROGRAM

## 2023-09-27 PROCEDURE — 99213 PR OFFICE/OUTPT VISIT, EST, LEVL III, 20-29 MIN: ICD-10-PCS | Mod: S$PBB,,, | Performed by: STUDENT IN AN ORGANIZED HEALTH CARE EDUCATION/TRAINING PROGRAM

## 2023-09-27 NOTE — PROGRESS NOTES
"  [facility]    Subjective:       Patient ID: Chinyere Crockett is a 48 y.o. female.    Chief Complaint: surgury clearance    HPI:  Patient is a 48 y.o. female who  presents for preop clearance for knee arthroscopy.  Patient has no personal history cardiac disease.  No history of heart attack or heart failure.  He is not have asthma.  She reports she is able to walk a mile  without chest pain or shortness of breath.  She is limited by knee pain.       Current Outpatient Medications   Medication Instructions    cyclobenzaprine (FLEXERIL) 10 MG tablet TAKE 1 TABLET(10 MG) BY MOUTH TWICE DAILY AS NEEDED FOR MUSCLE SPASMS    diclofenac (VOLTAREN) 50 mg, Oral, 2 times daily PRN    ergocalciferol (ERGOCALCIFEROL) 50,000 Units, Oral, Every 7 days       Objective:        Body mass index is 35.23 kg/m².  Vitals:    09/27/23 1459   Pulse: 83   SpO2: 99%   Weight: 99 kg (218 lb 4.1 oz)   Height: 5' 6" (1.676 m)   PainSc:   4   PainLoc: Knee     Physical Exam  Constitutional:       General: She is not in acute distress.     Appearance: Normal appearance. She is obese. She is not ill-appearing, toxic-appearing or diaphoretic.   HENT:      Head: Normocephalic.   Neck:      Thyroid: No thyromegaly or thyroid tenderness.   Cardiovascular:      Rate and Rhythm: Normal rate and regular rhythm.      Pulses: Normal pulses.      Heart sounds: No murmur heard.  Pulmonary:      Effort: Pulmonary effort is normal. No respiratory distress.      Breath sounds: No wheezing or rales.   Abdominal:      Palpations: Abdomen is soft.      Tenderness: There is no abdominal tenderness.   Musculoskeletal:      Right lower leg: No edema.      Left lower leg: No edema.   Lymphadenopathy:      Cervical: No cervical adenopathy.   Skin:     General: Skin is warm and dry.      Findings: No rash.   Neurological:      General: No focal deficit present.      Mental Status: She is alert.   Psychiatric:         Mood and Affect: " Mood normal.         Behavior: Behavior normal.           Lab Results   Component Value Date    WBC 7.59 07/11/2023    HGB 14.2 07/11/2023    HCT 43.7 07/11/2023     07/11/2023    CHOL 203 (H) 07/11/2023    TRIG 159 (H) 07/11/2023    HDL 68 07/11/2023    ALT 24 07/11/2023    AST 25 07/11/2023     07/11/2023    K 3.8 07/11/2023     07/11/2023    CREATININE 0.8 07/11/2023    BUN 11 07/11/2023    CO2 24 07/11/2023    TSH 2.184 07/11/2023    HGBA1C 4.9 07/11/2023       The 10-year ASCVD risk score (Danya GARCIA, et al., 2019) is: 0.9%    Values used to calculate the score:      Age: 48 years      Sex: Female      Is Non- : No      Diabetic: No      Tobacco smoker: No      Systolic Blood Pressure: 122 mmHg      Is BP treated: No      HDL Cholesterol: 68 mg/dL      Total Cholesterol: 203 mg/dL    Assessment:         1. Pre-op exam          Plan:   1. Pre-op exam  Revised cardiac risk index is 0 points based on no history of heart disease, CKD, diabetes and not elevated with surgery.  Patient can proceed to surgery without further testing.        Follow up with PCP as scheduled        Dominick Greenberg  [facility]  [address] [address]  Phone [phone]  Fax [phone]      This note is dictated using the M*Modal Fluency Direct word recognition program. It may contain word recognition mistakes or wrong word substitutions that were missed on review.

## 2023-09-29 NOTE — PROGRESS NOTES
Type of service: Individual    Content of session: In today's session, a goal for the pt was to build coping skills as she deals with the stress of navigating the health care system before getting surgery on her knee. Pt describes symptoms that follow and seem associated with significant stress. Pt expressed feeling relieved that she has a surgery date set. Pt has been struggling with knee pain for years and is hopefully that the surgery will help to lessen the pain lessen the amount of medication she currently takes for pain management. Discussed how this impacts and is impacted by anxiety and depression. Talked about a variety of coping skills to help manage anxiety and depression. Pt discussed that the quality of her sleep has been significantly impacted by the pain. In this session, the pt was given emotional support and unconditional positive regard. Today's therapeutic efforts included the use of open ended questions, rephrasing and reflection. Will follow up next week.     Therapeutic techniques and approaches: behavior modification and supportive counseling. Rationale: appropriate for presenting issues.     Pt denies SI/HI. Mood was euthymic, affect matches verbal content. AAOx3, participated fully in session.     Time spent in counselinmins      Leola Dobson, Social Work Intern 2023 1:49 PM

## 2023-10-02 ENCOUNTER — HOSPITAL ENCOUNTER (OUTPATIENT)
Dept: RADIOLOGY | Facility: OTHER | Age: 48
Discharge: HOME | End: 2023-10-02
Attending: STUDENT IN AN ORGANIZED HEALTH CARE EDUCATION/TRAINING PROGRAM
Payer: OTHER GOVERNMENT

## 2023-10-02 DIAGNOSIS — Z12.31 ENCOUNTER FOR SCREENING MAMMOGRAM FOR MALIGNANT NEOPLASM OF BREAST: ICD-10-CM

## 2023-10-02 PROCEDURE — 77067 SCR MAMMO BI INCL CAD: CPT | Mod: 26,,, | Performed by: RADIOLOGY

## 2023-10-02 PROCEDURE — 77063 BREAST TOMOSYNTHESIS BI: CPT | Mod: 26,,, | Performed by: RADIOLOGY

## 2023-10-02 PROCEDURE — 77063 MAMMO DIGITAL SCREENING BILAT WITH TOMO: ICD-10-PCS | Mod: 26,,, | Performed by: RADIOLOGY

## 2023-10-02 PROCEDURE — 77067 SCR MAMMO BI INCL CAD: CPT | Mod: TC

## 2023-10-02 PROCEDURE — 77067 MAMMO DIGITAL SCREENING BILAT WITH TOMO: ICD-10-PCS | Mod: 26,,, | Performed by: RADIOLOGY

## 2023-10-04 ENCOUNTER — OFFICE VISIT (OUTPATIENT)
Dept: PSYCHIATRY | Facility: OTHER | Age: 48
End: 2023-10-04
Payer: OTHER GOVERNMENT

## 2023-10-04 DIAGNOSIS — F41.9 ANXIETY: ICD-10-CM

## 2023-10-04 DIAGNOSIS — F33.1 MODERATE EPISODE OF RECURRENT MAJOR DEPRESSIVE DISORDER: Primary | ICD-10-CM

## 2023-10-04 PROCEDURE — 99499 UNLISTED E&M SERVICE: CPT | Mod: ,,, | Performed by: STUDENT IN AN ORGANIZED HEALTH CARE EDUCATION/TRAINING PROGRAM

## 2023-10-04 PROCEDURE — 99499 NO LOS: ICD-10-PCS | Mod: ,,, | Performed by: STUDENT IN AN ORGANIZED HEALTH CARE EDUCATION/TRAINING PROGRAM

## 2023-10-04 NOTE — PROGRESS NOTES
Type of service: Individual    Content of session: In today's session, the pt shared about a triggering experience she has last week. Talked about how this impacts and is impacted by anxiety and depression.The precipitants of certain anxious feelings and behaviors were explored today with the pt. Helped the patient to identity connections between current emotions and her childhood experiences. Today's therapeutic efforts included the use of open ended questions, rephrasing and reflection. The pt was given emotional support, structure and unconditional positive regard. The pt is having knee surgery and is unclear what the recovery time will look like but expressed that she does not want to have to miss therapy. Will follow up next week.     Therapeutic techniques and approaches: behavior modification and supportive counseling. Rationale: appropriate for presenting issues.     Pt denies SI/HI. Mood was euthymic, affect matches verbal content. AAOx3, participated fully in session.     Time spent in counselinmins      Leola Dobson, Social Work Intern 10/04/2023 12:13 PM

## 2023-10-06 ENCOUNTER — OFFICE VISIT (OUTPATIENT)
Dept: SPORTS MEDICINE | Facility: CLINIC | Age: 48
End: 2023-10-06
Payer: OTHER GOVERNMENT

## 2023-10-06 VITALS — BODY MASS INDEX: 35.03 KG/M2 | HEIGHT: 66 IN | WEIGHT: 218 LBS

## 2023-10-06 DIAGNOSIS — M22.42 CHONDROMALACIA, PATELLA, LEFT: Primary | ICD-10-CM

## 2023-10-06 DIAGNOSIS — M23.042 CYST OF ANTERIOR HORN OF LATERAL MENISCUS OF LEFT KNEE: ICD-10-CM

## 2023-10-06 PROCEDURE — 99499 UNLISTED E&M SERVICE: CPT | Mod: S$PBB,,, | Performed by: PHYSICIAN ASSISTANT

## 2023-10-06 PROCEDURE — 99213 OFFICE O/P EST LOW 20 MIN: CPT | Mod: PBBFAC | Performed by: PHYSICIAN ASSISTANT

## 2023-10-06 PROCEDURE — 99499 NO LOS: ICD-10-PCS | Mod: S$PBB,,, | Performed by: PHYSICIAN ASSISTANT

## 2023-10-06 PROCEDURE — 99999 PR PBB SHADOW E&M-EST. PATIENT-LVL III: ICD-10-PCS | Mod: PBBFAC,,, | Performed by: PHYSICIAN ASSISTANT

## 2023-10-06 PROCEDURE — 99999 PR PBB SHADOW E&M-EST. PATIENT-LVL III: CPT | Mod: PBBFAC,,, | Performed by: PHYSICIAN ASSISTANT

## 2023-10-06 RX ORDER — MUPIROCIN 20 MG/G
OINTMENT TOPICAL
Status: CANCELLED | OUTPATIENT
Start: 2023-10-06

## 2023-10-06 RX ORDER — CEFAZOLIN SODIUM 2 G/50ML
2 SOLUTION INTRAVENOUS
Status: CANCELLED | OUTPATIENT
Start: 2023-10-06

## 2023-10-06 RX ORDER — ASPIRIN 81 MG/1
81 TABLET ORAL DAILY
Qty: 28 TABLET | Refills: 0 | Status: SHIPPED | OUTPATIENT
Start: 2023-10-06

## 2023-10-06 RX ORDER — TRAMADOL HYDROCHLORIDE 50 MG/1
50 TABLET ORAL EVERY 6 HOURS
Qty: 20 TABLET | Refills: 0 | Status: SHIPPED | OUTPATIENT
Start: 2023-10-06 | End: 2023-11-09

## 2023-10-06 NOTE — H&P (VIEW-ONLY)
H&P    History 10/6/2023:  Chinyere returns here today for follow-up evaluation of her left knee and to complete her preoperative paperwork.  Surgical intervention has been recommended and she is scheduled to undergo a left knee arthroscopy with lateral menisectomy, chondroplasty and possible synovectomy on 10/12/2023.    History 9/25/2023:  48 y.o. Female with a history of left knee pain who is referred today by Yonis Mace PA-C. 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 She states this past spring she was starting a new exercise regimen. She states after the first few weeks she started to have knee pain. She states she had to exercise less and the pain has not gotten any better. She has mostly lateral sided knee pain. She has pain at night. She also has some pain behind her knee cap. She has not between able to find any relief.    History 9/11/2023:  Chinyere returns here today for follow-up evaluation of her left knee.  She was given a diagnostic intra-articular corticosteroid at her last visit.  She states that this injection gave her no relief.  She is still having lateral-sided pain particularly with physical activity.  Her symptoms will improve with rest, but she has trouble walking down her stairs when 1st getting up in the morning.  She is still having an occasional sharp, stabbing pain along the lateral aspect of the knee with weight-bearing.  She occasionally only notices some swelling laterally.    History 8/21/2023:  Chinyere returns here today for follow-up evaluation of her left knee and to discuss her MRI results.  She continues to have intermittent pain along the anterior aspect of the knee with some radiation laterally.  She occasionally has a stabbing sensation which causes her knee to feel slightly unstable.    History 8/14/2023:  48 y.o. Female with a history of left knee pain who complains today of having continued lateral-sided knee pain over the last 2-3 months.  She  denies having any injury but attributes her symptoms to increasing her physical activity and attempting to walk several times per day for weight loss.  After walking on uneven ground, she began having lateral-sided pain with occasional sharp, stabbing sensations.  Her symptoms appear to be exacerbated when the knee is in a bent position, particularly when crossing her legs.  She also has discomfort with any rotational movements.  She was seen by her primary care physician 5 weeks ago and given a prescription for meloxicam.  She states that this has given her no significant improvement.           - mechanical symptoms, - instability    Is affecting ADLs.  Pain is 3/10 at it's worst.        PAST MEDICAL HISTORY    History reviewed. No pertinent past medical history.    PAST SURGICAL HISTORY     Past Surgical History:   Procedure Laterality Date    INJECTION OF JOINT Bilateral 2023    Procedure: INJECTION, JOINT BILATERAL GTB CONTRAST;  Surgeon: Kristin Lakhani MD;  [place] [facility] [facility] [place]  Service: Pain Management;  Laterality: Bilateral;    TONSILLECTOMY         FAMILY HISTORY    Family History   Problem Relation Age of Onset    COPD Mother     Breast cancer Mother 72    Heart attack Father     Heart disease Paternal Grandfather         ?quad bypass    Heart disease Paternal Uncle         quad bypass    Cancer Neg Hx     Colon cancer Neg Hx     Diabetes Neg Hx     Eclampsia Neg Hx     Hypertension Neg Hx     Miscarriages / Stillbirths Neg Hx     Ovarian cancer Neg Hx      labor Neg Hx     Stroke Neg Hx        SOCIAL HISTORY    Social History     Socioeconomic History    Marital status:    Tobacco Use    Smoking status: Never    Smokeless tobacco: Never   Substance and Sexual Activity    Alcohol use: Yes     Comment: occasionally    Drug use: Yes     Types: Marijuana    Sexual activity: Yes     Partners: Male     Birth control/protection: None       MEDICATIONS      Current Outpatient  Medications:     cyclobenzaprine (FLEXERIL) 10 MG tablet, TAKE 1 TABLET(10 MG) BY MOUTH TWICE DAILY AS NEEDED FOR MUSCLE SPASMS, Disp: 60 tablet, Rfl: 2    diclofenac (VOLTAREN) 50 MG EC tablet, Take 1 tablet (50 mg total) by mouth 2 (two) times daily as needed (pain/swelling)., Disp: 60 tablet, Rfl: 1    ergocalciferol (ERGOCALCIFEROL) 50,000 unit Cap, Take 1 capsule (50,000 Units total) by mouth every 7 days., Disp: 12 capsule, Rfl: 2    Current Facility-Administered Medications:     triamcinolone acetonide injection 40 mg, 40 mg, INTRABURSAL, 1 time in Clinic/HOD, Daryl Zazueta, DO    triamcinolone acetonide injection 40 mg, 40 mg, INTRABURSAL, 1 time in Clinic/HOD, Daryl Zazueta,     Facility-Administered Medications Ordered in Other Visits:     0.9%  NaCl infusion, 500 mL, Intravenous, Continuous, Domingo Jernigan MD    ALLERGIES     Review of patient's allergies indicates:   Allergen Reactions    Duloxetine      Vs tizanidine unclear reaction, pt disliked?    Hydrocodone      Dizziness and nausea     Tizanidine      Unclear negative reaction vs duloxetine         REVIEW OF SYSTEMS   Constitution: Negative. Negative for chills, fever and night sweats.   HENT: Negative for congestion and headaches.    Eyes: Negative for blurred vision, left vision loss and right vision loss.   Cardiovascular: Negative for chest pain and syncope.   Respiratory: Negative for cough and shortness of breath.    Endocrine: Negative for polydipsia, polyphagia and polyuria.   Hematologic/Lymphatic: Negative for bleeding problem. Does not bruise/bleed easily.   Skin: Negative for dry skin, itching and rash.   Musculoskeletal: Negative for falls. Positive for left knee pain  Gastrointestinal: Negative for abdominal pain and bowel incontinence.   Genitourinary: Negative for bladder incontinence and nocturia.   Neurological: Negative for disturbances in coordination, loss of balance and seizures.   Psychiatric/Behavioral: Negative  "for depression. The patient does not have insomnia.    Allergic/Immunologic: Negative for hives and persistent infections.     PHYSICAL EXAMINATION    Vitals: Ht 5' 6" (1.676 m)   Wt 98.9 kg (218 lb)   LMP 03/16/2018 (LMP Unknown) Comment: spotting/ short periord 3/16/18  BMI 35.19 kg/m²     General: The patient appears active and healthy with no apparent physical problems.  No disturbance of mood or affect is demonstrated. Alert and Oriented.    HEENT: Eyes normal, pupils equally round, nose normal.    Resp: Equal and symmetrical chest rises. No wheezing    CV: Regular rate    Neck: Supple; nonpainful range of motion.    Extremities: no cyanosis, clubbing, edema, or diffuse swelling.  Palpable pulses, good capillary refill of the digits.  No coolness, discoloration, edema or obvious varicosities.    Skin: no lesions noted.    Lymphatic: no detected adenopathy in the upper or lower extremities.    Neurologic: normal mental status, normal reflexes, normal gait and balance.  Patient is alert and oriented to person, place and time.  No flaccidity or spasticity is noted.  No motor or sensory deficits are noted.  Light touch is intact    Orthopaedic:     KNEE EXAM - LEFT    Inspection:   Normal skin color and appearance with no scars, no ecchymosis and no effusion.      ROM:   0° - 145°.      Palpation:   There is no tenderness along medial joint line, medial plica, medial collateral ligament, pes bursa, iliotibial band, lateral collateral ligament, popliteal fossa, patellar tendon, or quadriceps tendon. Tender lateral joint line    Stability: - anterior drawer, - Lachman, - pivot shift and - posterior drawer.      No instability with varus or valgus stress at 0° or 30°. Negative dial  test at 30° and 90°.    Tests:   - Kevins test.  - patellar compression - grind test, - crepitus.  There is no patellar apprehension.     - J Sign. - Shane's. - patellar tilt. - Deanne. Lateral patella translation  2 quadrants. Medial " patella translation 2 quadrants    Motor:   Quadriceps strength is 5/5 and hamstrings strength is 5/5. Hamstrings show no tightness.      Neuro:   Distal neurovascular status is normal    Vascular: Negative Homans and no palpable popliteal cords. 2+ pedal pulse with brisk cap refill    Gait Normal      IMAGING    MRI Knee Without Contrast Left  Order: [id number]  Status: Final result     Visible to patient: Yes (seen)     Next appt: 10/11/2023 at 10:30 AM in Psychiatry ( STUDENT [facility], [facility])     Dx: Chronic pain of left knee     0 Result Notes  Details    Reading Physician Reading Date Result Priority   Braeden Griffiths MD  [phone] 8/19/2023 Routine     Narrative & Impression  EXAMINATION:  MRI KNEE WITHOUT CONTRAST LEFT     CLINICAL HISTORY:  Knee pain, chronic, negative xray (Age >= 5y);Meniscal tear, untreated, new symptoms;Please evaluate the lateral meniscus;Pain in left knee     TECHNIQUE:  Multiplanar, multisequence images were performed about the LEFT knee.     COMPARISON:  None     FINDINGS:  **MENISCI:     Medial meniscus: Intact anterior horn, body, and posterior horn. Low level increased signal intensity within  meniscus, not meeting criteria for meniscal tear, most consistent with myxoid change.     Lateral meniscus: Intact anterior horn, body, and posterior horn.  No evidence for meniscal tear.  Incidental ganglia adjacent to the anterior horn lateral meniscus.     Meniscal root attachment: Intact     Popliteal hiatus, superior and inferior meniscal fascicles:Intact and visualized.     **LIGAMENTS:     Anterior cruciate ligament: ACL demonstrates diffuse thickening containing ill-defined intraligamentous T2-hyperintensity, which splays the intact ACL fibers consistent with ACL ganglion/mucinous degeneration.  Minimal cystic change at the AC into the tibial spines, degenerative in nature.     Posterior cruciate ligament: Continuous, with normal signal.     Medial collateral ligament:  Intact.     Lateral collateral ligament and  biceps femoris: Intact.     Iliotibial band (ITB): No evidence for ITB syndrome.     Popliteus tendon and popliteofibular ligament: Intact.     Posterior medial and posterior lateral corners: Intact.     **TENDONS:     Quadriceps tendon: Intact.     Patella tendon: Intact.     Joint fluid: Physiologic.     Hoffa's fat pad: Normal.     Intact medial retinaculum/ MPFL.     Intact lateral retinaculum.     **CARTILAGE:     Patellofemoral:Preserved medial and  lateral patellar facet cartilage.Median ridge demonstrates no focal abnormality.  Preserved trochlear groove cartilage.  Preservedanterior medial and anterior lateral femoral trochlea facet cartilage.     Medial tibiofemoral: Articular cartilage is preserved without focal defects or subchondral marrow edema.Internal aspect of medial femoral condyle cartilage is intact.     Lateral tibiofemoral: Articular cartilage is preserved without focal defects or subchondral marrow edema.Cartilage at posterior medial aspect of lateral tibial plateau is intact.     **OTHER:     Bone marrow: No fracture or marrow replacing process.     Miscellaneous: The gastrocnemius muscles are normal.No evident plica thickening.     Impression:     No evidence for meniscal tear.  No evidence for significant degenerative change or occult osseous injury.     Mild mucoid degeneration of the ACL, incidental.  Ganglion, incidental anterior to the anterior horn lateral meniscus.        Electronically signed by: Braeden Griffiths MD  Date:                                            08/19/2023  Time:                                           16:22           Exam Ended: 08/19/23 15:42 Last Resulted: 08/19/23 16:22           It is evident on her MRI that she has likely an anterior horn lateral meniscus tear which is abnormal based on MRI findings.    IMPRESSION       ICD-10-CM ICD-9-CM   1. Chondromalacia, patella, left  M22.42 717.7   2. Cyst of anterior horn  of lateral meniscus of left knee  M23.042 717.5         MEDICATIONS PRESCRIBED      Aspirin 81 mg  Tramadol 81 mg    RECOMMENDATIONS     Surgical versus non-surgical options discussed today; left knee arthroscopy with lateral menisectomy, chondroplasty and possible synovectomy  The patient elected to proceed with operative intervention after all risks, benefits, and alternatives were discussed with the patient.  The risks of surgery include bleeding, scar formation, injuries to nerves, arteries and veins, need for additional surgeries, blood clots, infections, inability to return to the same level of the performance and the risk of anesthesia.   Informed consent was obtained  Physical therapy ordered - [facility]      All questions were answered, pt will contact us for questions or concerns in the interim.

## 2023-10-06 NOTE — H&P
H&P    History 10/6/2023:  Chinyere returns here today for follow-up evaluation of her left knee and to complete her preoperative paperwork.  Surgical intervention has been recommended and she is scheduled to undergo a left knee arthroscopy with lateral menisectomy, chondroplasty and possible synovectomy on 10/12/2023.    History 9/25/2023:  48 y.o. Female with a history of left knee pain who is referred today by Yonis Mace PA-C. 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 She states this past spring she was starting a new exercise regimen. She states after the first few weeks she started to have knee pain. She states she had to exercise less and the pain has not gotten any better. She has mostly lateral sided knee pain. She has pain at night. She also has some pain behind her knee cap. She has not between able to find any relief.    History 9/11/2023:  Chinyere returns here today for follow-up evaluation of her left knee.  She was given a diagnostic intra-articular corticosteroid at her last visit.  She states that this injection gave her no relief.  She is still having lateral-sided pain particularly with physical activity.  Her symptoms will improve with rest, but she has trouble walking down her stairs when 1st getting up in the morning.  She is still having an occasional sharp, stabbing pain along the lateral aspect of the knee with weight-bearing.  She occasionally only notices some swelling laterally.    History 8/21/2023:  Chinyere returns here today for follow-up evaluation of her left knee and to discuss her MRI results.  She continues to have intermittent pain along the anterior aspect of the knee with some radiation laterally.  She occasionally has a stabbing sensation which causes her knee to feel slightly unstable.    History 8/14/2023:  48 y.o. Female with a history of left knee pain who complains today of having continued lateral-sided knee pain over the last 2-3 months.  She  denies having any injury but attributes her symptoms to increasing her physical activity and attempting to walk several times per day for weight loss.  After walking on uneven ground, she began having lateral-sided pain with occasional sharp, stabbing sensations.  Her symptoms appear to be exacerbated when the knee is in a bent position, particularly when crossing her legs.  She also has discomfort with any rotational movements.  She was seen by her primary care physician 5 weeks ago and given a prescription for meloxicam.  She states that this has given her no significant improvement.           - mechanical symptoms, - instability    Is affecting ADLs.  Pain is 3/10 at it's worst.        PAST MEDICAL HISTORY    History reviewed. No pertinent past medical history.    PAST SURGICAL HISTORY     Past Surgical History:   Procedure Laterality Date    INJECTION OF JOINT Bilateral 2023    Procedure: INJECTION, JOINT BILATERAL GTB CONTRAST;  Surgeon: Kristin Lakhani MD;  Location: [facility] [place];  Service: Pain Management;  Laterality: Bilateral;    TONSILLECTOMY         FAMILY HISTORY    Family History   Problem Relation Age of Onset    COPD Mother     Breast cancer Mother 72    Heart attack Father     Heart disease Paternal Grandfather         ?quad bypass    Heart disease Paternal Uncle         quad bypass    Cancer Neg Hx     Colon cancer Neg Hx     Diabetes Neg Hx     Eclampsia Neg Hx     Hypertension Neg Hx     Miscarriages / Stillbirths Neg Hx     Ovarian cancer Neg Hx      labor Neg Hx     Stroke Neg Hx        SOCIAL HISTORY    Social History     Socioeconomic History    Marital status:    Tobacco Use    Smoking status: Never    Smokeless tobacco: Never   Substance and Sexual Activity    Alcohol use: Yes     Comment: occasionally    Drug use: Yes     Types: Marijuana    Sexual activity: Yes     Partners: Male     Birth control/protection: None       MEDICATIONS      Current Outpatient  Medications:     cyclobenzaprine (FLEXERIL) 10 MG tablet, TAKE 1 TABLET(10 MG) BY MOUTH TWICE DAILY AS NEEDED FOR MUSCLE SPASMS, Disp: 60 tablet, Rfl: 2    diclofenac (VOLTAREN) 50 MG EC tablet, Take 1 tablet (50 mg total) by mouth 2 (two) times daily as needed (pain/swelling)., Disp: 60 tablet, Rfl: 1    ergocalciferol (ERGOCALCIFEROL) 50,000 unit Cap, Take 1 capsule (50,000 Units total) by mouth every 7 days., Disp: 12 capsule, Rfl: 2    Current Facility-Administered Medications:     triamcinolone acetonide injection 40 mg, 40 mg, INTRABURSAL, 1 time in Clinic/HOD, Daryl Zazueta, DO    triamcinolone acetonide injection 40 mg, 40 mg, INTRABURSAL, 1 time in Clinic/HOD, Daryl Zazueta,     Facility-Administered Medications Ordered in Other Visits:     0.9%  NaCl infusion, 500 mL, Intravenous, Continuous, Domingo Jernigan MD    ALLERGIES     Review of patient's allergies indicates:   Allergen Reactions    Duloxetine      Vs tizanidine unclear reaction, pt disliked?    Hydrocodone      Dizziness and nausea     Tizanidine      Unclear negative reaction vs duloxetine         REVIEW OF SYSTEMS   Constitution: Negative. Negative for chills, fever and night sweats.   HENT: Negative for congestion and headaches.    Eyes: Negative for blurred vision, left vision loss and right vision loss.   Cardiovascular: Negative for chest pain and syncope.   Respiratory: Negative for cough and shortness of breath.    Endocrine: Negative for polydipsia, polyphagia and polyuria.   Hematologic/Lymphatic: Negative for bleeding problem. Does not bruise/bleed easily.   Skin: Negative for dry skin, itching and rash.   Musculoskeletal: Negative for falls. Positive for left knee pain  Gastrointestinal: Negative for abdominal pain and bowel incontinence.   Genitourinary: Negative for bladder incontinence and nocturia.   Neurological: Negative for disturbances in coordination, loss of balance and seizures.   Psychiatric/Behavioral: Negative  "for depression. The patient does not have insomnia.    Allergic/Immunologic: Negative for hives and persistent infections.     PHYSICAL EXAMINATION    Vitals: Ht 5' 6" (1.676 m)   Wt 98.9 kg (218 lb)   LMP 03/16/2018 (LMP Unknown) Comment: spotting/ short periord 3/16/18  BMI 35.19 kg/m²     General: The patient appears active and healthy with no apparent physical problems.  No disturbance of mood or affect is demonstrated. Alert and Oriented.    HEENT: Eyes normal, pupils equally round, nose normal.    Resp: Equal and symmetrical chest rises. No wheezing    CV: Regular rate    Neck: Supple; nonpainful range of motion.    Extremities: no cyanosis, clubbing, edema, or diffuse swelling.  Palpable pulses, good capillary refill of the digits.  No coolness, discoloration, edema or obvious varicosities.    Skin: no lesions noted.    Lymphatic: no detected adenopathy in the upper or lower extremities.    Neurologic: normal mental status, normal reflexes, normal gait and balance.  Patient is alert and oriented to person, place and time.  No flaccidity or spasticity is noted.  No motor or sensory deficits are noted.  Light touch is intact    Orthopaedic:     KNEE EXAM - LEFT    Inspection:   Normal skin color and appearance with no scars, no ecchymosis and no effusion.      ROM:   0° - 145°.      Palpation:   There is no tenderness along medial joint line, medial plica, medial collateral ligament, pes bursa, iliotibial band, lateral collateral ligament, popliteal fossa, patellar tendon, or quadriceps tendon. Tender lateral joint line    Stability: - anterior drawer, - Lachman, - pivot shift and - posterior drawer.      No instability with varus or valgus stress at 0° or 30°. Negative dial  test at 30° and 90°.    Tests:   - Kevins test.  - patellar compression - grind test, - crepitus.  There is no patellar apprehension.     - J Sign. - Shane's. - patellar tilt. - Deanne. Lateral patella translation  2 quadrants. Medial " patella translation 2 quadrants    Motor:   Quadriceps strength is 5/5 and hamstrings strength is 5/5. Hamstrings show no tightness.      Neuro:   Distal neurovascular status is normal    Vascular: Negative Homans and no palpable popliteal cords. 2+ pedal pulse with brisk cap refill    Gait Normal      IMAGING    MRI Knee Without Contrast Left  Order: [id number]  Status: Final result     Visible to patient: Yes (seen)     Next appt: 10/11/2023 at 10:30 AM in Psychiatry ( [facility]UDENT [facility], [facility])     Dx: Chronic pain of left knee     0 Result Notes  Details    Reading Physician Reading Date Result Priority   Braeden Griffiths MD  [phone] 8/19/2023 Routine     Narrative & Impression  EXAMINATION:  MRI KNEE WITHOUT CONTRAST LEFT     CLINICAL HISTORY:  Knee pain, chronic, negative xray (Age >= 5y);Meniscal tear, untreated, new symptoms;Please evaluate the lateral meniscus;Pain in left knee     TECHNIQUE:  Multiplanar, multisequence images were performed about the LEFT knee.     COMPARISON:  None     FINDINGS:  **MENISCI:     Medial meniscus: Intact anterior horn, body, and posterior horn. Low level increased signal intensity within  meniscus, not meeting criteria for meniscal tear, most consistent with myxoid change.     Lateral meniscus: Intact anterior horn, body, and posterior horn.  No evidence for meniscal tear.  Incidental ganglia adjacent to the anterior horn lateral meniscus.     Meniscal root attachment: Intact     Popliteal hiatus, superior and inferior meniscal fascicles:Intact and visualized.     **LIGAMENTS:     Anterior cruciate ligament: ACL demonstrates diffuse thickening containing ill-defined intraligamentous T2-hyperintensity, which splays the intact ACL fibers consistent with ACL ganglion/mucinous degeneration.  Minimal cystic change at the AC into the tibial spines, degenerative in nature.     Posterior cruciate ligament: Continuous, with normal signal.     Medial collateral ligament:  Intact.     Lateral collateral ligament and  biceps femoris: Intact.     Iliotibial band (ITB): No evidence for ITB syndrome.     Popliteus tendon and popliteofibular ligament: Intact.     Posterior medial and posterior lateral corners: Intact.     **TENDONS:     Quadriceps tendon: Intact.     Patella tendon: Intact.     Joint fluid: Physiologic.     Hoffa's fat pad: Normal.     Intact medial retinaculum/ MPFL.     Intact lateral retinaculum.     **CARTILAGE:     Patellofemoral:Preserved medial and  lateral patellar facet cartilage.Median ridge demonstrates no focal abnormality.  Preserved trochlear groove cartilage.  Preservedanterior medial and anterior lateral femoral trochlea facet cartilage.     Medial tibiofemoral: Articular cartilage is preserved without focal defects or subchondral marrow edema.Internal aspect of medial femoral condyle cartilage is intact.     Lateral tibiofemoral: Articular cartilage is preserved without focal defects or subchondral marrow edema.Cartilage at posterior medial aspect of lateral tibial plateau is intact.     **OTHER:     Bone marrow: No fracture or marrow replacing process.     Miscellaneous: The gastrocnemius muscles are normal.No evident plica thickening.     Impression:     No evidence for meniscal tear.  No evidence for significant degenerative change or occult osseous injury.     Mild mucoid degeneration of the ACL, incidental.  Ganglion, incidental anterior to the anterior horn lateral meniscus.        Electronically signed by: Braeden Griffiths MD  Date:                                            08/19/2023  Time:                                           16:22           Exam Ended: 08/19/23 15:42 Last Resulted: 08/19/23 16:22           It is evident on her MRI that she has likely an anterior horn lateral meniscus tear which is abnormal based on MRI findings.    IMPRESSION       ICD-10-CM ICD-9-CM   1. Chondromalacia, patella, left  M22.42 717.7   2. Cyst of anterior horn  of lateral meniscus of left knee  M23.042 717.5         MEDICATIONS PRESCRIBED      Aspirin 81 mg  Tramadol 81 mg    RECOMMENDATIONS     Surgical versus non-surgical options discussed today; left knee arthroscopy with lateral menisectomy, chondroplasty and possible synovectomy  The patient elected to proceed with operative intervention after all risks, benefits, and alternatives were discussed with the patient.  The risks of surgery include bleeding, scar formation, injuries to nerves, arteries and veins, need for additional surgeries, blood clots, infections, inability to return to the same level of the performance and the risk of anesthesia.   Informed consent was obtained  Physical therapy ordered - [facility]      All questions were answered, pt will contact us for questions or concerns in the interim.

## 2023-10-06 NOTE — PROGRESS NOTES
PREOPERATIVE APPOINTMENT    History 10/6/2023:  Chinyere returns here today for follow-up evaluation of her left knee and to complete her preoperative paperwork.  Surgical intervention has been recommended and she is scheduled to undergo a left knee arthroscopy with lateral menisectomy, chondroplasty and possible synovectomy on 10/12/2023.    History 9/25/2023:  48 y.o. Female with a history of left knee pain who is referred today by Yonis Mace PA-C. 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 She states this past spring she was starting a new exercise regimen. She states after the first few weeks she started to have knee pain. She states she had to exercise less and the pain has not gotten any better. She has mostly lateral sided knee pain. She has pain at night. She also has some pain behind her knee cap. She has not between able to find any relief.    History 9/11/2023:  Chinyere returns here today for follow-up evaluation of her left knee.  She was given a diagnostic intra-articular corticosteroid at her last visit.  She states that this injection gave her no relief.  She is still having lateral-sided pain particularly with physical activity.  Her symptoms will improve with rest, but she has trouble walking down her stairs when 1st getting up in the morning.  She is still having an occasional sharp, stabbing pain along the lateral aspect of the knee with weight-bearing.  She occasionally only notices some swelling laterally.    History 8/21/2023:  Chinyere returns here today for follow-up evaluation of her left knee and to discuss her MRI results.  She continues to have intermittent pain along the anterior aspect of the knee with some radiation laterally.  She occasionally has a stabbing sensation which causes her knee to feel slightly unstable.    History 8/14/2023:  48 y.o. Female with a history of left knee pain who complains today of having continued lateral-sided knee pain over the  last 2-3 months.  She denies having any injury but attributes her symptoms to increasing her physical activity and attempting to walk several times per day for weight loss.  After walking on uneven ground, she began having lateral-sided pain with occasional sharp, stabbing sensations.  Her symptoms appear to be exacerbated when the knee is in a bent position, particularly when crossing her legs.  She also has discomfort with any rotational movements.  She was seen by her primary care physician 5 weeks ago and given a prescription for meloxicam.  She states that this has given her no significant improvement.           - mechanical symptoms, - instability    Is affecting ADLs.  Pain is 3/10 at it's worst.        PAST MEDICAL HISTORY    History reviewed. No pertinent past medical history.    PAST SURGICAL HISTORY     Past Surgical History:   Procedure Laterality Date    INJECTION OF JOINT Bilateral 2023    Procedure: INJECTION, JOINT BILATERAL GTB CONTRAST;  Surgeon: Kristin Lakhani MD;  Location: [facility];  Service: Pain Management;  Laterality: Bilateral;    TONSILLECTOMY         FAMILY HISTORY    Family History   Problem Relation Age of Onset    COPD Mother     Breast cancer Mother 72    Heart attack Father     Heart disease Paternal Grandfather         ?quad bypass    Heart disease Paternal Uncle         quad bypass    Cancer Neg Hx     Colon cancer Neg Hx     Diabetes Neg Hx     Eclampsia Neg Hx     Hypertension Neg Hx     Miscarriages / Stillbirths Neg Hx     Ovarian cancer Neg Hx      labor Neg Hx     Stroke Neg Hx        SOCIAL HISTORY    Social History     Socioeconomic History    Marital status:    Tobacco Use    Smoking status: Never    Smokeless tobacco: Never   Substance and Sexual Activity    Alcohol use: Yes     Comment: occasionally    Drug use: Yes     Types: Marijuana    Sexual activity: Yes     Partners: Male     Birth control/protection: None       MEDICATIONS      Current  Outpatient Medications:     cyclobenzaprine (FLEXERIL) 10 MG tablet, TAKE 1 TABLET(10 MG) BY MOUTH TWICE DAILY AS NEEDED FOR MUSCLE SPASMS, Disp: 60 tablet, Rfl: 2    diclofenac (VOLTAREN) 50 MG EC tablet, Take 1 tablet (50 mg total) by mouth 2 (two) times daily as needed (pain/swelling)., Disp: 60 tablet, Rfl: 1    ergocalciferol (ERGOCALCIFEROL) 50,000 unit Cap, Take 1 capsule (50,000 Units total) by mouth every 7 days., Disp: 12 capsule, Rfl: 2    Current Facility-Administered Medications:     triamcinolone acetonide injection 40 mg, 40 mg, INTRABURSAL, 1 time in Clinic/HOD, Daryl Zazueta, DO    triamcinolone acetonide injection 40 mg, 40 mg, INTRABURSAL, 1 time in Clinic/HOD, Daryl Zazueta, DO    Facility-Administered Medications Ordered in Other Visits:     0.9%  NaCl infusion, 500 mL, Intravenous, Continuous, Domingo Jernigan MD    ALLERGIES     Review of patient's allergies indicates:   Allergen Reactions    Duloxetine      Vs tizanidine unclear reaction, pt disliked?    Hydrocodone      Dizziness and nausea     Tizanidine      Unclear negative reaction vs duloxetine         REVIEW OF SYSTEMS   Constitution: Negative. Negative for chills, fever and night sweats.   HENT: Negative for congestion and headaches.    Eyes: Negative for blurred vision, left vision loss and right vision loss.   Cardiovascular: Negative for chest pain and syncope.   Respiratory: Negative for cough and shortness of breath.    Endocrine: Negative for polydipsia, polyphagia and polyuria.   Hematologic/Lymphatic: Negative for bleeding problem. Does not bruise/bleed easily.   Skin: Negative for dry skin, itching and rash.   Musculoskeletal: Negative for falls. Positive for left knee pain  Gastrointestinal: Negative for abdominal pain and bowel incontinence.   Genitourinary: Negative for bladder incontinence and nocturia.   Neurological: Negative for disturbances in coordination, loss of balance and seizures.  "  Psychiatric/Behavioral: Negative for depression. The patient does not have insomnia.    Allergic/Immunologic: Negative for hives and persistent infections.     PHYSICAL EXAMINATION    Vitals: Ht 5' 6" (1.676 m)   Wt 98.9 kg (218 lb)   LMP 03/16/2018 (LMP Unknown) Comment: spotting/ short periord 3/16/18  BMI 35.19 kg/m²     General: The patient appears active and healthy with no apparent physical problems.  No disturbance of mood or affect is demonstrated. Alert and Oriented.    HEENT: Eyes normal, pupils equally round, nose normal.    Resp: Equal and symmetrical chest rises. No wheezing    CV: Regular rate    Neck: Supple; nonpainful range of motion.    Extremities: no cyanosis, clubbing, edema, or diffuse swelling.  Palpable pulses, good capillary refill of the digits.  No coolness, discoloration, edema or obvious varicosities.    Skin: no lesions noted.    Lymphatic: no detected adenopathy in the upper or lower extremities.    Neurologic: normal mental status, normal reflexes, normal gait and balance.  Patient is alert and oriented to person, place and time.  No flaccidity or spasticity is noted.  No motor or sensory deficits are noted.  Light touch is intact    Orthopaedic:     KNEE EXAM - LEFT    Inspection:   Normal skin color and appearance with no scars, no ecchymosis and no effusion.      ROM:   0° - 145°.      Palpation:   There is no tenderness along medial joint line, medial plica, medial collateral ligament, pes bursa, iliotibial band, lateral collateral ligament, popliteal fossa, patellar tendon, or quadriceps tendon. Tender lateral joint line    Stability: - anterior drawer, - Lachman, - pivot shift and - posterior drawer.      No instability with varus or valgus stress at 0° or 30°. Negative dial  test at 30° and 90°.    Tests:   - Kevins test.  - patellar compression - grind test, - crepitus.  There is no patellar apprehension.     - J Sign. - Shane's. - patellar tilt. - Deanne. Lateral " patella translation  2 quadrants. Medial patella translation 2 quadrants    Motor:   Quadriceps strength is 5/5 and hamstrings strength is 5/5. Hamstrings show no tightness.      Neuro:   Distal neurovascular status is normal    Vascular: Negative Homans and no palpable popliteal cords. 2+ pedal pulse with brisk cap refill    Gait Normal      IMAGING    MRI Knee Without Contrast Left  Order: [id number]  Status: Final result     Visible to patient: Yes (seen)     Next appt: 10/11/2023 at 10:30 AM in Psychiatry ( STUDENT INTERN, [facility])     Dx: Chronic pain of left knee     0 Result Notes  Details    Reading Physician Reading Date Result Priority   Braeden Griffiths MD  [phone] 8/19/2023 Routine     Narrative & Impression  EXAMINATION:  MRI KNEE WITHOUT CONTRAST LEFT     CLINICAL HISTORY:  Knee pain, chronic, negative xray (Age >= 5y);Meniscal tear, untreated, new symptoms;Please evaluate the lateral meniscus;Pain in left knee     TECHNIQUE:  Multiplanar, multisequence images were performed about the LEFT knee.     COMPARISON:  None     FINDINGS:  **MENISCI:     Medial meniscus: Intact anterior horn, body, and posterior horn. Low level increased signal intensity within  meniscus, not meeting criteria for meniscal tear, most consistent with myxoid change.     Lateral meniscus: Intact anterior horn, body, and posterior horn.  No evidence for meniscal tear.  Incidental ganglia adjacent to the anterior horn lateral meniscus.     Meniscal root attachment: Intact     Popliteal hiatus, superior and inferior meniscal fascicles:Intact and visualized.     **LIGAMENTS:     Anterior cruciate ligament: ACL demonstrates diffuse thickening containing ill-defined intraligamentous T2-hyperintensity, which splays the intact ACL fibers consistent with ACL ganglion/mucinous degeneration.  Minimal cystic change at the AC into the tibial spines, degenerative in nature.     Posterior cruciate ligament: Continuous, with normal  signal.     Medial collateral ligament: Intact.     Lateral collateral ligament and  biceps femoris: Intact.     Iliotibial band (ITB): No evidence for ITB syndrome.     Popliteus tendon and popliteofibular ligament: Intact.     Posterior medial and posterior lateral corners: Intact.     **TENDONS:     Quadriceps tendon: Intact.     Patella tendon: Intact.     Joint fluid: Physiologic.     Hoffa's fat pad: Normal.     Intact medial retinaculum/ MPFL.     Intact lateral retinaculum.     **CARTILAGE:     Patellofemoral:Preserved medial and  lateral patellar facet cartilage.Median ridge demonstrates no focal abnormality.  Preserved trochlear groove cartilage.  Preservedanterior medial and anterior lateral femoral trochlea facet cartilage.     Medial tibiofemoral: Articular cartilage is preserved without focal defects or subchondral marrow edema.Internal aspect of medial femoral condyle cartilage is intact.     Lateral tibiofemoral: Articular cartilage is preserved without focal defects or subchondral marrow edema.Cartilage at posterior medial aspect of lateral tibial plateau is intact.     **OTHER:     Bone marrow: No fracture or marrow replacing process.     Miscellaneous: The gastrocnemius muscles are normal.No evident plica thickening.     Impression:     No evidence for meniscal tear.  No evidence for significant degenerative change or occult osseous injury.     Mild mucoid degeneration of the ACL, incidental.  Ganglion, incidental anterior to the anterior horn lateral meniscus.        Electronically signed by: Braeden Griffiths MD  Date:                                            08/19/2023  Time:                                           16:22           Exam Ended: 08/19/23 15:42 Last Resulted: 08/19/23 16:22           It is evident on her MRI that she has likely an anterior horn lateral meniscus tear which is abnormal based on MRI findings.    IMPRESSION       ICD-10-CM ICD-9-CM   1. Chondromalacia, patella, left   M22.42 717.7   2. Cyst of anterior horn of lateral meniscus of left knee  M23.042 717.5         MEDICATIONS PRESCRIBED      Aspirin 81 mg  Tramadol 81 mg    RECOMMENDATIONS     Surgical versus non-surgical options discussed today; left knee arthroscopy with lateral menisectomy, chondroplasty and possible synovectomy  The patient elected to proceed with operative intervention after all risks, benefits, and alternatives were discussed with the patient.  The risks of surgery include bleeding, scar formation, injuries to nerves, arteries and veins, need for additional surgeries, blood clots, infections, inability to return to the same level of the performance and the risk of anesthesia.   Informed consent was obtained  Physical therapy ordered - [facility]      All questions were answered, pt will contact us for questions or concerns in the interim.

## 2023-10-09 ENCOUNTER — PATIENT MESSAGE (OUTPATIENT)
Dept: PREADMISSION TESTING | Facility: HOSPITAL | Age: 48
End: 2023-10-09
Payer: OTHER GOVERNMENT

## 2023-10-09 NOTE — ANESTHESIA PAT ROS NOTE
10/09/2023  Chinyere Crockett is a 48 y.o., female.      Pre-op Assessment    I have reviewed the Patient Summary Reports.       I have reviewed the Medications.     Review of Systems  Anesthesia Hx:  No problems with previous Anesthesia               Denies Personal Hx of Anesthesia complications.                    Social:  Non-Smoker, Social Alcohol Use       Hematology/Oncology:    Oncology Normal                Hematology Comments: Vitamin D deficiency, taking supplement                    EENT/Dental:   H/O Tonsillectomy          Cardiovascular:  Exercise tolerance: good       Denies CAD.     Denies Dysrhythmias.       hyperlipidemia  Denies FISH.   Elevated cholesterol and Triglycerides                         Pulmonary:     Denies Asthma.   Denies Shortness of breath.                  Renal/:  Renal/ Normal  Denies Chronic Renal Disease.                Hepatic/GI:  Hepatic/GI Normal     Denies GERD. Denies Liver Disease.            Musculoskeletal:     Chondromalacia, patella, left,  Cyst of anterior horn of medial meniscus of left knee,  Hip pain, bilateral Back pain, Decreased range of motion of trunk and back, Decreased strength of trunk and back, Weakness of both lower extremities,  Chronic right shoulder pain, Shoulder weakness,  H/O injection of joint per pain management              Neurological:    Denies CVA.   Headaches Denies Seizures.    Migraine without aura and without status migrainosus, not intractable                            Endocrine:  Denies Diabetes. Denies Hypothyroidism.        Obesity / BMI > 30  Psych:  Psychiatric History anxiety depression Moderate episode of Major Depressive Disorder              No past medical history on file.  Past Surgical History:   Procedure Laterality Date    INJECTION OF JOINT Bilateral 2/14/2023    Procedure: INJECTION, JOINT BILATERAL  GTB CONTRAST;  Surgeon: Kristin Lakhani MD;  Location: [facility];  Service: Pain Management;  Laterality: Bilateral;    TONSILLECTOMY         Anesthesia Assessment: Preoperative EQUATION    Planned Procedure: Procedure(s) (LRB):  ARTHROSCOPY, KNEE, WITH MENISCECTOMY (Left)  ARTHROSCOPIC SYNOVECTOMY, KNEE (Left)  Requested Anesthesia Type:General  Surgeon: Taina Lorenz MD  Service: Orthopedics  Known or anticipated Date of Surgery:10/12/2023    Surgeon notes: reviewed    Electronic QUestionnaire Assessment completed via nurse interview with patient.        Triage considerations:     The patient has no apparent active cardiac condition (No unstable coronary Syndrome such as severe unstable angina or recent [<1 month] myocardial infarction, decompensated CHF, severe valvular   disease or significant arrhythmia)    Previous anesthesia records:No problems and Not available    Last PCP note: within 1 month , within [facility]     Patient is cleared/ optimized by PCP to proceed to surgery without further testing.       Other important co-morbidities: Obesity, HLD                Instructions given. (See in Nurse's note)      Ht: 5'6  Wt: 218 lb  BMI: 35.19  Vaccinated

## 2023-10-11 ENCOUNTER — TELEPHONE (OUTPATIENT)
Dept: SPORTS MEDICINE | Facility: CLINIC | Age: 48
End: 2023-10-11
Payer: OTHER GOVERNMENT

## 2023-10-11 ENCOUNTER — ANESTHESIA EVENT (OUTPATIENT)
Dept: SURGERY | Facility: HOSPITAL | Age: 48
End: 2023-10-11
Payer: OTHER GOVERNMENT

## 2023-10-11 RX ORDER — CELECOXIB 200 MG/1
400 CAPSULE ORAL
Status: CANCELLED | OUTPATIENT
Start: 2023-10-11 | End: 2023-10-11

## 2023-10-12 ENCOUNTER — ANESTHESIA (OUTPATIENT)
Dept: SURGERY | Facility: HOSPITAL | Age: 48
End: 2023-10-12
Payer: OTHER GOVERNMENT

## 2023-10-12 ENCOUNTER — HOSPITAL ENCOUNTER (OUTPATIENT)
Facility: HOSPITAL | Age: 48
Discharge: HOME | End: 2023-10-12
Attending: ORTHOPAEDIC SURGERY | Admitting: ORTHOPAEDIC SURGERY
Payer: OTHER GOVERNMENT

## 2023-10-12 VITALS
RESPIRATION RATE: 20 BRPM | HEART RATE: 87 BPM | DIASTOLIC BLOOD PRESSURE: 71 MMHG | WEIGHT: 218 LBS | TEMPERATURE: 98 F | BODY MASS INDEX: 35.03 KG/M2 | HEIGHT: 66 IN | OXYGEN SATURATION: 98 % | SYSTOLIC BLOOD PRESSURE: 126 MMHG

## 2023-10-12 DIAGNOSIS — M22.42 CHONDROMALACIA, PATELLA, LEFT: ICD-10-CM

## 2023-10-12 DIAGNOSIS — M23.042 CYST OF ANTERIOR HORN OF LATERAL MENISCUS OF LEFT KNEE: ICD-10-CM

## 2023-10-12 PROCEDURE — 29881 PR KNEE SCOPE SINGLE MENISECECTOMY: ICD-10-PCS | Mod: LT,,, | Performed by: ORTHOPAEDIC SURGERY

## 2023-10-12 PROCEDURE — 27200651 HC AIRWAY, LMA: Performed by: ANESTHESIOLOGY

## 2023-10-12 PROCEDURE — 37000009 HC ANESTHESIA EA ADD 15 MINS: Performed by: ORTHOPAEDIC SURGERY

## 2023-10-12 PROCEDURE — 63600175 PHARM REV CODE 636 W HCPCS: Performed by: NURSE ANESTHETIST, CERTIFIED REGISTERED

## 2023-10-12 PROCEDURE — 01400 ANES OPN/ARTHRS KNEE JT NOS: CPT | Performed by: ORTHOPAEDIC SURGERY

## 2023-10-12 PROCEDURE — D9220A PRA ANESTHESIA: ICD-10-PCS | Mod: ANES,,, | Performed by: ANESTHESIOLOGY

## 2023-10-12 PROCEDURE — 29881 ARTHRS KNE SRG MNISECTMY M/L: CPT | Mod: LT,,, | Performed by: ORTHOPAEDIC SURGERY

## 2023-10-12 PROCEDURE — 36000710: Performed by: ORTHOPAEDIC SURGERY

## 2023-10-12 PROCEDURE — D9220A PRA ANESTHESIA: ICD-10-PCS | Mod: CRNA,,, | Performed by: NURSE ANESTHETIST, CERTIFIED REGISTERED

## 2023-10-12 PROCEDURE — 25000003 PHARM REV CODE 250: Performed by: ANESTHESIOLOGY

## 2023-10-12 PROCEDURE — 94761 N-INVAS EAR/PLS OXIMETRY MLT: CPT

## 2023-10-12 PROCEDURE — 37000008 HC ANESTHESIA 1ST 15 MINUTES: Performed by: ORTHOPAEDIC SURGERY

## 2023-10-12 PROCEDURE — 63600175 PHARM REV CODE 636 W HCPCS: Performed by: ANESTHESIOLOGY

## 2023-10-12 PROCEDURE — 27201423 OPTIME MED/SURG SUP & DEVICES STERILE SUPPLY: Performed by: ORTHOPAEDIC SURGERY

## 2023-10-12 PROCEDURE — 36000711: Performed by: ORTHOPAEDIC SURGERY

## 2023-10-12 PROCEDURE — 25000003 PHARM REV CODE 250: Performed by: ORTHOPAEDIC SURGERY

## 2023-10-12 PROCEDURE — 29881 ARTHRS KNE SRG MNISECTMY M/L: CPT | Mod: AS,LT,, | Performed by: PHYSICIAN ASSISTANT

## 2023-10-12 PROCEDURE — 71000015 HC POSTOP RECOV 1ST HR: Performed by: ORTHOPAEDIC SURGERY

## 2023-10-12 PROCEDURE — 25000003 PHARM REV CODE 250: Performed by: PHYSICIAN ASSISTANT

## 2023-10-12 PROCEDURE — 25000003 PHARM REV CODE 250: Performed by: NURSE ANESTHETIST, CERTIFIED REGISTERED

## 2023-10-12 PROCEDURE — 29881 PR KNEE SCOPE SINGLE MENISECECTOMY: ICD-10-PCS | Mod: AS,LT,, | Performed by: PHYSICIAN ASSISTANT

## 2023-10-12 PROCEDURE — 99900035 HC TECH TIME PER 15 MIN (STAT)

## 2023-10-12 PROCEDURE — 63600175 PHARM REV CODE 636 W HCPCS: Performed by: PHYSICIAN ASSISTANT

## 2023-10-12 PROCEDURE — D9220A PRA ANESTHESIA: Mod: ANES,,, | Performed by: ANESTHESIOLOGY

## 2023-10-12 PROCEDURE — 71000033 HC RECOVERY, INTIAL HOUR: Performed by: ORTHOPAEDIC SURGERY

## 2023-10-12 PROCEDURE — D9220A PRA ANESTHESIA: Mod: CRNA,,, | Performed by: NURSE ANESTHETIST, CERTIFIED REGISTERED

## 2023-10-12 PROCEDURE — 63600175 PHARM REV CODE 636 W HCPCS: Performed by: ORTHOPAEDIC SURGERY

## 2023-10-12 RX ORDER — MIDAZOLAM HYDROCHLORIDE 1 MG/ML
INJECTION INTRAMUSCULAR; INTRAVENOUS
Status: DISCONTINUED | OUTPATIENT
Start: 2023-10-12 | End: 2023-10-12

## 2023-10-12 RX ORDER — ACETAMINOPHEN 500 MG
1000 TABLET ORAL
Status: COMPLETED | OUTPATIENT
Start: 2023-10-12 | End: 2023-10-12

## 2023-10-12 RX ORDER — PROPOFOL 10 MG/ML
VIAL (ML) INTRAVENOUS
Status: DISCONTINUED | OUTPATIENT
Start: 2023-10-12 | End: 2023-10-12

## 2023-10-12 RX ORDER — BUPIVACAINE HYDROCHLORIDE AND EPINEPHRINE 5; 5 MG/ML; UG/ML
INJECTION, SOLUTION EPIDURAL; INTRACAUDAL; PERINEURAL
Status: DISCONTINUED | OUTPATIENT
Start: 2023-10-12 | End: 2023-10-12 | Stop reason: HOSPADM

## 2023-10-12 RX ORDER — BACITRACIN ZINC 500 UNIT/G
OINTMENT (GRAM) TOPICAL
Status: DISCONTINUED | OUTPATIENT
Start: 2023-10-12 | End: 2023-10-12 | Stop reason: HOSPADM

## 2023-10-12 RX ORDER — MUPIROCIN 20 MG/G
OINTMENT TOPICAL
Status: DISCONTINUED | OUTPATIENT
Start: 2023-10-12 | End: 2023-10-12 | Stop reason: HOSPADM

## 2023-10-12 RX ORDER — HALOPERIDOL 5 MG/ML
0.5 INJECTION INTRAMUSCULAR EVERY 10 MIN PRN
Status: DISCONTINUED | OUTPATIENT
Start: 2023-10-12 | End: 2023-10-12 | Stop reason: HOSPADM

## 2023-10-12 RX ORDER — KETOROLAC TROMETHAMINE 30 MG/ML
15 INJECTION, SOLUTION INTRAMUSCULAR; INTRAVENOUS ONCE
Status: COMPLETED | OUTPATIENT
Start: 2023-10-12 | End: 2023-10-12

## 2023-10-12 RX ORDER — FENTANYL CITRATE 50 UG/ML
25 INJECTION, SOLUTION INTRAMUSCULAR; INTRAVENOUS EVERY 5 MIN PRN
Status: DISCONTINUED | OUTPATIENT
Start: 2023-10-12 | End: 2023-10-12 | Stop reason: HOSPADM

## 2023-10-12 RX ORDER — LIDOCAINE HYDROCHLORIDE 20 MG/ML
INJECTION INTRAVENOUS
Status: DISCONTINUED | OUTPATIENT
Start: 2023-10-12 | End: 2023-10-12

## 2023-10-12 RX ORDER — FENTANYL CITRATE 50 UG/ML
INJECTION, SOLUTION INTRAMUSCULAR; INTRAVENOUS
Status: DISCONTINUED | OUTPATIENT
Start: 2023-10-12 | End: 2023-10-12

## 2023-10-12 RX ORDER — METHOCARBAMOL 500 MG/1
1000 TABLET, FILM COATED ORAL ONCE
Status: COMPLETED | OUTPATIENT
Start: 2023-10-12 | End: 2023-10-12

## 2023-10-12 RX ORDER — HYDROMORPHONE HYDROCHLORIDE 1 MG/ML
0.2 INJECTION, SOLUTION INTRAMUSCULAR; INTRAVENOUS; SUBCUTANEOUS EVERY 5 MIN PRN
Status: DISCONTINUED | OUTPATIENT
Start: 2023-10-12 | End: 2023-10-12 | Stop reason: HOSPADM

## 2023-10-12 RX ORDER — EPINEPHRINE 1 MG/ML
INJECTION, SOLUTION, CONCENTRATE INTRAVENOUS
Status: DISCONTINUED | OUTPATIENT
Start: 2023-10-12 | End: 2023-10-12 | Stop reason: HOSPADM

## 2023-10-12 RX ORDER — OXYCODONE HYDROCHLORIDE 5 MG/1
5 TABLET ORAL
Status: DISCONTINUED | OUTPATIENT
Start: 2023-10-12 | End: 2023-10-12 | Stop reason: HOSPADM

## 2023-10-12 RX ORDER — FAMOTIDINE 10 MG/ML
INJECTION INTRAVENOUS
Status: DISCONTINUED | OUTPATIENT
Start: 2023-10-12 | End: 2023-10-12

## 2023-10-12 RX ORDER — KETAMINE HCL IN 0.9 % NACL 50 MG/5 ML
SYRINGE (ML) INTRAVENOUS
Status: DISCONTINUED | OUTPATIENT
Start: 2023-10-12 | End: 2023-10-12

## 2023-10-12 RX ORDER — DEXAMETHASONE SODIUM PHOSPHATE 4 MG/ML
INJECTION, SOLUTION INTRA-ARTICULAR; INTRALESIONAL; INTRAMUSCULAR; INTRAVENOUS; SOFT TISSUE
Status: DISCONTINUED | OUTPATIENT
Start: 2023-10-12 | End: 2023-10-12

## 2023-10-12 RX ADMIN — Medication 20 MG: at 10:10

## 2023-10-12 RX ADMIN — FAMOTIDINE 20 MG: 10 INJECTION, SOLUTION INTRAVENOUS at 10:10

## 2023-10-12 RX ADMIN — LIDOCAINE HYDROCHLORIDE 100 MG: 20 INJECTION INTRAVENOUS at 10:10

## 2023-10-12 RX ADMIN — OXYCODONE HYDROCHLORIDE 5 MG: 5 TABLET ORAL at 11:10

## 2023-10-12 RX ADMIN — ACETAMINOPHEN 1000 MG: 500 TABLET ORAL at 07:10

## 2023-10-12 RX ADMIN — PROPOFOL 300 MG: 10 INJECTION, EMULSION INTRAVENOUS at 10:10

## 2023-10-12 RX ADMIN — METHOCARBAMOL 1000 MG: 500 TABLET ORAL at 11:10

## 2023-10-12 RX ADMIN — Medication 10 MG: at 10:10

## 2023-10-12 RX ADMIN — CEFAZOLIN 2 G: 2 INJECTION, POWDER, FOR SOLUTION INTRAMUSCULAR; INTRAVENOUS at 10:10

## 2023-10-12 RX ADMIN — FENTANYL CITRATE 100 MCG: 0.05 INJECTION, SOLUTION INTRAMUSCULAR; INTRAVENOUS at 10:10

## 2023-10-12 RX ADMIN — MIDAZOLAM HYDROCHLORIDE 2 MG: 1 INJECTION, SOLUTION INTRAMUSCULAR; INTRAVENOUS at 09:10

## 2023-10-12 RX ADMIN — MUPIROCIN: 20 OINTMENT TOPICAL at 07:10

## 2023-10-12 RX ADMIN — SODIUM CHLORIDE, SODIUM GLUCONATE, SODIUM ACETATE, POTASSIUM CHLORIDE, MAGNESIUM CHLORIDE, SODIUM PHOSPHATE, DIBASIC, AND POTASSIUM PHOSPHATE: .53; .5; .37; .037; .03; .012; .00082 INJECTION, SOLUTION INTRAVENOUS at 10:10

## 2023-10-12 RX ADMIN — SODIUM CHLORIDE: 0.9 INJECTION, SOLUTION INTRAVENOUS at 07:10

## 2023-10-12 RX ADMIN — DEXAMETHASONE SODIUM PHOSPHATE 8 MG: 4 INJECTION, SOLUTION INTRAMUSCULAR; INTRAVENOUS at 10:10

## 2023-10-12 RX ADMIN — KETOROLAC TROMETHAMINE 15 MG: 30 INJECTION, SOLUTION INTRAMUSCULAR; INTRAVENOUS at 11:10

## 2023-10-12 NOTE — OP NOTE
[facility])  Surgery Department  Operative Note    SUMMARY     Date of Procedure: 10/12/2023     Pre-Operative Diagnosis:   Left Knee Tear, Lateral meniscus, acute S83.289A  Left Knee Synovitis M65.9  Left Knee Anterior Cruciate Ligament Tear S83.510      Post-Operative Diagnosis:   Left Knee Tear, Lateral meniscus, acute S83.289A  Left Knee Synovitis M65.9  Left Knee Anterior Cruciate Ligament Tear S83.510 Partial    Procedure:  1. Left Knee Arthroscopy, with meniscectomy (medial OR lateral) 29665  2.  Left Knee Arthroscopy, knee, synovectomy, limited 09771 (cyclops lesion)      Surgeon(s) and Role:     * Taina Lorenz MD - Primary    Assistant:   Yonis Mace PA-C    No resident or fellow was available throughout the entire procedure as a result it was medically necessary for Yonis Mace PA-C to perform first assist duties.      Anesthesia: General    Complications: None    Tourniquet: None    Implants: * No implants in log *    Arthroscopic Findings:   Patellofemoral Compartment  Medial Patella Cartilage: Intact  Central Patella Cartilage:Intact  Lateral Patella Cartilage: Intact  Trochlea Cartilage:Intact  Plica:  None  Medial Gutter: Clear of loose bodies or debris  Lateral Gutter:Clear of loose bodies or debris    Ligaments  ACL:  Partial tear involving the posterior lateral bundle of the ACL from the femoral wall with some fraying noted at the base of the ACL which was involving the anterior medial fibers  PCL:Intact    Medial Compartment:  Femoral Cartilage: Intact  Tibial Cartilage:Intact  Meniscus:Intact    Lateral Compartment:  Femoral Cartilage: Intact  Tibial Cartilage:Intact  Meniscus:  Small partial tear anterior horn of the lateral meniscus at the base of the ACL    Exam Under Anesthesia:  Stable ligamentous exam    Indication for Procedure: 48 y.o. Female with a history of left knee pain who is referred by Yonis Mace PA-C. 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 She states this past spring she was starting a new exercise regimen. She states after the first few weeks she started to have knee pain. She states she had to exercise less and the pain has not gotten any better. She has mostly lateral sided knee pain. She has pain at night. She also has some pain behind her knee cap. She has not between able to find any relief.   She also recall an event when she went walking where she felt a pop in her knee on an uneven surface while walking.  Her history, physical and imaging was concerning for the above-stated diagnosis.  We discussed the risks, benefits and alternatives prior to proceeding with surgery.  Based on her preoperative exam and history, there was no discussion about reconstructing her ACL however it was indicated for diagnostic arthroscopy concerning the area anterior to the ACL which appeared to look like a cyclops lesion with a concerning anterior horn lateral meniscus tear.    Surgery in Detail:  The patient was marked identified in the holding room.  She was brought back to the operating room and placed in the supine position.  After general endotracheal anesthesia was administered the left lower extremity was prepped and draped in usual sterile fashion for a knee arthroscopy. The contralateral leg was secured and well padded. Preoperative antibiotics were given within 1 hour the procedure.  A time-out was taken prior starting. We used 0.25% Marcaine and epinephrine for local periportal anesthetic.  We created an anterior lateral portal and under direct visualization an anterior medial portal was created.    Vaishali were introduced into the notch and the cyclops lesion was debrided.  This involved primarily the base of the ACL.  After evaluating the posterior lateral bundle which appeared to be torn and flipped into the lateral compartment this was reduced and part of it was debrided.  Most anterior aspect of the base of the  ACL at the anterior horn lateral meniscus had partial fraying to the anterior horn lateral meniscus.  This was debrided.  The edges were stabilized with the ArthroCare Wand to a smooth border.  The knee was then brought to full extension to make sure there was no impingement in full extension.  No other significant pathology was encountered in the joint.    The arthroscopes were removed from the joint. The patient was dressed with Adaptic, bacitracin, 4x4s, Webril and an Ace wrap from the toes up to the proximal thigh.  The patient was extubated and taken recovery in satisfactory condition.      Post-Op Plan:  Knee arthroscopy protocol    Attestation: I was present and scrubbed for the entire procedure.

## 2023-10-12 NOTE — TRANSFER OF CARE
"Anesthesia Transfer of Care Note    Patient: Chinyere Crockett    Procedure(s) Performed: Procedure(s) (LRB):  ARTHROSCOPY, KNEE, WITH MENISCECTOMY (Left)  ARTHROSCOPIC SYNOVECTOMY, KNEE (Left)    Patient location: PACU    Anesthesia Type: general    Transport from OR: Transported from OR on 6-10 L/min O2 by face mask with adequate spontaneous ventilation    Post pain: adequate analgesia    Post assessment: no apparent anesthetic complications    Post vital signs: stable    Level of consciousness: awake    Nausea/Vomiting: no nausea/vomiting    Complications: none    Transfer of care protocol was followed      Last vitals:   Visit Vitals  /77 (BP Location: Right arm, Patient Position: Lying)   Pulse 90   Temp 36.7 °C (98.1 °F) (Oral)   Resp 14   Ht 5' 6" (1.676 m)   Wt 98.9 kg (218 lb)   LMP 03/16/2018 (LMP Unknown)   SpO2 99%   Breastfeeding No   BMI 35.19 kg/m²     "

## 2023-10-12 NOTE — OPERATIVE NOTE ADDENDUM
Certification of Assistant at Surgery       Surgery Date: 10/12/2023     Participating Surgeons:  Surgeon(s) and Role:     * Taina Lorenz MD - Primary    Assistant:   Yonis Mace PA-C    No resident or fellow was available throughout the entire procedure as a result it was medically necessary for Yonis Mace PA-C to perform first assist duties.      Procedures:  Procedure(s) (LRB):  ARTHROSCOPY, KNEE, WITH MENISCECTOMY (Left)  ARTHROSCOPIC SYNOVECTOMY, KNEE (Left)    Assistant Surgeon's Certification of Necessity:  I understand that section 1842 (b) (6) (d) of the Social Security Act generally prohibits Medicare Part B reasonable charge payment for the services of assistants at surgery in teaching hospitals when qualified residents are available to furnish such services. I certify that the services for which payment is claimed were medically necessary, and that no qualified resident was available to perform the services. I further understand that these services are subject to post-payment review by the Medicare carrier.      Yonis Mace PA-C    10/12/2023  10:57 AM

## 2023-10-12 NOTE — ANESTHESIA PROCEDURE NOTES
Intubation    Date/Time: 10/12/2023 10:08 AM    Performed by: Aletha Hays CRNA  Authorized by: Robin Caldwell MD    Intubation:     Induction:  Intravenous    Intubated:  Postinduction    Mask Ventilation:  Easy mask    Attempts:  1    Attempted By:  CRNA    Method of Intubation:  Fast track LMA    Difficult Airway Encountered?: No      Airway Device:  Supraglottic airway/LMA    Airway Device Size:  4.0    Secured at:  The lips    Placement Verified By:  Capnometry    Complicating Factors:  None    Findings Post-Intubation:  BS equal bilateral and atraumatic/condition of teeth unchanged

## 2023-10-12 NOTE — ANESTHESIA PREPROCEDURE EVALUATION
10/12/2023  Chinyere Crockett is a 48 y.o., female.      Pre-op Assessment    I have reviewed the Patient Summary Reports.     I have reviewed the Nursing Notes. I have reviewed the NPO Status.   I have reviewed the Medications.     Review of Systems  Anesthesia Hx:  No problems with previous Anesthesia   Denies Personal Hx of Anesthesia complications.   Social:  Non-Smoker, Social Alcohol Use    Hematology/Oncology:     Oncology Normal   Hematology Comments: Vitamin D deficiency, taking supplement   EENT/Dental:   H/O Tonsillectomy   Cardiovascular:   Exercise tolerance: good Denies CAD.    Denies Dysrhythmias.  hyperlipidemia  Denies FISH. Elevated cholesterol and Triglycerides   Pulmonary:   Denies Asthma.  Denies Shortness of breath.    Renal/:  Renal/ Normal  Denies Chronic Renal Disease.     Hepatic/GI:  Hepatic/GI Normal  Denies GERD. Denies Liver Disease.    Musculoskeletal:   Chondromalacia, patella, left,  Cyst of anterior horn of medial meniscus of left knee,  Hip pain, bilateral Back pain, Decreased range of motion of trunk and back, Decreased strength of trunk and back, Weakness of both lower extremities,  Chronic right shoulder pain, Shoulder weakness,  H/O injection of joint per pain management   Neurological:   Denies CVA. Headaches Denies Seizures. Migraine without aura and without status migrainosus, not intractable   Endocrine:   Denies Diabetes. Denies Hypothyroidism.  Obesity / BMI > 30  Psych:   Psychiatric History anxiety depression Moderate episode of Major Depressive Disorder         Physical Exam  General: Well nourished, Alert and Cooperative    Airway:  Mallampati: II   Mouth Opening: Normal  Neck ROM: Normal ROM        Anesthesia Plan  Type of Anesthesia, risks & benefits discussed:    Anesthesia Type: Gen Supraglottic Airway  Intra-op Monitoring Plan: Standard ASA  Monitors  Post Op Pain Control Plan: multimodal analgesia and IV/PO Opioids PRN  Induction:  IV  Informed Consent: Informed consent signed with the Patient and all parties understand the risks and agree with anesthesia plan.  All questions answered.   ASA Score: 2    Ready For Surgery From Anesthesia Perspective.     .

## 2023-10-12 NOTE — ANESTHESIA POSTPROCEDURE EVALUATION
Anesthesia Post Evaluation    Patient: Chinyere Crockett    Procedure(s) Performed: Procedure(s) (LRB):  ARTHROSCOPY, KNEE, WITH MENISCECTOMY (Left)  ARTHROSCOPIC SYNOVECTOMY, KNEE (Left)    Final Anesthesia Type: general      Patient location during evaluation: PACU  Patient participation: Yes- Able to Participate  Level of consciousness: awake and alert  Post-procedure vital signs: reviewed and stable  Pain management: adequate  Airway patency: patent    PONV status at discharge: No PONV  Anesthetic complications: no      Cardiovascular status: blood pressure returned to baseline  Respiratory status: unassisted  Hydration status: euvolemic  Follow-up not needed.          Vitals Value Taken Time   /71 10/12/23 1146   Temp 36.6 °C (97.9 °F) 10/12/23 1130   Pulse 87 10/12/23 1152   Resp 20 10/12/23 1152   SpO2 98 % 10/12/23 1151   Vitals shown include unvalidated device data.      Event Time   Out of Recovery 11:33:00         Pain/Rafael Score: Pain Rating Prior to Med Admin: 3 (10/12/2023 11:12 AM)  Rafael Score: 10 (10/12/2023 11:30 AM)

## 2023-10-12 NOTE — BRIEF OP NOTE
[facility]  Brief Operative Note    Surgery Date: 10/12/2023     Surgeon(s) and Role:     * Taina Lorenz MD - Primary    Assisting Provider:     * Yonis Mace PA-C - First Assist    No resident or fellow was available throughout the entire procedure as a result it was medically necessary for Yonis Mace PA-C to perform first assistant duties.    Pre-Operative Diagnosis: Chondromalacia, patella, left [M22.42]  Cyst of anterior horn of medial meniscus of left knee [M23.012]    Post-Operative Diagnosis: Post-Op Diagnosis Codes:     * Chondromalacia, patella, left [M22.42]     * Cyst of anterior horn of medial meniscus of left knee [M23.012]    Procedure(s) (LRB):  ARTHROSCOPY, KNEE, WITH MENISCECTOMY (Left)  ARTHROSCOPIC SYNOVECTOMY, KNEE (Left)    Anesthesia: General    Operative Findings: See Op note.    Estimated Blood Loss: * No values recorded between 10/12/2023 10:23 AM and 10/12/2023 10:55 AM *         Specimens:   Specimen (24h ago, onward)      None              Discharge Note    OUTCOME: Patient tolerated treatment/procedure well without complication and is now ready for discharge.    DISPOSITION: Home or Self Care    FINAL DIAGNOSIS:  Post-Op Diagnosis Codes:     * Chondromalacia, patella, left [M22.42]     * Cyst of anterior horn of medial meniscus of left knee [M23.012]    FOLLOWUP: In clinic    DISCHARGE INSTRUCTIONS: See attached.

## 2023-10-12 NOTE — DISCHARGE INSTRUCTIONS
POST-OPERATIVE DISCHARGE INSTRUCTIONS    Diet: Ice chips, clear liquids, and then diet as tolerated.  Drink plenty of liquids after surgery.  Ice the area at least three times a day (20 minutes per session) if possible.    Elevate the extremity above the level of the heart to help reduce swelling.  Progress weight-bearing as tolerated.  Pain medication can be taken every four to six hours as needed.  It is helpful to take pain medication prior to physical therapy. If pain is not controlled, please take 800 mg ibuprofen every 8 hours as needed unless contraindicated (NSAID allergy, kidney disease, heart disease, currently taking blood thinners, etc.).  Any activity that requires precise thinking or accuracy should be avoided for a minimum of 72 hours after surgery and while on narcotic pain medication.  This includes operating machinery and/or driving a vehicle.  All sutures will be removed approximately 10-14 days from the time of surgery. Leave steri-strips (skin tapes) in place until sutures are removed.  If skin glue is used instead of stitches, do not apply any ointments or solutions to the incision.  Keep the incision dry.  The skin glue will peel off in 3-4 weeks.  Dressing change directions, unless otherwise instructed:   ?  Knee scope Change dressing on the first post-op day.  Use gauze for the first 3 days, then start using waterproof Band-Aids over the incision sites.  All casts, splints, braces, slings, crutches, abduction pillows, etc. are to be worn as instructed.  Akbar Hose or Sequential Compression Devices are often used following surgery to help with swelling and prevent blood clots.  They can be removed for 2-3 hours daily as needed.  If the hose becomes uncomfortable after a few days, switch to an Ace Wrap if desired.  Keep the incision dry for 10-14 days.  A waterproof dressing ([facility] or [facility]) can be used to shower.  No bath, pool, or hot tub until instructed.  You should notify our office if  you notice any of the following:  A temperature greater than 101 F  Severe or increasing pain  Excessive drainage or redness of the incision  Calf swelling and pain  Chest pain or shortness of breath  Your post-op follow up appointment will be approximately 14 days from the time of surgery.  If you do not have an appointment scheduled, please call our office and schedule within 1-2 days.   If you have any problems or questions, please call our office at [phone].

## 2023-10-12 NOTE — PLAN OF CARE
Discharge plan reviewed w/ pt and spouse at bedside. AVSS on RA. L knee dressing CDI. Pain controlled w/ PRN medications. All Rx delivered to bedside. Crutches given to pt. Pt states she is ready for discharge.

## 2023-10-16 ENCOUNTER — CLINICAL SUPPORT (OUTPATIENT)
Dept: REHABILITATION | Facility: OTHER | Age: 48
End: 2023-10-16
Payer: OTHER GOVERNMENT

## 2023-10-16 DIAGNOSIS — M23.042 CYST OF ANTERIOR HORN OF LATERAL MENISCUS OF LEFT KNEE: ICD-10-CM

## 2023-10-16 DIAGNOSIS — M22.42 CHONDROMALACIA, PATELLA, LEFT: ICD-10-CM

## 2023-10-16 DIAGNOSIS — M25.662 DECREASED RANGE OF MOTION (ROM) OF LEFT KNEE: ICD-10-CM

## 2023-10-16 PROCEDURE — 97112 NEUROMUSCULAR REEDUCATION: CPT | Mod: PN

## 2023-10-16 PROCEDURE — 97110 THERAPEUTIC EXERCISES: CPT | Mod: PN

## 2023-10-16 PROCEDURE — 97161 PT EVAL LOW COMPLEX 20 MIN: CPT | Mod: PN

## 2023-10-17 NOTE — PLAN OF CARE
[facility]   Physical Therapy Initial Evaluation      Name: Chinyere Bay  Clinic Number: [id number]    Therapy Diagnosis:   Encounter Diagnoses   Name Primary?    Chondromalacia, patella, left     Cyst of anterior horn of lateral meniscus of left knee     Decreased range of motion (ROM) of left knee         Physician: Yonis Mace PA-C    Physician Orders: PT Eval and Treat   Medical Diagnosis from Referral:   M22.42 (ICD-10-CM) - Chondromalacia, patella, left   M23.042 (ICD-10-CM) - Cyst of anterior horn of lateral meniscus of left knee     Evaluation Date: 10/16/2023  Authorization Period Expiration: 10/5/2024  Plan of Care Expiration: 1/8/2024  Progress Note Due: 11/16/2023  Date of Surgery: 10/12/2023  Visit # / Visits authorized: 1/ 1   FOTO: 0/ 3    Precautions: Standard     Time In: 3:44  Time Out: 4:40  Total Billable Time: 56 minutes    1. Left Knee Arthroscopy, with meniscectomy (medial OR lateral) 01758  2.  Left Knee Arthroscopy, knee, synovectomy, limited 74677 (cyclops lesion)    Subjective     Date of onset: Spring 2023     History of current condition - Chinyere reports: she had her surgery last Thursday after dealing with knee pain since spring of 2023. She reports that her knee already feels better than it did prior to surgery. She was been ambulating with cane and states that she does not want to use crutches. She denies any numbness or tingling and is sleeping okay. She has changed bandages and now has the water proof ones on. She has 20 stairs at her house and has been able to use them. She the most pain with bending her knee.     Falls: No    Imaging: MRI studies: No evidence for meniscal tear.  No evidence for significant degenerative change or occult osseous injury.     Mild mucoid degeneration of the ACL, incidental.  Ganglion, incidental anterior to the anterior horn lateral meniscus.       Prior Therapy: Yes  Social History:  lives with their family,  20 stairs  Occupation: arts and crafts   Prior Level of Function: Pt was able ambulate around the neighborhood pain free  Current Level of Function: Pt now has difficulty with ambulating and bending her knee due to pain    Pain:  Current 4/10, worst 7/10, best 3/10   Location: left knee   Description: Aching and Burning  Aggravating Factors: Sitting, Bending, and Walking  Easing Factors: pain medication and ice  pain medication and ice  Patients goals: Pt would like to get back to walking around the neighbor dickson.      Medical History:   No past medical history on file.    Surgical History:   Chinyere Crockett  has a past surgical history that includes Tonsillectomy; Injection of joint (Bilateral, 2/14/2023); Knee arthroscopy w/ meniscectomy (Left, 10/12/2023); and Synovectomy of knee (Left, 10/12/2023).    Medications:   Chinyere has a current medication list which includes the following prescription(s): aspirin, cyclobenzaprine, diclofenac, ergocalciferol, and tramadol, and the following Facility-Administered Medications: sodium chloride 0.9%, triamcinolone acetonide, and triamcinolone acetonide.    Allergies:   Review of patient's allergies indicates:   Allergen Reactions    Duloxetine      Vs tizanidine unclear reaction, pt disliked?    Hydrocodone      Dizziness and nausea     Tizanidine      Unclear negative reaction vs duloxetine        Objective        Observation: Pt has ace wrap and water proof bandages under. Incision looks to be healing with no obvious signs of infection     Functional Tests:  Gait: Pt has slowly ambulates with single point     Knee Passive Range of Motion:   Right  Left    Flexion 130 40 (55 after)   Extension +5 +1     Quad Set: Fair    Joint Mobility: Decreased patellar glide globally due to edema, decreased anterior tibial glide     Sensation: Intact     Girth Measurement Joint line 10 cm below 10 cm above   Right 46 cm 45 cm 57 cm   Left 48 cm 49 cm 56.5 cm        Intake Outcome  Measure for FOTO Knee Survey    Therapist reviewed FOTO scores for Chinyere Crockett on 10/16/2023.   FOTO report - see Media section or FOTO account episode details.    Intake Score: 34%         Treatment     Total Treatment time (time-based codes) separate from Evaluation: 24 minutes     Chinyere received the treatments listed below:      therapeutic exercises to develop strength, endurance, ROM, and flexibility for 14 minutes including:  Heel prop 6 min  Heel slides 20x  Ankle pumps 30x    neuromuscular re-education activities to improve: Coordination, Kinesthetic, Sense, and Proprioception for 10 minutes. The following activities were included:  Quad sets 10x5s   SAQ 10x  LAQ with LE assist 2x10    Patient Education and Home Exercises     Education provided:   - HEP  - Role of PT  - DVT education     Written Home Exercises Provided: yes. Exercises were reviewed and Chinyere was able to demonstrate them prior to the end of the session.  Chinyere demonstrated good  understanding of the education provided. See EMR under Patient Instructions for exercises provided during therapy sessions.    Assessment     Chinyere is a 48 y.o. female referred to outpatient Physical Therapy with a medical diagnosis of   M22.42 (ICD-10-CM) - Chondromalacia, patella, left   M23.042 (ICD-10-CM) - Cyst of anterior horn of lateral meniscus of left knee   . Patient presents with decreased knee range of motion, decreased knee mobility, decreased lower extremity strength, decreased functional mobility and gait dysfunctions. Her signs and symptoms are consistent with post operative status. She was educated on injection and edema control. She demonstrated HEP during the session and required frequent verbal cues to perform exercises without compensation and with correct quad activation. Her impairments are limiting her ability to perform daily activates without pain.     Patient prognosis is Good.   Patient will benefit from skilled outpatient  Physical Therapy to address the deficits stated above and in the chart below, provide patient /family education, and to maximize patientt's level of independence.     Plan of care discussed with patient: Yes  Patient's spiritual, cultural and educational needs considered and patient is agreeable to the plan of care and goals as stated below:     Anticipated Barriers for therapy: Transportation    Medical Necessity is demonstrated by the following  History  Co-morbidities and personal factors that may impact the plan of care [x] LOW: no personal factors / co-morbidities  [] MODERATE: 1-2 personal factors / co-morbidities  [] HIGH: 3+ personal factors / co-morbidities    Moderate / High Support Documentation:   Co-morbidities affecting plan of care:     Personal Factors:   no deficits     Examination  Body Structures and Functions, activity limitations and participation restrictions that may impact the plan of care [x] LOW: addressing 1-2 elements  [] MODERATE: 3+ elements  [] HIGH: 4+ elements (please support below)    Moderate / High Support Documentation:      Clinical Presentation [x] LOW: stable  [] MODERATE: Evolving  [] HIGH: Unstable     Decision Making/ Complexity Score: low       Goals:  Short Term Goals:  6 weeks  1.Report decreased left knee pain  < / =  2/10  to increase tolerance for ADL's, walking, sit to stands, stairs  2. Increase knee ROM to 5/0/130 in order to be able to perform ADLs without difficulty.  3. Increase strength by 1/3 MMT grade in left lower extremity to increase tolerance for ADL and work activities.  4. Pt to tolerate HEP to improve ROM and independence with ADL's    Long Term Goals: 12 weeks  1.Report decreased left knee pain < / = 0/10  to increase tolerance for ADL's, walking, sit to stands, stairs  2.Patient goal: Pt would like to go on neighbor dickson walks without pain  3.Increase strength to >/= 4+/5 in left lower extremity to increase tolerance for ADL and work activities.  4.  Pt will improve FOTO knee survey score to </= 60% in order to demo improved functional mobility  5. Pt will perform TUG in < 10 seconds without AD in order to demo improved gait speed  6. Pt will perform at least 10 sit to stands without UE support on 30 second sit to stand test in order to demo improved ability to perform transfers   Plan     Plan of care Certification: 10/16/2023 to 1/8/2023.    Outpatient Physical Therapy 1-2 times weekly for 12 weeks to include the following interventions: Electrical Stimulation NMES, Gait Training, Manual Therapy, Neuromuscular Re-ed, Patient Education, Therapeutic Activities, and Therapeutic Exercise.     Roney Goldman, PT, DPT        Physician's Signature: _________________________________________ Date: ________________

## 2023-10-18 ENCOUNTER — TELEPHONE (OUTPATIENT)
Dept: SPORTS MEDICINE | Facility: CLINIC | Age: 48
End: 2023-10-18
Payer: OTHER GOVERNMENT

## 2023-10-18 NOTE — TELEPHONE ENCOUNTER
ZACKARYM for patient to call the office at [phone] to help her reschedule her appointment from 11/24/23 due to Yonis will be out of the office that day or you can sent a message.

## 2023-10-19 ENCOUNTER — TELEPHONE (OUTPATIENT)
Dept: SPORTS MEDICINE | Facility: CLINIC | Age: 48
End: 2023-10-19
Payer: OTHER GOVERNMENT

## 2023-10-19 NOTE — TELEPHONE ENCOUNTER
Called and spoke with patient to let her know that Yonis will not be in the office on 11/24/23.  Patient reschedule on 11/15/23 at 3:30 pm.

## 2023-10-19 NOTE — TELEPHONE ENCOUNTER
Called and spoke with patient back to let her know that she has PT at 3:30 on 11/15/23.   Patient reschedule on 11/15/23 from 3:30 pm. To 2pm.

## 2023-10-23 ENCOUNTER — CLINICAL SUPPORT (OUTPATIENT)
Dept: REHABILITATION | Facility: OTHER | Age: 48
End: 2023-10-23
Payer: OTHER GOVERNMENT

## 2023-10-23 DIAGNOSIS — M25.662 DECREASED RANGE OF MOTION (ROM) OF LEFT KNEE: Primary | ICD-10-CM

## 2023-10-23 PROCEDURE — 97140 MANUAL THERAPY 1/> REGIONS: CPT | Mod: PN

## 2023-10-23 PROCEDURE — 97112 NEUROMUSCULAR REEDUCATION: CPT | Mod: PN

## 2023-10-23 PROCEDURE — 97110 THERAPEUTIC EXERCISES: CPT | Mod: PN

## 2023-10-23 NOTE — PROGRESS NOTES
[facility]   Physical Therapy Treatment Note     Name: Chinyere Hollis [facility]  Clinic Number: [id number]    Therapy Diagnosis:   Encounter Diagnosis   Name Primary?    Decreased range of motion (ROM) of left knee Yes     Physician: Yonis Mace PA-C    Visit Date: 10/23/2023    Physician Orders: PT Eval and Treat   Medical Diagnosis from Referral:   M22.42 (ICD-10-CM) - Chondromalacia, patella, left   M23.042 (ICD-10-CM) - Cyst of anterior horn of lateral meniscus of left knee      Evaluation Date: 10/16/2023  Authorization Period Expiration: 10/5/2024  Plan of Care Expiration: 1/8/2024  Progress Note Due: 11/16/2023  Date of Surgery: 10/12/2023  Visit # / Visits authorized: 1/ 20   FOTO: 0/ 3     Precautions: Standard      Time In: 3:29  Time Out: 4:29  Total Billable Time: 60 minutes     1. Left Knee Arthroscopy, with meniscectomy (medial OR lateral) 01562  2.  Left Knee Arthroscopy, knee, synovectomy, limited 60323 (cyclops lesion)    SUBJECTIVE     Pt reports: She is feeling better than she was prior to surgery    She was compliant with home exercise program.  Response to previous treatment: Increased knee ROM  Functional change: Progressing    Pain: 2/10  Location: left knee     OBJECTIVE     Objective Measures updated at progress report unless specified.     Treatment       Chinyere received the treatments listed below:      Therapeutic exercises to develop strength, endurance, ROM, and flexibility for 21 minutes including:  Heel prop 6 min  Heel slides 20x  Ankle pumps 30x  Clams 3x10   Bridges 3x10     Neuromuscular re-education activities to improve: Coordination, Kinesthetic, Sense, and Proprioception for 30 minutes. The following activities were included:    NMES:   - Quad sets 10 min (5 with strap)  - TKE 5 min  - EOM SLR 10 min  LAQ 3x10    Manual therapy techniques: Joint mobilizations were applied to the: Left knee for 9 minutes, including:  Anterior hinge glide    Patellar mobilizations globally    Therapeutic activities to improve functional performance for 0  minutes, including:      Patient Education and Home Exercises     Home Exercises Provided and Patient Education Provided     Education provided:   - HEP    Written Home Exercises Provided: Patient instructed to cont prior HEP. Exercises were reviewed and Chinyere was able to demonstrate them prior to the end of the session.  Chinyere demonstrated good  understanding of the education provided. See EMR under Patient Instructions for exercises provided during therapy sessions    ASSESSMENT     Chinyere was progressed with knee range of motion and quad strengthening exercises. She was able to reach full terminal knee extension passively and adequate active knee hyper extension. She required verbal and tactile cues to perform exercises correctly without compensation. She showed good progress compared to zackery.     Chinyere Is progressing well towards her goals.     Pt prognosis is Good.     Pt will continue to benefit from skilled outpatient physical therapy to address the deficits listed in the problem list box on initial evaluation, provide pt/family education and to maximize pt's level of independence in the home and community environment.     Pt's spiritual, cultural and educational needs considered and pt agreeable to plan of care and goals.     Anticipated Barriers for therapy: Transportation     Goals:  Short Term Goals:  6 weeks (Progressing, not met)  1.Report decreased left knee pain  < / =  2/10  to increase tolerance for ADL's, walking, sit to stands, stairs  2. Increase knee ROM to 5/0/130 in order to be able to perform ADLs without difficulty.  3. Increase strength by 1/3 MMT grade in left lower extremity to increase tolerance for ADL and work activities.  4. Pt to tolerate HEP to improve ROM and independence with ADL's     Long Term Goals: 12 weeks (Progressing, not met)  1.Report decreased left knee pain < / = 0/10  to  increase tolerance for ADL's, walking, sit to stands, stairs  2.Patient goal: Pt would like to go on neighbor dickson walks without pain  3.Increase strength to >/= 4+/5 in left lower extremity to increase tolerance for ADL and work activities.  4. Pt will improve FOTO knee survey score to </= 60% in order to demo improved functional mobility  5. Pt will perform TUG in < 10 seconds without AD in order to demo improved gait speed  6. Pt will perform at least 10 sit to stands without UE support on 30 second sit to stand test in order to demo improved ability to perform transfers   Plan      Plan of care Certification: 10/16/2023 to 1/8/2023.    Continue with PT plan of care with emphasis on knee ROM and quad strength    Roney Goldman, PT, DPT

## 2023-10-25 ENCOUNTER — CLINICAL SUPPORT (OUTPATIENT)
Dept: REHABILITATION | Facility: OTHER | Age: 48
End: 2023-10-25
Payer: OTHER GOVERNMENT

## 2023-10-25 ENCOUNTER — OFFICE VISIT (OUTPATIENT)
Dept: PSYCHIATRY | Facility: OTHER | Age: 48
End: 2023-10-25
Payer: OTHER GOVERNMENT

## 2023-10-25 DIAGNOSIS — M25.662 DECREASED RANGE OF MOTION (ROM) OF LEFT KNEE: Primary | ICD-10-CM

## 2023-10-25 DIAGNOSIS — F33.1 MODERATE EPISODE OF RECURRENT MAJOR DEPRESSIVE DISORDER: ICD-10-CM

## 2023-10-25 DIAGNOSIS — F41.9 ANXIETY: Primary | ICD-10-CM

## 2023-10-25 PROCEDURE — 97110 THERAPEUTIC EXERCISES: CPT | Mod: PN

## 2023-10-25 PROCEDURE — 99499 UNLISTED E&M SERVICE: CPT | Mod: ,,, | Performed by: STUDENT IN AN ORGANIZED HEALTH CARE EDUCATION/TRAINING PROGRAM

## 2023-10-25 PROCEDURE — 97530 THERAPEUTIC ACTIVITIES: CPT | Mod: PN

## 2023-10-25 PROCEDURE — 99499 NO LOS: ICD-10-PCS | Mod: ,,, | Performed by: STUDENT IN AN ORGANIZED HEALTH CARE EDUCATION/TRAINING PROGRAM

## 2023-10-25 PROCEDURE — 97116 GAIT TRAINING THERAPY: CPT | Mod: PN

## 2023-10-25 PROCEDURE — 97112 NEUROMUSCULAR REEDUCATION: CPT | Mod: PN

## 2023-10-25 NOTE — PROGRESS NOTES
Type of service: Individual    Content of session: In today's session pt reported a major improvement in mood and pain since getting surgery on her knee three weeks ago. Pt stated that she feels more motivated to look for and find work now that she is able to move around and walk/stand more easily and for longer periods of time. Pt spoke of isolation during her recovery process and having to mainly heal alone as her  has been away frequently. Talked about how this impacts and is impacted by anxiety and depression. Discussed coping skills for anxiety and depression. Today's therapeutic efforts included the use of open ended questions, rephrasing, and reflection. The pt was given emotional support and unconditional positive regard. Will follow up next week.     Therapeutic techniques and approaches: behavior modification and supportive counseling. Rationale: appropriate for presenting issues.     Pt denies SI/HI. Mood was euthymic, affect matches verbal content. AAOx3, participated fully in session.     Time spent in counselinmins      Leola Dobson, Social Work Intern 10/25/2023 4:02 PM

## 2023-10-25 NOTE — PROGRESS NOTES
[facility]   Physical Therapy Treatment Note     Name: Chinyere Hollis [facility]ly [facility]  Clinic Number: [id number]    Therapy Diagnosis:   Encounter Diagnosis   Name Primary?    Decreased range of motion (ROM) of left knee Yes       Physician: Yonis Mace PA-C    Visit Date: 10/25/2023    Physician Orders: PT Eval and Treat   Medical Diagnosis from Referral:   M22.42 (ICD-10-CM) - Chondromalacia, patella, left   M23.042 (ICD-10-CM) - Cyst of anterior horn of lateral meniscus of left knee      Evaluation Date: 10/16/2023  Authorization Period Expiration: 10/5/2024  Plan of Care Expiration: 1/8/2024  Progress Note Due: 11/16/2023  Date of Surgery: 10/12/2023  Visit # / Visits authorized: 2/ 20   FOTO: 0/ 3     Precautions: Standard      Time In: 3:35  Time Out: 4:35  Total Billable Time: 60 minutes     1. Left Knee Arthroscopy, with meniscectomy (medial OR lateral) 79263  2.  Left Knee Arthroscopy, knee, synovectomy, limited 69169 (cyclops lesion)    SUBJECTIVE     Pt reports: She stepped wrong and had a little bit of a twist in her knee that left her in pain    She was compliant with home exercise program.  Response to previous treatment: Increased knee ROM  Functional change: Progressing    Pain: 3/10  Location: left knee     OBJECTIVE     Objective Measures updated at progress report unless specified.   Knee Passive Range of Motion:    Right  Left    Flexion 130 110   Extension +5 +3       Treatment       Chinyere received the treatments listed below:      Therapeutic exercises to develop strength, endurance, ROM, and flexibility for 14 minutes including:    Heel slides 20x  Ankle pumps 30x  Clams 3x10   Bridges 3x10     Neuromuscular re-education activities to improve: Coordination, Kinesthetic, Sense, and Proprioception for 23 minutes. The following activities were included:    SAQ 2x10 3s holds  SLR 3x10  LAQ 3x10  SLR 3x10      NMES:   - Quad sets 10 min (5 with strap)      Manual therapy  techniques: Joint mobilizations were applied to the: Left knee for 5 minutes, including:  Anterior hinge glide   Patellar mobilizations globally    Therapeutic activities to improve functional performance for 8 minutes, including:    Sit to stands 3x10    Gait training to improve functional mobility and safety for 10  minutes, including:  Cones step overs 3x8 - cues for knee extension and knee flexion    Patient Education and Home Exercises     Home Exercises Provided and Patient Education Provided     Education provided:   - HEP    Written Home Exercises Provided: Patient instructed to cont prior HEP. Exercises were reviewed and Chinyere was able to demonstrate them prior to the end of the session.  Chinyere demonstrated good  understanding of the education provided. See EMR under Patient Instructions for exercises provided during therapy sessions    ASSESSMENT     Chinyere was progressed with knee range of motion exercises and was able to achieve 110 degrees of knee extension. She was able to achieve good knee extension and able to perform good SLR. She continues to show good progress. Plan to continue to progress as tolerated.       Chinyere Is progressing well towards her goals.     Pt prognosis is Good.     Pt will continue to benefit from skilled outpatient physical therapy to address the deficits listed in the problem list box on initial evaluation, provide pt/family education and to maximize pt's level of independence in the home and community environment.     Pt's spiritual, cultural and educational needs considered and pt agreeable to plan of care and goals.     Anticipated Barriers for therapy: Transportation     Goals:  Short Term Goals:  6 weeks (Progressing, not met)  1.Report decreased left knee pain  < / =  2/10  to increase tolerance for ADL's, walking, sit to stands, stairs  2. Increase knee ROM to 5/0/130 in order to be able to perform ADLs without difficulty.  3. Increase strength by 1/3 MMT grade in left  lower extremity to increase tolerance for ADL and work activities.  4. Pt to tolerate HEP to improve ROM and independence with ADL's     Long Term Goals: 12 weeks (Progressing, not met)  1.Report decreased left knee pain < / = 0/10  to increase tolerance for ADL's, walking, sit to stands, stairs  2.Patient goal: Pt would like to go on neighbor dickson walks without pain  3.Increase strength to >/= 4+/5 in left lower extremity to increase tolerance for ADL and work activities.  4. Pt will improve FOTO knee survey score to </= 60% in order to demo improved functional mobility  5. Pt will perform TUG in < 10 seconds without AD in order to demo improved gait speed  6. Pt will perform at least 10 sit to stands without UE support on 30 second sit to stand test in order to demo improved ability to perform transfers   Plan      Plan of care Certification: 10/16/2023 to 1/8/2023.    Continue with PT plan of care with emphasis on knee ROM and quad strength    Roney Goldman, PT, DPT

## 2023-10-27 ENCOUNTER — OFFICE VISIT (OUTPATIENT)
Dept: SPORTS MEDICINE | Facility: CLINIC | Age: 48
End: 2023-10-27
Payer: OTHER GOVERNMENT

## 2023-10-27 VITALS
WEIGHT: 218 LBS | HEIGHT: 66 IN | BODY MASS INDEX: 35.03 KG/M2 | HEART RATE: 96 BPM | DIASTOLIC BLOOD PRESSURE: 83 MMHG | SYSTOLIC BLOOD PRESSURE: 132 MMHG

## 2023-10-27 DIAGNOSIS — Z98.890 S/P LEFT KNEE ARTHROSCOPY: Primary | ICD-10-CM

## 2023-10-27 PROCEDURE — 99999 PR PBB SHADOW E&M-EST. PATIENT-LVL III: CPT | Mod: PBBFAC,,, | Performed by: ORTHOPAEDIC SURGERY

## 2023-10-27 PROCEDURE — 99024 PR POST-OP FOLLOW-UP VISIT: ICD-10-PCS | Mod: ,,, | Performed by: ORTHOPAEDIC SURGERY

## 2023-10-27 PROCEDURE — 99999 PR PBB SHADOW E&M-EST. PATIENT-LVL III: ICD-10-PCS | Mod: PBBFAC,,, | Performed by: ORTHOPAEDIC SURGERY

## 2023-10-27 PROCEDURE — 99024 POSTOP FOLLOW-UP VISIT: CPT | Mod: ,,, | Performed by: ORTHOPAEDIC SURGERY

## 2023-10-27 PROCEDURE — 99213 OFFICE O/P EST LOW 20 MIN: CPT | Mod: PBBFAC | Performed by: ORTHOPAEDIC SURGERY

## 2023-10-27 NOTE — PROGRESS NOTES
"POST-OPERATIVE EXAMINATION    48 y.o. Female who returns for follow after surgery. She is 2 weeks s/p    Procedure:  1. Left Knee Arthroscopy, with meniscectomy (medial OR lateral) 04965  2.  Left Knee Arthroscopy, knee, synovectomy, limited 42901 (cyclops lesion)     She is doing well without any issues.       PHYSICAL EXAMINATION:  /83   Pulse 96   Ht 5' 6" (1.676 m)   Wt 98.9 kg (218 lb)   LMP 03/16/2018 (LMP Unknown) Comment: spotting/ short periord 3/16/18  BMI 35.19 kg/m²   General: Well-developed well-nourished 48 y.o. femalein no acute distress   Cardiovascular: Regular rhythm   Lungs: No labored breathing or wheezing appreciated   Neuro: Alert and oriented ×3   Psychiatric: well oriented to person, place and time, demonstrates normal mood and affect   Skin: No rashes, lesions or ulcers, normal temperature, turgor, and texture on involved extremity    ORTHOPEDIC EXAM:  Normal post-operative swelling  Normal post-operative scarring  Strength: Grossly intact  ROM: WNL  Tests: None today    ASSESSMENT:      ICD-10-CM ICD-9-CM   1. S/P left knee arthroscopy  Z98.890 V45.89         PLAN:       Sutures removed today  Continue physical therapy  RTC in 1 month with Yonis Mace PA-C            "

## 2023-11-01 ENCOUNTER — OFFICE VISIT (OUTPATIENT)
Dept: PSYCHIATRY | Facility: OTHER | Age: 48
End: 2023-11-01
Payer: OTHER GOVERNMENT

## 2023-11-01 ENCOUNTER — CLINICAL SUPPORT (OUTPATIENT)
Dept: REHABILITATION | Facility: OTHER | Age: 48
End: 2023-11-01
Payer: OTHER GOVERNMENT

## 2023-11-01 DIAGNOSIS — F41.9 ANXIETY: Primary | ICD-10-CM

## 2023-11-01 DIAGNOSIS — M25.662 DECREASED RANGE OF MOTION (ROM) OF LEFT KNEE: Primary | ICD-10-CM

## 2023-11-01 PROCEDURE — 99499 NO LOS: ICD-10-PCS | Mod: ,,, | Performed by: STUDENT IN AN ORGANIZED HEALTH CARE EDUCATION/TRAINING PROGRAM

## 2023-11-01 PROCEDURE — 97112 NEUROMUSCULAR REEDUCATION: CPT | Mod: PN

## 2023-11-01 PROCEDURE — 99499 UNLISTED E&M SERVICE: CPT | Mod: ,,, | Performed by: STUDENT IN AN ORGANIZED HEALTH CARE EDUCATION/TRAINING PROGRAM

## 2023-11-01 PROCEDURE — 97110 THERAPEUTIC EXERCISES: CPT | Mod: PN

## 2023-11-01 PROCEDURE — 97530 THERAPEUTIC ACTIVITIES: CPT | Mod: PN

## 2023-11-01 NOTE — PROGRESS NOTES
[facility]   Physical Therapy Treatment Note     Name: Chinyere Hollis [facility]  Clinic Number: [id number]    Therapy Diagnosis:   Encounter Diagnosis   Name Primary?    Decreased range of motion (ROM) of left knee Yes         Physician: Yonis Mace PA-C    Visit Date: 11/1/2023    Physician Orders: PT Eval and Treat   Medical Diagnosis from Referral:   M22.42 (ICD-10-CM) - Chondromalacia, patella, left   M23.042 (ICD-10-CM) - Cyst of anterior horn of lateral meniscus of left knee      Evaluation Date: 10/16/2023  Authorization Period Expiration: 10/5/2024  Plan of Care Expiration: 1/8/2024  Progress Note Due: 11/16/2023  Date of Surgery: 10/12/2023  Visit # / Visits authorized: 3/ 20   FOTO: 0/ 3     Precautions: Standard      Time In: 3:30  Time Out: 4:25  Total Billable Time: 55 minutes     1. Left Knee Arthroscopy, with meniscectomy (medial OR lateral) 89820  2.  Left Knee Arthroscopy, knee, synovectomy, limited 64888 (cyclops lesion)    SUBJECTIVE     Pt reports: She is feeling a lot better and able to walk around without pain    She was compliant with home exercise program.  Response to previous treatment: Increased knee ROM  Functional change: Progressing    Pain: 1/10  Location: left knee     OBJECTIVE     Objective Measures updated at progress report unless specified.   Knee Passive Range of Motion:    Right  Left    Flexion 130 110   Extension +5 +3       Treatment       Chinyere received the treatments listed below:      Therapeutic exercises to develop strength, endurance, ROM, and flexibility for 14 minutes including:    Bike 8 min  Clams 3x10 RTB  Bridges 3x10     Neuromuscular re-education activities to improve: Coordination, Kinesthetic, Sense, and Proprioception for 17 minutes. The following activities were included:    SAQ 2x10 3s holds  SLR 3x10  SLR 3x10      Manual therapy techniques: Joint mobilizations were applied to the: Left knee for 0 minutes,  including:  Anterior hinge glide   Patellar mobilizations globally    Therapeutic activities to improve functional performance for 24 minutes, including:    Sit to stands 3x10  Step ups 8in 2x10  LAQ 3x10 5#  Standing Heel raises 2x20      Gait training to improve functional mobility and safety for 00  minutes, including:  Cones step overs 3x8 - cues for knee extension and knee flexion     Patient Education and Home Exercises     Home Exercises Provided and Patient Education Provided     Education provided:   - HEP    Written Home Exercises Provided: Patient instructed to cont prior HEP. Exercises were reviewed and Chinyere was able to demonstrate them prior to the end of the session.  Chinyere demonstrated good  understanding of the education provided. See EMR under Patient Instructions for exercises provided during therapy sessions    ASSESSMENT     Chinyere was able to progress to riding the bike. She demonstrated good quad control with SLR and weight LAQ. She is progressing well. Plan to continue to progress as tolerated.       Chinyere Is progressing well towards her goals.     Pt prognosis is Good.     Pt will continue to benefit from skilled outpatient physical therapy to address the deficits listed in the problem list box on initial evaluation, provide pt/family education and to maximize pt's level of independence in the home and community environment.     Pt's spiritual, cultural and educational needs considered and pt agreeable to plan of care and goals.     Anticipated Barriers for therapy: Transportation     Goals:  Short Term Goals:  6 weeks (Progressing, not met)  1.Report decreased left knee pain  < / =  2/10  to increase tolerance for ADL's, walking, sit to stands, stairs  2. Increase knee ROM to 5/0/130 in order to be able to perform ADLs without difficulty.  3. Increase strength by 1/3 MMT grade in left lower extremity to increase tolerance for ADL and work activities.  4. Pt to tolerate HEP to improve ROM and  independence with ADL's     Long Term Goals: 12 weeks (Progressing, not met)  1.Report decreased left knee pain < / = 0/10  to increase tolerance for ADL's, walking, sit to stands, stairs  2.Patient goal: Pt would like to go on neighbor dickson walks without pain  3.Increase strength to >/= 4+/5 in left lower extremity to increase tolerance for ADL and work activities.  4. Pt will improve FOTO knee survey score to </= 60% in order to demo improved functional mobility  5. Pt will perform TUG in < 10 seconds without AD in order to demo improved gait speed  6. Pt will perform at least 10 sit to stands without UE support on 30 second sit to stand test in order to demo improved ability to perform transfers   Plan      Plan of care Certification: 10/16/2023 to 1/8/2023.    Continue with PT plan of care with emphasis on knee ROM and quad strength    Roney Goldman, PT, DPT

## 2023-11-01 NOTE — PROGRESS NOTES
"Type of service: Individual    Content of session: In today's session, a goal was to reflect upon progress that had been made in the last year of therapy. Pt described feeling like she's "in a good place" with her work and her relationships. However, pt discussed that she has not been getting enough sleep. Talked about how this impacts and is impacted by anxiety. Discussed anxiety coping skills. Explored different ways to improve sleep hygiene in order to make it easier to fall asleep. Pt was given emotional support and unconditional positive regard. Today's therapeutic efforts included the use of open ended questions, rephrasing, and reflection. Will follow up next week.     Therapeutic techniques and approaches: behavior modification and supportive counseling. Rationale: appropriate for presenting issues.     Pt denies SI/HI. Mood was euthymic, affect matches verbal content. AAOx3, participated fully in session.     Time spent in counselinmins      Leola Dobson, Social Work Intern 2023 5:10 PM   "

## 2023-11-06 ENCOUNTER — CLINICAL SUPPORT (OUTPATIENT)
Dept: REHABILITATION | Facility: OTHER | Age: 48
End: 2023-11-06
Payer: OTHER GOVERNMENT

## 2023-11-06 DIAGNOSIS — M25.662 DECREASED RANGE OF MOTION (ROM) OF LEFT KNEE: Primary | ICD-10-CM

## 2023-11-06 PROCEDURE — 97112 NEUROMUSCULAR REEDUCATION: CPT | Mod: PN

## 2023-11-06 PROCEDURE — 97530 THERAPEUTIC ACTIVITIES: CPT | Mod: PN

## 2023-11-06 PROCEDURE — 97110 THERAPEUTIC EXERCISES: CPT | Mod: PN

## 2023-11-06 NOTE — PROGRESS NOTES
[facility]   Physical Therapy Treatment Note     Name: Chinyere Hollis [facility]  Clinic Number: [id number]    Therapy Diagnosis:   Encounter Diagnosis   Name Primary?    Decreased range of motion (ROM) of left knee Yes       Physician: Yonis Mace PA-C    Visit Date: 11/6/2023    Physician Orders: PT Eval and Treat   Medical Diagnosis from Referral:   M22.42 (ICD-10-CM) - Chondromalacia, patella, left   M23.042 (ICD-10-CM) - Cyst of anterior horn of lateral meniscus of left knee      Evaluation Date: 10/16/2023  Authorization Period Expiration: 10/5/2024  Plan of Care Expiration: 1/8/2024  Progress Note Due: 11/16/2023  Date of Surgery: 10/12/2023  Visit # / Visits authorized: 4/ 20   FOTO: 0/ 3     Precautions: Standard      Time In: 3:30  Time Out: 4:30  Total Billable Time: 60 minutes     1. Left Knee Arthroscopy, with meniscectomy (medial OR lateral) 42537  2.  Left Knee Arthroscopy, knee, synovectomy, limited 44184 (cyclops lesion)    SUBJECTIVE     Pt reports: She is feeling great    She was compliant with home exercise program.  Response to previous treatment: Increased knee ROM  Functional change: Progressing    Pain: 0/10  Location: left knee     OBJECTIVE     Objective Measures updated at progress report unless specified.   Knee Passive Range of Motion:    Right  Left    Flexion 130 130   Extension +5 +3       Treatment       Chinyere received the treatments listed below:      Therapeutic exercises to develop strength, endurance, ROM, and flexibility for 16 minutes including:    Bike 6 min  Clams 3x10 RTB  Bridges 3x10     Neuromuscular re-education activities to improve: Coordination, Kinesthetic, Sense, and Proprioception for 20 minutes. The following activities were included:    SAQ 3x15 7#  SLR 3x15  SL balance 3x20s        Manual therapy techniques: Joint mobilizations were applied to the: Left knee for 0 minutes, including:  Anterior hinge glide   Patellar mobilizations  globally    Therapeutic activities to improve functional performance for 24 minutes, including:    Sit to stands 3x10  Step ups 8in 2x10  LAQ 3x15 7#  Standing Heel raises 2x20      Gait training to improve functional mobility and safety for 00  minutes, including:  Cones step overs 3x8 - cues for knee extension and knee flexion     Patient Education and Home Exercises     Home Exercises Provided and Patient Education Provided     Education provided:   - HEP    Written Home Exercises Provided: Patient instructed to cont prior HEP. Exercises were reviewed and Chinyere was able to demonstrate them prior to the end of the session.  Chinyere demonstrated good  understanding of the education provided. See EMR under Patient Instructions for exercises provided during therapy sessions    ASSESSMENT     Chinyere was able to perform 15 SLR without compensation and without rest. She was progressed with quad strengthening that she tolerated well. She did have difficulty with balance and was only able to hold position for about 10s. Plan to continue to progress as tolerated.       Chinyere Is progressing well towards her goals.     Pt prognosis is Good.     Pt will continue to benefit from skilled outpatient physical therapy to address the deficits listed in the problem list box on initial evaluation, provide pt/family education and to maximize pt's level of independence in the home and community environment.     Pt's spiritual, cultural and educational needs considered and pt agreeable to plan of care and goals.     Anticipated Barriers for therapy: Transportation     Goals:  Short Term Goals:  6 weeks (Progressing, not met)  1.Report decreased left knee pain  < / =  2/10  to increase tolerance for ADL's, walking, sit to stands, stairs  2. Increase knee ROM to 5/0/130 in order to be able to perform ADLs without difficulty.  3. Increase strength by 1/3 MMT grade in left lower extremity to increase tolerance for ADL and work activities.  4.  Pt to tolerate HEP to improve ROM and independence with ADL's     Long Term Goals: 12 weeks (Progressing, not met)  1.Report decreased left knee pain < / = 0/10  to increase tolerance for ADL's, walking, sit to stands, stairs  2.Patient goal: Pt would like to go on neighbor dickson walks without pain  3.Increase strength to >/= 4+/5 in left lower extremity to increase tolerance for ADL and work activities.  4. Pt will improve FOTO knee survey score to </= 60% in order to demo improved functional mobility  5. Pt will perform TUG in < 10 seconds without AD in order to demo improved gait speed  6. Pt will perform at least 10 sit to stands without UE support on 30 second sit to stand test in order to demo improved ability to perform transfers   Plan      Plan of care Certification: 10/16/2023 to 1/8/2023.    Continue with PT plan of care with emphasis on knee ROM and quad strength    Roney Goldman, PT, DPT

## 2023-11-08 ENCOUNTER — OFFICE VISIT (OUTPATIENT)
Dept: PSYCHIATRY | Facility: OTHER | Age: 48
End: 2023-11-08
Payer: OTHER GOVERNMENT

## 2023-11-08 ENCOUNTER — CLINICAL SUPPORT (OUTPATIENT)
Dept: REHABILITATION | Facility: OTHER | Age: 48
End: 2023-11-08
Payer: OTHER GOVERNMENT

## 2023-11-08 DIAGNOSIS — F41.9 ANXIETY: Primary | ICD-10-CM

## 2023-11-08 DIAGNOSIS — M25.662 DECREASED RANGE OF MOTION (ROM) OF LEFT KNEE: Primary | ICD-10-CM

## 2023-11-08 DIAGNOSIS — F33.1 MODERATE EPISODE OF RECURRENT MAJOR DEPRESSIVE DISORDER: ICD-10-CM

## 2023-11-08 PROCEDURE — 99499 UNLISTED E&M SERVICE: CPT | Mod: ,,, | Performed by: STUDENT IN AN ORGANIZED HEALTH CARE EDUCATION/TRAINING PROGRAM

## 2023-11-08 PROCEDURE — 97110 THERAPEUTIC EXERCISES: CPT | Mod: PN

## 2023-11-08 PROCEDURE — 97112 NEUROMUSCULAR REEDUCATION: CPT | Mod: PN

## 2023-11-08 PROCEDURE — 97530 THERAPEUTIC ACTIVITIES: CPT | Mod: PN

## 2023-11-08 PROCEDURE — 99499 NO LOS: ICD-10-PCS | Mod: ,,, | Performed by: STUDENT IN AN ORGANIZED HEALTH CARE EDUCATION/TRAINING PROGRAM

## 2023-11-08 NOTE — PROGRESS NOTES
"Type of service: Individual    Content of session: In today's session pt discussed her improved boundary setting with her mother and the impact it has had on her mental health. Pt has not been as active in her job search as she wants to be. Talked about how this impacts and is impacted by anxiety and depression. Discussed coping skills for anxiety and depression. Pt also would like to continue to improve her social skills to expand her network of friends outside of her relationship with her . Pt reports that he knee is continuing to improve post surgery which is also put her in a "good place mentally". Today's therapeutic efforts included using open ended questions, rephrasing, and reflection. The pt was given emotional support, structure and unconditional positive regard. Will follow up next week.     Therapeutic techniques and approaches: behavior modification and supportive counseling. Rationale: appropriate for presenting issues.     Pt denies SI/HI. Mood was euthymic, affect matches verbal content. AAOx3, participated fully in session.     Time spent in counselinmins      Leola Dobson, Social Work Intern 2023 5:06 PM   "

## 2023-11-08 NOTE — PROGRESS NOTES
[facility]   Physical Therapy Treatment Note     Name: Chinyere Hollis [facility]  Clinic Number: [id number]    Therapy Diagnosis:   Encounter Diagnosis   Name Primary?    Decreased range of motion (ROM) of left knee Yes       Physician: Yonis aMce PA-C    Visit Date: 11/8/2023    Physician Orders: PT Eval and Treat   Medical Diagnosis from Referral:   M22.42 (ICD-10-CM) - Chondromalacia, patella, left   M23.042 (ICD-10-CM) - Cyst of anterior horn of lateral meniscus of left knee      Evaluation Date: 10/16/2023  Authorization Period Expiration: 10/5/2024  Plan of Care Expiration: 1/8/2024  Progress Note Due: 11/16/2023  Date of Surgery: 10/12/2023  Visit # / Visits authorized: 5/ 20   FOTO: 0/ 3     Precautions: Standard      Time In: 3:30  Time Out: 4:30  Total Billable Time: 60 minutes     1. Left Knee Arthroscopy, with meniscectomy (medial OR lateral) 94596  2.  Left Knee Arthroscopy, knee, synovectomy, limited 70953 (cyclops lesion)    SUBJECTIVE     Pt reports: She is feeling great, and has no pain    She was compliant with home exercise program.  Response to previous treatment: Increased knee ROM  Functional change: Progressing    Pain: 0/10  Location: left knee     OBJECTIVE     Objective Measures updated at progress report unless specified.   Knee Passive Range of Motion:    Right  Left    Flexion 130 130   Extension +5 +3       Treatment       Chinyere received the treatments listed below:      Therapeutic exercises to develop strength, endurance, ROM, and flexibility for 16 minutes including:    Bike 6 min  Clams 3x10 RTB  Bridges 3x10     Neuromuscular re-education activities to improve: Coordination, Kinesthetic, Sense, and Proprioception for 18 minutes. The following activities were included:    SAQ 3x15 7#  SLR 3x15  SL balance 3x20s      Manual therapy techniques: Joint mobilizations were applied to the: Left knee for 0 minutes, including:  Anterior hinge glide   Patellar  mobilizations globally    Therapeutic activities to improve functional performance for 26 minutes, including:    Sit to stands 3x10 - left leg back  Step ups 8in 2x10  LAQ 3x15 7#  Standing Heel raises 2x20  Shuttle 2x12  DL - 2 cords  SL - 2 cords      Gait training to improve functional mobility and safety for 00  minutes, including:  Cones step overs 3x8 - cues for knee extension and knee flexion     Patient Education and Home Exercises     Home Exercises Provided and Patient Education Provided     Education provided:   - HEP    Written Home Exercises Provided: Patient instructed to cont prior HEP. Exercises were reviewed and Chinyere was able to demonstrate them prior to the end of the session.  Chinyere demonstrated good  understanding of the education provided. See EMR under Patient Instructions for exercises provided during therapy sessions    ASSESSMENT     Chinyere was challenged with shuttle exercise and with quad strengthening exercises. She had no pain during the session, but did report fatigue during the session. She required cues for SLR to maintain full extension. She is walking with less compensation and with full terminal knee extension. Plan to continue to progress as tolerated.       Chinyere Is progressing well towards her goals.     Pt prognosis is Good.     Pt will continue to benefit from skilled outpatient physical therapy to address the deficits listed in the problem list box on initial evaluation, provide pt/family education and to maximize pt's level of independence in the home and community environment.     Pt's spiritual, cultural and educational needs considered and pt agreeable to plan of care and goals.     Anticipated Barriers for therapy: Transportation     Goals:  Short Term Goals:  6 weeks (Progressing, not met)  1.Report decreased left knee pain  < / =  2/10  to increase tolerance for ADL's, walking, sit to stands, stairs  2. Increase knee ROM to 5/0/130 in order to be able to perform ADLs  without difficulty.  3. Increase strength by 1/3 MMT grade in left lower extremity to increase tolerance for ADL and work activities.  4. Pt to tolerate HEP to improve ROM and independence with ADL's     Long Term Goals: 12 weeks (Progressing, not met)  1.Report decreased left knee pain < / = 0/10  to increase tolerance for ADL's, walking, sit to stands, stairs  2.Patient goal: Pt would like to go on neighbor dickson walks without pain  3.Increase strength to >/= 4+/5 in left lower extremity to increase tolerance for ADL and work activities.  4. Pt will improve FOTO knee survey score to </= 60% in order to demo improved functional mobility  5. Pt will perform TUG in < 10 seconds without AD in order to demo improved gait speed  6. Pt will perform at least 10 sit to stands without UE support on 30 second sit to stand test in order to demo improved ability to perform transfers   Plan      Plan of care Certification: 10/16/2023 to 1/8/2023.    Continue with PT plan of care with emphasis on knee ROM and quad strength    Roney Goldman, PT, DPT

## 2023-11-09 ENCOUNTER — OFFICE VISIT (OUTPATIENT)
Dept: PAIN MEDICINE | Facility: CLINIC | Age: 48
End: 2023-11-09
Payer: OTHER GOVERNMENT

## 2023-11-09 VITALS — BODY MASS INDEX: 35.57 KG/M2 | HEIGHT: 66 IN | RESPIRATION RATE: 18 BRPM | WEIGHT: 221.31 LBS | OXYGEN SATURATION: 100 %

## 2023-11-09 DIAGNOSIS — G89.29 CHRONIC PAIN OF LEFT KNEE: ICD-10-CM

## 2023-11-09 DIAGNOSIS — M25.562 CHRONIC PAIN OF LEFT KNEE: ICD-10-CM

## 2023-11-09 PROCEDURE — 99213 OFFICE O/P EST LOW 20 MIN: CPT | Mod: PBBFAC | Performed by: NURSE PRACTITIONER

## 2023-11-09 PROCEDURE — 99999 PR PBB SHADOW E&M-EST. PATIENT-LVL III: CPT | Mod: PBBFAC,,, | Performed by: NURSE PRACTITIONER

## 2023-11-09 PROCEDURE — 99213 PR OFFICE/OUTPT VISIT, EST, LEVL III, 20-29 MIN: ICD-10-PCS | Mod: S$PBB,,, | Performed by: NURSE PRACTITIONER

## 2023-11-09 PROCEDURE — 99999 PR PBB SHADOW E&M-EST. PATIENT-LVL III: ICD-10-PCS | Mod: PBBFAC,,, | Performed by: NURSE PRACTITIONER

## 2023-11-09 PROCEDURE — 99213 OFFICE O/P EST LOW 20 MIN: CPT | Mod: S$PBB,,, | Performed by: NURSE PRACTITIONER

## 2023-11-09 RX ORDER — CYCLOBENZAPRINE HCL 10 MG
TABLET ORAL
Qty: 60 TABLET | Refills: 5 | Status: SHIPPED | OUTPATIENT
Start: 2023-11-09

## 2023-11-09 RX ORDER — DICLOFENAC SODIUM 50 MG/1
50 TABLET, DELAYED RELEASE ORAL 2 TIMES DAILY PRN
Qty: 60 TABLET | Refills: 5 | Status: SHIPPED | OUTPATIENT
Start: 2023-11-09

## 2023-11-09 NOTE — PROGRESS NOTES
Chronic Pain - Established patient - Follow Up     Referring Physician: No ref. provider found    Chief Complaint:   Chief Complaint   Patient presents with    Knee Pain    Hip Pain       SUBJECTIVE: Disclaimer: This note has been generated using voice-recognition software. There may be typographical errors that have been missed during proof-reading        11/9/2023     8:32 AM 3/1/2023     9:35 AM 1/23/2023     9:12 AM   Last 3 PDI Scores   Pain Disability Index (PDI) 12 12 15     Interval History 11/9/2023:  The patient presents today for bilateral hip pain. She says that she has overall been maintained on Voltaren and Flexeril PRN. She reports a long standing history of bursitis to her hip, left worse than right. She has tried ice and heat without much benefit. She says that she cannot currently receive steroid injections because of a complication with her mom and steroids. Her pain today is 2/10.     Interval History 3/1/2023:  Mrs Crockett presents for follow up evaluation of B hip pain. She is s/p B GTB injection with significant benefit and pain control to the areas. She is having bad day with LOPEZ today. She is pleased with progress of GTB injection which are improving ambulation and functioning.     Interval History 1/23/2023:  Mrs Crockett presents for follow up and having exacerbated B, lateral hip pain. She has had GTB injections done by Orthopedic in past. She denies newer areas of pain or neurological changes. She is taking Flexeril 10mg qhs PRN with benefit and no significant SE of medication. She did additionally strain shoulder but this has improved.     Interval History 7/11/2022:  Mrs Crockett presents for follow up. HA manageable and correlated to pressure change in weather, states they are mild and constant. Overall found benefit from FRP. Continues to take diclofenac and flexeril qhs with benefit and denies SE of medications.     Interval History 4/11/2022:  Mrs Crockett presents for follow  up of pain complaints. She states doing very well with FRP and pleased with progress. She continues to take Diclofenac and Flexeril. She has noticed since prior visit having more daily functionality She states SE and never got over Nausea from Cymbalta and discussed she does not ever wish to be on this medication again.She does voice frustation with HA mgt regarding messaging to Neurology and prescriptions.     Interval History 2/1/2022:  Mrs Crockett presents for follow up. She has been attending Therapy for Right shoulder and while she endorses benefit from PT at times PT sets her back and exacerbates pain. She states this Thursday will be her last day for PT. And again while she has found progress she is not where she would like to be with functioning and pain. She does not wish to have surgical intervention or CSI at this time. She is frustrated with diffuse pain complaints and decreased daily functioning. She is taking Flexeril more frequently at this time but feels brain fog at times during the day. Diclofenac has been beneficial for her. She denies new areas of pain, denies neurological changes.     Interval History 12/21/2021:  The Pt presents for follow up of Right shoulder pain. She states doing fair and but continued scapular pain and shoulder pain with even cooking activities. She will be starting Shoulder PT in January. Does not wish to pursue CSI or surgical intervention at this time without giving PT a try 1st. She is currently taking diclofenac with benefit and denies SE of medications.     Interval History 11/29/2021:  Pt presents to clinic for follow up of low back pain and new right sided scapula pain. Pt reports that her low back pain had improved with a combination of physical therapy and meloxicam. Unfortunately pt reports she had some GI upset and tried oral diclofenac with some relief.   Additionally pt reports right shoulder pain associated with right upper back spasms. Her spasms are  associated with any movement of the shoulder. She denies any neck pain, arm pain or any feelings of numbness. She reports that her symptoms are relieved by ice.     Initial encounter 08/25/2021:  Chinyere Crockett presents to the clinic for the evaluation of low back pain. The pain started 3 years ago following a fall at work and symptoms have been worsening. She slipped on a wet floor and fell in a twisting motion onto her back while striking a mop bucket. Since that time she has dealt with hip and back pain. The hip pain is treated with GTB injections by Dr. Zazueta with sports medicine. She receives the injections every 3-4 months. When she feels the injection's effect wear off, she begins to notice her back pain worsening.     Brief history:    Pain Description:    The pain is located in the Left lower back area and does not radiate.      At BEST  4/10     At WORST  8/10 on the WORST day.      On average pain is rated as 6/10.     Today the pain is rated as 6/10    The pain is described as aching and burning      Symptoms interfere with daily activity, sleeping and work.     Exacerbating factors: Extension and rotation.      Mitigating factors heat, ice, medications and dry needling.     Patient denies night fever/night sweats, urinary incontinence, bowel incontinence, significant weight loss, significant motor weakness and loss of sensations.  Patient denies any suicidal or homicidal ideations    Pain Medications:  Current:  Flexeril 10mg qHS PRN  Tylenol   OTC NSAIDs      Tried in Past:  NSAIDs - Mobic  TCA -Never  SNRI -Never  Anti-convulsants -Never  Muscle Relaxants -Never  Opioids-Never  Benzodiazepines -Never    Physical Therapy/Home Exercise:   No, most recently in Jan 2021  Not consistent with HEP       report:  Reviewed and consistent with medication use as prescribed.    Pain Procedures:   GTB injections with sports medicine  2/14/2023 B GTB injection- 80% relief for 7 months    Chiropractor  -yes  Acupuncture - never  TENS unit -never  Spinal decompression -never  Joint replacement -never    Imaging:    MRI SHOULDER WITHOUT CONTRAST RIGHT 12/8/2021     CLINICAL HISTORY:  Pain in right shoulderShoulder pain, rotator cuff disorder suspected, xray done;     TECHNIQUE:  MRI of right shoulder was performed on a 1.5T magnet utilizing the following sequences: Localizer; axial T2 FS; coronal T2 FS and PD FS; sagittal T1, T2 FS and PD FS.     COMPARISON:  None     FINDINGS:  Rotator cuff: There is some attenuation of caliber of the insertional fibers of the supraspinatus and infraspinatus tendons and some irregularity and abnormal signal along the bursal surface suggesting partial-thickness bursal sided tear involving approximately 50% of the tendon thickness.  Tear measures 2 cm in AP diameter.  Subscapularis tendon intact.Muscle bulk preserved.     Biceps: Long head biceps tendon is intact.     Labrum: Glenoid labrum is intact.     Glenohumeral Joint: There is no fracture or significant articular cartilage loss.  Bone marrow signal is normal.     Acromioclavicular joint: The AC joint is unremarkable.     Misc: There is no evidence of bursitis.     Impression:     Partial-thickness bursal sided tear of the supraspinatus and infraspinatus tendons measuring 2 cm in size.    XR L-Spine 09/27/2021  FINDINGS:  There is rightward curvature of the lumbar spine with discogenic change and lower facet hypertrophy.  Vertebral body heights appear maintained.  No significant translation.  No evidence for lytic or blastic lesion.     Impression:     As above.     XR L-Spine 9/23/2020  FINDINGS:  Scoliosis convex to the right in the lumbar region is appreciated, alignment otherwise appearing unremarkable.  Vertebral body heights are normally maintained, without significant compression deformity at any level.  No significant disc narrowing.  The AP projection strongly suggests some prominent left-sided L4-5 facet arthropathy.   Minimal marginal vertebral endplate spurring is seen at a few thoracolumbar levels.  Vertebral endplates are well defined.  No osseous destructive process.  SI joints appear unremarkable.     Impression:     Lumbar dextroscoliosis and prominent left-sided facet arthropathy at L4-5.  No evidence of compression fracture.    XR hip/pelvis 9/23/2021   FINDINGS:  No acute fracture of the visualized lower lumbar spine, bony pelvis, or proximal femurs.  Preserved right and left femoral head contours.  Intact right and left SI joints and hip joint spaces.  Pelvic densities felt related to phleboliths unless concern for otherwise.  Some asymmetric radiodensity projects to the left side of the L4-L5 disc space that could be further evaluated with lumbar spine radiographs.     Impression:     No acute fracture.    History reviewed. No pertinent past medical history.  Past Surgical History:   Procedure Laterality Date    INJECTION OF JOINT Bilateral 2/14/2023    Procedure: INJECTION, JOINT BILATERAL GTB CONTRAST;  Surgeon: Kristin Lakhani MD;  Location: [facility];  Service: Pain Management;  Laterality: Bilateral;    KNEE ARTHROSCOPY W/ MENISCECTOMY Left 10/12/2023    Procedure: ARTHROSCOPY, KNEE, WITH MENISCECTOMY;  Surgeon: Taina Lorenz MD;  Location: [facility];  Service: Orthopedics;  Laterality: Left;  lateral meniscectomy    SYNOVECTOMY OF KNEE Left 10/12/2023    Procedure: ARTHROSCOPIC SYNOVECTOMY, KNEE;  Surgeon: Taina Lorenz MD;  Location: [facility];  Service: Orthopedics;  Laterality: Left;    TONSILLECTOMY       Social History     Socioeconomic History    Marital status:    Tobacco Use    Smoking status: Never    Smokeless tobacco: Never   Substance and Sexual Activity    Alcohol use: Yes     Comment: occasionally    Drug use: Not Currently     Types: Marijuana    Sexual activity: Yes     Partners: Male     Birth control/protection: None     Family History   Problem Relation Age of Onset    COPD  Mother     Breast cancer Mother 72    Heart attack Father     Heart disease Paternal Grandfather         ?quad bypass    Heart disease Paternal Uncle         quad bypass    Cancer Neg Hx     Colon cancer Neg Hx     Diabetes Neg Hx     Eclampsia Neg Hx     Hypertension Neg Hx     Miscarriages / Stillbirths Neg Hx     Ovarian cancer Neg Hx      labor Neg Hx     Stroke Neg Hx        Review of patient's allergies indicates:   Allergen Reactions    Duloxetine      Vs tizanidine unclear reaction, pt disliked?    Hydrocodone      Dizziness and nausea     Tizanidine      Unclear negative reaction vs duloxetine       Current Outpatient Medications   Medication Sig    aspirin (ECOTRIN) 81 MG EC tablet Take 1 tablet (81 mg total) by mouth once daily.    cyclobenzaprine (FLEXERIL) 10 MG tablet TAKE 1 TABLET(10 MG) BY MOUTH TWICE DAILY AS NEEDED FOR MUSCLE SPASMS    diclofenac (VOLTAREN) 50 MG EC tablet Take 1 tablet (50 mg total) by mouth 2 (two) times daily as needed (pain/swelling).    ergocalciferol (ERGOCALCIFEROL) 50,000 unit Cap Take 1 capsule (50,000 Units total) by mouth every 7 days. (Patient not taking: Reported on 2023)    traMADoL (ULTRAM) 50 mg tablet Take 1 tablet (50 mg total) by mouth every 6 (six) hours. (Patient not taking: Reported on 2023)     Current Facility-Administered Medications   Medication    triamcinolone acetonide injection 40 mg    triamcinolone acetonide injection 40 mg     Facility-Administered Medications Ordered in Other Visits   Medication    0.9%  NaCl infusion       REVIEW OF SYSTEMS:    GENERAL:  No weight loss, malaise or fevers.  HEENT:   No recent changes in vision or hearing  NECK:  Negative for lumps, no difficulty with swallowing.  RESPIRATORY:  Negative for cough, wheezing or shortness of breath, patient denies any recent URI.  CARDIOVASCULAR:  Negative for chest pain, leg swelling or palpitations.  GI:  Negative for abdominal discomfort, blood in stools or black  "stools or change in bowel habits.  MUSCULOSKELETAL:  See HPI.  SKIN:  Negative for lesions, rash, and itching.  PSYCH:  No mood disorder or recent psychosocial stressors.  Patients sleep is not disturbed secondary to pain.  HEMATOLOGY/LYMPHOLOGY:  Negative for prolonged bleeding, bruising easily or swollen nodes.  Patient is not currently taking any anti-coagulants  ENDO: No history of diabetes or thyroid dysfunction  NEURO:   No history of headaches, syncope, paralysis, seizures or tremors.  All other reviewed and negative other than HPI.    OBJECTIVE:    Resp 18   Ht 5' 6" (1.676 m)   Wt 100.4 kg (221 lb 5.5 oz)   LMP 03/16/2018 (LMP Unknown) Comment: spotting/ short periord 3/16/18  SpO2 100%   BMI 35.73 kg/m²       PHYSICAL EXAMINATION:  Voice normal.  Normal affect without evidence of distress.  Psych: tearful at times and voices frustration with continued decreased functioning and pain.   Gait: sitting in chair without difficulty     Prior PHYSICAL EXAMINATION:    GENERAL: Well appearing, in no acute distress, alert and oriented x3.  PSYCH:  Mood and affect appropriate.  SKIN: Skin color, texture, turgor normal, no rashes or lesions.  HEAD/FACE:  Normocephalic, atraumatic. Cranial nerves grossly intact.  PULM: No evidence of respiratory difficulty, symmetric chest rise.  GI:  Soft and non-tender.  BACK: Straight leg raising in the supine position is negative to radicular pain.. No tenderness over spinous processes. Normal range of motion. Pain reproduction with lateral rotation and extension.  EXTREMITIES: TTP over bilateral GTB. Painful extension of right hip. Negative Jose's test bilaterally.  MUSCULOSKELETAL: Bilateral lower extremity strength is normal and symmetric.  No atrophy or tone abnormalities are noted.   NEURO: Bilateral upper and lower extremity coordination and muscle stretch reflexes are physiologic and symmetric.  Plantar response are downgoing. No clonus.  No loss of sensation is " noted.  GAIT: Normal.    Lab Results   Component Value Date    WBC 7.59 07/11/2023    HGB 14.2 07/11/2023    HCT 43.7 07/11/2023    MCV 94 07/11/2023     07/11/2023       BMP  Lab Results   Component Value Date     07/11/2023    K 3.8 07/11/2023     07/11/2023    CO2 24 07/11/2023    BUN 11 07/11/2023    CREATININE 0.8 07/11/2023    CALCIUM 9.9 07/11/2023    ANIONGAP 11 07/11/2023    ESTGFRAFRICA >60 08/19/2021    EGFRNONAA >60 08/19/2021     Lab Results   Component Value Date    HGBA1C 4.9 07/11/2023       ASSESSMENT: 48 y.o. year old female with pain, consistent with     No diagnosis found.          PLAN:   - Prior records reviewed     - Prior imaging reviewed    - I recommended GTB injections which she declined because she would like to avoid steroids at this time.    - ContinueFlexeril 10mg qhs.    - Continue HEP at this time.    - RTC PRN    The above plan and management options were discussed at length with patient. Patient is in agreement with the above and verbalized understanding. It will be communicated with the referring physician via electronic record, fax, or mail.       Telma Valadez    11/09/2023

## 2023-11-13 ENCOUNTER — CLINICAL SUPPORT (OUTPATIENT)
Dept: REHABILITATION | Facility: OTHER | Age: 48
End: 2023-11-13
Payer: OTHER GOVERNMENT

## 2023-11-13 DIAGNOSIS — M25.662 DECREASED RANGE OF MOTION (ROM) OF LEFT KNEE: Primary | ICD-10-CM

## 2023-11-13 PROCEDURE — 97530 THERAPEUTIC ACTIVITIES: CPT | Mod: PN

## 2023-11-13 PROCEDURE — 97110 THERAPEUTIC EXERCISES: CPT | Mod: PN

## 2023-11-13 PROCEDURE — 97112 NEUROMUSCULAR REEDUCATION: CPT | Mod: PN

## 2023-11-13 NOTE — PROGRESS NOTES
[facility]   Physical Therapy Treatment Note     Name: Chinyere Hollis [facility]  Clinic Number: [id number]    Therapy Diagnosis:   Encounter Diagnosis   Name Primary?    Decreased range of motion (ROM) of left knee Yes       Physician: Yonis Mace PA-C    Visit Date: 11/13/2023    Physician Orders: PT Eval and Treat   Medical Diagnosis from Referral:   M22.42 (ICD-10-CM) - Chondromalacia, patella, left   M23.042 (ICD-10-CM) - Cyst of anterior horn of lateral meniscus of left knee      Evaluation Date: 10/16/2023  Authorization Period Expiration: 10/5/2024  Plan of Care Expiration: 1/8/2024  Progress Note Due: 11/16/2023  Date of Surgery: 10/12/2023  Visit # / Visits authorized: 6/ 20   FOTO: 0/ 3     Precautions: Standard      Time In: 3:30  Time Out: 4:30  Total Billable Time: 60 minutes     1. Left Knee Arthroscopy, with meniscectomy (medial OR lateral) 16268  2.  Left Knee Arthroscopy, knee, synovectomy, limited 68470 (cyclops lesion)    SUBJECTIVE     Pt reports: Has been feeling really good    She was compliant with home exercise program.  Response to previous treatment: Increased knee ROM  Functional change: Progressing    Pain: 0/10  Location: left knee     OBJECTIVE     Objective Measures updated at progress report unless specified.   Knee Passive Range of Motion:    Right  Left    Flexion 130 130   Extension +5 +3       Treatment       Chinyere received the treatments listed below:      Therapeutic exercises to develop strength, endurance, ROM, and flexibility for 15 minutes including:    Bike 6 min  Clams 3x10 RTB  Bridges 3x10     Neuromuscular re-education activities to improve: Coordination, Kinesthetic, Sense, and Proprioception for 19 minutes. The following activities were included:    SAQ 3x15 7#  SLR 3x15 1#  SL balance 3x15 ball toss      Manual therapy techniques: Joint mobilizations were applied to the: Left knee for 0 minutes, including:  Anterior hinge glide    Patellar mobilizations globally    Therapeutic activities to improve functional performance for 26 minutes, including:    Sit to stands 3x10 - left leg back  Step ups 8in 3x10  Standing Heel raises 2x20  Shuttle 3x12  DL - 4 cords  SL - 2 cords  Leg Extension 3x12 25#  Leg curls 3x12 25#      Gait training to improve functional mobility and safety for 00  minutes, including:  Cones step overs 3x8 - cues for knee extension and knee flexion     Patient Education and Home Exercises     Home Exercises Provided and Patient Education Provided     Education provided:   - HEP    Written Home Exercises Provided: Patient instructed to cont prior HEP. Exercises were reviewed and Chinyere was able to demonstrate them prior to the end of the session.  Chinyere demonstrated good  understanding of the education provided. See EMR under Patient Instructions for exercises provided during therapy sessions    ASSESSMENT     Chinyere was progressed with quad strengthening exercises and tolerated the session well. She was able perform leg extensions at the machine as well as increased weight with SLR. She did not report any pain with the exercises. She will do for f/u later this week.       Chinyere Is progressing well towards her goals.     Pt prognosis is Good.     Pt will continue to benefit from skilled outpatient physical therapy to address the deficits listed in the problem list box on initial evaluation, provide pt/family education and to maximize pt's level of independence in the home and community environment.     Pt's spiritual, cultural and educational needs considered and pt agreeable to plan of care and goals.     Anticipated Barriers for therapy: Transportation     Goals:  Short Term Goals:  6 weeks (Progressing, not met)  1.Report decreased left knee pain  < / =  2/10  to increase tolerance for ADL's, walking, sit to stands, stairs  2. Increase knee ROM to 5/0/130 in order to be able to perform ADLs without difficulty.  3. Increase  strength by 1/3 MMT grade in left lower extremity to increase tolerance for ADL and work activities.  4. Pt to tolerate HEP to improve ROM and independence with ADL's     Long Term Goals: 12 weeks (Progressing, not met)  1.Report decreased left knee pain < / = 0/10  to increase tolerance for ADL's, walking, sit to stands, stairs  2.Patient goal: Pt would like to go on neighbor dickson walks without pain  3.Increase strength to >/= 4+/5 in left lower extremity to increase tolerance for ADL and work activities.  4. Pt will improve FOTO knee survey score to </= 60% in order to demo improved functional mobility  5. Pt will perform TUG in < 10 seconds without AD in order to demo improved gait speed  6. Pt will perform at least 10 sit to stands without UE support on 30 second sit to stand test in order to demo improved ability to perform transfers   Plan      Plan of care Certification: 10/16/2023 to 1/8/2023.    Continue with PT plan of care with emphasis on knee ROM and quad strength    Roney Goldman, PT, DPT

## 2023-11-15 ENCOUNTER — OFFICE VISIT (OUTPATIENT)
Dept: SPORTS MEDICINE | Facility: CLINIC | Age: 48
End: 2023-11-15
Payer: OTHER GOVERNMENT

## 2023-11-15 VITALS
WEIGHT: 224 LBS | HEART RATE: 96 BPM | HEIGHT: 66 IN | BODY MASS INDEX: 36 KG/M2 | DIASTOLIC BLOOD PRESSURE: 83 MMHG | SYSTOLIC BLOOD PRESSURE: 132 MMHG

## 2023-11-15 DIAGNOSIS — Z98.890 S/P LEFT KNEE ARTHROSCOPY: Primary | ICD-10-CM

## 2023-11-15 PROCEDURE — 99213 OFFICE O/P EST LOW 20 MIN: CPT | Mod: PBBFAC | Performed by: PHYSICIAN ASSISTANT

## 2023-11-15 PROCEDURE — 99024 PR POST-OP FOLLOW-UP VISIT: ICD-10-PCS | Mod: ,,, | Performed by: PHYSICIAN ASSISTANT

## 2023-11-15 PROCEDURE — 99999 PR PBB SHADOW E&M-EST. PATIENT-LVL III: CPT | Mod: PBBFAC,,, | Performed by: PHYSICIAN ASSISTANT

## 2023-11-15 PROCEDURE — 99999 PR PBB SHADOW E&M-EST. PATIENT-LVL III: ICD-10-PCS | Mod: PBBFAC,,, | Performed by: PHYSICIAN ASSISTANT

## 2023-11-15 PROCEDURE — 99024 POSTOP FOLLOW-UP VISIT: CPT | Mod: ,,, | Performed by: PHYSICIAN ASSISTANT

## 2023-11-15 NOTE — PROGRESS NOTES
"POST-OPERATIVE EXAMINATION    48 y.o. Female who returns for follow after surgery. She is 5 weeks s/p    Procedure:  1.  Left Knee Arthroscopy, with meniscectomy (medial OR lateral) 33053  2.  Left Knee Arthroscopy, knee, synovectomy, limited 76304 (cyclops lesion)     She is doing well without any issues.  She has had complete resolution of her preoperative symptoms and has no complaints regarding her left knee today.  She denies having any instability.      PHYSICAL EXAMINATION:  /83   Pulse 96   Ht 5' 6" (1.676 m)   Wt 101.6 kg (223 lb 15.8 oz)   LMP 03/16/2018 (LMP Unknown) Comment: spotting/ short periord 3/16/18  BMI 36.15 kg/m²   General: Well-developed well-nourished 48 y.o. female in no acute distress   Cardiovascular: Regular rhythm   Lungs: No labored breathing or wheezing appreciated   Neuro: Alert and oriented ×3   Psychiatric: well oriented to person, place and time, demonstrates normal mood and affect   Skin: No rashes, lesions or ulcers, normal temperature, turgor, and texture on involved extremity    ORTHOPEDIC EXAM:  Normal post-operative swelling  Normal post-operative scarring  Strength: Grossly intact  ROM: WNL  Tests: None today    ASSESSMENT:      ICD-10-CM ICD-9-CM   1. S/P left knee arthroscopy  Z98.890 V45.89           PLAN:       Complete physical therapy  Continue HEP  RTC as needed              "

## 2023-11-29 ENCOUNTER — OFFICE VISIT (OUTPATIENT)
Dept: PSYCHIATRY | Facility: OTHER | Age: 48
End: 2023-11-29
Payer: OTHER GOVERNMENT

## 2023-11-29 DIAGNOSIS — F41.9 ANXIETY: Primary | ICD-10-CM

## 2023-11-29 PROCEDURE — 99499 NO LOS: ICD-10-PCS | Mod: ,,, | Performed by: STUDENT IN AN ORGANIZED HEALTH CARE EDUCATION/TRAINING PROGRAM

## 2023-11-29 PROCEDURE — 99499 UNLISTED E&M SERVICE: CPT | Mod: ,,, | Performed by: STUDENT IN AN ORGANIZED HEALTH CARE EDUCATION/TRAINING PROGRAM

## 2023-11-29 NOTE — PROGRESS NOTES
Type of service: Individual    Content of session: In today's session, the pt spoke about feelings of loneliness that she anticipates once her  is away for a month. The precipitants of certain lonely feelings were explored today. A goal for the pt is to improve social skills and improve her routine when he is away. Pt discussed how she has less time for herself and struggles to stick to her schedule when her  is around more. Discussed how this impacts and is impacted by anxiety. Talked about different anxiety coping skills. Pt reported that her boundary setting has improved and she is more aware of her boom and bust cycle in relation to her crafting. Pt was given emotional support and unconditional positive regard. Will follow up next week.     Therapeutic techniques and approaches: behavior modification and supportive counseling. Rationale: appropriate for presenting issues.     Pt denies SI/HI. Mood was euthymic, affect matches verbal content. AAOx3, participated fully in session.     Time spent in counselinmins      Leola Dobson, Social Work Intern 2023 5:01 PM

## 2023-12-06 ENCOUNTER — OFFICE VISIT (OUTPATIENT)
Dept: PSYCHIATRY | Facility: OTHER | Age: 48
End: 2023-12-06
Payer: OTHER GOVERNMENT

## 2023-12-06 DIAGNOSIS — F41.9 ANXIETY: Primary | ICD-10-CM

## 2023-12-06 PROCEDURE — 99499 NO LOS: ICD-10-PCS | Mod: ,,, | Performed by: STUDENT IN AN ORGANIZED HEALTH CARE EDUCATION/TRAINING PROGRAM

## 2023-12-06 PROCEDURE — 99499 UNLISTED E&M SERVICE: CPT | Mod: ,,, | Performed by: STUDENT IN AN ORGANIZED HEALTH CARE EDUCATION/TRAINING PROGRAM

## 2023-12-08 NOTE — PROGRESS NOTES
Type of service: Individual    Content of session: In today's termination session, a goal for the pt was to reflect on progress and changes made over the last year of work together. The pt feels that she's improved her insight and judgement in regard to her relationship with family members. Talked about how this impacts and is impacted by anxiety. The precipitants of certain anxious feelings and behaviors today were explored with the pt. Discussed anxiety coping skills that the pt is going to carry forward after therapy. Pt also discussed her desire to continue to improve her social skills and to get out of the house more often. The pt was given encouragement regarding her ability to handle future situations now that she has the tools to manage her stress and feelings of fear. Today's therapeutic efforts included the use of open ended questions, rephrasing, and reflection. The pt was given emotional support and unconditional positive regard.     Therapeutic techniques and approaches: behavior modification and supportive counseling. Rationale: appropriate for presenting issues.     Pt denies SI/HI. Mood was euthymic, affect matches verbal content. AAOx3, participated fully in session.     Time spent in counselinmins      Leola Dobson, Social Work Intern 2023 1:37 PM

## 2024-01-11 ENCOUNTER — LAB VISIT (OUTPATIENT)
Dept: LAB | Facility: OTHER | Age: 49
End: 2024-01-11
Attending: STUDENT IN AN ORGANIZED HEALTH CARE EDUCATION/TRAINING PROGRAM
Payer: OTHER GOVERNMENT

## 2024-01-11 ENCOUNTER — OFFICE VISIT (OUTPATIENT)
Dept: INTERNAL MEDICINE | Facility: CLINIC | Age: 49
End: 2024-01-11
Payer: OTHER GOVERNMENT

## 2024-01-11 VITALS
HEIGHT: 66 IN | SYSTOLIC BLOOD PRESSURE: 128 MMHG | WEIGHT: 230.63 LBS | OXYGEN SATURATION: 98 % | DIASTOLIC BLOOD PRESSURE: 78 MMHG | HEART RATE: 104 BPM | BODY MASS INDEX: 37.06 KG/M2

## 2024-01-11 DIAGNOSIS — M25.50 POLYARTHRALGIA: Primary | ICD-10-CM

## 2024-01-11 DIAGNOSIS — Z00.00 PREVENTATIVE HEALTH CARE: ICD-10-CM

## 2024-01-11 DIAGNOSIS — E55.9 VITAMIN D DEFICIENCY: ICD-10-CM

## 2024-01-11 DIAGNOSIS — F41.9 ANXIETY: ICD-10-CM

## 2024-01-11 DIAGNOSIS — M25.50 POLYARTHRALGIA: ICD-10-CM

## 2024-01-11 DIAGNOSIS — Z12.11 SCREENING FOR MALIGNANT NEOPLASM OF COLON: ICD-10-CM

## 2024-01-11 LAB
ALBUMIN SERPL BCP-MCNC: 4.1 G/DL (ref 3.5–5.2)
ALP SERPL-CCNC: 121 U/L (ref 55–135)
ALT SERPL W/O P-5'-P-CCNC: 16 U/L (ref 10–44)
ANION GAP SERPL CALC-SCNC: 9 MMOL/L (ref 8–16)
AST SERPL-CCNC: 21 U/L (ref 10–40)
BASOPHILS # BLD AUTO: 0.04 K/UL (ref 0–0.2)
BASOPHILS NFR BLD: 0.5 % (ref 0–1.9)
BILIRUB SERPL-MCNC: 0.3 MG/DL (ref 0.1–1)
BUN SERPL-MCNC: 11 MG/DL (ref 6–20)
CALCIUM SERPL-MCNC: 9.1 MG/DL (ref 8.7–10.5)
CCP AB SER IA-ACNC: <0.5 U/ML
CHLORIDE SERPL-SCNC: 105 MMOL/L (ref 95–110)
CO2 SERPL-SCNC: 27 MMOL/L (ref 23–29)
CREAT SERPL-MCNC: 0.7 MG/DL (ref 0.5–1.4)
CRP SERPL-MCNC: 27.2 MG/L (ref 0–8.2)
DIFFERENTIAL METHOD BLD: ABNORMAL
EOSINOPHIL # BLD AUTO: 0.1 K/UL (ref 0–0.5)
EOSINOPHIL NFR BLD: 0.9 % (ref 0–8)
ERYTHROCYTE [DISTWIDTH] IN BLOOD BY AUTOMATED COUNT: 12.5 % (ref 11.5–14.5)
ERYTHROCYTE [SEDIMENTATION RATE] IN BLOOD BY PHOTOMETRIC METHOD: 46 MM/HR (ref 0–36)
EST. GFR  (NO RACE VARIABLE): >60 ML/MIN/1.73 M^2
GLUCOSE SERPL-MCNC: 85 MG/DL (ref 70–110)
HCT VFR BLD AUTO: 43.3 % (ref 37–48.5)
HCV AB SERPL QL IA: NORMAL
HGB BLD-MCNC: 13.7 G/DL (ref 12–16)
IMM GRANULOCYTES # BLD AUTO: 0.04 K/UL (ref 0–0.04)
IMM GRANULOCYTES NFR BLD AUTO: 0.5 % (ref 0–0.5)
LYMPHOCYTES # BLD AUTO: 2.9 K/UL (ref 1–4.8)
LYMPHOCYTES NFR BLD: 38.1 % (ref 18–48)
MCH RBC QN AUTO: 29.6 PG (ref 27–31)
MCHC RBC AUTO-ENTMCNC: 31.6 G/DL (ref 32–36)
MCV RBC AUTO: 94 FL (ref 82–98)
MONOCYTES # BLD AUTO: 0.7 K/UL (ref 0.3–1)
MONOCYTES NFR BLD: 9 % (ref 4–15)
NEUTROPHILS # BLD AUTO: 3.9 K/UL (ref 1.8–7.7)
NEUTROPHILS NFR BLD: 51 % (ref 38–73)
NRBC BLD-RTO: 0 /100 WBC
PLATELET # BLD AUTO: 279 K/UL (ref 150–450)
PMV BLD AUTO: 9.9 FL (ref 9.2–12.9)
POTASSIUM SERPL-SCNC: 4 MMOL/L (ref 3.5–5.1)
PROT SERPL-MCNC: 7.7 G/DL (ref 6–8.4)
RBC # BLD AUTO: 4.63 M/UL (ref 4–5.4)
SODIUM SERPL-SCNC: 141 MMOL/L (ref 136–145)
WBC # BLD AUTO: 7.58 K/UL (ref 3.9–12.7)

## 2024-01-11 PROCEDURE — 99999 PR PBB SHADOW E&M-EST. PATIENT-LVL V: CPT | Mod: PBBFAC,,, | Performed by: STUDENT IN AN ORGANIZED HEALTH CARE EDUCATION/TRAINING PROGRAM

## 2024-01-11 PROCEDURE — 86038 ANTINUCLEAR ANTIBODIES: CPT | Performed by: STUDENT IN AN ORGANIZED HEALTH CARE EDUCATION/TRAINING PROGRAM

## 2024-01-11 PROCEDURE — 80053 COMPREHEN METABOLIC PANEL: CPT | Performed by: STUDENT IN AN ORGANIZED HEALTH CARE EDUCATION/TRAINING PROGRAM

## 2024-01-11 PROCEDURE — 86140 C-REACTIVE PROTEIN: CPT | Performed by: STUDENT IN AN ORGANIZED HEALTH CARE EDUCATION/TRAINING PROGRAM

## 2024-01-11 PROCEDURE — 99214 OFFICE O/P EST MOD 30 MIN: CPT | Mod: S$PBB,,, | Performed by: STUDENT IN AN ORGANIZED HEALTH CARE EDUCATION/TRAINING PROGRAM

## 2024-01-11 PROCEDURE — 86803 HEPATITIS C AB TEST: CPT | Performed by: STUDENT IN AN ORGANIZED HEALTH CARE EDUCATION/TRAINING PROGRAM

## 2024-01-11 PROCEDURE — 86431 RHEUMATOID FACTOR QUANT: CPT | Performed by: STUDENT IN AN ORGANIZED HEALTH CARE EDUCATION/TRAINING PROGRAM

## 2024-01-11 PROCEDURE — 85652 RBC SED RATE AUTOMATED: CPT | Performed by: STUDENT IN AN ORGANIZED HEALTH CARE EDUCATION/TRAINING PROGRAM

## 2024-01-11 PROCEDURE — 86235 NUCLEAR ANTIGEN ANTIBODY: CPT | Performed by: STUDENT IN AN ORGANIZED HEALTH CARE EDUCATION/TRAINING PROGRAM

## 2024-01-11 PROCEDURE — 85025 COMPLETE CBC W/AUTO DIFF WBC: CPT | Performed by: STUDENT IN AN ORGANIZED HEALTH CARE EDUCATION/TRAINING PROGRAM

## 2024-01-11 PROCEDURE — 99215 OFFICE O/P EST HI 40 MIN: CPT | Mod: PBBFAC | Performed by: STUDENT IN AN ORGANIZED HEALTH CARE EDUCATION/TRAINING PROGRAM

## 2024-01-11 PROCEDURE — 36415 COLL VENOUS BLD VENIPUNCTURE: CPT | Performed by: STUDENT IN AN ORGANIZED HEALTH CARE EDUCATION/TRAINING PROGRAM

## 2024-01-11 PROCEDURE — 86200 CCP ANTIBODY: CPT | Performed by: STUDENT IN AN ORGANIZED HEALTH CARE EDUCATION/TRAINING PROGRAM

## 2024-01-11 RX ORDER — ERGOCALCIFEROL 1.25 MG/1
50000 CAPSULE ORAL
Qty: 12 CAPSULE | Refills: 1 | Status: SHIPPED | OUTPATIENT
Start: 2024-01-11

## 2024-01-11 NOTE — PROGRESS NOTES
"[facility]    Subjective:       Patient ID: Chinyere Crockett is a 48 y.o. female.    Chief Complaint: Anxiety      History was obtained from the patient and supplemented through chart review.  This is the pt's second visit with me.     HPI:    Patient is a 48 y.o. female who presents for annual 2 years after being seen 8/2021. Pt with multiple concerns.    Polyathralgias  Bilat hips, hand swelling,   No singular finger swelling, but some generalized swelling  Autoimmune work-up today  Follows with pain management    Anxiety/depression  GAD7: 12 01/11/2024   PHQ9: 10 01/11/2024   Chronic, sees therapist, has not seen psychiatrist recently, partially due to insurance  Previous on duloxetine, did not tolerate, not taking medications, now  Wondering why xanax is not offered more readily   in [employer], off to go off to [employer]  Difficulty finding a job  Difficult relationship with mom in [place], who is in ailing health, also grandmother 94 yo (good relationship)  No siblings, no other family involved  Thinks duloxetine and flexeril caused "uncontrollable rectal bleeding", no longer      Sleep Disturbance- not addressed today, see prior extensive notes  Poor libido- encouraged efforts at health, checking labs and return to gyn    Soc Hx  , 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,  works and was also enrolled in psychology degree (not discussed today)    Wt Readings from Last 15 Encounters:   01/11/24 104.6 kg (230 lb 9.6 oz)   11/15/23 101.6 kg (223 lb 15.8 oz)   11/09/23 100.4 kg (221 lb 5.5 oz)   10/27/23 98.9 kg (218 lb)   10/12/23 98.9 kg (218 lb)   10/06/23 98.9 kg (218 lb)   09/27/23 99 kg (218 lb 4.1 oz)   09/25/23 98.9 kg (218 lb 2.3 oz)   09/11/23 98.9 kg (218 lb)   08/29/23 98.9 kg (218 lb 0.6 oz)   08/14/23 97.8 kg (215 lb 11.5 oz)   07/11/23 97.1 kg (214 lb 1.1 oz)   03/01/23 97.7 kg (215 lb 6.2 oz)   02/14/23 99.3 kg (219 lb) "   01/23/23 98 kg (216 lb)          Medical History  History reviewed. No pertinent past medical history.    Review of Systems   Constitutional:  Negative for fever.   HENT:  Negative for trouble swallowing.    Respiratory:  Negative for shortness of breath.    Cardiovascular:  Negative for chest pain.   Gastrointestinal:  Negative for constipation, diarrhea, nausea and vomiting.   Musculoskeletal:  Positive for arthralgias, gait problem, joint swelling and myalgias.   Neurological:  Negative for dizziness and seizures.   Psychiatric/Behavioral:  Positive for dysphoric mood. Negative for hallucinations. The patient is nervous/anxious.          Surgical hx, family hx, social hx   Have been reviewed      Current Outpatient Medications:     cyclobenzaprine (FLEXERIL) 10 MG tablet, TAKE 1 TABLET(10 MG) BY MOUTH TWICE DAILY AS NEEDED FOR MUSCLE SPASMS (Patient taking differently: Take 10 mg by mouth 3 (three) times daily as needed for Muscle spasms. TAKE 1 TABLET(10 MG) BY MOUTH TWICE DAILY AS NEEDED FOR MUSCLE SPASMS), Disp: 60 tablet, Rfl: 5    diclofenac (VOLTAREN) 50 MG EC tablet, Take 1 tablet (50 mg total) by mouth 2 (two) times daily as needed (pain/swelling)., Disp: 60 tablet, Rfl: 5    aspirin (ECOTRIN) 81 MG EC tablet, Take 1 tablet (81 mg total) by mouth once daily. (Patient not taking: Reported on 11/15/2023), Disp: 28 tablet, Rfl: 0    ergocalciferol (ERGOCALCIFEROL) 50,000 unit Cap, Take 1 capsule (50,000 Units total) by mouth every 7 days., Disp: 12 capsule, Rfl: 1    flu vacc md3037-41 6mos up,PF, (FLUARIX QUAD 5383-7379, PF,) 60 mcg (15 mcg x 4)/0.5 mL Syrg, Inject 0.5 mLs into the muscle once. for 1 dose, Disp: 0.5 mL, Rfl: 0    sars-cov-2, covid-19, (SPIKEVAX, MODERNA,, 12YRS AND UP 2023,) 50 mcg/0.5 mL injection, Inject 0.5 mLs into the muscle once. Inject 0.5 mL into the muscle once. for 1 dose, Disp: 0.5 mL, Rfl: 0    Current Facility-Administered Medications:     triamcinolone acetonide injection 40  "mg, 40 mg, INTRABURSAL, 1 time in Clinic/HOD, Daryl Zazueta, DO    triamcinolone acetonide injection 40 mg, 40 mg, INTRABURSAL, 1 time in Clinic/HOD, Daryl Zazueta,     Facility-Administered Medications Ordered in Other Visits:     0.9%  NaCl infusion, 500 mL, Intravenous, Continuous, Domingo Jernigan MD    Objective:        Body mass index is 37.22 kg/m².  Vitals:    01/11/24 1012   BP: 128/78   Pulse: 104   SpO2: 98%   Weight: 104.6 kg (230 lb 9.6 oz)   Height: 5' 6" (1.676 m)   PainSc:   3   PainLoc: Knee     Physical Exam  Vitals and nursing note reviewed.   Constitutional:       General: She is not in acute distress.     Appearance: Normal appearance. She is not ill-appearing.   HENT:      Head: Normocephalic and atraumatic.   Eyes:      General: No scleral icterus.  Cardiovascular:      Rate and Rhythm: Normal rate and regular rhythm.      Heart sounds: Normal heart sounds.   Pulmonary:      Effort: Pulmonary effort is normal.   Musculoskeletal:         General: No deformity.   Neurological:      Mental Status: She is alert and oriented to person, place, and time.   Psychiatric:         Behavior: Behavior normal.           Lab Results   Component Value Date    WBC 7.58 01/11/2024    HGB 13.7 01/11/2024    HCT 43.3 01/11/2024     01/11/2024    CHOL 203 (H) 07/11/2023    TRIG 159 (H) 07/11/2023    HDL 68 07/11/2023    ALT 16 01/11/2024    AST 21 01/11/2024     01/11/2024    K 4.0 01/11/2024     01/11/2024    CREATININE 0.7 01/11/2024    BUN 11 01/11/2024    CO2 27 01/11/2024    TSH 2.184 07/11/2023    HGBA1C 4.9 07/11/2023       The 10-year ASCVD risk score (Danya GARCIA, et al., 2019) is: 1%    Values used to calculate the score:      Age: 48 years      Sex: Female      Is Non- : No      Diabetic: No      Tobacco smoker: No      Systolic Blood Pressure: 128 mmHg      Is BP treated: No      HDL Cholesterol: 68 mg/dL      Total Cholesterol: 203 mg/dL    (Imaging " have been independently reviewed)  Xray knee without bony abn    Assessment:         1. Polyarthralgia    2. Screening for malignant neoplasm of colon    3. Vitamin D deficiency    4. Preventative health care    5. Anxiety            Plan:     Chinyere was seen today for anxiety.    Diagnoses and all orders for this visit:    Polyarthralgia  -     CBC auto differential; Future  -     Comprehensive metabolic panel; Future  -     Sedimentation rate; Future  -     C-reactive protein; Future  -     DAVE; Future  -     Rheumatoid factor; Future  -     Cyclic citrul peptide antibody, IgG; Future  -     Sjogrens syndrome-A extractable nuclear antibody; Future  -     Urinalysis  -     Protein / creatinine ratio, urine  -     Hepatitis C antibody; Future    Screening for malignant neoplasm of colon  -     Cancel: Ambulatory referral/consult to Endo Procedure ; Future  -     Ambulatory referral/consult to Endo Procedure ; Future    Vitamin D deficiency  -     ergocalciferol (ERGOCALCIFEROL) 50,000 unit Cap; Take 1 capsule (50,000 Units total) by mouth every 7 days.  -     Vitamin D; Future    Preventative health care  -     Comprehensive Metabolic Panel; Future  -     Lipid Panel; Future  -     TSH; Future  -     CBC Auto Differential; Future  -     Hemoglobin A1C; Future    Anxiety  -     Ambulatory referral/consult to Psychiatry; Future            Health Maintenance  - Lipids: stable  - A1C: 4.9, down from 5.0  - Colon Ca Screen: ordered cologuard, no family history, pt concerned   - Immunizations: tetanus vaccine, covid vaccinated    Women's health  - Pap: Reportedly, 09/20  - Mammo: Due 09/21  - Dexa: N/A due to age  - Contraception:       Tests to Keep You Healthy    Mammogram: Met on 10/2/2023  Colon Cancer Screening: SCHEDULED  Cervical Cancer Screening: Met on 8/29/2023      Follow up in about 6 months (around 7/11/2024) for annual or sooner if needed. or sooner prn        All medications were  reviewed including potential side effects and risks/benefits.  Pt was counseled to call back if anything worsens or if questions arise.        Harrison Lanza MD  Family Medicine  [facility]  [facility] [address] [address]  Phone [phone]  Fax [phone]

## 2024-01-11 NOTE — PATIENT INSTRUCTIONS
Call to make an appointment within [facility] for psychiatry (meds) / psychology (counseling)   [phone]     [address] [facility]    [facility] psychiatrists:   Aurora Barragan(psychiatrist) [address] [facility] [address] Phone: [phone]   Catalino Jean (psychiatrist) [phone], [phone] [address] [facility]   Dr. Rafal Comer - [phone]   Dr. Myrna French - [phone]   Dr. Yulia Judd - [phone]   Dr. Rajeev Escobar - [phone]     [facility]: [phone]     Therapy/Psychology:   You can try anyone of these number to see if your insurance is accepted or you would have to call your insurance.     Cognitive Behavioral Therapy (CBT) Center [facility]   Address: [facility] [place] [address]   Phone: [phone]   [email or web address]     [facility] [address], [address]   Phone: [phone]   You can email for an appointment at: [email or web address]     [facility]   [address] [place] [address], [place] [address], [facility], [facility]   [email or web address]   Dr. Jennifer Reilly, [phone] or [email or web address]   Dr. Carlita Horton, [phone] or [email or web address]     Nadiya PINK (therapist) [phone] [address]    LCSW (therapist) [phone]   [address] [place] [address]   Divine Patricia LCSW (therapist) [phone]   [address] [place] [address]   Edda Becker LCSW (therapist) [phone]   [address] [place] [address]   NARAYAN Jean         LCSW                    [phone]   Bill Nathan [phone] (therapist) [address] [facility]   Beau Patricia (therapist) [phone]  [address] Ciarra Chicas (therapist) [phone] [address] [facility]     [facility] Counseling [phone]   [facility] [place] [address]     Employee Assistance Program (EAP)   Check through your employer's HR.     [facility] (accepts  medicaid)  [address]  [phone]  or [phone] ([phone])    Online Therapist:     [email or web address]     Free Guided Meditations   [email or web address]   [email or web address]   [email or web address]

## 2024-01-12 ENCOUNTER — OFFICE VISIT (OUTPATIENT)
Dept: PAIN MEDICINE | Facility: CLINIC | Age: 49
End: 2024-01-12
Payer: OTHER GOVERNMENT

## 2024-01-12 VITALS
HEART RATE: 86 BPM | HEIGHT: 66 IN | OXYGEN SATURATION: 100 % | SYSTOLIC BLOOD PRESSURE: 148 MMHG | WEIGHT: 227.06 LBS | TEMPERATURE: 98 F | RESPIRATION RATE: 18 BRPM | BODY MASS INDEX: 36.49 KG/M2 | DIASTOLIC BLOOD PRESSURE: 85 MMHG

## 2024-01-12 DIAGNOSIS — M70.62 GREATER TROCHANTERIC BURSITIS OF BOTH HIPS: Primary | ICD-10-CM

## 2024-01-12 DIAGNOSIS — M70.61 GREATER TROCHANTERIC BURSITIS OF BOTH HIPS: Primary | ICD-10-CM

## 2024-01-12 LAB
ANA SER QL IF: NORMAL
RHEUMATOID FACT SERPL-ACNC: <13 IU/ML (ref 0–15)

## 2024-01-12 PROCEDURE — 99214 OFFICE O/P EST MOD 30 MIN: CPT | Mod: S$PBB,,, | Performed by: ANESTHESIOLOGY

## 2024-01-12 PROCEDURE — 99213 OFFICE O/P EST LOW 20 MIN: CPT | Mod: PBBFAC | Performed by: ANESTHESIOLOGY

## 2024-01-12 PROCEDURE — 99999 PR PBB SHADOW E&M-EST. PATIENT-LVL III: CPT | Mod: PBBFAC,,, | Performed by: ANESTHESIOLOGY

## 2024-01-12 NOTE — PROGRESS NOTES
Chronic Pain - Established patient - Follow Up     Referring Physician: No ref. provider found    Chief Complaint:   Chief Complaint   Patient presents with    Hip Pain     both       SUBJECTIVE: Disclaimer: This note has been generated using voice-recognition software. There may be typographical errors that have been missed during proof-reading        1/12/2024     3:24 PM 11/9/2023     8:32 AM 3/1/2023     9:35 AM   Last 3 PDI Scores   Pain Disability Index (PDI) 16 12 12     Interval History 1/12/24:  Patient is followed for chronic hi pain. She is now s/p left knee arthroscopy with meniscectomy per Dr. Lorenz. She returns with her typical bilateral GTB bursitis that has been present for months but was not severe enough to warrant injections at [facility]. The last injections she received resulted in 100% pain relief for 7 months and she would like to repeat. Her bilateral GTB pain was acutely worsened while ambulating antalgically from the knee pain prior to meniscectomy. She underwent PT and her knee is greatly improved. She denies any BLE weakness, sensation changes, ataxia, LBP, weight loss, fevers, unintended weight loss. She underwent physical therapy for knee and hip formally from September to October for 8 weeks and another 2-3 weeks HEP thereafter. The pain is a 6/10 today and worse with ambulation and sleeping on her side.     Interval History 11/9/2023:  The patient presents today for bilateral hip pain. She says that she has overall been maintained on Voltaren and Flexeril PRN. She reports a long standing history of bursitis to her hip, left worse than right. She has tried ice and heat without much benefit. She says that she cannot currently receive steroid injections because of a complication with her mom and steroids. Her pain today is 2/10.     Interval History 3/1/2023:  Mrs Crockett presents for follow up evaluation of B hip pain. She is s/p B GTB injection with significant benefit and pain control  to the areas. She is having bad day with LOPEZ today. She is pleased with progress of GTB injection which are improving ambulation and functioning.     Interval History 1/23/2023:  Mrs Crockett presents for follow up and having exacerbated B, lateral hip pain. She has had GTB injections done by Orthopedic in past. She denies newer areas of pain or neurological changes. She is taking Flexeril 10mg qhs PRN with benefit and no significant SE of medication. She did additionally strain shoulder but this has improved.     Interval History 7/11/2022:  Mrs Crockett presents for follow up. HA manageable and correlated to pressure change in weather, states they are mild and constant. Overall found benefit from FRP. Continues to take diclofenac and flexeril qhs with benefit and denies SE of medications.     Interval History 4/11/2022:  Mrs Crockett presents for follow up of pain complaints. She states doing very well with FRP and pleased with progress. She continues to take Diclofenac and Flexeril. She has noticed since prior visit having more daily functionality She states SE and never got over Nausea from Cymbalta and discussed she does not ever wish to be on this medication again.She does voice frustation with HA mgt regarding messaging to Neurology and prescriptions.     Interval History 2/1/2022:  Mrs Crockett presents for follow up. She has been attending Therapy for Right shoulder and while she endorses benefit from PT at times PT sets her back and exacerbates pain. She states this Thursday will be her last day for PT. And again while she has found progress she is not where she would like to be with functioning and pain. She does not wish to have surgical intervention or CSI at this time. She is frustrated with diffuse pain complaints and decreased daily functioning. She is taking Flexeril more frequently at this time but feels brain fog at times during the day. Diclofenac has been beneficial for her. She denies new areas  of pain, denies neurological changes.     Interval History 12/21/2021:  The Pt presents for follow up of Right shoulder pain. She states doing fair and but continued scapular pain and shoulder pain with even cooking activities. She will be starting Shoulder PT in January. Does not wish to pursue CSI or surgical intervention at this time without giving PT a try 1st. She is currently taking diclofenac with benefit and denies SE of medications.     Interval History 11/29/2021:  Pt presents to clinic for follow up of low back pain and new right sided scapula pain. Pt reports that her low back pain had improved with a combination of physical therapy and meloxicam. Unfortunately pt reports she had some GI upset and tried oral diclofenac with some relief.   Additionally pt reports right shoulder pain associated with right upper back spasms. Her spasms are associated with any movement of the shoulder. She denies any neck pain, arm pain or any feelings of numbness. She reports that her symptoms are relieved by ice.     Initial encounter 08/25/2021:  Chinyere Crockett presents to the clinic for the evaluation of low back pain. The pain started 3 years ago following a fall at work and symptoms have been worsening. She slipped on a wet floor and fell in a twisting motion onto her back while striking a mop bucket. Since that time she has dealt with hip and back pain. The hip pain is treated with GTB injections by Dr. Zazueta with sports medicine. She receives the injections every 3-4 months. When she feels the injection's effect wear off, she begins to notice her back pain worsening.     Brief history:    Pain Description:    The pain is located in the Left lower back area and does not radiate.      At BEST  4/10     At WORST  8/10 on the WORST day.      On average pain is rated as 6/10.     Today the pain is rated as 6/10    The pain is described as aching and burning      Symptoms interfere with daily activity, sleeping  and work.     Exacerbating factors: Extension and rotation.      Mitigating factors heat, ice, medications and dry needling.     Patient denies night fever/night sweats, urinary incontinence, bowel incontinence, significant weight loss, significant motor weakness and loss of sensations.  Patient denies any suicidal or homicidal ideations    Pain Medications:  Current:  Flexeril 10mg qHS PRN  Tylenol   OTC NSAIDs      Tried in Past:  NSAIDs - Mobic  TCA -Never  SNRI -Never  Anti-convulsants -Never  Muscle Relaxants -Never  Opioids-Never  Benzodiazepines -Never    Physical Therapy/Home Exercise:   HEP:  Continued HEP for 3 weeks in November, 2023     report:  Reviewed and consistent with medication use as prescribed.    Pain Procedures:   GTB injections with sports medicine  2/14/2023 B GTB injection- 80% relief for 7 months    Chiropractor -yes  Acupuncture - never  TENS unit -never  Spinal decompression -never  Joint replacement -never    Imaging:    MRI SHOULDER WITHOUT CONTRAST RIGHT 12/8/2021     CLINICAL HISTORY:  Pain in right shoulderShoulder pain, rotator cuff disorder suspected, xray done;     TECHNIQUE:  MRI of right shoulder was performed on a 1.5T magnet utilizing the following sequences: Localizer; axial T2 FS; coronal T2 FS and PD FS; sagittal T1, T2 FS and PD FS.     COMPARISON:  None     FINDINGS:  Rotator cuff: There is some attenuation of caliber of the insertional fibers of the supraspinatus and infraspinatus tendons and some irregularity and abnormal signal along the bursal surface suggesting partial-thickness bursal sided tear involving approximately 50% of the tendon thickness.  Tear measures 2 cm in AP diameter.  Subscapularis tendon intact.Muscle bulk preserved.     Biceps: Long head biceps tendon is intact.     Labrum: Glenoid labrum is intact.     Glenohumeral Joint: There is no fracture or significant articular cartilage loss.  Bone marrow signal is normal.     Acromioclavicular joint:  The AC joint is unremarkable.     Misc: There is no evidence of bursitis.     Impression:     Partial-thickness bursal sided tear of the supraspinatus and infraspinatus tendons measuring 2 cm in size.    XR L-Spine 09/27/2021  FINDINGS:  There is rightward curvature of the lumbar spine with discogenic change and lower facet hypertrophy.  Vertebral body heights appear maintained.  No significant translation.  No evidence for lytic or blastic lesion.     Impression:     As above.     XR L-Spine 9/23/2020  FINDINGS:  Scoliosis convex to the right in the lumbar region is appreciated, alignment otherwise appearing unremarkable.  Vertebral body heights are normally maintained, without significant compression deformity at any level.  No significant disc narrowing.  The AP projection strongly suggests some prominent left-sided L4-5 facet arthropathy.  Minimal marginal vertebral endplate spurring is seen at a few thoracolumbar levels.  Vertebral endplates are well defined.  No osseous destructive process.  SI joints appear unremarkable.     Impression:     Lumbar dextroscoliosis and prominent left-sided facet arthropathy at L4-5.  No evidence of compression fracture.    XR hip/pelvis 9/23/2021   FINDINGS:  No acute fracture of the visualized lower lumbar spine, bony pelvis, or proximal femurs.  Preserved right and left femoral head contours.  Intact right and left SI joints and hip joint spaces.  Pelvic densities felt related to phleboliths unless concern for otherwise.  Some asymmetric radiodensity projects to the left side of the L4-L5 disc space that could be further evaluated with lumbar spine radiographs.     Impression:     No acute fracture.    No past medical history on file.  Past Surgical History:   Procedure Laterality Date    INJECTION OF JOINT Bilateral 2/14/2023    Procedure: INJECTION, JOINT BILATERAL GTB CONTRAST;  Surgeon: Kristin Lakhani MD;  Location: [facility];  Service: Pain Management;   Laterality: Bilateral;    KNEE ARTHROSCOPY W/ MENISCECTOMY Left 10/12/2023    Procedure: ARTHROSCOPY, KNEE, WITH MENISCECTOMY;  Surgeon: Taina Lorenz MD;  Location: [facility] OR;  Service: Orthopedics;  Laterality: Left;  lateral meniscectomy    SYNOVECTOMY OF KNEE Left 10/12/2023    Procedure: ARTHROSCOPIC SYNOVECTOMY, KNEE;  Surgeon: Taina Lorenz MD;  Location: [facility] OR;  Service: Orthopedics;  Laterality: Left;    TONSILLECTOMY       Social History     Socioeconomic History    Marital status:    Tobacco Use    Smoking status: Never    Smokeless tobacco: Never   Substance and Sexual Activity    Alcohol use: Yes     Comment: occasionally    Drug use: Not Currently     Types: Marijuana    Sexual activity: Yes     Partners: Male     Birth control/protection: None     Family History   Problem Relation Age of Onset    COPD Mother     Breast cancer Mother 72    Heart attack Father     Heart disease Paternal Grandfather         ?quad bypass    Heart disease Paternal Uncle         quad bypass    Cancer Neg Hx     Colon cancer Neg Hx     Diabetes Neg Hx     Eclampsia Neg Hx     Hypertension Neg Hx     Miscarriages / Stillbirths Neg Hx     Ovarian cancer Neg Hx      labor Neg Hx     Stroke Neg Hx        Review of patient's allergies indicates:   Allergen Reactions    Duloxetine      Vs tizanidine unclear reaction, pt disliked?    Hydrocodone      Dizziness and nausea     Tizanidine      Unclear negative reaction vs duloxetine       Current Outpatient Medications   Medication Sig    aspirin (ECOTRIN) 81 MG EC tablet Take 1 tablet (81 mg total) by mouth once daily.    cyclobenzaprine (FLEXERIL) 10 MG tablet TAKE 1 TABLET(10 MG) BY MOUTH TWICE DAILY AS NEEDED FOR MUSCLE SPASMS (Patient taking differently: Take 10 mg by mouth 3 (three) times daily as needed for Muscle spasms. TAKE 1 TABLET(10 MG) BY MOUTH TWICE DAILY AS NEEDED FOR MUSCLE SPASMS)    diclofenac (VOLTAREN) 50 MG EC tablet Take 1 tablet (50 mg  "total) by mouth 2 (two) times daily as needed (pain/swelling).    ergocalciferol (ERGOCALCIFEROL) 50,000 unit Cap Take 1 capsule (50,000 Units total) by mouth every 7 days.     Current Facility-Administered Medications   Medication    triamcinolone acetonide injection 40 mg    triamcinolone acetonide injection 40 mg     Facility-Administered Medications Ordered in Other Visits   Medication    0.9%  NaCl infusion       REVIEW OF SYSTEMS:    GENERAL:  No weight loss, malaise or fevers.  HEENT:   No recent changes in vision or hearing  NECK:  Negative for lumps, no difficulty with swallowing.  RESPIRATORY:  Negative for cough, wheezing or shortness of breath, patient denies any recent URI.  CARDIOVASCULAR:  Negative for chest pain, leg swelling or palpitations.  GI:  Negative for abdominal discomfort, blood in stools or black stools or change in bowel habits.  MUSCULOSKELETAL:  See HPI.  SKIN:  Negative for lesions, rash, and itching.  PSYCH:  No mood disorder or recent psychosocial stressors.  Patients sleep is not disturbed secondary to pain.  HEMATOLOGY/LYMPHOLOGY:  Negative for prolonged bleeding, bruising easily or swollen nodes.  Patient is not currently taking any anti-coagulants  ENDO: No history of diabetes or thyroid dysfunction  NEURO:   No history of headaches, syncope, paralysis, seizures or tremors.  All other reviewed and negative other than HPI.    OBJECTIVE:    BP (!) 148/85   Pulse 86   Temp 98.1 °F (36.7 °C)   Resp 18   Ht 5' 6" (1.676 m)   Wt 103 kg (227 lb 1.2 oz)   LMP 03/16/2018 (LMP Unknown) Comment: spotting/ short periord 3/16/18  SpO2 100%   BMI 36.65 kg/m²       PHYSICAL EXAMINATION:  Voice normal.  Normal affect without evidence of distress.  Psych: tearful at times and voices frustration with continued decreased functioning and pain.   Gait: sitting in chair without difficulty     Prior PHYSICAL EXAMINATION:    GENERAL: Well appearing, in no acute distress, alert and oriented " x3.  PSYCH:  Mood and affect appropriate.  SKIN: Skin color, texture, turgor normal, no rashes or lesions.  HEAD/FACE:  Normocephalic, atraumatic. Cranial nerves grossly intact.  PULM: No evidence of respiratory difficulty, symmetric chest rise.  GI:  Soft and non-tender.  BACK: Straight leg raising in the supine position is negative to radicular pain.. No tenderness over spinous processes. Normal range of motion. Pain reproduction with lateral rotation and extension.  EXTREMITIES: TTP over bilateral GTB. Painful extension of right hip. Negative Jose's test bilaterally. + Powell compression test bilaterally  MUSCULOSKELETAL: Bilateral lower extremity strength is normal and symmetric.  No atrophy or tone abnormalities are noted.   NEURO: Bilateral upper and lower extremity coordination and muscle stretch reflexes are physiologic and symmetric.  Plantar response are downgoing. No clonus.  No loss of sensation is noted.  GAIT: Normal.    Lab Results   Component Value Date    WBC 7.58 01/11/2024    HGB 13.7 01/11/2024    HCT 43.3 01/11/2024    MCV 94 01/11/2024     01/11/2024       BMP  Lab Results   Component Value Date     01/11/2024    K 4.0 01/11/2024     01/11/2024    CO2 27 01/11/2024    BUN 11 01/11/2024    CREATININE 0.7 01/11/2024    CALCIUM 9.1 01/11/2024    ANIONGAP 9 01/11/2024    ESTGFRAFRICA >60 08/19/2021    EGFRNONAA >60 08/19/2021     Lab Results   Component Value Date    HGBA1C 4.9 07/11/2023       ASSESSMENT: 48 y.o. year old female with pain, consistent with     Encounter Diagnosis   Name Primary?    Greater trochanteric bursitis of both hips Yes     Discussion: Patient is pleasant woman who came to us with bilateral lateral hip pain that worsened after left knee pain caused an altered gait. Hip Xrays were normal in 2020. She is now s/p arthroscopic meniscus repair to left knee and is focally tender to bilateral trochanters with radiation along IT band. She previously received  85% relief for 7 months after GTB injection.       PLAN:   1) Prior records/imaging reviewed  2) Continue Flexeril 10mg qhs.  3) Continue HEP at this time.  4) Recommend not sleeping on side if possible  5) Schedule bilateral GTB bursa injections  6) RTC 2 weeks post injection    The above plan and management options were discussed at length with patient. Patient is in agreement with the above and verbalized understanding. It will be communicated with the referring physician via electronic record, fax, or mail.      Herb Haro    01/12/2024

## 2024-01-16 DIAGNOSIS — M70.61 GREATER TROCHANTERIC BURSITIS OF BOTH HIPS: Primary | ICD-10-CM

## 2024-01-16 DIAGNOSIS — M70.62 GREATER TROCHANTERIC BURSITIS OF BOTH HIPS: Primary | ICD-10-CM

## 2024-01-16 LAB
ANTI-SSA ANTIBODY: 0.07 RATIO (ref 0–0.99)
ANTI-SSA INTERPRETATION: NEGATIVE

## 2024-02-20 ENCOUNTER — HOSPITAL ENCOUNTER (OUTPATIENT)
Facility: OTHER | Age: 49
Discharge: HOME | End: 2024-02-20
Attending: ANESTHESIOLOGY | Admitting: ANESTHESIOLOGY
Payer: OTHER GOVERNMENT

## 2024-02-20 VITALS
BODY MASS INDEX: 36 KG/M2 | RESPIRATION RATE: 16 BRPM | OXYGEN SATURATION: 95 % | WEIGHT: 224 LBS | SYSTOLIC BLOOD PRESSURE: 156 MMHG | HEIGHT: 66 IN | TEMPERATURE: 98 F | DIASTOLIC BLOOD PRESSURE: 78 MMHG | HEART RATE: 88 BPM

## 2024-02-20 DIAGNOSIS — M25.552 HIP PAIN, BILATERAL: ICD-10-CM

## 2024-02-20 DIAGNOSIS — M25.551 HIP PAIN, BILATERAL: ICD-10-CM

## 2024-02-20 DIAGNOSIS — M70.61 GREATER TROCHANTERIC BURSITIS OF BOTH HIPS: Primary | ICD-10-CM

## 2024-02-20 DIAGNOSIS — G89.29 CHRONIC PAIN: ICD-10-CM

## 2024-02-20 DIAGNOSIS — M70.62 GREATER TROCHANTERIC BURSITIS OF BOTH HIPS: Primary | ICD-10-CM

## 2024-02-20 PROCEDURE — 25500020 PHARM REV CODE 255: Performed by: ANESTHESIOLOGY

## 2024-02-20 PROCEDURE — 20610 DRAIN/INJ JOINT/BURSA W/O US: CPT | Mod: 50 | Performed by: ANESTHESIOLOGY

## 2024-02-20 PROCEDURE — 20610 DRAIN/INJ JOINT/BURSA W/O US: CPT | Mod: 50,,, | Performed by: ANESTHESIOLOGY

## 2024-02-20 PROCEDURE — 25000003 PHARM REV CODE 250: Performed by: ANESTHESIOLOGY

## 2024-02-20 PROCEDURE — 63600175 PHARM REV CODE 636 W HCPCS: Mod: JZ,JG | Performed by: ANESTHESIOLOGY

## 2024-02-20 PROCEDURE — 77002 NEEDLE LOCALIZATION BY XRAY: CPT | Mod: 26,,, | Performed by: ANESTHESIOLOGY

## 2024-02-20 RX ORDER — BUPIVACAINE HYDROCHLORIDE 2.5 MG/ML
INJECTION, SOLUTION EPIDURAL; INFILTRATION; INTRACAUDAL
Status: DISCONTINUED | OUTPATIENT
Start: 2024-02-20 | End: 2024-02-20 | Stop reason: HOSPADM

## 2024-02-20 RX ORDER — SODIUM CHLORIDE 9 MG/ML
INJECTION, SOLUTION INTRAVENOUS CONTINUOUS
Status: DISCONTINUED | OUTPATIENT
Start: 2024-02-20 | End: 2024-02-20 | Stop reason: HOSPADM

## 2024-02-20 RX ORDER — TRIAMCINOLONE ACETONIDE 40 MG/ML
INJECTION, SUSPENSION INTRA-ARTICULAR; INTRAMUSCULAR
Status: DISCONTINUED | OUTPATIENT
Start: 2024-02-20 | End: 2024-02-20 | Stop reason: HOSPADM

## 2024-02-20 RX ORDER — LIDOCAINE HYDROCHLORIDE 20 MG/ML
INJECTION, SOLUTION INFILTRATION; PERINEURAL
Status: DISCONTINUED | OUTPATIENT
Start: 2024-02-20 | End: 2024-02-20 | Stop reason: HOSPADM

## 2024-02-20 NOTE — DISCHARGE SUMMARY
Discharge Note  Short Stay      SUMMARY     Admit Date: 2/20/2024    Attending Physician: Pura Dey      Discharge Physician: Pura Dey      Discharge Date: 2/20/2024      Procedure(s) (LRB):  INJECTION, JOINT BILATERAL GTB (Bilateral)    Final Diagnosis: Greater trochanteric bursitis of both hips [M70.61, M70.62]    Disposition: Home or self care    Patient Instructions:   Current Discharge Medication List        CONTINUE these medications which have NOT CHANGED    Details   aspirin (ECOTRIN) 81 MG EC tablet Take 1 tablet (81 mg total) by mouth once daily.  Qty: 28 tablet, Refills: 0    Associated Diagnoses: Chondromalacia, patella, left; Cyst of anterior horn of lateral meniscus of left knee      cyclobenzaprine (FLEXERIL) 10 MG tablet TAKE 1 TABLET(10 MG) BY MOUTH TWICE DAILY AS NEEDED FOR MUSCLE SPASMS  Qty: 60 tablet, Refills: 5      diclofenac (VOLTAREN) 50 MG EC tablet Take 1 tablet (50 mg total) by mouth 2 (two) times daily as needed (pain/swelling).  Qty: 60 tablet, Refills: 5    Associated Diagnoses: Chronic pain of left knee      ergocalciferol (ERGOCALCIFEROL) 50,000 unit Cap Take 1 capsule (50,000 Units total) by mouth every 7 days.  Qty: 12 capsule, Refills: 1    Associated Diagnoses: Vitamin D deficiency                 Discharge Diagnosis: Greater trochanteric bursitis of both hips [M70.61, M70.62]  Condition on Discharge: Stable with no complications to procedure   Diet on Discharge: Same as before.  Activity: as per instruction sheet.  Discharge to: Home with a responsible adult.  Follow up: 2-4 weeks       Please call my office or pager at [phone] if experienced any weakness or loss of sensation, fever > 101.5, pain uncontrolled with oral medications, persistent nausea/vomiting/or diarrhea, redness or drainage from the incisions, or any other worrisome concerns. If physician on call was not reached or could not communicate with our office for any reason please go to the nearest  emergency department

## 2024-02-20 NOTE — H&P
HPI  Patient presenting for Procedure(s) (LRB):  INJECTION, JOINT BILATERAL GTB (Bilateral)     Patient on Anti-coagulation No    No health changes since previous encounter    History reviewed. No pertinent past medical history.  Past Surgical History:   Procedure Laterality Date    INJECTION OF JOINT Bilateral 2/14/2023    Procedure: INJECTION, JOINT BILATERAL GTB CONTRAST;  Surgeon: Kristin Lakhani MD;  [facility];  Service: Pain Management;  Laterality: Bilateral;    KNEE ARTHROSCOPY W/ MENISCECTOMY Left 10/12/2023    Procedure: ARTHROSCOPY, KNEE, WITH MENISCECTOMY;  Surgeon: Taina Lorenz MD;  [facility];  Service: Orthopedics;  Laterality: Left;  lateral meniscectomy    SYNOVECTOMY OF KNEE Left 10/12/2023    Procedure: ARTHROSCOPIC SYNOVECTOMY, KNEE;  Surgeon: Taina Lorenz MD;  [facility];  Service: Orthopedics;  Laterality: Left;    TONSILLECTOMY       Review of patient's allergies indicates:   Allergen Reactions    Duloxetine      Vs tizanidine unclear reaction, pt disliked?    Hydrocodone      Dizziness and nausea     Tizanidine      Unclear negative reaction vs duloxetine      Current Facility-Administered Medications   Medication    0.9%  NaCl infusion     Facility-Administered Medications Ordered in Other Encounters   Medication    0.9%  NaCl infusion       PMHx, PSHx, Allergies, Medications reviewed in epic    ROS negative except pain complaints in HPI    OBJECTIVE:    LMP 03/16/2018 (LMP Unknown) Comment: spotting/ short periord 3/16/18    PHYSICAL EXAMINATION:    GENERAL: Well appearing, in no acute distress, alert and oriented x3.  PSYCH:  Mood and affect appropriate.  SKIN: Skin color, texture, turgor normal, no rashes or lesions which will impact the procedure.  CV: RRR with palpation of the radial artery.  PULM: No evidence of respiratory difficulty, symmetric chest rise. Clear to auscultation.  NEURO: Cranial nerves grossly intact.    Plan:    Proceed with  procedure as planned Procedure(s) (LRB):  INJECTION, JOINT BILATERAL GTB (Bilateral)    Belal Alammar  02/20/2024

## 2024-02-20 NOTE — DISCHARGE INSTRUCTIONS

## 2024-02-20 NOTE — OP NOTE
Greater Trochanteric Bursa Injection under Fluoroscopic Guidance    The procedure, risks, benefits, and options were discussed with the patient. There are no contraindications to the procedure. The patent expressed understanding and agreed to the procedure. Informed written consent was obtained prior to the start of the procedure and can be found in the patient's chart.    PATIENT NAME: Chinyere Crockett   MRN: [id number]     DATE OF PROCEDURE: 02/20/2024  Bilateral  PROCEDURE: Bilateral Greater Trochanteric Bursa Injection under Fluoroscopic Guidance    PRE-OP DIAGNOSIS: Greater trochanteric bursitis of both hips [M70.61, M70.62]    POST-OP DIAGNOSIS: Same    PHYSICIAN: Kristin Lakhani MD    ASSISTANTS: MD Kelli    MEDICATIONS INJECTED: Preservative-free Kenalog 40mg with 7cc of Bupivacine 0.25%     LOCAL ANESTHETIC INJECTED: Xylocaine 2%     SEDATION: None    ESTIMATED BLOOD LOSS: None    COMPLICATIONS: None    TECHNIQUE: Time-out was performed to identify the patient and procedure to be performed. With the patient laying in a prone position, the surgical area was prepped and draped in the usual sterile fashion using ChloraPrep and a fenestrated drape. The area overlying the greater trochanteric bursa was determined under fluoroscopy guidance. Skin anesthesia was achieved by injecting Lidocaine 2% over the injection site. The greater trochanteric bursa was then approached with a 22 gauge, 3.5 inch spinal quinke needle that was introduced under fluoroscopic guidance in the AP view. Once the needle tip was in the area of the bursa, and there was no blood aspiration, Contrast dye  Omnipaque (300mg/mL) was injected to confirm placement and there was no vascular runoff. 4 mL of the medication mixture listed above was injected slowly. The needles were removed and bleeding was nil. A sterile dressing was applied. No specimens collected. The patient tolerated the procedure well.  The patient was monitored after the  procedure in the recovery area. They were given post-procedure and discharge instructions to follow at home. The patient was discharged in a stable condition.    I reviewed and edited the fellow's note. I conducted my own interview and physical examination. I agree with the findings. I was present and supervising all critical portions of the procedure.    Pura Dey MD

## 2024-02-29 ENCOUNTER — CLINICAL SUPPORT (OUTPATIENT)
Dept: ENDOSCOPY | Facility: HOSPITAL | Age: 49
End: 2024-02-29
Attending: RADIOLOGY
Payer: OTHER GOVERNMENT

## 2024-02-29 ENCOUNTER — TELEPHONE (OUTPATIENT)
Dept: ENDOSCOPY | Facility: HOSPITAL | Age: 49
End: 2024-02-29

## 2024-02-29 DIAGNOSIS — Z12.11 SCREENING FOR MALIGNANT NEOPLASM OF COLON: ICD-10-CM

## 2024-02-29 NOTE — PLAN OF CARE
We attempted to reach you to schedule your Colonoscopy. Left voicemail message. Please contact Endoscopy Scheduling at # [phone]

## 2024-02-29 NOTE — TELEPHONE ENCOUNTER
Attempted to contact patient to schedule colonoscopy. Left voicemail message with information to call Endoscopy scheduling department at [phone] to schedule procedure. Also, sent  message via patient portal.

## 2024-03-06 ENCOUNTER — TELEPHONE (OUTPATIENT)
Dept: INTERNAL MEDICINE | Facility: CLINIC | Age: 49
End: 2024-03-06
Payer: OTHER GOVERNMENT

## 2024-03-06 DIAGNOSIS — M25.50 POLYARTHRALGIA: Primary | ICD-10-CM

## 2024-03-06 NOTE — TELEPHONE ENCOUNTER
"I found two urine tests from an encounter in Jan 2024 marked as "clinic collect" and never completed  Pended both as home collect and routing to provider to advise if still need to be completed    Canceled the old clinic collect orders   "
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
## 2024-07-11 ENCOUNTER — LAB VISIT (OUTPATIENT)
Dept: LAB | Facility: OTHER | Age: 49
End: 2024-07-11
Attending: STUDENT IN AN ORGANIZED HEALTH CARE EDUCATION/TRAINING PROGRAM
Payer: OTHER GOVERNMENT

## 2024-07-11 DIAGNOSIS — Z00.00 PREVENTATIVE HEALTH CARE: ICD-10-CM

## 2024-07-11 DIAGNOSIS — E55.9 VITAMIN D DEFICIENCY: ICD-10-CM

## 2024-07-11 LAB
25(OH)D3+25(OH)D2 SERPL-MCNC: 15 NG/ML (ref 30–96)
ALBUMIN SERPL BCP-MCNC: 4 G/DL (ref 3.5–5.2)
ALP SERPL-CCNC: 106 U/L (ref 55–135)
ALT SERPL W/O P-5'-P-CCNC: 19 U/L (ref 10–44)
ANION GAP SERPL CALC-SCNC: 7 MMOL/L (ref 8–16)
AST SERPL-CCNC: 23 U/L (ref 10–40)
BASOPHILS # BLD AUTO: 0.05 K/UL (ref 0–0.2)
BASOPHILS NFR BLD: 0.7 % (ref 0–1.9)
BILIRUB SERPL-MCNC: 0.6 MG/DL (ref 0.1–1)
BUN SERPL-MCNC: 10 MG/DL (ref 6–20)
CALCIUM SERPL-MCNC: 9.2 MG/DL (ref 8.7–10.5)
CHLORIDE SERPL-SCNC: 106 MMOL/L (ref 95–110)
CHOLEST SERPL-MCNC: 199 MG/DL (ref 120–199)
CHOLEST/HDLC SERPL: 2.9 {RATIO} (ref 2–5)
CO2 SERPL-SCNC: 27 MMOL/L (ref 23–29)
CREAT SERPL-MCNC: 0.8 MG/DL (ref 0.5–1.4)
DIFFERENTIAL METHOD BLD: NORMAL
EOSINOPHIL # BLD AUTO: 0.1 K/UL (ref 0–0.5)
EOSINOPHIL NFR BLD: 1.3 % (ref 0–8)
ERYTHROCYTE [DISTWIDTH] IN BLOOD BY AUTOMATED COUNT: 12.7 % (ref 11.5–14.5)
EST. GFR  (NO RACE VARIABLE): >60 ML/MIN/1.73 M^2
ESTIMATED AVG GLUCOSE: 97 MG/DL (ref 68–131)
GLUCOSE SERPL-MCNC: 96 MG/DL (ref 70–110)
HBA1C MFR BLD: 5 % (ref 4–5.6)
HCT VFR BLD AUTO: 42.6 % (ref 37–48.5)
HDLC SERPL-MCNC: 69 MG/DL (ref 40–75)
HDLC SERPL: 34.7 % (ref 20–50)
HGB BLD-MCNC: 13.8 G/DL (ref 12–16)
IMM GRANULOCYTES # BLD AUTO: 0.02 K/UL (ref 0–0.04)
IMM GRANULOCYTES NFR BLD AUTO: 0.3 % (ref 0–0.5)
LDLC SERPL CALC-MCNC: 110.6 MG/DL (ref 63–159)
LYMPHOCYTES # BLD AUTO: 2.2 K/UL (ref 1–4.8)
LYMPHOCYTES NFR BLD: 32.5 % (ref 18–48)
MCH RBC QN AUTO: 30.1 PG (ref 27–31)
MCHC RBC AUTO-ENTMCNC: 32.4 G/DL (ref 32–36)
MCV RBC AUTO: 93 FL (ref 82–98)
MONOCYTES # BLD AUTO: 0.7 K/UL (ref 0.3–1)
MONOCYTES NFR BLD: 9.9 % (ref 4–15)
NEUTROPHILS # BLD AUTO: 3.8 K/UL (ref 1.8–7.7)
NEUTROPHILS NFR BLD: 55.3 % (ref 38–73)
NONHDLC SERPL-MCNC: 130 MG/DL
NRBC BLD-RTO: 0 /100 WBC
PLATELET # BLD AUTO: 288 K/UL (ref 150–450)
PMV BLD AUTO: 9.3 FL (ref 9.2–12.9)
POTASSIUM SERPL-SCNC: 4.1 MMOL/L (ref 3.5–5.1)
PROT SERPL-MCNC: 7.4 G/DL (ref 6–8.4)
RBC # BLD AUTO: 4.59 M/UL (ref 4–5.4)
SODIUM SERPL-SCNC: 140 MMOL/L (ref 136–145)
TRIGL SERPL-MCNC: 97 MG/DL (ref 30–150)
TSH SERPL DL<=0.005 MIU/L-ACNC: 2 UIU/ML (ref 0.4–4)
WBC # BLD AUTO: 6.87 K/UL (ref 3.9–12.7)

## 2024-07-11 PROCEDURE — 36415 COLL VENOUS BLD VENIPUNCTURE: CPT | Performed by: STUDENT IN AN ORGANIZED HEALTH CARE EDUCATION/TRAINING PROGRAM

## 2024-07-11 PROCEDURE — 82306 VITAMIN D 25 HYDROXY: CPT | Performed by: STUDENT IN AN ORGANIZED HEALTH CARE EDUCATION/TRAINING PROGRAM

## 2024-07-11 PROCEDURE — 83036 HEMOGLOBIN GLYCOSYLATED A1C: CPT | Performed by: STUDENT IN AN ORGANIZED HEALTH CARE EDUCATION/TRAINING PROGRAM

## 2024-07-11 PROCEDURE — 80061 LIPID PANEL: CPT | Performed by: STUDENT IN AN ORGANIZED HEALTH CARE EDUCATION/TRAINING PROGRAM

## 2024-07-11 PROCEDURE — 84443 ASSAY THYROID STIM HORMONE: CPT | Performed by: STUDENT IN AN ORGANIZED HEALTH CARE EDUCATION/TRAINING PROGRAM

## 2024-07-11 PROCEDURE — 80053 COMPREHEN METABOLIC PANEL: CPT | Performed by: STUDENT IN AN ORGANIZED HEALTH CARE EDUCATION/TRAINING PROGRAM

## 2024-07-11 PROCEDURE — 85025 COMPLETE CBC W/AUTO DIFF WBC: CPT | Performed by: STUDENT IN AN ORGANIZED HEALTH CARE EDUCATION/TRAINING PROGRAM

## 2024-07-18 ENCOUNTER — OFFICE VISIT (OUTPATIENT)
Dept: INTERNAL MEDICINE | Facility: CLINIC | Age: 49
End: 2024-07-18
Payer: OTHER GOVERNMENT

## 2024-07-18 VITALS
WEIGHT: 223.31 LBS | OXYGEN SATURATION: 97 % | SYSTOLIC BLOOD PRESSURE: 119 MMHG | DIASTOLIC BLOOD PRESSURE: 85 MMHG | HEIGHT: 66 IN | HEART RATE: 103 BPM | BODY MASS INDEX: 35.89 KG/M2

## 2024-07-18 DIAGNOSIS — Z12.11 SCREENING FOR MALIGNANT NEOPLASM OF COLON: ICD-10-CM

## 2024-07-18 DIAGNOSIS — E55.9 VITAMIN D DEFICIENCY: ICD-10-CM

## 2024-07-18 DIAGNOSIS — Z00.00 ANNUAL PHYSICAL EXAM: Primary | ICD-10-CM

## 2024-07-18 DIAGNOSIS — Z12.31 ENCOUNTER FOR SCREENING MAMMOGRAM FOR MALIGNANT NEOPLASM OF BREAST: ICD-10-CM

## 2024-07-18 DIAGNOSIS — E66.01 CLASS 2 SEVERE OBESITY DUE TO EXCESS CALORIES WITH SERIOUS COMORBIDITY AND BODY MASS INDEX (BMI) OF 36.0 TO 36.9 IN ADULT: ICD-10-CM

## 2024-07-18 PROCEDURE — 99396 PREV VISIT EST AGE 40-64: CPT | Mod: S$PBB,,, | Performed by: STUDENT IN AN ORGANIZED HEALTH CARE EDUCATION/TRAINING PROGRAM

## 2024-07-18 PROCEDURE — 99999 PR PBB SHADOW E&M-EST. PATIENT-LVL IV: CPT | Mod: PBBFAC,,, | Performed by: STUDENT IN AN ORGANIZED HEALTH CARE EDUCATION/TRAINING PROGRAM

## 2024-07-18 PROCEDURE — 99214 OFFICE O/P EST MOD 30 MIN: CPT | Mod: PBBFAC | Performed by: STUDENT IN AN ORGANIZED HEALTH CARE EDUCATION/TRAINING PROGRAM

## 2024-07-18 RX ORDER — SEMAGLUTIDE 0.25 MG/.5ML
0.25 INJECTION, SOLUTION SUBCUTANEOUS
Qty: 2 ML | Refills: 0 | Status: SHIPPED | OUTPATIENT
Start: 2024-07-18

## 2024-07-18 RX ORDER — ERGOCALCIFEROL 1.25 MG/1
50000 CAPSULE ORAL
Qty: 24 CAPSULE | Refills: 3 | Status: SHIPPED | OUTPATIENT
Start: 2024-07-18

## 2024-07-18 NOTE — PATIENT INSTRUCTIONS
Always calories make the difference  Meds can make it easier to eat fewer calories.  Weight loss is 90% about what you eat.  Exercise can sometimes make this harder to control due to hunger cues.    GLP1's fancy, $$$  Cannot take if a family history of Medullary Thyroid Cancer  Cannot take if a family history of Multiple Endocrine Neoplasia Type II  Cannot take if you have a personal history of pancreatitis  -ozempic/wegovy = semaglutide  -mounjaro/zepbound = tirzepatide  GI side effects- nausea, constipation, heartburn+    ------------------------------------------  **  Metformin- cheap  Diarrhea, nausea    Start at a low dose 500 once a day with food.  Can go to 1000 mg twice a day. Let me know in 2-4 weeks if you want to increase.    ------------------------------------      Adipex (phentermine)- stimulant, cheap, controlled substance, prior was also only prescribed for 3 months at time  Topamax (topiramate)- anti-epileptic, also used for migraines, potential side effect of brain fog    Qysmia (phentaramine and topiramate)- $90-$100 month from speciality pharmacy    ------------------------------------    Potentially a referral to the bariatric department- Lilia Brock MD. It's not just for surgery

## 2024-07-18 NOTE — PROGRESS NOTES
"[facility]    Subjective:       Patient ID: Chinyere Crockett is a 48 y.o. female.    Chief Complaint: Annual Exam      History was obtained from the patient and supplemented through chart review.  This is the pt's second visit with me.     HPI:    Patient is a 48 y.o. female who presents Presents for annual    Reviewed labs     will be out potentially for  leave  2 cat deaths recently  Grandmother , up and back to [place]  Difficult relationship with mother    Intgerested in GLp1  No contraindications  Trial weogvy, will need prior auth, aware this may be difficult with   Precautions given    Vit D infsuffic  Now up to twice a day, sent in more    Polyathralgias s/p trochanter injection    Anxiety/depression/potential   PHQ9:  2024 -> 15, no SI  Difficulty getting  cov ered insurance  Needs new  ID  Chronic, therapist ended, has not seen psychiatrist recently, partially due to insurance; reviewed resources in depth  Previous on duloxetine, did not tolerate, not taking medications, now  Wondering why xanax is not offered more readily   in [employer], off to go off to [employer] school  Difficulty finding a job  Difficult relationship with mom in [place], who is in ailing health, also grandmother 92 yo (good relationship)  No siblings, no other family involved  Thinks duloxetine and flexeril caused "uncontrollable rectal bleeding", no longer  No SI      Sleep Disturbance- not addressed today, see prior extensive notes  Poor libido- encouraged efforts at health, checking labs and return to gyn    Soc Hx  , 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,  works and was also enrolled in psychology degree (not discussed today)    Wt Readings from Last 15 Encounters:   24 101.3 kg (223 lb 5.2 oz)   24 101.6 kg (224 lb)   24 103 kg (227 lb 1.2 oz)   24 104.6 kg (230 lb 9.6 oz)   11/15/23 101.6 kg (223 " lb 15.8 oz)   11/09/23 100.4 kg (221 lb 5.5 oz)   10/27/23 98.9 kg (218 lb)   10/12/23 98.9 kg (218 lb)   10/06/23 98.9 kg (218 lb)   09/27/23 99 kg (218 lb 4.1 oz)   09/25/23 98.9 kg (218 lb 2.3 oz)   09/11/23 98.9 kg (218 lb)   08/29/23 98.9 kg (218 lb 0.6 oz)   08/14/23 97.8 kg (215 lb 11.5 oz)   07/11/23 97.1 kg (214 lb 1.1 oz)          Medical History  History reviewed. No pertinent past medical history.    Review of Systems   Constitutional:  Negative for fever.   HENT:  Negative for trouble swallowing.    Respiratory:  Negative for shortness of breath.    Cardiovascular:  Negative for chest pain.   Gastrointestinal:  Negative for constipation, diarrhea, nausea and vomiting.   Musculoskeletal:  Positive for arthralgias, gait problem, joint swelling and myalgias.        Indentions on shoulder from regular bra straps, wears sports bras because of this   Neurological:  Negative for dizziness and seizures.   Psychiatric/Behavioral:  Positive for dysphoric mood. Negative for hallucinations. The patient is nervous/anxious.          Surgical hx, family hx, social hx   Have been reviewed      Current Outpatient Medications:     aspirin (ECOTRIN) 81 MG EC tablet, Take 1 tablet (81 mg total) by mouth once daily., Disp: 28 tablet, Rfl: 0    cyclobenzaprine (FLEXERIL) 10 MG tablet, TAKE 1 TABLET(10 MG) BY MOUTH TWICE DAILY AS NEEDED FOR MUSCLE SPASMS (Patient taking differently: Take 10 mg by mouth 3 (three) times daily as needed for Muscle spasms. TAKE 1 TABLET(10 MG) BY MOUTH TWICE DAILY AS NEEDED FOR MUSCLE SPASMS), Disp: 60 tablet, Rfl: 5    diclofenac (VOLTAREN) 50 MG EC tablet, Take 1 tablet (50 mg total) by mouth 2 (two) times daily as needed (pain/swelling)., Disp: 60 tablet, Rfl: 5    ergocalciferol (ERGOCALCIFEROL) 50,000 unit Cap, Take 1 capsule (50,000 Units total) by mouth twice a week., Disp: 24 capsule, Rfl: 3    semaglutide, weight loss, (WEGOVY) 0.25 mg/0.5 mL PnIj, Inject 0.25 mg into the skin every 7  "days., Disp: 2 mL, Rfl: 0    Current Facility-Administered Medications:     triamcinolone acetonide injection 40 mg, 40 mg, INTRABURSAL, 1 time in Clinic/HOD, Daryl Zazueta, DO    triamcinolone acetonide injection 40 mg, 40 mg, INTRABURSAL, 1 time in Clinic/HOD, Daryl Zazueta,     Facility-Administered Medications Ordered in Other Visits:     0.9%  NaCl infusion, 500 mL, Intravenous, Continuous, Domingo Jernigan MD    Objective:        Body mass index is 36.05 kg/m².  Vitals:    07/18/24 1012 07/18/24 1044   BP: 119/85    Pulse: (!) 121 103   SpO2: 97%    Weight: 101.3 kg (223 lb 5.2 oz)    Height: 5' 6" (1.676 m)    PainSc:   4    PainLoc: Leg        Physical Exam  Vitals and nursing note reviewed.   Constitutional:       General: She is not in acute distress.     Appearance: Normal appearance. She is not ill-appearing.   HENT:      Head: Normocephalic and atraumatic.   Eyes:      General: No scleral icterus.  Cardiovascular:      Rate and Rhythm: Normal rate and regular rhythm.      Heart sounds: Normal heart sounds.   Pulmonary:      Effort: Pulmonary effort is normal.   Musculoskeletal:         General: No deformity.   Skin:     Comments: Indentions bilat shoulder even from sports bra   Neurological:      Mental Status: She is alert and oriented to person, place, and time.   Psychiatric:         Behavior: Behavior normal.           Lab Results   Component Value Date    WBC 6.87 07/11/2024    HGB 13.8 07/11/2024    HCT 42.6 07/11/2024     07/11/2024    CHOL 199 07/11/2024    TRIG 97 07/11/2024    HDL 69 07/11/2024    ALT 19 07/11/2024    AST 23 07/11/2024     07/11/2024    K 4.1 07/11/2024     07/11/2024    CREATININE 0.8 07/11/2024    BUN 10 07/11/2024    CO2 27 07/11/2024    TSH 1.996 07/11/2024    HGBA1C 5.0 07/11/2024       The 10-year ASCVD risk score (Danya GARCIA, et al., 2019) is: 0.8%    Values used to calculate the score:      Age: 48 years      Sex: Female      Is Non- " : No      Diabetic: No      Tobacco smoker: No      Systolic Blood Pressure: 119 mmHg      Is BP treated: No      HDL Cholesterol: 69 mg/dL      Total Cholesterol: 199 mg/dL    (Imaging have been independently reviewed)  MRI knee Aug reviewed    Assessment:         1. Annual physical exam    2. Screening for malignant neoplasm of colon    3. Class 2 severe obesity due to excess calories with serious comorbidity and body mass index (BMI) of 36.0 to 36.9 in adult    4. Vitamin D deficiency    5. Encounter for screening mammogram for malignant neoplasm of breast              Plan:     Chinyere was seen today for annual exam.    Diagnoses and all orders for this visit:    Annual physical exam    Screening for malignant neoplasm of colon  -     Ambulatory referral/consult to Endo Procedure ; Future    Class 2 severe obesity due to excess calories with serious comorbidity and body mass index (BMI) of 36.0 to 36.9 in adult  -     semaglutide, weight loss, (WEGOVY) 0.25 mg/0.5 mL PnIj; Inject 0.25 mg into the skin every 7 days.    Vitamin D deficiency  -     ergocalciferol (ERGOCALCIFEROL) 50,000 unit Cap; Take 1 capsule (50,000 Units total) by mouth twice a week.  -     Vitamin D; Future    Encounter for screening mammogram for malignant neoplasm of breast  -     Mammo Digital Screening Bilat w/ Randy; Future              Health Maintenance  - Lipids: stable  - A1C: 4.9, down from 5.0  - Colon Ca Screen: recommended colonoscopy, end proc  planned Oct  - Immunizations: tetanus vaccine, covid vaccinated    Women's health  - Pap: Dr Turcios, due to return Aug  - Mammo:   - Dexa: N/A due to age  - Contraception:       Tests to Keep You Healthy    Mammogram: Met on 10/2/2023  Colon Cancer Screening: SCHEDULED  Cervical Cancer Screening: Met on 8/29/2023      Follow up in about 3 months (around 10/18/2024) for weight, coordination of care problems. or sooner prn        All medications were  reviewed including potential side effects and risks/benefits.  Pt was counseled to call back if anything worsens or if questions arise.        Harrison Lanza MD  Family Medicine  [facility]  [facility] [address]  Phone [phone]  Fax [phone]

## 2024-07-22 ENCOUNTER — TELEPHONE (OUTPATIENT)
Dept: INTERNAL MEDICINE | Facility: CLINIC | Age: 49
End: 2024-07-22
Payer: OTHER GOVERNMENT

## 2024-07-22 NOTE — TELEPHONE ENCOUNTER
Called pt  She said pharmacy told her the Wegovy needs PA  I let her know chart indicates it was done by Nurse Rose and denied, but that I will f/u with Rose to see if she did an appeal and if that was denied as well    They verbalized understanding and had no further concerns or questions.

## 2024-07-22 NOTE — TELEPHONE ENCOUNTER
----- Message from Lucila Durham sent at 7/22/2024  9:48 AM CDT -----  Regarding: call back  Type: Patient Call Back    Who called: pt     What is the request in detail: calling to inform clinic her pharmacy is awaiting an auth form for pt medication     Can the clinic reply by [facility]?no    Would the patient rather a call back or a response via [facility]? call    Best call back number: [phone]     Additional Information:

## 2024-07-22 NOTE — TELEPHONE ENCOUNTER
Patients Wegovy denied.     Patient has to have tried and failed or has a contraindication to phentermine, Qsymia or one of its individual generic components, and Contrave or one of its individual generic components? Note: The dates and durations of therapy for each medication or contraindication to each medication must be provided or your case could be denied.

## 2024-10-03 ENCOUNTER — HOSPITAL ENCOUNTER (OUTPATIENT)
Dept: RADIOLOGY | Facility: OTHER | Age: 49
Discharge: HOME | End: 2024-10-03
Attending: STUDENT IN AN ORGANIZED HEALTH CARE EDUCATION/TRAINING PROGRAM
Payer: OTHER GOVERNMENT

## 2024-10-03 DIAGNOSIS — Z12.31 ENCOUNTER FOR SCREENING MAMMOGRAM FOR MALIGNANT NEOPLASM OF BREAST: ICD-10-CM

## 2024-10-03 PROCEDURE — 77067 SCR MAMMO BI INCL CAD: CPT | Mod: TC

## 2024-10-13 DIAGNOSIS — G89.29 CHRONIC PAIN OF LEFT KNEE: ICD-10-CM

## 2024-10-13 DIAGNOSIS — M25.562 CHRONIC PAIN OF LEFT KNEE: ICD-10-CM

## 2024-10-14 RX ORDER — DICLOFENAC SODIUM 50 MG/1
TABLET, DELAYED RELEASE ORAL
Qty: 60 TABLET | Refills: 5 | Status: SHIPPED | OUTPATIENT
Start: 2024-10-14

## 2024-10-18 ENCOUNTER — LAB VISIT (OUTPATIENT)
Dept: LAB | Facility: OTHER | Age: 49
End: 2024-10-18
Attending: STUDENT IN AN ORGANIZED HEALTH CARE EDUCATION/TRAINING PROGRAM
Payer: OTHER GOVERNMENT

## 2024-10-18 DIAGNOSIS — E55.9 VITAMIN D DEFICIENCY: ICD-10-CM

## 2024-10-18 LAB — 25(OH)D3+25(OH)D2 SERPL-MCNC: 31 NG/ML (ref 30–96)

## 2024-10-18 PROCEDURE — 36415 COLL VENOUS BLD VENIPUNCTURE: CPT | Performed by: STUDENT IN AN ORGANIZED HEALTH CARE EDUCATION/TRAINING PROGRAM

## 2024-10-18 PROCEDURE — 82306 VITAMIN D 25 HYDROXY: CPT | Performed by: STUDENT IN AN ORGANIZED HEALTH CARE EDUCATION/TRAINING PROGRAM

## 2024-10-21 DIAGNOSIS — E55.9 VITAMIN D DEFICIENCY: Primary | ICD-10-CM

## 2024-10-22 ENCOUNTER — TELEPHONE (OUTPATIENT)
Dept: INTERNAL MEDICINE | Facility: CLINIC | Age: 49
End: 2024-10-22
Payer: OTHER GOVERNMENT

## 2024-10-22 NOTE — TELEPHONE ENCOUNTER
Harrison Lanza MD Lovitt Jamie Denise Staff16 hours ago (4:44 PM)       Vit d nonfastin 3 months please

## 2024-10-22 NOTE — TELEPHONE ENCOUNTER
Spoke to pt and went over lab results from PCP regarding vitamin D. Scheduled repeat lab work in 3 months.

## 2024-10-24 ENCOUNTER — TELEPHONE (OUTPATIENT)
Dept: ENDOSCOPY | Facility: HOSPITAL | Age: 49
End: 2024-10-24

## 2024-10-24 ENCOUNTER — CLINICAL SUPPORT (OUTPATIENT)
Dept: ENDOSCOPY | Facility: HOSPITAL | Age: 49
End: 2024-10-24
Payer: OTHER GOVERNMENT

## 2024-10-24 VITALS — WEIGHT: 222.69 LBS | HEIGHT: 66 IN | BODY MASS INDEX: 35.79 KG/M2

## 2024-10-24 DIAGNOSIS — Z12.11 SCREENING FOR MALIGNANT NEOPLASM OF COLON: ICD-10-CM

## 2024-10-24 RX ORDER — POLYETHYLENE GLYCOL 3350, SODIUM SULFATE ANHYDROUS, SODIUM BICARBONATE, SODIUM CHLORIDE, POTASSIUM CHLORIDE 236; 22.74; 6.74; 5.86; 2.97 G/4L; G/4L; G/4L; G/4L; G/4L
4 POWDER, FOR SOLUTION ORAL ONCE
Qty: 4000 ML | Refills: 0 | Status: SHIPPED | OUTPATIENT
Start: 2024-10-24 | End: 2024-10-24

## 2024-11-13 ENCOUNTER — OFFICE VISIT (OUTPATIENT)
Dept: INTERNAL MEDICINE | Facility: CLINIC | Age: 49
End: 2024-11-13
Payer: OTHER GOVERNMENT

## 2024-11-13 VITALS
SYSTOLIC BLOOD PRESSURE: 116 MMHG | HEART RATE: 110 BPM | DIASTOLIC BLOOD PRESSURE: 55 MMHG | WEIGHT: 226 LBS | OXYGEN SATURATION: 98 % | HEIGHT: 66 IN | BODY MASS INDEX: 36.32 KG/M2

## 2024-11-13 DIAGNOSIS — F43.10 PTSD (POST-TRAUMATIC STRESS DISORDER): ICD-10-CM

## 2024-11-13 DIAGNOSIS — F32.A DEPRESSION, UNSPECIFIED DEPRESSION TYPE: ICD-10-CM

## 2024-11-13 DIAGNOSIS — R68.89 DECREASED FUNCTIONAL ACTIVITY TOLERANCE: ICD-10-CM

## 2024-11-13 DIAGNOSIS — F41.9 ANXIETY: Primary | ICD-10-CM

## 2024-11-13 DIAGNOSIS — Z63.79 STRESSFUL LIFE EVENT AFFECTING FAMILY: ICD-10-CM

## 2024-11-13 PROCEDURE — 99999 PR PBB SHADOW E&M-EST. PATIENT-LVL V: CPT | Mod: PBBFAC,,, | Performed by: STUDENT IN AN ORGANIZED HEALTH CARE EDUCATION/TRAINING PROGRAM

## 2024-11-13 PROCEDURE — 99215 OFFICE O/P EST HI 40 MIN: CPT | Mod: PBBFAC | Performed by: STUDENT IN AN ORGANIZED HEALTH CARE EDUCATION/TRAINING PROGRAM

## 2024-11-13 PROCEDURE — G2211 COMPLEX E/M VISIT ADD ON: HCPCS | Mod: S$PBB,,, | Performed by: STUDENT IN AN ORGANIZED HEALTH CARE EDUCATION/TRAINING PROGRAM

## 2024-11-13 PROCEDURE — 99214 OFFICE O/P EST MOD 30 MIN: CPT | Mod: S$PBB,,, | Performed by: STUDENT IN AN ORGANIZED HEALTH CARE EDUCATION/TRAINING PROGRAM
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
## 2024-11-13 NOTE — PATIENT INSTRUCTIONS
Call to make an appointment within [facility] for psychiatry (meds) / psychology (counseling)   [phone]     Make two appts. One for psychiatry and one for psychology.    Call early in the morning.    Fri, Dec 27th 3:00 pm Dr. Juanita Smith, [place] [address] Medication management, [facility]    Mr. Saroj Krishnamurthy, therapist, Fri, Feb 28th 10 AM, counseling    -------------------------------------------------------------------        [email or web address]    [facility] psychiatrists:   Aurora Barragan(psychiatrist) [address] [facility] [address] Phone: [phone]   Catalino Jean (psychiatrist) [phone], [phone] [address] [facility]   Dr. Rafal Comer - [phone]   Dr. Myrna French - [phone]   Dr. Yulia Judd - [phone]   Dr. Rajeev Escobar - [phone]     [facility]: [phone]     Therapy/Psychology:   You can try anyone of these number to see if your insurance is accepted or you would have to call your insurance.     [facility]ognitive Behavioral Therapy (CBT) [facility]   Address: [facility] [place] [address]   Phone: [phone]   [email or web address]     [facility] [address], [address]   Phone: [phone]   You can email for an appointment at: [email or web address]     [facility]   [address] [place] [address], [place] [address], [facility], [facility]   [email or web address]   Dr. Jennifer Reilly, [phone] or [email or web address]   Dr. Carlita Horton, [phone] or [email or web address]     Nadiya PINK (therapist) [phone] [address]    [facility] (therapist) [phone] [address] [facility]   Divine Patricia   [facility] (therapist) [phone]   [address] [facility]   Edda Becker [facility] (therapist) [phone]   [address] [facility]   NAOMIWillie Jean         [facility]                    [phone]   Bill Nathan [phone] (therapist)  [address] [facility]   Beau Patricia (therapist) [phone]  [address]   Kennedy Chicas (therapist) [phone] [address] [facility]     [facility] Counseling [phone]   [address] [address]     Employee Assistance Program (EAP)   Check through your employer's HR.     [facility] (accepts medicaid)  [address]  [phone]  or [phone] ([phone])    Online Therapist:     [email or web address]     Free Guided Meditations   [email or web address]   [email or web address]   [email or web address]

## 2024-11-15 ENCOUNTER — PATIENT OUTREACH (OUTPATIENT)
Dept: ADMINISTRATIVE | Facility: OTHER | Age: 49
End: 2024-11-15
Payer: OTHER GOVERNMENT

## 2024-11-15 NOTE — PROGRESS NOTES
CHW - Outreach Attempt      LPN left a voicemail message for 1st attempt for initial outreach to contact patient regarding: Referral for transportation assistance.  Community Health Worker / LPN to attempt to contact patient on: 11/18/24

## 2024-11-25 NOTE — PROGRESS NOTES
CHW - Outreach Attempt    LPN left a voicemail message for 2nd attempt for initial outreach to contact patient regarding: Transportation assistance  Community Health Worker / LPN to attempt to contact patient on: 12/03/24

## 2024-12-04 NOTE — PROGRESS NOTES
CHW - Case Closure    This LPN unable to speak to patient via telephone today. 12/04/24  3 Failed attempts for initial outreach.     At this time the episode is closed.  Provided patient with Community Health Worker's / LPN contact information on voicemail and encouraged him/her to contact this LPN if as assistance is needed..

## 2024-12-04 NOTE — PROGRESS NOTES
CHW - Outreach Attempt    LPN left a voicemail message for 3rd attempt to contact patient regarding: Referral for transportation assistance.      3 unsuccessful attempts for CHW initial outreach.

## 2024-12-06 ENCOUNTER — ANESTHESIA EVENT (OUTPATIENT)
Dept: ENDOSCOPY | Facility: HOSPITAL | Age: 49
End: 2024-12-06
Payer: OTHER GOVERNMENT

## 2024-12-06 NOTE — ANESTHESIA PREPROCEDURE EVALUATION
12/06/2024  Chinyere Crockett is a 49 y.o., female.    Procedure: COLONOSCOPY, SCREENING, LOW RISK PATIENT (N/A)         Patient Active Problem List   Diagnosis    Decreased strength of lower extremity    Anxiety    Hip pain, bilateral    Back pain    Migraine without aura and without status migrainosus, not intractable    Sleep disturbance    Vitamin D deficiency    Decreased range of motion of trunk and back    Decreased strength of trunk and back    Weakness of both lower extremities    Chronic right shoulder pain    Shoulder weakness    Decreased functional activity tolerance    Decreased range of motion (ROM) of left knee    Greater trochanteric bursitis of both hips       History reviewed. No pertinent past medical history.    ECHO: No results found for this or any previous visit.      There is no height or weight on file to calculate BMI.    Tobacco Use: Low Risk  (12/6/2024)    Patient History     Smoking Tobacco Use: Never     Smokeless Tobacco Use: Never     Passive Exposure: Not on file       Social History     Substance and Sexual Activity   Drug Use Not Currently    Types: Marijuana        Alcohol Use: Not on file       Review of patient's allergies indicates:   Allergen Reactions    Duloxetine      Vs tizanidine unclear reaction, pt disliked?    Hydrocodone      Dizziness and nausea     Tizanidine      Unclear negative reaction vs duloxetine         Airway:  No value filed.    Pre-op Assessment    I have reviewed the Patient Summary Reports.    I have reviewed the NPO Status.   I have reviewed the Medications.     Review of Systems  Anesthesia Hx:             Denies Family Hx of Anesthesia complications.    Denies Personal Hx of Anesthesia complications.                    Hematology/Oncology:  Hematology Normal   Oncology Normal                                   EENT/Dental:  EENT/Dental  Normal           Cardiovascular:  Cardiovascular Normal                                              Pulmonary:  Pulmonary Normal                       Renal/:  Renal/ Normal                 Hepatic/GI:  Hepatic/GI Normal                    Musculoskeletal:  Musculoskeletal Normal                Neurological:      Headaches                                 Endocrine:  Endocrine Normal          Obesity / BMI > 30  Dermatological:  Skin Normal    Psych:  Psychiatric Normal                    Physical Exam  General: Well nourished, Cooperative, Alert and Oriented    Airway:  Mallampati: II   Mouth Opening: Normal  TM Distance: Normal  Tongue: Normal  Neck ROM: Normal ROM    Dental:  Intact    Chest/Lungs:  Clear to auscultation, Normal Respiratory Rate    Heart:  Rate: Normal  Rhythm: Regular Rhythm  Sounds: Normal    Abdomen:  Normal        Anesthesia Plan  Type of Anesthesia, risks & benefits discussed:    Anesthesia Type: Gen Natural Airway  Intra-op Monitoring Plan: Standard ASA Monitors  Post Op Pain Control Plan: multimodal analgesia  Induction:  IV  Informed Consent: Informed consent signed with the Patient and all parties understand the risks and agree with anesthesia plan.  All questions answered.   ASA Score: 2  Day of Surgery Review of History & Physical: H&P Update referred to the surgeon/provider.    Ready For Surgery From Anesthesia Perspective.     .

## 2024-12-12 ENCOUNTER — ANESTHESIA (OUTPATIENT)
Dept: ENDOSCOPY | Facility: HOSPITAL | Age: 49
End: 2024-12-12
Payer: OTHER GOVERNMENT

## 2024-12-12 ENCOUNTER — HOSPITAL ENCOUNTER (OUTPATIENT)
Facility: HOSPITAL | Age: 49
Discharge: HOME | End: 2024-12-12
Attending: INTERNAL MEDICINE | Admitting: INTERNAL MEDICINE
Payer: OTHER GOVERNMENT

## 2024-12-12 VITALS
HEART RATE: 102 BPM | DIASTOLIC BLOOD PRESSURE: 74 MMHG | TEMPERATURE: 97 F | WEIGHT: 226 LBS | SYSTOLIC BLOOD PRESSURE: 135 MMHG | BODY MASS INDEX: 36.32 KG/M2 | OXYGEN SATURATION: 99 % | HEIGHT: 66 IN | RESPIRATION RATE: 17 BRPM

## 2024-12-12 DIAGNOSIS — Z12.11 SCREEN FOR COLON CANCER: ICD-10-CM

## 2024-12-12 DIAGNOSIS — Z12.11 COLON CANCER SCREENING: Primary | ICD-10-CM

## 2024-12-12 PROCEDURE — 99900035 HC TECH TIME PER 15 MIN (STAT)

## 2024-12-12 PROCEDURE — A4216 STERILE WATER/SALINE, 10 ML: HCPCS | Performed by: NURSE ANESTHETIST, CERTIFIED REGISTERED

## 2024-12-12 PROCEDURE — G0121 COLON CA SCRN NOT HI RSK IND: HCPCS | Mod: ,,, | Performed by: INTERNAL MEDICINE

## 2024-12-12 PROCEDURE — G0121 COLON CA SCRN NOT HI RSK IND: HCPCS | Performed by: INTERNAL MEDICINE

## 2024-12-12 PROCEDURE — 37000008 HC ANESTHESIA 1ST 15 MINUTES: Performed by: INTERNAL MEDICINE

## 2024-12-12 PROCEDURE — 94761 N-INVAS EAR/PLS OXIMETRY MLT: CPT

## 2024-12-12 PROCEDURE — 25000003 PHARM REV CODE 250: Performed by: NURSE ANESTHETIST, CERTIFIED REGISTERED

## 2024-12-12 PROCEDURE — 37000009 HC ANESTHESIA EA ADD 15 MINS: Performed by: INTERNAL MEDICINE

## 2024-12-12 PROCEDURE — 63600175 PHARM REV CODE 636 W HCPCS: Performed by: NURSE ANESTHETIST, CERTIFIED REGISTERED

## 2024-12-12 RX ORDER — LIDOCAINE HYDROCHLORIDE 20 MG/ML
INJECTION INTRAVENOUS
Status: DISCONTINUED | OUTPATIENT
Start: 2024-12-12 | End: 2024-12-12

## 2024-12-12 RX ORDER — SODIUM CHLORIDE 0.9 % (FLUSH) 0.9 %
SYRINGE (ML) INJECTION
Status: DISCONTINUED | OUTPATIENT
Start: 2024-12-12 | End: 2024-12-12

## 2024-12-12 RX ORDER — PROPOFOL 10 MG/ML
VIAL (ML) INTRAVENOUS CONTINUOUS PRN
Status: DISCONTINUED | OUTPATIENT
Start: 2024-12-12 | End: 2024-12-12

## 2024-12-12 RX ORDER — PROPOFOL 10 MG/ML
VIAL (ML) INTRAVENOUS
Status: DISCONTINUED | OUTPATIENT
Start: 2024-12-12 | End: 2024-12-12

## 2024-12-12 RX ADMIN — PROPOFOL 100 MG: 10 INJECTION, EMULSION INTRAVENOUS at 08:12

## 2024-12-12 RX ADMIN — PROPOFOL 175 MCG/KG/MIN: 10 INJECTION, EMULSION INTRAVENOUS at 08:12

## 2024-12-12 RX ADMIN — Medication 10 ML: at 08:12

## 2024-12-12 RX ADMIN — LIDOCAINE HYDROCHLORIDE 100 MG: 20 INJECTION INTRAVENOUS at 08:12

## 2024-12-12 NOTE — PROVATION PATIENT INSTRUCTIONS
Discharge Summary/Instructions after an Endoscopic Procedure  Patient Name: Chinyere Crockett  Patient MRN: [id number]  Patient YOB: 1975 Thursday, December 12, 2024  Derek Rodriguez MD  Dear patient,  As a result of recent federal legislation (The Federal Cures Act), you may   receive lab or pathology results from your procedure in your [facility]   account before your physician is able to contact you. Your physician or   their representative will relay the results to you with their   recommendations at their soonest availability.  Thank you,  RESTRICTIONS:  During your procedure today, you received medications for sedation.  These   medications may affect your judgment, balance and coordination.  Therefore,   for 24 hours, you have the following restrictions:   - DO NOT drive a car, operate machinery, make legal/financial decisions,   sign important papers or drink alcohol.    ACTIVITY:  Today: no heavy lifting, straining or running due to procedural   sedation/anesthesia.  The following day: return to full activity including work.  DIET:  Eat and drink normally unless instructed otherwise.     TREATMENT FOR COMMON SIDE EFFECTS:  - Mild abdominal pain, nausea, belching, bloating or excessive gas:  rest,   eat lightly and use a heating pad.  - Sore Throat: treat with throat lozenges and/or gargle with warm salt   water.  - Because air was used during the procedure, expelling large amounts of air   from your rectum or belching is normal.  - If a bowel prep was taken, you may not have a bowel movement for 1-3 days.    This is normal.  SYMPTOMS TO WATCH FOR AND REPORT TO YOUR PHYSICIAN:  1. Abdominal pain or bloating, other than gas cramps.  2. Chest pain.  3. Back pain.  4. Signs of infection such as: chills or fever occurring within 24 hours   after the procedure.  5. Rectal bleeding, which would show as bright red, maroon, or black stools.   (A tablespoon of blood from the rectum is not serious,  especially if   hemorrhoids are present.)  6. Vomiting.  7. Weakness or dizziness.  GO DIRECTLY TO THE NEAREST EMERGENCY ROOM IF YOU HAVE ANY OF THE FOLLOWING:      Difficulty breathing              Chills and/or fever over 101 F   Persistent vomiting and/or vomiting blood   Severe abdominal pain   Severe chest pain   Black, tarry stools   Bleeding- more than one tablespoon   Any other symptom or condition that you feel may need urgent attention  Your doctor recommends these additional instructions:  If any biopsies were taken, your doctors clinic will contact you in 1 to 2   weeks with any results.  - Patient has a contact number available for emergencies.  The signs and   symptoms of potential delayed complications were discussed with the   patient.  Return to normal activities tomorrow.  Written discharge   instructions were provided to the patient.   - Discharge patient to home.   - Resume previous diet.   - Continue present medications.   - Repeat colonoscopy in 10 years for screening purposes.   For questions, problems or results please call your physician - Derek Rodriguez MD at Work:  [phone].  [facility], EMERGENCY ROOM PHONE NUMBER: [phone]  IF A COMPLICATION OR EMERGENCY SITUATION ARISES AND YOU ARE UNABLE TO REACH   YOUR PHYSICIAN - GO DIRECTLY TO THE EMERGENCY ROOM.  Derek Rodriguez MD  12/12/2024 8:35:18 AM  This report has been verified and signed electronically.  Dear patient,  As a result of recent federal legislation (The Federal Cures Act), you may   receive lab or pathology results from your procedure in your [facility]   account before your physician is able to contact you. Your physician or   their representative will relay the results to you with their   recommendations at their soonest availability.  Thank you,  PROVATION

## 2024-12-12 NOTE — H&P
Short Stay Endoscopy History and Physical    PCP - Harrison Lanza MD    Procedure - Colonoscopy  Sedation: GA  ASA - per anesthesia  Mallampati - per anesthesia  History of Anesthesia problems - no  Family history Anesthesia problems -  no     HPI:  This is a 49 y.o. female here for evaluation of : Screening for CRC    Reflux - no  Dysphagia - no  Abdominal pain - no  Diarrhea - no    ROS:  Constitutional: No fevers, chills, No weight loss  ENT: No allergies  CV: No chest pain  Pulm: No cough, No shortness of breath  Ophtho: No vision changes  GI: see HPI  Medical History:  has no past medical history on file.    Surgical History:  has a past surgical history that includes Tonsillectomy; Injection of joint (Bilateral, 2/14/2023); Knee arthroscopy w/ meniscectomy (Left, 10/12/2023); Synovectomy of knee (Left, 10/12/2023); and Injection of joint (Bilateral, 2/20/2024).    Family History: family history includes Breast cancer (age of onset: 72) in her mother; COPD in her mother; Heart attack in her father; Heart disease in her paternal grandfather and paternal uncle.. Otherwise no colon cancer, inflammatory bowel disease, or GI malignancies.    Social History:  reports that she has never smoked. She has never used smokeless tobacco. She reports current alcohol use. She reports that she does not currently use drugs after having used the following drugs: Marijuana.    Review of patient's allergies indicates:   Allergen Reactions    Duloxetine      Vs tizanidine unclear reaction, pt disliked?    Hydrocodone      Dizziness and nausea     Tizanidine      Unclear negative reaction vs duloxetine       Medications:   Facility-Administered Medications Prior to Admission   Medication Dose Route Frequency Provider Last Rate Last Admin    triamcinolone acetonide injection 40 mg  40 mg INTRABURSAL 1 time in Clinic/HOD Daryl Zazueta, DO        triamcinolone acetonide injection 40 mg  40 mg INTRABURSAL 1 time in Clinic/HOD  Daryl Zazueta, DO         Medications Prior to Admission   Medication Sig Dispense Refill Last Dose/Taking    cyclobenzaprine (FLEXERIL) 10 MG tablet TAKE 1 TABLET(10 MG) BY MOUTH TWICE DAILY AS NEEDED FOR MUSCLE SPASMS 60 tablet 5 12/11/2024    diclofenac (VOLTAREN) 50 MG EC tablet TAKE 1 TABLET(50 MG) BY MOUTH TWICE DAILY AS NEEDED FOR PAIN OR SWELLING 60 tablet 5 12/11/2024    aspirin (ECOTRIN) 81 MG EC tablet Take 1 tablet (81 mg total) by mouth once daily. 28 tablet 0 More than a month    ergocalciferol (ERGOCALCIFEROL) 50,000 unit Cap Take 1 capsule (50,000 Units total) by mouth twice a week. 24 capsule 3     semaglutide, weight loss, (WEGOVY) 0.25 mg/0.5 mL PnIj Inject 0.25 mg into the skin every 7 days. 2 mL 0        Objective Findings:    Vital Signs: Per nursing notes.    Physical Exam:  General Appearance: Well appearing in no acute distress  Head:   Normocephalic, without obvious abnormality  Eyes:    No scleral icterus  Airway: Open  Neck: No restriction in mobility  Lungs: CTA bilaterally in anterior and posterior fields, no wheezes, no crackles.  Heart:  Regular rate and rhythm, S1, S2 normal, no murmurs heard  Abdomen: Soft, non tender, non distended      Labs:  Lab Results   Component Value Date    WBC 6.87 07/11/2024    HGB 13.8 07/11/2024    HCT 42.6 07/11/2024     07/11/2024    CHOL 199 07/11/2024    TRIG 97 07/11/2024    HDL 69 07/11/2024    ALT 19 07/11/2024    AST 23 07/11/2024     07/11/2024    K 4.1 07/11/2024     07/11/2024    CREATININE 0.8 07/11/2024    BUN 10 07/11/2024    CO2 27 07/11/2024    TSH 1.996 07/11/2024    HGBA1C 5.0 07/11/2024         I have explained the risks and benefits of endoscopy procedures to the patient including but not limited to bleeding, perforation, infection, and death.    Thank you so much for allowing me to participate in the care of Chinyere Rodriguez MD

## 2024-12-12 NOTE — TRANSFER OF CARE
"Anesthesia Transfer of Care Note    Patient: Chinyere Crockett    Procedure(s) Performed: Procedure(s) (LRB):  COLONOSCOPY, SCREENING, LOW RISK PATIENT (N/A)    Patient location: PACU    Anesthesia Type: general    Transport from OR: Transported from OR on room air with adequate spontaneous ventilation    Post pain: adequate analgesia    Post assessment: no apparent anesthetic complications and tolerated procedure well    Post vital signs: stable    Level of consciousness: awake, alert and oriented    Nausea/Vomiting: no nausea/vomiting    Complications: none    Transfer of care protocol was followed      Last vitals: Visit Vitals  BP (!) 145/71 (BP Location: Left arm, Patient Position: Lying)   Pulse 96   Temp 36.3 °C (97.4 °F) (Temporal)   Resp 16   Ht 5' 6" (1.676 m)   Wt 102.5 kg (226 lb)   LMP 03/16/2018 (LMP Unknown)   SpO2 99%   Breastfeeding No   BMI 36.48 kg/m²     "

## 2024-12-12 NOTE — ANESTHESIA POSTPROCEDURE EVALUATION
Anesthesia Post Evaluation    Patient: Chinyere Crockett    Procedure(s) Performed: Procedure(s) (LRB):  COLONOSCOPY, SCREENING, LOW RISK PATIENT (N/A)    Final Anesthesia Type: general      Patient location during evaluation: PACU  Patient participation: Yes- Able to Participate  Level of consciousness: awake and alert  Post-procedure vital signs: reviewed and stable  Pain management: adequate  Airway patency: patent    PONV status at discharge: No PONV  Anesthetic complications: no      Cardiovascular status: stable  Respiratory status: spontaneous ventilation  Hydration status: euvolemic  Follow-up not needed.              Vitals Value Taken Time   /74 12/12/24 0852   Temp 36.1 °C (96.9 °F) 12/12/24 0839   Pulse 102 12/12/24 0854   Resp 21 12/12/24 0854   SpO2 99 % 12/12/24 0854   Vitals shown include unfiled device data.      Event Time   Out of Recovery 08:53:00         Pain/Rafael Score: Rafael Score: 10 (12/12/2024  8:53 AM)

## 2024-12-16 RX ORDER — CYCLOBENZAPRINE HCL 10 MG
TABLET ORAL
Qty: 60 TABLET | Refills: 5 | Status: SHIPPED | OUTPATIENT
Start: 2024-12-16

## 2024-12-27 ENCOUNTER — OFFICE VISIT (OUTPATIENT)
Dept: PSYCHIATRY | Facility: CLINIC | Age: 49
End: 2024-12-27
Payer: OTHER GOVERNMENT

## 2024-12-27 VITALS
HEIGHT: 66 IN | DIASTOLIC BLOOD PRESSURE: 86 MMHG | HEART RATE: 94 BPM | BODY MASS INDEX: 37.28 KG/M2 | WEIGHT: 231.94 LBS | SYSTOLIC BLOOD PRESSURE: 151 MMHG

## 2024-12-27 DIAGNOSIS — F39 UNSPECIFIED MOOD (AFFECTIVE) DISORDER: ICD-10-CM

## 2024-12-27 DIAGNOSIS — F41.9 ANXIETY: ICD-10-CM

## 2024-12-27 DIAGNOSIS — F41.1 GENERALIZED ANXIETY DISORDER: Primary | ICD-10-CM

## 2024-12-27 DIAGNOSIS — F33.1 MODERATE EPISODE OF RECURRENT MAJOR DEPRESSIVE DISORDER: ICD-10-CM

## 2024-12-27 PROCEDURE — 99999 PR PBB SHADOW E&M-EST. PATIENT-LVL III: CPT | Mod: PBBFAC,,,

## 2024-12-27 PROCEDURE — 99213 OFFICE O/P EST LOW 20 MIN: CPT | Mod: PBBFAC

## 2024-12-27 RX ORDER — ESCITALOPRAM OXALATE 10 MG/1
10 TABLET ORAL DAILY
Qty: 90 TABLET | Refills: 3 | Status: SHIPPED | OUTPATIENT
Start: 2024-12-27 | End: 2025-12-27
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
## 2024-12-27 NOTE — PROGRESS NOTES
[facility] DEPARTMENT OF PSYCHIATRY     STAFF NOTE:     I have familiarized myself with the relevant aspects of the history and case, via documentation review and/or direct discussion, and coordinated care amongst my treatment team.  I have personally interviewed the patient, and agree with the overall findings noted herein, with corrections or clarifications, if any, detailed below.    **    Daryl Hook MD  Board Certification: Psychiatry and Addiction Medicine

## 2025-03-07 ENCOUNTER — OFFICE VISIT (OUTPATIENT)
Dept: INTERNAL MEDICINE | Facility: CLINIC | Age: 50
End: 2025-03-07
Payer: OTHER GOVERNMENT

## 2025-03-07 VITALS
WEIGHT: 228.19 LBS | BODY MASS INDEX: 36.67 KG/M2 | HEART RATE: 107 BPM | SYSTOLIC BLOOD PRESSURE: 132 MMHG | OXYGEN SATURATION: 97 % | HEIGHT: 66 IN | DIASTOLIC BLOOD PRESSURE: 86 MMHG

## 2025-03-07 DIAGNOSIS — R68.89 DECREASED FUNCTIONAL ACTIVITY TOLERANCE: ICD-10-CM

## 2025-03-07 DIAGNOSIS — Z60.4 SOCIAL ISOLATION: ICD-10-CM

## 2025-03-07 DIAGNOSIS — Z59.82 TRANSPORTATION INSECURITY: ICD-10-CM

## 2025-03-07 DIAGNOSIS — E66.812 CLASS 2 SEVERE OBESITY DUE TO EXCESS CALORIES WITH SERIOUS COMORBIDITY AND BODY MASS INDEX (BMI) OF 36.0 TO 36.9 IN ADULT: ICD-10-CM

## 2025-03-07 DIAGNOSIS — F34.1 DYSTHYMIA: ICD-10-CM

## 2025-03-07 DIAGNOSIS — F41.9 ANXIETY: Primary | ICD-10-CM

## 2025-03-07 DIAGNOSIS — E55.9 VITAMIN D DEFICIENCY: ICD-10-CM

## 2025-03-07 DIAGNOSIS — E66.01 CLASS 2 SEVERE OBESITY DUE TO EXCESS CALORIES WITH SERIOUS COMORBIDITY AND BODY MASS INDEX (BMI) OF 36.0 TO 36.9 IN ADULT: ICD-10-CM

## 2025-03-07 DIAGNOSIS — M79.676 PAIN OF TOE, UNSPECIFIED LATERALITY: ICD-10-CM

## 2025-03-07 PROCEDURE — 99999 PR PBB SHADOW E&M-EST. PATIENT-LVL IV: CPT | Mod: PBBFAC,,, | Performed by: STUDENT IN AN ORGANIZED HEALTH CARE EDUCATION/TRAINING PROGRAM

## 2025-03-07 PROCEDURE — G2211 COMPLEX E/M VISIT ADD ON: HCPCS | Mod: S$PBB,,, | Performed by: STUDENT IN AN ORGANIZED HEALTH CARE EDUCATION/TRAINING PROGRAM

## 2025-03-07 PROCEDURE — 99214 OFFICE O/P EST MOD 30 MIN: CPT | Mod: S$PBB,,, | Performed by: STUDENT IN AN ORGANIZED HEALTH CARE EDUCATION/TRAINING PROGRAM

## 2025-03-07 PROCEDURE — 99214 OFFICE O/P EST MOD 30 MIN: CPT | Mod: PBBFAC | Performed by: STUDENT IN AN ORGANIZED HEALTH CARE EDUCATION/TRAINING PROGRAM

## 2025-03-07 RX ORDER — SEMAGLUTIDE 0.25 MG/.5ML
0.25 INJECTION, SOLUTION SUBCUTANEOUS
Qty: 2 ML | Refills: 0 | Status: SHIPPED | OUTPATIENT
Start: 2025-03-07

## 2025-03-07 SDOH — SOCIAL DETERMINANTS OF HEALTH (SDOH): TRANSPORTATION INSECURITY: Z59.82

## 2025-03-07 SDOH — SOCIAL DETERMINANTS OF HEALTH (SDOH): SOCIAL EXCLUSION AND REJECTION: Z60.4
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
## 2025-03-26 ENCOUNTER — OFFICE VISIT (OUTPATIENT)
Dept: PODIATRY | Facility: CLINIC | Age: 50
End: 2025-03-26
Payer: OTHER GOVERNMENT

## 2025-03-26 VITALS
SYSTOLIC BLOOD PRESSURE: 124 MMHG | HEIGHT: 66 IN | WEIGHT: 228.19 LBS | HEART RATE: 112 BPM | BODY MASS INDEX: 36.67 KG/M2 | DIASTOLIC BLOOD PRESSURE: 83 MMHG

## 2025-03-26 DIAGNOSIS — M24.573 EQUINUS CONTRACTURE OF ANKLE: ICD-10-CM

## 2025-03-26 DIAGNOSIS — L84 CORN OR CALLUS: Primary | ICD-10-CM

## 2025-03-26 DIAGNOSIS — M79.671 FOOT PAIN, BILATERAL: ICD-10-CM

## 2025-03-26 DIAGNOSIS — M79.672 FOOT PAIN, BILATERAL: ICD-10-CM

## 2025-03-26 DIAGNOSIS — M77.9 CAPSULITIS: ICD-10-CM

## 2025-03-26 DIAGNOSIS — M79.676 PAIN OF TOE, UNSPECIFIED LATERALITY: ICD-10-CM

## 2025-03-26 PROCEDURE — 99213 OFFICE O/P EST LOW 20 MIN: CPT | Mod: PBBFAC,PN | Performed by: PODIATRIST

## 2025-03-26 PROCEDURE — 99999 PR PBB SHADOW E&M-EST. PATIENT-LVL III: CPT | Mod: PBBFAC,,, | Performed by: PODIATRIST

## 2025-03-26 PROCEDURE — 99204 OFFICE O/P NEW MOD 45 MIN: CPT | Mod: S$PBB,,, | Performed by: PODIATRIST

## 2025-03-26 RX ORDER — UREA 40 %
CREAM (GRAM) TOPICAL 2 TIMES DAILY
Qty: 1 EACH | Refills: 11 | Status: SHIPPED | OUTPATIENT
Start: 2025-03-26

## 2025-03-26 NOTE — PROGRESS NOTES
Subjective:      Patient ID: Chinyere Crockett is a 49 y.o. female.    Chief Complaint: Toe Pain and Plantar Warts    Pain in the bottom of the forefoot right and left.  Longstanding condition on and off since adolescence.  This exacerbations gradual onset, worsening over the past several months.  Aggravated with increased weight-bearing prolonged standing some shoes.  Prior medical treatment as youth with sports medicine and custom inserts did help.  Denies trauma and surgery both feet.    Cc2  thick hard callus and possible skin lesion in the bottom of both feet.  Longstanding condition comes and goes.  This exacerbations gradual onset, worsening over the past several weeks.  Aggravated with increased weight-bearing prolonged standing some shoes.  Prior removal of wart helped shoe for short time, lesion has returned.  Denies trauma and surgery both feet.    Review of Systems   Constitutional: Negative for chills, diaphoresis, fever, malaise/fatigue and night sweats.   Cardiovascular:  Negative for claudication, cyanosis, leg swelling and syncope.   Skin:  Positive for suspicious lesions. Negative for color change, dry skin, nail changes, rash and unusual hair distribution.   Musculoskeletal:  Positive for joint pain. Negative for falls, joint swelling, muscle cramps, muscle weakness and stiffness.   Gastrointestinal:  Negative for constipation, diarrhea, nausea and vomiting.   Neurological:  Negative for brief paralysis, disturbances in coordination, focal weakness, numbness, paresthesias, sensory change and tremors.           Objective:      Physical Exam  Constitutional:       General: She is not in acute distress.     Appearance: She is well-developed. She is not diaphoretic.   Cardiovascular:      Pulses:           Popliteal pulses are 2+ on the right side and 2+ on the left side.        Dorsalis pedis pulses are 2+ on the right side and 2+ on the left side.        Posterior tibial pulses are 2+ on the  right side and 2+ on the left side.      Comments: Capillary refill 3 seconds all toes/distal feet, all toes/both feet warm to touch.      Negative lymphadenopathy bilateral popliteal fossa and tarsal tunnel.      Negavie lower extremity edema bilateral.    Musculoskeletal:      Right ankle: No swelling, deformity, ecchymosis or lacerations. Normal range of motion. Normal pulse.      Right Achilles Tendon: Normal. No defects. Thomas's test negative.      Comments: Pain to palpation inferior mtpj one through 5 bilateral without evidence of trauma or infection.    Ankle dorsiflexion decreased at <10 degrees bilateral with moderate increase with knee flexion bilateral.     Lymphadenopathy:      Lower Body: No right inguinal adenopathy. No left inguinal adenopathy.      Comments: Negative lymphadenopathy bilateral popliteal fossa and tarsal tunnel.    Negative lymphangitic streaking bilateral feet/ankles/legs.   Skin:     General: Skin is warm and dry.      Capillary Refill: Capillary refill takes 2 to 3 seconds.      Coloration: Skin is not pale.      Findings: No abrasion, bruising, burn, ecchymosis, erythema, laceration, lesion or rash.      Nails: There is no clubbing.      Comments: Focal hyperkeratotic lesion consisting entirely of hyperkeratotic tissue without open skin, drainage, pus, fluctuance, malodor, or signs of infection beneath metatarsal heads right and left feet.    Otherwise, Skin is normal age and health appropriate color, turgor, texture, and temperature bilateral lower extremities without ulceration, hyperpigmentation, discoloration, masses nodules or cords palpated.  No ecchymosis, erythema, edema, or cardinal signs of infection bilateral lower extremities.        Neurological:      Mental Status: She is alert and oriented to person, place, and time.      Sensory: No sensory deficit.      Motor: No tremor, atrophy or abnormal muscle tone.      Gait: Gait normal.      Deep Tendon Reflexes:       Reflex Scores:       Patellar reflexes are 2+ on the right side and 2+ on the left side.       Achilles reflexes are 2+ on the right side and 2+ on the left side.     Comments:   Negative moulder sign/click bilateral all intermetatarsal spaces.  .    Negative tinel sign to percussion sural, superficial peroneal, deep peroneal, saphenous, and posterior tibial nerves right and left ankles and feet.    Psychiatric:         Behavior: Behavior is cooperative.           Assessment:       Encounter Diagnoses   Name Primary?    Pain of toe, unspecified laterality     Corn or callus Yes    Capsulitis     Equinus contracture of ankle     Foot pain, bilateral          Plan:       Chinyere was seen today for toe pain and plantar warts.    Diagnoses and all orders for this visit:    Corn or callus    Pain of toe, unspecified laterality  -     Ambulatory referral/consult to Podiatry    Capsulitis    Equinus contracture of ankle    Foot pain, bilateral    Other orders  -     urea (CARMOL) 40 % Crea; Apply topically 2 (two) times daily.      I counseled the patient on her conditions, their implications and medical management.        Self maintenance pumice stone periodically to help maintain calluses.      Topical urea cream to assistant above.      Patient will obtain over the counter arch supports and wear them in shoes whenever possible.  Athletic shoes intended for walking or running are usually best.    Patient will stretch the tendo achilles complex three times daily as demonstrated in the office.  Literature was dispensed illustrating proper stretching technique.    Discussed conservative treatment with shoes of adequate dimensions, material, and style to alleviate symptoms and delay or prevent surgical intervention.           No follow-ups on file.

## 2025-03-28 ENCOUNTER — TELEPHONE (OUTPATIENT)
Dept: PAIN MEDICINE | Facility: CLINIC | Age: 50
End: 2025-03-28
Payer: OTHER GOVERNMENT

## 2025-03-28 ENCOUNTER — OFFICE VISIT (OUTPATIENT)
Dept: PSYCHIATRY | Facility: CLINIC | Age: 50
End: 2025-03-28
Payer: OTHER GOVERNMENT

## 2025-03-28 VITALS
HEART RATE: 105 BPM | SYSTOLIC BLOOD PRESSURE: 116 MMHG | DIASTOLIC BLOOD PRESSURE: 71 MMHG | BODY MASS INDEX: 37.01 KG/M2 | WEIGHT: 229.25 LBS

## 2025-03-28 DIAGNOSIS — G47.9 SLEEP DISTURBANCE: ICD-10-CM

## 2025-03-28 DIAGNOSIS — F41.1 GENERALIZED ANXIETY DISORDER: Primary | ICD-10-CM

## 2025-03-28 PROCEDURE — 99212 OFFICE O/P EST SF 10 MIN: CPT | Mod: PBBFAC

## 2025-03-28 PROCEDURE — 99999 PR PBB SHADOW E&M-EST. PATIENT-LVL II: CPT | Mod: PBBFAC,,,

## 2025-03-28 RX ORDER — ESCITALOPRAM OXALATE 20 MG/1
20 TABLET ORAL DAILY
Qty: 90 TABLET | Refills: 0 | Status: SHIPPED | OUTPATIENT
Start: 2025-03-28 | End: 2025-06-26

## 2025-03-28 NOTE — PROGRESS NOTES
OUTPATIENT PSYCHIATRY Follow up     ENCOUNTER DATE:  3/28/2025  SITE:  [facility], [facility]    CHIEF COMPLAINT:   Anxiety       HISTORY OF PRESENTING ILLNESS:  Chinyere Crockett is a 49 y.o. female with history of KIKA  who presents for initial assessment.      History as told by Patient - & or family/friend/spouse/caregiver with pts permission  Patient reports she has been doing okay. She endorses that the medication has made her anxiety and racing thoughts less. She also endorses she is sleeping a lot better. However, she currently is going through a major stressor of her  being deployed in a few weeks and that has been very hard to cope with.   She denies SI/HI/AVH.       PSYCHIATRIC REVIEW OF SYMPTOMS: (Is patient experiencing or having changes in any of the following?)        Patient Health Questionnaire-2 (PHQ-2)  Over the last 2 weeks, how often have you been bothered by the following problems?    Little interest or pleasure in doing things +1 - Several days   Feeling down, depressed, and hopeless +1 - Several days   Total score (>3 considered positive screen) 2   Follow up positive screen with PHQ-9    Patient Health Questionnaire-9 (PHQ-9)  Over the last 2 weeks, how often have you been bothered by the following problems?    Little interest or pleasure in doing things +1 - Several days   Feeling down, depressed, and hopeless +1 - Several days   Trouble falling or staying asleep, or sleeping too much + 0 - Not at all   Feeling tired or having little energy +1 - Several days   Poor appetite or overeating +1 - Several days   Feeling bad about yourself - or that you are a failure or have let yourself or your family down +1 - Several days   Trouble concentrating on things, such as reading the newspaper or watching television +1 - Several days   Moving or speaking so slowly that other people could have noticed? Or so fidgety or restless that you have been moving a lot more than usual  + 0 - Not at all   Thoughts that you would be better off dead, or thoughts of hurting yourself in some way + 0 - Not at all       Total score 6    Mild depression (5-9)           Generalized Anxiety Disorder 7-item (GAD7)  Over the last 2 weeks, how often have you been bothered by the following problems?    Feeling nervous, anxious or on edge +3 - Nearly every day   Not being able to stop or control worrying +2 - More than half the days   Worrying too much about different things +2 - More than half the days   Trouble relaxing +3 - Nearly every day   Being so restless that it is hard to sit still +3 - Nearly every day   Becoming easily annoyed or irritable +2 - More than half the days   Feeling afraid as if something awful might happen +2 - More than half the days       Total score 17    Severe anxiety (15+)             Risk Parameters:  Patient reports no suicidal ideation  Patient reports no homicidal ideation  Patient reports no self-injurious behavior  Patient reports no violent behavior      MEDICAL REVIEW OF SYSTEMS:  Chronic pain from bilateral bursitis     MEDICAL HISTORY:  No past medical history on file.    ALL MEDICATIONS:    Current Outpatient Medications:     aspirin (ECOTRIN) 81 MG EC tablet, Take 1 tablet (81 mg total) by mouth once daily. (Patient not taking: Reported on 3/26/2025), Disp: 28 tablet, Rfl: 0    cyclobenzaprine (FLEXERIL) 10 MG tablet, TAKE 1 TABLET(10 MG) BY MOUTH TWICE DAILY AS NEEDED FOR MUSCLE SPASMS, Disp: 60 tablet, Rfl: 5    diclofenac (VOLTAREN) 50 MG EC tablet, TAKE 1 TABLET(50 MG) BY MOUTH TWICE DAILY AS NEEDED FOR PAIN OR SWELLING, Disp: 60 tablet, Rfl: 5    ergocalciferol (ERGOCALCIFEROL) 50,000 unit Cap, Take 1 capsule (50,000 Units total) by mouth twice a week., Disp: 24 capsule, Rfl: 3    EScitalopram oxalate (LEXAPRO) 20 MG tablet, Take 1 tablet (20 mg total) by mouth once daily., Disp: 90 tablet, Rfl: 0    semaglutide, weight loss, (WEGOVY) 0.25 mg/0.5 mL PnIj, Inject  0.25 mg into the skin every 7 days., Disp: 2 mL, Rfl: 0    urea (CARMOL) 40 % Crea, Apply topically 2 (two) times daily., Disp: 1 each, Rfl: 11    Current Facility-Administered Medications:     triamcinolone acetonide injection 40 mg, 40 mg, INTRABURSAL, 1 time in Clinic/HOD, Daryl Zazueta, DO    triamcinolone acetonide injection 40 mg, 40 mg, INTRABURSAL, 1 time in Clinic/HOD, Daryl Zazueta, DO    Facility-Administered Medications Ordered in Other Visits:     0.9%  NaCl infusion, 500 mL, Intravenous, Continuous, Domingo Jernigan MD    ALLERGIES:  Review of patient's allergies indicates:   Allergen Reactions    Duloxetine      Vs tizanidine unclear reaction, pt disliked?    Hydrocodone      Dizziness and nausea     Tizanidine      Unclear negative reaction vs duloxetine       RELEVANT LABS/STUDIES:    Lab Results   Component Value Date    WBC 6.87 07/11/2024    HGB 13.8 07/11/2024    HCT 42.6 07/11/2024    MCV 93 07/11/2024     07/11/2024     BMP  Lab Results   Component Value Date     07/11/2024    K 4.1 07/11/2024     07/11/2024    CO2 27 07/11/2024    BUN 10 07/11/2024    CREATININE 0.8 07/11/2024    CALCIUM 9.2 07/11/2024    ANIONGAP 7 (L) 07/11/2024    ESTGFRAFRICA >60 08/19/2021    EGFRNONAA >60 08/19/2021     Lab Results   Component Value Date    ALT 19 07/11/2024    AST 23 07/11/2024    ALKPHOS 106 07/11/2024    BILITOT 0.6 07/11/2024     Lab Results   Component Value Date    TSH 1.996 07/11/2024     Lab Results   Component Value Date    HGBA1C 5.0 07/11/2024         VITALS  Vitals:    03/28/25 1342   BP: 116/71   Pulse: 105   Weight: 104 kg (229 lb 4.5 oz)         PSYCHIATRIC EXAM:    Mental Status Exam:  Appearance: unremarkable, age appropriate  Behavior/Cooperation: appropriate normal, cooperative  Speech:  normal tone, normal rate, normal pitch, normal volume  Language: uses words appropriately; NO aphasia or dysarthria  Mood: anxious  Affect: anxious   Thought Process: normal  and logical  Thought Content: normal, no suicidality, no homicidality, delusions, or paranoia  Level of Consciousness: Alert and Oriented x3  Memory:  Intact  Attention/concentration: appropriate for age/education.   Fund of Knowledge: intact to conversation  Insight:  Intact - patient has awareness of current condition(s)  Judgment: Intact - behavior adequate for circumstances      IMPRESSION:    Chinyere Crockett is a 49 y.o. female with history of KIKA who presents for initial assessment.    DIAGNOSES:    ICD-10-CM ICD-9-CM   1. Generalized anxiety disorder  F41.1 300.02         PLAN:    Increase lexapro 20 mg daily for depression and anxiety. Discussed common side effects   PMDP reviewed: no issues   Lifestyle modifications to reduce symptoms non-pharmacologically (ex: sleep, exercise, diet, etc).  Establish with psychotherapy  Limit substance use as drugs/alcohol can exacerbate psychiatric symptoms.  Encouraged Sleep hygiene    RTC 3 months or sooner if needed    Discussed with patient verbal informed consent including: risks and benefits of proposed treatment above vs. alternative treatments vs. no treatment, serious and common side effects of these respective treatments, as well as the inherent unpredictability of individual responses to any treatments, contingency plans if adverse reactions occur, precautions in which to me through Epic MyChart portal or call the clinic, and precautions in which to call 911 and/or go to the emergency room. The patient expresses understanding of the above and displays the capacity to agree with this current plan. All questions were answered.    Case discussed & seen by staff psychiatrist: Dr. Hook .    Total of 30 minutes spent today on encounter, including chart review,  review, seeing patient, documenting encounter, ordering medications.    Jennifer Cox MD   [facility], PGY-III  [facility]

## 2025-03-28 NOTE — PROGRESS NOTES
[facility]   DEPARTMENT OF PSYCHIATRY     STAFF NOTE:     I have familiarized myself with the relevant aspects of the history and case, via documentation review and/or direct discussion, and coordinated care amongst my treatment team.  I agree with the overall findings noted herein, with corrections or clarifications, if any, detailed below.    **    Daryl Hook MD  Board Certification: Psychiatry and Addiction Medicine

## 2025-03-28 NOTE — TELEPHONE ENCOUNTER
"Left a voicemail, confirming appointment location & time on 04/02/25 with Dr. Lakhani.      rumr: turn off the lights message sent:    "Ms. Crockett,  Good afternoon. I left a voicemail earlier today, reminding you of your appointment with Dr. Lakhani on Wednesday April 2, 2025 at 1:45 pm. See you then!  Thank you,  MARLIN Knight LPN  "    "

## 2025-04-01 ENCOUNTER — TELEPHONE (OUTPATIENT)
Dept: PAIN MEDICINE | Facility: CLINIC | Age: 50
End: 2025-04-01
Payer: OTHER GOVERNMENT

## 2025-04-01 NOTE — TELEPHONE ENCOUNTER
Able to reschedule annual with Dr. Lakhani to 04/03/25. Patient expressed understanding and gratitude for phone call.  Call ended.

## 2025-04-03 ENCOUNTER — OFFICE VISIT (OUTPATIENT)
Dept: PAIN MEDICINE | Facility: CLINIC | Age: 50
End: 2025-04-03
Payer: OTHER GOVERNMENT

## 2025-04-03 VITALS
SYSTOLIC BLOOD PRESSURE: 132 MMHG | OXYGEN SATURATION: 98 % | DIASTOLIC BLOOD PRESSURE: 97 MMHG | WEIGHT: 232.56 LBS | TEMPERATURE: 99 F | BODY MASS INDEX: 37.54 KG/M2 | HEART RATE: 109 BPM | RESPIRATION RATE: 18 BRPM

## 2025-04-03 DIAGNOSIS — M70.62 GREATER TROCHANTERIC BURSITIS OF BOTH HIPS: Primary | ICD-10-CM

## 2025-04-03 DIAGNOSIS — M70.61 GREATER TROCHANTERIC BURSITIS OF BOTH HIPS: Primary | ICD-10-CM

## 2025-04-03 DIAGNOSIS — M25.552 HIP PAIN, BILATERAL: Primary | ICD-10-CM

## 2025-04-03 DIAGNOSIS — M70.61 GREATER TROCHANTERIC BURSITIS OF BOTH HIPS: ICD-10-CM

## 2025-04-03 DIAGNOSIS — M25.551 HIP PAIN, BILATERAL: Primary | ICD-10-CM

## 2025-04-03 DIAGNOSIS — M70.62 GREATER TROCHANTERIC BURSITIS OF BOTH HIPS: ICD-10-CM

## 2025-04-03 PROCEDURE — 99999 PR PBB SHADOW E&M-EST. PATIENT-LVL III: CPT | Mod: PBBFAC,,, | Performed by: ANESTHESIOLOGY

## 2025-04-03 PROCEDURE — 99213 OFFICE O/P EST LOW 20 MIN: CPT | Mod: PBBFAC | Performed by: ANESTHESIOLOGY

## 2025-04-03 PROCEDURE — 99214 OFFICE O/P EST MOD 30 MIN: CPT | Mod: S$PBB,,, | Performed by: ANESTHESIOLOGY

## 2025-04-03 NOTE — PROGRESS NOTES
Chronic Pain - Established patient - Follow Up     Referring Physician: No ref. provider found    Chief Complaint:   Chief Complaint   Patient presents with    Pain    Hip Pain       SUBJECTIVE: Disclaimer: This note has been generated using voice-recognition software. There may be typographical errors that have been missed during proof-reading        1/12/2024     3:24 PM 11/9/2023     8:32 AM 3/1/2023     9:35 AM   Last 3 PDI Scores   Pain Disability Index (PDI) 16 12 12       Interval history 4/3/25:  Chinyere Crockett presents for follow up. Last underwent bilateral GTB injection 2/20/24. She reports that she had 3 months of relief of 100%. She reports that her pain has returned in bilateral lateral hips and that she will need to be able to drive independently now that her  is soon to be deployed, so she hopes for a repeat injection to help improve her independence and mobility. She rates her pain as 4-6/10. It does not radiate. She endorses similar pain to before prior injection. Otherwise, she denies new changes.      Interval History 1/12/24:  Patient is followed for chronic hi pain. She is now s/p left knee arthroscopy with meniscectomy per Dr. Lorenz. She returns with her typical bilateral GTB bursitis that has been present for months but was not severe enough to warrant injections at [facility]. The last injections she received resulted in 100% pain relief for 7 months and she would like to repeat. Her bilateral GTB pain was acutely worsened while ambulating antalgically from the knee pain prior to meniscectomy. She underwent PT and her knee is greatly improved. She denies any BLE weakness, sensation changes, ataxia, LBP, weight loss, fevers, unintended weight loss. She underwent physical therapy for knee and hip formally from September to October for 8 weeks and another 2-3 weeks HEP thereafter. The pain is a 6/10 today and worse with ambulation and sleeping on her side.     Interval History  11/9/2023:  The patient presents today for bilateral hip pain. She says that she has overall been maintained on Voltaren and Flexeril PRN. She reports a long standing history of bursitis to her hip, left worse than right. She has tried ice and heat without much benefit. She says that she cannot currently receive steroid injections because of a complication with her mom and steroids. Her pain today is 2/10.     Interval History 3/1/2023:  Mrs Crockett presents for follow up evaluation of B hip pain. She is s/p B GTB injection with significant benefit and pain control to the areas. She is having bad day with LOPEZ today. She is pleased with progress of GTB injection which are improving ambulation and functioning.     Interval History 1/23/2023:  Mrs Crockett presents for follow up and having exacerbated B, lateral hip pain. She has had GTB injections done by Orthopedic in past. She denies newer areas of pain or neurological changes. She is taking Flexeril 10mg qhs PRN with benefit and no significant SE of medication. She did additionally strain shoulder but this has improved.     Interval History 7/11/2022:  Mrs Crockett presents for follow up. HA manageable and correlated to pressure change in weather, states they are mild and constant. Overall found benefit from FRP. Continues to take diclofenac and flexeril qhs with benefit and denies SE of medications.     Interval History 4/11/2022:  Mrs Crockett presents for follow up of pain complaints. She states doing very well with FRP and pleased with progress. She continues to take Diclofenac and Flexeril. She has noticed since prior visit having more daily functionality She states SE and never got over Nausea from Cymbalta and discussed she does not ever wish to be on this medication again.She does voice frustation with HA mgt regarding messaging to Neurology and prescriptions.     Interval History 2/1/2022:  Mrs Crockett presents for follow up. She has been attending  Therapy for Right shoulder and while she endorses benefit from PT at times PT sets her back and exacerbates pain. She states this Thursday will be her last day for PT. And again while she has found progress she is not where she would like to be with functioning and pain. She does not wish to have surgical intervention or CSI at this time. She is frustrated with diffuse pain complaints and decreased daily functioning. She is taking Flexeril more frequently at this time but feels brain fog at times during the day. Diclofenac has been beneficial for her. She denies new areas of pain, denies neurological changes.     Interval History 12/21/2021:  The Pt presents for follow up of Right shoulder pain. She states doing fair and but continued scapular pain and shoulder pain with even cooking activities. She will be starting Shoulder PT in January. Does not wish to pursue CSI or surgical intervention at this time without giving PT a try 1st. She is currently taking diclofenac with benefit and denies SE of medications.     Interval History 11/29/2021:  Pt presents to clinic for follow up of low back pain and new right sided scapula pain. Pt reports that her low back pain had improved with a combination of physical therapy and meloxicam. Unfortunately pt reports she had some GI upset and tried oral diclofenac with some relief.   Additionally pt reports right shoulder pain associated with right upper back spasms. Her spasms are associated with any movement of the shoulder. She denies any neck pain, arm pain or any feelings of numbness. She reports that her symptoms are relieved by ice.     Initial encounter 08/25/2021:  Chinyere Crockett presents to the clinic for the evaluation of low back pain. The pain started 3 years ago following a fall at work and symptoms have been worsening. She slipped on a wet floor and fell in a twisting motion onto her back while striking a mop bucket. Since that time she has dealt with hip  and back pain. The hip pain is treated with GTB injections by Dr. Zazueta with sports medicine. She receives the injections every 3-4 months. When she feels the injection's effect wear off, she begins to notice her back pain worsening.     Brief history:    Pain Description:    The pain is located in the Left lower back area and does not radiate.      At BEST  4/10     At WORST  8/10 on the WORST day.      On average pain is rated as 6/10.     Today the pain is rated as 6/10    The pain is described as aching and burning      Symptoms interfere with daily activity, sleeping and work.     Exacerbating factors: Extension and rotation.      Mitigating factors heat, ice, medications and dry needling.     Patient denies night fever/night sweats, urinary incontinence, bowel incontinence, significant weight loss, significant motor weakness and loss of sensations.  Patient denies any suicidal or homicidal ideations    Pain Medications:  Current:  Flexeril 10mg qHS PRN  Tylenol   OTC NSAIDs      Tried in Past:  NSAIDs - Mobic  TCA -Never  SNRI -Never  Anti-convulsants -Never  Muscle Relaxants -Never  Opioids-Never  Benzodiazepines -Never    Physical Therapy/Home Exercise:   HEP:  Continued HEP      report:  Reviewed and consistent with medication use as prescribed.    Pain Procedures:   GTB injections with sports medicine  2/14/2023 B GTB injection- 80% relief for 7 months  2/20/2024 BL GTB injection - 100% relief for 3 months    Chiropractor -yes  Acupuncture - never  TENS unit -never  Spinal decompression -never  Joint replacement -never    Imaging:    MRI SHOULDER WITHOUT CONTRAST RIGHT 12/8/2021     CLINICAL HISTORY:  Pain in right shoulderShoulder pain, rotator cuff disorder suspected, xray done;     TECHNIQUE:  MRI of right shoulder was performed on a 1.5T magnet utilizing the following sequences: Localizer; axial T2 FS; coronal T2 FS and PD FS; sagittal T1, T2 FS and PD FS.     COMPARISON:  None      FINDINGS:  Rotator cuff: There is some attenuation of caliber of the insertional fibers of the supraspinatus and infraspinatus tendons and some irregularity and abnormal signal along the bursal surface suggesting partial-thickness bursal sided tear involving approximately 50% of the tendon thickness.  Tear measures 2 cm in AP diameter.  Subscapularis tendon intact.Muscle bulk preserved.     Biceps: Long head biceps tendon is intact.     Labrum: Glenoid labrum is intact.     Glenohumeral Joint: There is no fracture or significant articular cartilage loss.  Bone marrow signal is normal.     Acromioclavicular joint: The AC joint is unremarkable.     Misc: There is no evidence of bursitis.     Impression:     Partial-thickness bursal sided tear of the supraspinatus and infraspinatus tendons measuring 2 cm in size.    XR L-Spine 09/27/2021  FINDINGS:  There is rightward curvature of the lumbar spine with discogenic change and lower facet hypertrophy.  Vertebral body heights appear maintained.  No significant translation.  No evidence for lytic or blastic lesion.     Impression:     As above.     XR L-Spine 9/23/2020  FINDINGS:  Scoliosis convex to the right in the lumbar region is appreciated, alignment otherwise appearing unremarkable.  Vertebral body heights are normally maintained, without significant compression deformity at any level.  No significant disc narrowing.  The AP projection strongly suggests some prominent left-sided L4-5 facet arthropathy.  Minimal marginal vertebral endplate spurring is seen at a few thoracolumbar levels.  Vertebral endplates are well defined.  No osseous destructive process.  SI joints appear unremarkable.     Impression:     Lumbar dextroscoliosis and prominent left-sided facet arthropathy at L4-5.  No evidence of compression fracture.    XR hip/pelvis 9/23/2021   FINDINGS:  No acute fracture of the visualized lower lumbar spine, bony pelvis, or proximal femurs.  Preserved right and  left femoral head contours.  Intact right and left SI joints and hip joint spaces.  Pelvic densities felt related to phleboliths unless concern for otherwise.  Some asymmetric radiodensity projects to the left side of the L4-L5 disc space that could be further evaluated with lumbar spine radiographs.     Impression:     No acute fracture.    History reviewed. No pertinent past medical history.  Past Surgical History:   Procedure Laterality Date    COLONOSCOPY, SCREENING, LOW RISK PATIENT N/A 12/12/2024    Procedure: COLONOSCOPY, SCREENING, LOW RISK PATIENT;  Surgeon: Derek Rodriguez MD;  Location: [facility] ENDOSCOPY;  Service: Endoscopy;  Laterality: N/A;  referral Harrison Lanza. Golytelyinstr. to portal. EC Pt. is NOT taking Wegovy. EC  12/5/24- lvm/portal for pc. DBM    INJECTION OF JOINT Bilateral 2/14/2023    Procedure: INJECTION, JOINT BILATERAL GTB CONTRAST;  Surgeon: Kristin Lakhani MD;  Location: [facility] PAIN MGT;  Service: Pain Management;  Laterality: Bilateral;    INJECTION OF JOINT Bilateral 2/20/2024    Procedure: INJECTION, JOINT BILATERAL GTB;  Surgeon: Kristin Lakhani MD;  Location: [facility] PAIN MGT;  Service: Pain Management;  Laterality: Bilateral;  [phone]  2 WK F/U SHARATH    KNEE ARTHROSCOPY W/ MENISCECTOMY Left 10/12/2023    Procedure: ARTHROSCOPY, KNEE, WITH MENISCECTOMY;  Surgeon: Taina Lorenz MD;  Location: [facility] OR;  Service: Orthopedics;  Laterality: Left;  lateral meniscectomy    SYNOVECTOMY OF KNEE Left 10/12/2023    Procedure: ARTHROSCOPIC SYNOVECTOMY, KNEE;  Surgeon: Taina Lorenz MD;  Location: [facility] OR;  Service: Orthopedics;  Laterality: Left;    TONSILLECTOMY       Social History     Socioeconomic History    Marital status:    Tobacco Use    Smoking status: Never    Smokeless tobacco: Never   Substance and Sexual Activity    Alcohol use: Yes     Comment: occasionally    Drug use: Not Currently     Types: Marijuana    Sexual activity: Yes     Partners: Male      Birth control/protection: None     Family History   Problem Relation Name Age of Onset    COPD Mother      Breast cancer Mother  72    Heart attack Father      Heart disease Paternal Grandfather          ?quad bypass    Heart disease Paternal Uncle          quad bypass    Cancer Neg Hx      Colon cancer Neg Hx      Diabetes Neg Hx      Eclampsia Neg Hx      Hypertension Neg Hx      Miscarriages / Stillbirths Neg Hx      Ovarian cancer Neg Hx       labor Neg Hx      Stroke Neg Hx         Review of patient's allergies indicates:   Allergen Reactions    Duloxetine      Vs tizanidine unclear reaction, pt disliked?    Hydrocodone      Dizziness and nausea     Tizanidine      Unclear negative reaction vs duloxetine       Current Outpatient Medications   Medication Sig    aspirin (ECOTRIN) 81 MG EC tablet Take 1 tablet (81 mg total) by mouth once daily. (Patient not taking: Reported on 3/26/2025)    cyclobenzaprine (FLEXERIL) 10 MG tablet TAKE 1 TABLET(10 MG) BY MOUTH TWICE DAILY AS NEEDED FOR MUSCLE SPASMS    diclofenac (VOLTAREN) 50 MG EC tablet TAKE 1 TABLET(50 MG) BY MOUTH TWICE DAILY AS NEEDED FOR PAIN OR SWELLING    ergocalciferol (ERGOCALCIFEROL) 50,000 unit Cap Take 1 capsule (50,000 Units total) by mouth twice a week.    EScitalopram oxalate (LEXAPRO) 20 MG tablet Take 1 tablet (20 mg total) by mouth once daily.    semaglutide, weight loss, (WEGOVY) 0.25 mg/0.5 mL PnIj Inject 0.25 mg into the skin every 7 days.    urea (CARMOL) 40 % Crea Apply topically 2 (two) times daily.     Current Facility-Administered Medications   Medication    triamcinolone acetonide injection 40 mg    triamcinolone acetonide injection 40 mg     Facility-Administered Medications Ordered in Other Visits   Medication    0.9%  NaCl infusion       REVIEW OF SYSTEMS:    GENERAL:  No weight loss, malaise or fevers.  HEENT:   No recent changes in vision or hearing  NECK:  Negative for lumps, no difficulty with swallowing.  RESPIRATORY:   Negative for cough, wheezing or shortness of breath, patient denies any recent URI.  CARDIOVASCULAR:  Negative for chest pain, leg swelling or palpitations.  GI:  Negative for abdominal discomfort, blood in stools or black stools or change in bowel habits.  MUSCULOSKELETAL:  See HPI.  SKIN:  Negative for lesions, rash, and itching.  PSYCH:  No mood disorder or recent psychosocial stressors.  Patients sleep is not disturbed secondary to pain.  HEMATOLOGY/LYMPHOLOGY:  Negative for prolonged bleeding, bruising easily or swollen nodes.  Patient is not currently taking any anti-coagulants  ENDO: No history of diabetes or thyroid dysfunction  NEURO:   No history of headaches, syncope, paralysis, seizures or tremors.  All other reviewed and negative other than HPI.    OBJECTIVE:    BP (!) 132/97   Pulse 109   Temp 98.8 °F (37.1 °C)   Resp 18   Wt 105.5 kg (232 lb 9.4 oz)   LMP 03/16/2018 (LMP Unknown) Comment: spotting/ short periord 3/16/18  SpO2 98%   BMI 37.54 kg/m²       PHYSICAL EXAMINATION:  Voice normal.  Normal affect without evidence of distress.  Psych: tearful at times and voices frustration with continued decreased functioning and pain.   Gait: sitting in chair without difficulty     Prior PHYSICAL EXAMINATION:    GENERAL: Well appearing, in no acute distress, alert and oriented x3.  PSYCH:  Mood and affect appropriate.  SKIN: Skin color, texture, turgor normal, no rashes or lesions.  HEAD/FACE:  Normocephalic, atraumatic. Cranial nerves grossly intact.  PULM: No evidence of respiratory difficulty, symmetric chest rise.  GI:  Soft and non-tender.  BACK: Straight leg raising in the supine position is negative to radicular pain.. No tenderness over spinous processes. Normal range of motion. Pain reproduction with lateral rotation and extension.  EXTREMITIES: TTP over bilateral GTB. Painful extension of right hip. Negative Jose's test bilaterally. + Powell compression test bilaterally  MUSCULOSKELETAL:  Bilateral lower extremity strength is normal and symmetric.  No atrophy or tone abnormalities are noted.   NEURO: Bilateral upper and lower extremity coordination and muscle stretch reflexes are physiologic and symmetric.  Plantar response are downgoing. No clonus.  No loss of sensation is noted.  GAIT: Normal.    Lab Results   Component Value Date    WBC 6.87 07/11/2024    HGB 13.8 07/11/2024    HCT 42.6 07/11/2024    MCV 93 07/11/2024     07/11/2024       BMP  Lab Results   Component Value Date     07/11/2024    K 4.1 07/11/2024     07/11/2024    CO2 27 07/11/2024    BUN 10 07/11/2024    CREATININE 0.8 07/11/2024    CALCIUM 9.2 07/11/2024    ANIONGAP 7 (L) 07/11/2024    ESTGFRAFRICA >60 08/19/2021    EGFRNONAA >60 08/19/2021     Lab Results   Component Value Date    HGBA1C 5.0 07/11/2024       ASSESSMENT: 49 y.o. year old female with pain, consistent with     Encounter Diagnoses   Name Primary?    Hip pain, bilateral Yes    Greater trochanteric bursitis of both hips        Discussion: Ms. Crockett is pleasant woman whose  is in the [employer]. She came to us with bilateral lateral hip pain that worsened after left knee pain caused an altered gait. Exam shows pain to bilateral trochanters. She previously received 100% relief for 3 months with last GTB injection 1 year ago.       PLAN:   1) Prior records/imaging reviewed  2) Continue Flexeril 10mg qhs PRN  3) Continue HEP at this time.  4) Recommend not sleeping on side if possible  5) Schedule bilateral GTB bursa injections  6) Will follow up after injection    The above plan and management options were discussed at length with patient. Patient is in agreement with the above and verbalized understanding. It will be communicated with the referring physician via electronic record, fax, or mail.      Ankit Corral MD [employer] Pain Fellow   04/03/2025

## 2025-04-04 ENCOUNTER — PATIENT MESSAGE (OUTPATIENT)
Dept: PAIN MEDICINE | Facility: OTHER | Age: 50
End: 2025-04-04
Payer: OTHER GOVERNMENT

## 2025-04-11 ENCOUNTER — PATIENT MESSAGE (OUTPATIENT)
Dept: PAIN MEDICINE | Facility: OTHER | Age: 50
End: 2025-04-11
Payer: OTHER GOVERNMENT

## 2025-04-15 ENCOUNTER — HOSPITAL ENCOUNTER (OUTPATIENT)
Facility: OTHER | Age: 50
Discharge: HOME | End: 2025-04-15
Attending: ANESTHESIOLOGY | Admitting: ANESTHESIOLOGY
Payer: OTHER GOVERNMENT

## 2025-04-15 VITALS
SYSTOLIC BLOOD PRESSURE: 139 MMHG | RESPIRATION RATE: 18 BRPM | OXYGEN SATURATION: 94 % | BODY MASS INDEX: 36.8 KG/M2 | WEIGHT: 229 LBS | HEIGHT: 66 IN | DIASTOLIC BLOOD PRESSURE: 82 MMHG | TEMPERATURE: 99 F | HEART RATE: 86 BPM

## 2025-04-15 DIAGNOSIS — M70.62 GREATER TROCHANTERIC BURSITIS OF BOTH HIPS: Primary | ICD-10-CM

## 2025-04-15 DIAGNOSIS — G89.29 CHRONIC PAIN: ICD-10-CM

## 2025-04-15 DIAGNOSIS — M70.61 GREATER TROCHANTERIC BURSITIS OF BOTH HIPS: Primary | ICD-10-CM

## 2025-04-15 PROCEDURE — 20610 DRAIN/INJ JOINT/BURSA W/O US: CPT | Mod: 50 | Performed by: ANESTHESIOLOGY

## 2025-04-15 PROCEDURE — 63600175 PHARM REV CODE 636 W HCPCS: Performed by: ANESTHESIOLOGY

## 2025-04-15 PROCEDURE — 25000003 PHARM REV CODE 250: Performed by: ANESTHESIOLOGY

## 2025-04-15 PROCEDURE — 25500020 PHARM REV CODE 255: Performed by: ANESTHESIOLOGY

## 2025-04-15 PROCEDURE — 20610 DRAIN/INJ JOINT/BURSA W/O US: CPT | Mod: 50,,, | Performed by: ANESTHESIOLOGY

## 2025-04-15 RX ORDER — TRIAMCINOLONE ACETONIDE 40 MG/ML
INJECTION, SUSPENSION INTRA-ARTICULAR; INTRAMUSCULAR
Status: DISCONTINUED | OUTPATIENT
Start: 2025-04-15 | End: 2025-04-15 | Stop reason: HOSPADM

## 2025-04-15 RX ORDER — SODIUM CHLORIDE 9 MG/ML
INJECTION, SOLUTION INTRAVENOUS CONTINUOUS
Status: DISCONTINUED | OUTPATIENT
Start: 2025-04-15 | End: 2025-04-15 | Stop reason: HOSPADM

## 2025-04-15 RX ORDER — BUPIVACAINE HYDROCHLORIDE 2.5 MG/ML
INJECTION, SOLUTION EPIDURAL; INFILTRATION; INTRACAUDAL; PERINEURAL
Status: DISCONTINUED | OUTPATIENT
Start: 2025-04-15 | End: 2025-04-15 | Stop reason: HOSPADM

## 2025-04-15 RX ORDER — LIDOCAINE HYDROCHLORIDE 20 MG/ML
INJECTION, SOLUTION INFILTRATION; PERINEURAL
Status: DISCONTINUED | OUTPATIENT
Start: 2025-04-15 | End: 2025-04-15 | Stop reason: HOSPADM

## 2025-04-15 RX ORDER — ALPRAZOLAM 0.5 MG/1
1 TABLET, ORALLY DISINTEGRATING ORAL ONCE
Status: COMPLETED | OUTPATIENT
Start: 2025-04-15 | End: 2025-04-15

## 2025-04-15 RX ADMIN — ALPRAZOLAM 1 MG: 0.5 TABLET, ORALLY DISINTEGRATING ORAL at 08:04

## 2025-04-15 NOTE — H&P
HPI  Patient presenting for Procedure(s) (LRB):  INJECTION, BILATERAL GTB (Bilateral)     Patient on Anti-coagulation No    No health changes since previous encounter    History reviewed. No pertinent past medical history.  Past Surgical History:   Procedure Laterality Date    COLONOSCOPY, SCREENING, LOW RISK PATIENT N/A 12/12/2024    Procedure: COLONOSCOPY, SCREENING, LOW RISK PATIENT;  Surgeon: Derek Rodriguez MD;  Location: [facility] ENDOSCOPY;  Service: Endoscopy;  Laterality: N/A;  referral Harrison Ricci Pool. Golytelyinstr. to portal. EC Pt. is NOT taking Wegovy. EC  12/5/24- lvm/portal for pc. DBM    INJECTION OF JOINT Bilateral 2/14/2023    Procedure: INJECTION, JOINT BILATERAL GTB CONTRAST;  Surgeon: Kristin Lakhani MD;  Location: [facility] [place] PAIN MGT;  Service: Pain Management;  Laterality: Bilateral;    INJECTION OF JOINT Bilateral 2/20/2024    Procedure: INJECTION, JOINT BILATERAL GTB;  Surgeon: Kristin Lakhain MD;  Location: [facility], [place], operated [facility] PAIN MGT;  Service: Pain Management;  Laterality: Bilateral;  [phone]  2 WK F/U SHARATH    KNEE ARTHROSCOPY W/ MENISCECTOMY Left 10/12/2023    Procedure: ARTHROSCOPY, KNEE, WITH MENISCECTOMY;  Surgeon: Taina Lorenz MD;  Location: [facility] OR;  Service: Orthopedics;  Laterality: Left;  lateral meniscectomy    SYNOVECTOMY OF KNEE Left 10/12/2023    Procedure: ARTHROSCOPIC SYNOVECTOMY, KNEE;  Surgeon: Taina Lorenz MD;  Location: [facility] OR;  Service: Orthopedics;  Laterality: Left;    TONSILLECTOMY       Review of patient's allergies indicates:   Allergen Reactions    Duloxetine      Vs tizanidine unclear reaction, pt disliked?    Hydrocodone      Dizziness and nausea     Tizanidine      Unclear negative reaction vs duloxetine      Current Facility-Administered Medications   Medication    0.9% NaCl infusion     Facility-Administered Medications Ordered in Other Encounters   Medication    0.9%  NaCl infusion       PMHx, PSHx, Allergies, Medications reviewed in epic    ROS  negative except pain complaints in HPI    OBJECTIVE:    LMP 03/16/2018 (LMP Unknown) Comment: spotting/ short periord 3/16/18  Breastfeeding No     PHYSICAL EXAMINATION:    GENERAL: Well appearing, in no acute distress, alert and oriented x3.  PSYCH:  Mood and affect appropriate.  SKIN: Skin color, texture, turgor normal, no rashes or lesions which will impact the procedure.  CV: RRR with palpation of the radial artery.  PULM: No evidence of respiratory difficulty, symmetric chest rise. Clear to auscultation.  NEURO: Cranial nerves grossly intact.    Plan:    Proceed with procedure as planned Procedure(s) (LRB):  INJECTION, BILATERAL GTB (Bilateral)    Mikel Lakhani  04/15/2025

## 2025-04-15 NOTE — DISCHARGE SUMMARY
Discharge Note  Short Stay      SUMMARY     Admit Date: 4/15/2025    Attending Physician: Chrissie Chandler      Discharge Physician: Chrissie Chandler      Discharge Date: 4/15/2025 8:33 AM    Procedure(s) (LRB):  INJECTION, BILATERAL GTB (Bilateral)    Final Diagnosis: Greater trochanteric bursitis of both hips [M70.61, M70.62]    Disposition: Home or self care    Patient Instructions:   Current Discharge Medication List        CONTINUE these medications which have NOT CHANGED    Details   cyclobenzaprine (FLEXERIL) 10 MG tablet TAKE 1 TABLET(10 MG) BY MOUTH TWICE DAILY AS NEEDED FOR MUSCLE SPASMS  Qty: 60 tablet, Refills: 5      diclofenac (VOLTAREN) 50 MG EC tablet TAKE 1 TABLET(50 MG) BY MOUTH TWICE DAILY AS NEEDED FOR PAIN OR SWELLING  Qty: 60 tablet, Refills: 5    Associated Diagnoses: Chronic pain of left knee      ergocalciferol (ERGOCALCIFEROL) 50,000 unit Cap Take 1 capsule (50,000 Units total) by mouth twice a week.  Qty: 24 capsule, Refills: 3    Associated Diagnoses: Vitamin D deficiency      EScitalopram oxalate (LEXAPRO) 20 MG tablet Take 1 tablet (20 mg total) by mouth once daily.  Qty: 90 tablet, Refills: 0    Associated Diagnoses: Generalized anxiety disorder      urea (CARMOL) 40 % Crea Apply topically 2 (two) times daily.  Qty: 1 each, Refills: 11                 Discharge Diagnosis: Greater trochanteric bursitis of both hips [M70.61, M70.62]  Condition on Discharge: Stable with no complications to procedure   Diet on Discharge: Same as before.  Activity: as per instruction sheet.  Discharge to: Home with a responsible adult.  Follow up: 2-4 weeks       Please call my office or pager at [phone] if experienced any weakness or loss of sensation, fever > 101.5, pain uncontrolled with oral medications, persistent nausea/vomiting/or diarrhea, redness or drainage from the incisions, or any other worrisome concerns. If physician on call was not reached or could not communicate with our  office for any reason please go to the nearest emergency department

## 2025-04-15 NOTE — OP NOTE
Greater Trochanteric Bursa Injection under Fluoroscopic Guidance    The procedure, risks, benefits, and options were discussed with the patient. There are no contraindications to the procedure. The patent expressed understanding and agreed to the procedure. Informed written consent was obtained prior to the start of the procedure and can be found in the patient's chart.    PATIENT NAME: Chinyere Crockett   MRN: [id number]     DATE OF PROCEDURE: 04/15/2025    PROCEDURE: Bilateral Greater Trochanteric Bursa Injection under Fluoroscopic Guidance    PRE-OP DIAGNOSIS: Greater trochanteric bursitis of both hips [M70.61, M70.62]    POST-OP DIAGNOSIS: Same    PHYSICIAN: Kristin Lakhani MD    ASSISTANTS: Brown Lakhani and Geraldine    MEDICATIONS INJECTED: Preservative-free Kenalog 40mg with 7cc of Bupivacine 0.25%     LOCAL ANESTHETIC INJECTED: Xylocaine 2%     SEDATION: None    ESTIMATED BLOOD LOSS: None    COMPLICATIONS: None    TECHNIQUE: Time-out was performed to identify the patient and procedure to be performed. With the patient laying in a prone position, the surgical area was prepped and draped in the usual sterile fashion using ChloraPrep and a fenestrated drape. The area overlying the greater trochanteric bursa was determined under fluoroscopy guidance. Skin anesthesia was achieved by injecting Lidocaine 2% over the injection site. The greater trochanteric bursa was then approached with a 22 gauge, 5 inch spinal quinke needle that was introduced under fluoroscopic guidance in the AP view. Once the needle tip was in the area of the bursa, and there was no blood aspiration, Contrast dye  Omnipaque (300mg/mL) was injected to confirm placement and there was no vascular runoff. 4 mL of the medication mixture listed above was injected slowly. The needles were removed and bleeding was nil. A sterile dressing was applied. No specimens collected. The patient tolerated the procedure well.      The patient was monitored after  the procedure in the recovery area. They were given post-procedure and discharge instructions to follow at home. The patient was discharged in a stable condition.    Chrissie Chandler, DO     I reviewed and edited the fellow's note. I conducted my own interview and physical examination. I agree with the findings. I was present and supervising all critical portions of the procedure.

## 2025-04-15 NOTE — DISCHARGE INSTRUCTIONS

## 2025-05-16 ENCOUNTER — TELEPHONE (OUTPATIENT)
Dept: INTERNAL MEDICINE | Facility: CLINIC | Age: 50
End: 2025-05-16
Payer: OTHER GOVERNMENT

## 2025-05-16 NOTE — TELEPHONE ENCOUNTER
Spoke to patient and informed her that Dr. Lanza said Vit D high dose prescription not refilled     She is to switch to daily Vit D 3 2,000 units OTC please along with calcium 1200 mg a day.    Patient asked that I also send this as a message through the portal.(Message sent)

## 2025-05-16 NOTE — TELEPHONE ENCOUNTER
Vit D high dose prescription not refilled    Please call to advise to switch to daily Vit D 3 2,000 units OTC please along with calcium 1200 mg a day    thanks

## 2025-06-27 ENCOUNTER — PATIENT MESSAGE (OUTPATIENT)
Dept: PSYCHIATRY | Facility: CLINIC | Age: 50
End: 2025-06-27
Payer: OTHER GOVERNMENT

## 2025-06-27 ENCOUNTER — OFFICE VISIT (OUTPATIENT)
Dept: PSYCHIATRY | Facility: CLINIC | Age: 50
End: 2025-06-27
Payer: OTHER GOVERNMENT

## 2025-06-27 DIAGNOSIS — F43.22 ADJUSTMENT DISORDER WITH ANXIOUS MOOD: Primary | ICD-10-CM

## 2025-06-27 DIAGNOSIS — F41.1 GENERALIZED ANXIETY DISORDER: ICD-10-CM

## 2025-06-27 RX ORDER — ESCITALOPRAM OXALATE 20 MG/1
20 TABLET ORAL DAILY
Qty: 90 TABLET | Refills: 1 | Status: SHIPPED | OUTPATIENT
Start: 2025-06-27 | End: 2025-12-24

## 2025-06-27 NOTE — PROGRESS NOTES
The patient location is: [place]  The chief complaint leading to consultation is: anxiety    Visit type: audiovisual    Face to Face time with patient: 20 minutes  30 minutes of total time spent on the encounter, which includes face to face time and non-face to face time preparing to see the patient (eg, review of tests), Obtaining and/or reviewing separately obtained history, Documenting clinical information in the electronic or other health record, Independently interpreting results (not separately reported) and communicating results to the patient/family/caregiver, or Care coordination (not separately reported).         Each patient to whom he or she provides medical services by telemedicine is:  (1) informed of the relationship between the physician and patient and the respective role of any other health care provider with respect to management of the patient; and (2) notified that he or she may decline to receive medical services by telemedicine and may withdraw from such care at any time.      Chief Complaint:  Chinyere Crockett is a 49 y.o. female who presents today for follow-up of anxiety. Patient last seen for follow-up on 7/27 via zoom.    History as told by Patient - & or family/friend/spouse/caregiver with pts permission    Patient reports she has been doing okay. She reports that she is having some difficulties with her 's deployment but us taking it day by day. She feels the lexapro has helped with the intensity of her anxiety. However, she does endorse side effects of congestion and sleepiness but states they are mild not too bothersome. She is keeping busy with doing her crafts and considering fostering kittens.  She also endorses she is sleeping a lot better.  She denies SI/HI/AVH.         Psychiatric Review of Systems-is patient experiencing or having changes in  sleep: no  appetite: no  weight: no  energy/anergy: no  anhedonia: no  libido: no  anxiety: yes; improving  "  guilty/hopelessness: no  concentration: no  Irritability: no  Paranoia/delusions: no  S.I.B.s/risky behavior: no    Review of Systems   PSYCHIATRIC: Pertinant items are noted in the narrative.  CONSTITUTIONAL: No weight gain or loss.  MUSCULOSKELETAL: No pain or stiffness of the joints.  NEUROLOGIC: No weakness, sensory changes, seizures, confusion, memory loss, tremor or other abnormal movements.  RESPIRATORY: No shortness of breath.  CARDIOVASCULAR: No tachycardia or chest pain.  GASTROINTESTINAL: No nausea, vomiting, pain, constipation or diarrhea.    Past Medical, Family and Social History: The patient's past medical, family and social history have been reviewed and updated as appropriate within the electronic medical record - see encounter notes.    Compliance: yes    Side effects: drowsiness and "head congestion"    Risk Parameters:  Patient reports no suicidal ideation  Patient reports no homicidal ideation  Patient reports no self-injurious behavior  Patient reports no violent behavior    Exam (detailed: at least 9 elements; comprehensive: all 15 elements)   Constitutional  Vitals:  Most recent vital signs, dated less than 90 days prior to this appointment, were reviewed.   There were no vitals filed for this visit.     General:  unremarkable, age appropriate     Musculoskeletal  Muscle Strength/Tone:  not examined   Gait & Station:  ROBERT      Psychiatric  Speech:  no latency; no press   Mood & Affect:  euthymic  congruent and appropriate   Thought Process:  normal and logical   Associations:  intact   Thought Content:  no suicidality, no homicidality, delusions, or paranoia   Insight:  intact   Judgement: behavior is adequate to circumstances   Orientation:  grossly intact   Memory: intact for content of interview   Language: grossly intact   Attention Span & Concentration:  able to focus   Fund of Knowledge:  intact and appropriate to age and level of education     Assessment and Diagnosis "   Status/Progress: Based on the examination today, the patient's problem(s) is/are improved.  New problems have not been presented today.       General Impression:    ICD-10-CM ICD-9-CM   1. Adjustment disorder with anxious mood  F43.22 309.24   2. Generalized anxiety disorder  F41.1 300.02       Intervention/Counseling/Treatment Plan   PLAN:    continue lexapro 20 mg daily for depression and anxiety. Discussed common side effects   Encouraged patient to establish with therapist to help cope with her current situation  PMDP reviewed: no issues   Lifestyle modifications to reduce symptoms non-pharmacologically (ex: sleep, exercise, diet, etc).  Establish with psychotherapy  Limit substance use as drugs/alcohol can exacerbate psychiatric symptoms.  Encouraged Sleep hygiene    RTC 1 month or sooner if needed  -patient is unable to drive 2/2 physical limitations. Her  was recently deployed who usually gets her to all of her appointments. Please allow her to continue via zoom for follow up appointments with new provider. Resident transition discussed.     Discussed with patient verbal informed consent including: risks and benefits of proposed treatment above vs. alternative treatments vs. no treatment, serious and common side effects of these respective treatments, as well as the inherent unpredictability of individual responses to any treatments, contingency plans if adverse reactions occur, precautions in which to me through Epic MyChart portal or call the clinic, and precautions in which to call 911 and/or go to the emergency room. The patient expresses understanding of the above and displays the capacity to agree with this current plan. All questions were answered.    Case discussed & seen by staff psychiatrist: Dr. Hook     Return to Clinic: 1 month    Case to be discussed with supervising staff, Dr. Darryl Antunez MD.    Libby Evans,    [facility], PGY-3

## 2025-07-09 ENCOUNTER — OFFICE VISIT (OUTPATIENT)
Dept: INTERNAL MEDICINE | Facility: CLINIC | Age: 50
End: 2025-07-09
Payer: OTHER GOVERNMENT

## 2025-07-09 VITALS — OXYGEN SATURATION: 97 % | HEART RATE: 110 BPM | BODY MASS INDEX: 36.88 KG/M2 | HEIGHT: 66 IN | WEIGHT: 229.5 LBS

## 2025-07-09 DIAGNOSIS — N64.89 BILATERAL PENDULOUS BREASTS: ICD-10-CM

## 2025-07-09 DIAGNOSIS — G43.009 MIGRAINE WITHOUT AURA AND WITHOUT STATUS MIGRAINOSUS, NOT INTRACTABLE: ICD-10-CM

## 2025-07-09 DIAGNOSIS — Z00.00 ANNUAL PHYSICAL EXAM: Primary | ICD-10-CM

## 2025-07-09 DIAGNOSIS — M70.61 GREATER TROCHANTERIC BURSITIS OF BOTH HIPS: ICD-10-CM

## 2025-07-09 DIAGNOSIS — Z12.31 ENCOUNTER FOR SCREENING MAMMOGRAM FOR MALIGNANT NEOPLASM OF BREAST: ICD-10-CM

## 2025-07-09 DIAGNOSIS — M25.551 HIP PAIN, BILATERAL: ICD-10-CM

## 2025-07-09 DIAGNOSIS — M25.552 HIP PAIN, BILATERAL: ICD-10-CM

## 2025-07-09 DIAGNOSIS — M70.62 GREATER TROCHANTERIC BURSITIS OF BOTH HIPS: ICD-10-CM

## 2025-07-09 DIAGNOSIS — E66.811 OBESITY (BMI 30.0-34.9): ICD-10-CM

## 2025-07-09 DIAGNOSIS — R68.89 DECREASED FUNCTIONAL ACTIVITY TOLERANCE: ICD-10-CM

## 2025-07-09 DIAGNOSIS — Z00.00 PREVENTATIVE HEALTH CARE: ICD-10-CM

## 2025-07-09 DIAGNOSIS — M54.50 ACUTE LEFT-SIDED LOW BACK PAIN WITHOUT SCIATICA: ICD-10-CM

## 2025-07-09 DIAGNOSIS — M25.552 LEFT HIP PAIN: ICD-10-CM

## 2025-07-09 DIAGNOSIS — F41.9 ANXIETY: ICD-10-CM

## 2025-07-09 PROCEDURE — 99999 PR PBB SHADOW E&M-EST. PATIENT-LVL V: CPT | Mod: PBBFAC,,, | Performed by: STUDENT IN AN ORGANIZED HEALTH CARE EDUCATION/TRAINING PROGRAM

## 2025-07-09 PROCEDURE — 99215 OFFICE O/P EST HI 40 MIN: CPT | Mod: PBBFAC | Performed by: STUDENT IN AN ORGANIZED HEALTH CARE EDUCATION/TRAINING PROGRAM

## 2025-07-09 RX ORDER — TIRZEPATIDE 2.5 MG/.5ML
2.5 INJECTION, SOLUTION SUBCUTANEOUS
Qty: 4 PEN | Refills: 1 | Status: SHIPPED | OUTPATIENT
Start: 2025-07-09

## 2025-07-10 ENCOUNTER — TELEPHONE (OUTPATIENT)
Dept: INTERNAL MEDICINE | Facility: CLINIC | Age: 50
End: 2025-07-10
Payer: OTHER GOVERNMENT

## 2025-07-10 NOTE — TELEPHONE ENCOUNTER
Prior Authorization Portal   Note from payer: CaseId:[id number];Status:Denied;Review Type:Prior Auth;Appeal Information: Attention:ATTN: [facility] [employer] [address] [address],[place], [place],[address] Phone:[phone] Fax:[phone]; Important - Please read the below note on eAppeals: Please reference the denial letter for information on the rights for an appeal, rationale for the denial, and how to submit an appeal including if any information is needed to support the appeal. Note about urgent situations - Generally, an urgent situation is one which, in the opinion of the provider, the health of the patient may be in serious jeopardy or may experience pain that cannot be adequately controlled while waiting for a decision on the appeal.;  Payer: [employer] Case ID: [id number]  Electronic appeal: Not supported   [employer]

## 2025-07-14 ENCOUNTER — HOSPITAL ENCOUNTER (OUTPATIENT)
Dept: RADIOLOGY | Facility: OTHER | Age: 50
Discharge: HOME | End: 2025-07-14
Attending: STUDENT IN AN ORGANIZED HEALTH CARE EDUCATION/TRAINING PROGRAM
Payer: OTHER GOVERNMENT

## 2025-07-14 DIAGNOSIS — M54.50 ACUTE LEFT-SIDED LOW BACK PAIN WITHOUT SCIATICA: ICD-10-CM

## 2025-07-14 DIAGNOSIS — M25.552 LEFT HIP PAIN: ICD-10-CM

## 2025-07-14 PROCEDURE — 73502 X-RAY EXAM HIP UNI 2-3 VIEWS: CPT | Mod: 26,LT,, | Performed by: RADIOLOGY

## 2025-07-14 PROCEDURE — 72110 X-RAY EXAM L-2 SPINE 4/>VWS: CPT | Mod: TC,FY

## 2025-07-14 PROCEDURE — 72110 X-RAY EXAM L-2 SPINE 4/>VWS: CPT | Mod: 26,,, | Performed by: RADIOLOGY

## 2025-07-14 PROCEDURE — 73502 X-RAY EXAM HIP UNI 2-3 VIEWS: CPT | Mod: TC,FY,LT

## 2025-07-25 DIAGNOSIS — G89.29 CHRONIC PAIN OF LEFT KNEE: ICD-10-CM

## 2025-07-25 DIAGNOSIS — M25.562 CHRONIC PAIN OF LEFT KNEE: ICD-10-CM

## 2025-07-25 RX ORDER — DICLOFENAC SODIUM 50 MG/1
TABLET, DELAYED RELEASE ORAL
Qty: 60 TABLET | Refills: 5 | Status: SHIPPED | OUTPATIENT
Start: 2025-07-25

## 2025-07-28 DIAGNOSIS — M51.362 DEGENERATION OF INTERVERTEBRAL DISC OF LUMBAR REGION WITH DISCOGENIC BACK PAIN AND LOWER EXTREMITY PAIN: Primary | ICD-10-CM

## 2025-07-30 ENCOUNTER — TELEPHONE (OUTPATIENT)
Dept: INTERNAL MEDICINE | Facility: CLINIC | Age: 50
End: 2025-07-30
Payer: OTHER GOVERNMENT

## (undated) DEVICE — SOL NACL IRR 3000ML

## (undated) DEVICE — UNDERGLOVES BIOGEL PI SIZE 8

## (undated) DEVICE — DRAPE STERI U-SHAPED 47X51IN

## (undated) DEVICE — NDL 18GA X1 1/2 REG BEVEL

## (undated) DEVICE — GOWN ECLIPSE REINF LVL4 TWL LG

## (undated) DEVICE — GLOVE PROTEXIS LIGHT BROWN 8.5

## (undated) DEVICE — GLOVE BIOGEL SKINSENSE PI 8.0

## (undated) DEVICE — SUT ETHILON 3-0 PS2 18 BLK

## (undated) DEVICE — APPLICATOR CHLORAPREP ORN 26ML

## (undated) DEVICE — DRAPE TOP 53X102IN

## (undated) DEVICE — GOWN ECLIPSE REINF L4 XLNG XXL

## (undated) DEVICE — UNDERGLOVES BIOGEL PI SZ 7 LF

## (undated) DEVICE — DRESSING N ADH OIL EMUL 3X3

## (undated) DEVICE — PAD CAST SPECIALIST STRL 6

## (undated) DEVICE — BANDAGE MATRIX HK LOOP 6IN 5YD

## (undated) DEVICE — COVER PROXIMA MAYO STAND

## (undated) DEVICE — SOL IRR SOD CHL .9% POUR

## (undated) DEVICE — SEE MEDLINE ITEM 159592

## (undated) DEVICE — Device

## (undated) DEVICE — GAUZE SPONGE 4X4 12PLY

## (undated) DEVICE — COVER LIGHT HANDLE 80/CA

## (undated) DEVICE — SET INFLOW TUBE ARTHSCP

## (undated) DEVICE — GLOVE BIOGEL SKINSENSE PI 6.5

## (undated) DEVICE — WRAP KNEE ACCU THERM GEL PACK

## (undated) DEVICE — GLOVE PROTEXIS LTX MICRO 8

## (undated) DEVICE — TOURNIQUET SB QC DP 34X4IN

## (undated) DEVICE — DRAPE ARTHSCP FLD CTRL POUCH